# Patient Record
Sex: MALE | Race: BLACK OR AFRICAN AMERICAN | NOT HISPANIC OR LATINO | URBAN - METROPOLITAN AREA
[De-identification: names, ages, dates, MRNs, and addresses within clinical notes are randomized per-mention and may not be internally consistent; named-entity substitution may affect disease eponyms.]

---

## 2024-08-02 ENCOUNTER — OUTPATIENT (OUTPATIENT)
Dept: OUTPATIENT SERVICES | Facility: HOSPITAL | Age: 29
LOS: 1 days | End: 2024-08-02

## 2024-08-02 DIAGNOSIS — Z90.81 ACQUIRED ABSENCE OF SPLEEN: Chronic | ICD-10-CM

## 2024-08-02 DIAGNOSIS — Z98.890 OTHER SPECIFIED POSTPROCEDURAL STATES: Chronic | ICD-10-CM

## 2024-08-09 DIAGNOSIS — D57.42 SICKLE-CELL THALASSEMIA BETA ZERO WITHOUT CRISIS: ICD-10-CM

## 2024-08-09 DIAGNOSIS — G89.4 CHRONIC PAIN SYNDROME: ICD-10-CM

## 2024-08-09 DIAGNOSIS — R79.89 OTHER SPECIFIED ABNORMAL FINDINGS OF BLOOD CHEMISTRY: ICD-10-CM

## 2024-08-09 DIAGNOSIS — E83.19 OTHER DISORDERS OF IRON METABOLISM: ICD-10-CM

## 2024-08-24 ENCOUNTER — INPATIENT (INPATIENT)
Facility: HOSPITAL | Age: 29
LOS: 46 days | Discharge: HOME | End: 2024-10-10
Attending: HOSPITALIST | Admitting: HOSPITALIST
Payer: MEDICAID

## 2024-08-24 VITALS
HEART RATE: 90 BPM | RESPIRATION RATE: 20 BRPM | DIASTOLIC BLOOD PRESSURE: 104 MMHG | SYSTOLIC BLOOD PRESSURE: 162 MMHG | TEMPERATURE: 98 F | OXYGEN SATURATION: 93 %

## 2024-08-24 DIAGNOSIS — Z98.890 OTHER SPECIFIED POSTPROCEDURAL STATES: Chronic | ICD-10-CM

## 2024-08-24 DIAGNOSIS — Z90.81 ACQUIRED ABSENCE OF SPLEEN: Chronic | ICD-10-CM

## 2024-08-24 PROCEDURE — 99285 EMERGENCY DEPT VISIT HI MDM: CPT

## 2024-08-24 NOTE — ED ADULT TRIAGE NOTE - CHIEF COMPLAINT QUOTE
c/o right leg pain for the past week, thinks it is his SCC. c/o chest pain and SOB, feels like when he had acute chest. Denies dizziness, headache. Charge RN notified. c/o right leg pain for the past week, thinks it is his SCC. c/o chest pain and SOB, feels like when he had acute chest. Denies dizziness, headache. Charge RN notified. 4L nasal cannula applied.

## 2024-08-25 DIAGNOSIS — R94.31 ABNORMAL ELECTROCARDIOGRAM [ECG] [EKG]: ICD-10-CM

## 2024-08-25 DIAGNOSIS — D57.00 HB-SS DISEASE WITH CRISIS, UNSPECIFIED: ICD-10-CM

## 2024-08-25 DIAGNOSIS — E87.6 HYPOKALEMIA: ICD-10-CM

## 2024-08-25 DIAGNOSIS — Z29.9 ENCOUNTER FOR PROPHYLACTIC MEASURES, UNSPECIFIED: ICD-10-CM

## 2024-08-25 LAB
ALBUMIN SERPL ELPH-MCNC: 2.9 G/DL — LOW (ref 3.3–5)
ALP SERPL-CCNC: 315 U/L — HIGH (ref 40–120)
ALT FLD-CCNC: 42 U/L — HIGH (ref 4–41)
ANION GAP SERPL CALC-SCNC: 14 MMOL/L — SIGNIFICANT CHANGE UP (ref 7–14)
ANISOCYTOSIS BLD QL: SIGNIFICANT CHANGE UP
AST SERPL-CCNC: 94 U/L — HIGH (ref 4–40)
BASOPHILS # BLD AUTO: 0.3 K/UL — HIGH (ref 0–0.2)
BASOPHILS NFR BLD AUTO: 1.9 % — SIGNIFICANT CHANGE UP (ref 0–2)
BILIRUB DIRECT SERPL-MCNC: 3.4 MG/DL — HIGH (ref 0–0.3)
BILIRUB INDIRECT FLD-MCNC: 2.1 MG/DL — HIGH (ref 0–1)
BILIRUB SERPL-MCNC: 5.5 MG/DL — HIGH (ref 0.2–1.2)
BILIRUB SERPL-MCNC: 5.5 MG/DL — HIGH (ref 0.2–1.2)
BUN SERPL-MCNC: 6 MG/DL — LOW (ref 7–23)
CALCIUM SERPL-MCNC: 8.5 MG/DL — SIGNIFICANT CHANGE UP (ref 8.4–10.5)
CHLORIDE SERPL-SCNC: 94 MMOL/L — LOW (ref 98–107)
CO2 SERPL-SCNC: 27 MMOL/L — SIGNIFICANT CHANGE UP (ref 22–31)
CREAT SERPL-MCNC: 0.36 MG/DL — LOW (ref 0.5–1.3)
DACRYOCYTES BLD QL SMEAR: SLIGHT — SIGNIFICANT CHANGE UP
EGFR: 157 ML/MIN/1.73M2 — SIGNIFICANT CHANGE UP
EOSINOPHIL # BLD AUTO: 0.6 K/UL — HIGH (ref 0–0.5)
EOSINOPHIL NFR BLD AUTO: 3.8 % — SIGNIFICANT CHANGE UP (ref 0–6)
GIANT PLATELETS BLD QL SMEAR: PRESENT — SIGNIFICANT CHANGE UP
GLUCOSE SERPL-MCNC: 83 MG/DL — SIGNIFICANT CHANGE UP (ref 70–99)
HCT VFR BLD CALC: 23.2 % — LOW (ref 39–50)
HGB BLD-MCNC: 7 G/DL — CRITICAL LOW (ref 13–17)
HOWELL-JOLLY BOD BLD QL SMEAR: PRESENT — SIGNIFICANT CHANGE UP
HYPOCHROMIA BLD QL: SIGNIFICANT CHANGE UP
IANC: 10.31 K/UL — HIGH (ref 1.8–7.4)
LDH SERPL L TO P-CCNC: 821 U/L — HIGH (ref 135–225)
LYMPHOCYTES # BLD AUTO: 20.7 % — SIGNIFICANT CHANGE UP (ref 13–44)
LYMPHOCYTES # BLD AUTO: 3.26 K/UL — SIGNIFICANT CHANGE UP (ref 1–3.3)
MACROCYTES BLD QL: SIGNIFICANT CHANGE UP
MANUAL SMEAR VERIFICATION: SIGNIFICANT CHANGE UP
MCHC RBC-ENTMCNC: 20.3 PG — LOW (ref 27–34)
MCHC RBC-ENTMCNC: 30.2 GM/DL — LOW (ref 32–36)
MCV RBC AUTO: 67.2 FL — LOW (ref 80–100)
MICROCYTES BLD QL: SLIGHT — SIGNIFICANT CHANGE UP
MONOCYTES # BLD AUTO: 1.78 K/UL — HIGH (ref 0–0.9)
MONOCYTES NFR BLD AUTO: 11.3 % — SIGNIFICANT CHANGE UP (ref 2–14)
MYELOCYTES NFR BLD: 3.8 % — HIGH (ref 0–0)
NEUTROPHILS # BLD AUTO: 9.23 K/UL — HIGH (ref 1.8–7.4)
NEUTROPHILS NFR BLD AUTO: 58.5 % — SIGNIFICANT CHANGE UP (ref 43–77)
NRBC # BLD: 653 /100 WBCS — HIGH (ref 0–0)
PLAT MORPH BLD: ABNORMAL
PLATELET # BLD AUTO: 337 K/UL — SIGNIFICANT CHANGE UP (ref 150–400)
PLATELET COUNT - ESTIMATE: NORMAL — SIGNIFICANT CHANGE UP
POIKILOCYTOSIS BLD QL AUTO: SLIGHT — SIGNIFICANT CHANGE UP
POLYCHROMASIA BLD QL SMEAR: SIGNIFICANT CHANGE UP
POTASSIUM SERPL-MCNC: 3.1 MMOL/L — LOW (ref 3.5–5.3)
POTASSIUM SERPL-SCNC: 3.1 MMOL/L — LOW (ref 3.5–5.3)
PROT SERPL-MCNC: 8.1 G/DL — SIGNIFICANT CHANGE UP (ref 6–8.3)
RBC # BLD: 3.45 M/UL — LOW (ref 4.2–5.8)
RBC # BLD: 3.45 M/UL — LOW (ref 4.2–5.8)
RBC # FLD: 27.9 % — HIGH (ref 10.3–14.5)
RBC BLD AUTO: ABNORMAL
RETICS #: 600.6 K/UL — HIGH (ref 25–125)
RETICS/RBC NFR: 17.4 % — HIGH (ref 0.5–2.5)
SICKLE CELLS BLD QL SMEAR: SLIGHT — SIGNIFICANT CHANGE UP
SODIUM SERPL-SCNC: 135 MMOL/L — SIGNIFICANT CHANGE UP (ref 135–145)
SPHEROCYTES BLD QL SMEAR: SLIGHT — SIGNIFICANT CHANGE UP
TARGETS BLD QL SMEAR: SIGNIFICANT CHANGE UP
TROPONIN T, HIGH SENSITIVITY RESULT: <6 NG/L — SIGNIFICANT CHANGE UP
TROPONIN T, HIGH SENSITIVITY RESULT: <6 NG/L — SIGNIFICANT CHANGE UP
WBC # BLD: 15.77 K/UL — HIGH (ref 3.8–10.5)
WBC # FLD AUTO: 15.77 K/UL — HIGH (ref 3.8–10.5)

## 2024-08-25 PROCEDURE — 99252 IP/OBS CONSLTJ NEW/EST SF 35: CPT | Mod: GC

## 2024-08-25 PROCEDURE — 99223 1ST HOSP IP/OBS HIGH 75: CPT | Mod: GC

## 2024-08-25 PROCEDURE — 71046 X-RAY EXAM CHEST 2 VIEWS: CPT | Mod: 26

## 2024-08-25 RX ORDER — ENOXAPARIN SODIUM 150 MG/ML
40 INJECTION SUBCUTANEOUS EVERY 24 HOURS
Refills: 0 | Status: DISCONTINUED | OUTPATIENT
Start: 2024-08-25 | End: 2024-08-25

## 2024-08-25 RX ORDER — ONDANSETRON HCL/PF 4 MG/2 ML
4 VIAL (ML) INJECTION EVERY 6 HOURS
Refills: 0 | Status: DISCONTINUED | OUTPATIENT
Start: 2024-08-25 | End: 2024-10-10

## 2024-08-25 RX ORDER — KETOROLAC TROMETHAMINE 10 MG/1
10 TABLET, FILM COATED ORAL ONCE
Refills: 0 | Status: DISCONTINUED | OUTPATIENT
Start: 2024-08-25 | End: 2024-08-25

## 2024-08-25 RX ORDER — ACETAMINOPHEN 325 MG
1000 TABLET ORAL ONCE
Refills: 0 | Status: COMPLETED | OUTPATIENT
Start: 2024-08-25 | End: 2024-08-25

## 2024-08-25 RX ORDER — MORPHINE SULFATE 30 MG/1
4 TABLET, FILM COATED, EXTENDED RELEASE ORAL ONCE
Refills: 0 | Status: DISCONTINUED | OUTPATIENT
Start: 2024-08-25 | End: 2024-08-25

## 2024-08-25 RX ORDER — SENNOSIDES 8.6 MG
2 TABLET ORAL AT BEDTIME
Refills: 0 | Status: DISCONTINUED | OUTPATIENT
Start: 2024-08-25 | End: 2024-09-01

## 2024-08-25 RX ORDER — GABAPENTIN 800 MG/1
1 TABLET, FILM COATED ORAL
Refills: 0 | DISCHARGE

## 2024-08-25 RX ORDER — LIDOCAINE 50 MG/G
1 CREAM TOPICAL ONCE
Refills: 0 | Status: COMPLETED | OUTPATIENT
Start: 2024-08-25 | End: 2024-08-25

## 2024-08-25 RX ORDER — NALOXONE HYDROCHLORIDE 0.4 MG/ML
0.1 INJECTION, SOLUTION INTRAMUSCULAR; INTRAVENOUS; SUBCUTANEOUS
Refills: 0 | Status: DISCONTINUED | OUTPATIENT
Start: 2024-08-25 | End: 2024-10-10

## 2024-08-25 RX ORDER — SODIUM CHLORIDE IRRIG SOLUTION 0.9 %
1000 SOLUTION, IRRIGATION IRRIGATION
Refills: 0 | Status: DISCONTINUED | OUTPATIENT
Start: 2024-08-25 | End: 2024-09-01

## 2024-08-25 RX ORDER — ENOXAPARIN SODIUM 150 MG/ML
30 INJECTION SUBCUTANEOUS EVERY 24 HOURS
Refills: 0 | Status: DISCONTINUED | OUTPATIENT
Start: 2024-08-25 | End: 2024-09-19

## 2024-08-25 RX ORDER — ACETAMINOPHEN 325 MG
2 TABLET ORAL
Refills: 0 | DISCHARGE

## 2024-08-25 RX ORDER — HYDROXYUREA 500 MG/1
1000 CAPSULE ORAL
Refills: 0 | Status: DISCONTINUED | OUTPATIENT
Start: 2024-08-25 | End: 2024-08-26

## 2024-08-25 RX ORDER — MORPHINE SULFATE 30 MG/1
30 TABLET, FILM COATED, EXTENDED RELEASE ORAL
Refills: 0 | Status: DISCONTINUED | OUTPATIENT
Start: 2024-08-25 | End: 2024-08-26

## 2024-08-25 RX ORDER — KETOROLAC TROMETHAMINE 10 MG/1
15 TABLET, FILM COATED ORAL ONCE
Refills: 0 | Status: DISCONTINUED | OUTPATIENT
Start: 2024-08-25 | End: 2024-08-25

## 2024-08-25 RX ORDER — MORPHINE SULFATE 30 MG/1
2 TABLET, FILM COATED, EXTENDED RELEASE ORAL ONCE
Refills: 0 | Status: DISCONTINUED | OUTPATIENT
Start: 2024-08-25 | End: 2024-08-25

## 2024-08-25 RX ORDER — ACETAMINOPHEN 325 MG
650 TABLET ORAL EVERY 6 HOURS
Refills: 0 | Status: DISCONTINUED | OUTPATIENT
Start: 2024-08-25 | End: 2024-08-26

## 2024-08-25 RX ORDER — TYROSINE 500 MG
2 CAPSULE ORAL
Refills: 0 | DISCHARGE

## 2024-08-25 RX ORDER — MORPHINE SULFATE 30 MG/1
15 TABLET, FILM COATED, EXTENDED RELEASE ORAL
Refills: 0 | Status: DISCONTINUED | OUTPATIENT
Start: 2024-08-25 | End: 2024-08-31

## 2024-08-25 RX ORDER — CRIZANLIZUMAB 10 MG/ML
225 INJECTION INTRAVENOUS
Refills: 0 | DISCHARGE

## 2024-08-25 RX ORDER — TYROSINE 500 MG
10 CAPSULE ORAL
Refills: 0 | Status: DISCONTINUED | OUTPATIENT
Start: 2024-08-25 | End: 2024-10-10

## 2024-08-25 RX ORDER — HYDROXYUREA 500 MG/1
750 CAPSULE ORAL
Refills: 0 | Status: DISCONTINUED | OUTPATIENT
Start: 2024-08-25 | End: 2024-08-26

## 2024-08-25 RX ORDER — ACETAMINOPHEN 325 MG
1000 TABLET ORAL ONCE
Refills: 0 | Status: COMPLETED | OUTPATIENT
Start: 2024-08-25 | End: 2024-08-26

## 2024-08-25 RX ORDER — FOLIC ACID 1 MG/1
1 TABLET ORAL DAILY
Refills: 0 | Status: DISCONTINUED | OUTPATIENT
Start: 2024-08-25 | End: 2024-10-10

## 2024-08-25 RX ADMIN — FOLIC ACID 1 MILLIGRAM(S): 1 TABLET ORAL at 13:15

## 2024-08-25 RX ADMIN — Medication 400 MILLIGRAM(S): at 03:34

## 2024-08-25 RX ADMIN — KETOROLAC TROMETHAMINE 15 MILLIGRAM(S): 10 TABLET, FILM COATED ORAL at 03:13

## 2024-08-25 RX ADMIN — Medication 10 GRAM(S): at 18:48

## 2024-08-25 RX ADMIN — KETOROLAC TROMETHAMINE 10 MILLIGRAM(S): 10 TABLET, FILM COATED ORAL at 09:05

## 2024-08-25 RX ADMIN — KETOROLAC TROMETHAMINE 10 MILLIGRAM(S): 10 TABLET, FILM COATED ORAL at 08:52

## 2024-08-25 RX ADMIN — MORPHINE SULFATE 4 MILLIGRAM(S): 30 TABLET, FILM COATED, EXTENDED RELEASE ORAL at 04:46

## 2024-08-25 RX ADMIN — Medication 100 MILLILITER(S): at 10:40

## 2024-08-25 RX ADMIN — MORPHINE SULFATE 4 MILLIGRAM(S): 30 TABLET, FILM COATED, EXTENDED RELEASE ORAL at 05:25

## 2024-08-25 RX ADMIN — Medication 40 MILLIEQUIVALENT(S): at 18:48

## 2024-08-25 RX ADMIN — MORPHINE SULFATE 30 MILLILITER(S): 30 TABLET, FILM COATED, EXTENDED RELEASE ORAL at 10:42

## 2024-08-25 RX ADMIN — LIDOCAINE 1 PATCH: 50 CREAM TOPICAL at 04:46

## 2024-08-25 RX ADMIN — Medication 40 MILLIEQUIVALENT(S): at 13:15

## 2024-08-25 RX ADMIN — LIDOCAINE 1 PATCH: 50 CREAM TOPICAL at 07:19

## 2024-08-25 RX ADMIN — MORPHINE SULFATE 4 MILLIGRAM(S): 30 TABLET, FILM COATED, EXTENDED RELEASE ORAL at 02:11

## 2024-08-25 RX ADMIN — Medication 1000 MILLIGRAM(S): at 13:12

## 2024-08-25 RX ADMIN — MORPHINE SULFATE 4 MILLIGRAM(S): 30 TABLET, FILM COATED, EXTENDED RELEASE ORAL at 01:11

## 2024-08-25 RX ADMIN — MORPHINE SULFATE 2 MILLIGRAM(S): 30 TABLET, FILM COATED, EXTENDED RELEASE ORAL at 07:01

## 2024-08-25 RX ADMIN — ENOXAPARIN SODIUM 30 MILLIGRAM(S): 150 INJECTION SUBCUTANEOUS at 13:15

## 2024-08-25 RX ADMIN — KETOROLAC TROMETHAMINE 15 MILLIGRAM(S): 10 TABLET, FILM COATED ORAL at 02:11

## 2024-08-25 RX ADMIN — MORPHINE SULFATE 15 MILLIGRAM(S): 30 TABLET, FILM COATED, EXTENDED RELEASE ORAL at 18:42

## 2024-08-25 RX ADMIN — MORPHINE SULFATE 30 MILLILITER(S): 30 TABLET, FILM COATED, EXTENDED RELEASE ORAL at 20:50

## 2024-08-25 RX ADMIN — Medication 400 MILLIGRAM(S): at 12:57

## 2024-08-25 RX ADMIN — KETOROLAC TROMETHAMINE 15 MILLIGRAM(S): 10 TABLET, FILM COATED ORAL at 01:10

## 2024-08-25 RX ADMIN — MORPHINE SULFATE 4 MILLIGRAM(S): 30 TABLET, FILM COATED, EXTENDED RELEASE ORAL at 03:13

## 2024-08-25 RX ADMIN — Medication 1000 MILLIGRAM(S): at 04:39

## 2024-08-25 NOTE — H&P ADULT - NSHPSOCIALHISTORY_GEN_ALL_CORE
Lives in apartment with mother and grandmother. 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 Denies alcohol, drug, tobacco use.

## 2024-08-25 NOTE — H&P ADULT - NSHPLABSRESULTS_GEN_ALL_CORE
LABS:                          7.0    15.77 )-----------( 337      ( 25 Aug 2024 01:10 )             23.2     08-25    135  |  94<L>  |  6<L>  ----------------------------<  83  3.1<L>   |  27  |  0.36<L>    Ca    8.5      25 Aug 2024 01:10    TPro  8.1  /  Alb  2.9<L>  /  TBili  5.5<H>  /  DBili  3.4<H>  /  AST  94<H>  /  ALT  42<H>  /  AlkPhos  315<H>  08-25    LIVER FUNCTIONS - ( 25 Aug 2024 01:10 )  Alb: 2.9 g/dL / Pro: 8.1 g/dL / ALK PHOS: 315 U/L / ALT: 42 U/L / AST: 94 U/L / GGT: x                     Urinalysis Basic - ( 25 Aug 2024 01:10 )    Color: x / Appearance: x / SG: x / pH: x  Gluc: 83 mg/dL / Ketone: x  / Bili: x / Urobili: x   Blood: x / Protein: x / Nitrite: x   Leuk Esterase: x / RBC: x / WBC x   Sq Epi: x / Non Sq Epi: x / Bacteria: x      Radiology:  CXR 8/25/24  IMPRESSION:  No focal consolidation.

## 2024-08-25 NOTE — H&P ADULT - ATTENDING SUPERVISION STATEMENT
Discussed condition and exacerbating conditions/situations (e.g., dry/arid environments, overhead fans, air conditioners, side effect of medications). Resident

## 2024-08-25 NOTE — ED PROVIDER NOTE - ATTENDING CONTRIBUTION TO CARE
29-year-old male with history of sickle cell anemia, previous splenectomy presenting with right knee pain x 1 week and lateral torso pain x 2 days.  Patient reports symptoms are typical of his vaso-occlusive crises.  Denies associated fever, chills, shortness of breath, cough or URI symptoms.  He has been taking his home medications without any relief.  No preceding injury or fall.    Thin, lying in stretcher, awake and alert, nontoxic.  AF/VSS.  NCAT (+)scleral icterus.  Lungs cta bl.  Cards nl S1/S2, RRR, no MRG.  Abd soft ntnd.  No pedal edema or calf tenderness.  All extremities intact, no gross deformities, no focal neuro deficits.    Suspect vaso-occlusive crisis, rule out acute chest, underlying infection, worsening anemia, cardiac event.  Plan for EKG, labs, pain control, likely admit.

## 2024-08-25 NOTE — H&P ADULT - HISTORY OF PRESENT ILLNESS
24M with h/o sickle cell disease (h/o ACS, previous splenectomy) here with low back, bilateral knee, and left ankle pain since 9pm last night.  Pt has been taking home PO morphine and gabapentin without any relief.  He reports this pain is typical of his pain crisis.  Denies any fever, chills, cp, sob, abd pain, n/v/c/d.  No recent illnesses.  24M with sickle cell disease (hx acute chest syndrome, previous splenectomy) with L hip osteonecrosis and b/l shoulder osteonecrosis who presented to hospital for R knee pain and chest pain.     Patient states that he began noticing worsening R knee pain with swelling 1 week ago. 3 days ago started having R and L lateral chest pain. Yesterday started noticing b/l feet, hand, and forearm pain.  Home medications of PO morphine and gabapentin did not alleviate the pain. Denies any fever, chills, cp, sob, abd pain, n/v/c/d, no recent illness. His last hospitalization for sickle cell crisis was in 10/2023 and states that his current pain is similar to previous crisis pain. Has been following with his PCP Dr. Montano for management. States that he was getting Adakveo infusions, last received 2 months ago and was suppose to get monthly infusion but cancelled appointment due to being sick at the time.    In ED, vitals significant for /104, sating well on RA. Labs with Hb 7, K 3.1, elevated LFTs total bili 5.5, , AST 94, ALT 42. Troponin neg. CXR with course interstitial marking with no consolidation and avascular necrosis bL on humeral heads. Given toradol 15 MG x2, morphine 4 MG x3, morphine 2 MG x1, and tylenol. Patient seen and examined at bedside and reported 8/10 pain. Given another 10 MG toradol. Heme consulted.  24M with sickle cell disease (hx acute chest syndrome, previous splenectomy) with L hip osteonecrosis and b/l shoulder osteonecrosis who presented to hospital for R knee pain and chest pain.     Patient states that he began noticing worsening R knee pain with swelling 1 week ago. 3 days ago started having R and L lateral chest pain. Yesterday started noticing b/l feet, hand, and forearm pain.  Home medications of PO morphine and gabapentin did not alleviate the pain. Denies any fever, chills, cp, sob, abd pain, n/v/c/d, no recent illness. His last hospitalization for sickle cell crisis was in 10/2023 and states that his current pain is similar to previous crisis pain. Has been following with his PCP Dr. Montano for management. States that he was getting Adakveo infusions, last received 2 months ago and was suppose to get monthly infusion but cancelled appointment due to being sick at the time.    In ED, vitals significant for /104, sating well on RA. Labs with Hb 7, K 3.1, elevated LFTs total bili 5.5, , AST 94, ALT 42. EKG with TWI in V1-V3. Troponin neg. CXR with course interstitial marking with no consolidation and avascular necrosis bL on humeral heads. Given toradol 15 MG x2, morphine 4 MG x3, morphine 2 MG x1, and tylenol. Patient seen and examined at bedside and reported 8/10 pain. Given another 10 MG toradol. Heme consulted.

## 2024-08-25 NOTE — ED PROVIDER NOTE - NSICDXFAMILYHX_GEN_ALL_CORE_FT
FAMILY HISTORY:  Family history of sickle cell trait, mother and father with sickle cell trait, only child  Family history of sickle cell trait in father  Family history of sickle cell trait in mother

## 2024-08-25 NOTE — ED PROVIDER NOTE - NSICDXPASTMEDICALHX_GEN_ALL_CORE_FT
PAST MEDICAL HISTORY:  Acute chest syndrome     Acute chest syndrome     Cholelithiasis     Disease of biliary tract     Lumbago     Sickle Cell Disease     Sickle cell disease, type S beta-zero thalassemia, with splenic sequestration

## 2024-08-25 NOTE — H&P ADULT - NSHPPHYSICALEXAM_GEN_ALL_CORE
VITALS:   T(C): 36.6 (08-25-24 @ 07:21), Max: 36.9 (08-25-24 @ 04:49)  HR: 95 (08-25-24 @ 07:21) (73 - 105)  BP: 135/91 (08-25-24 @ 07:21) (131/93 - 162/104)  RR: 15 (08-25-24 @ 07:21) (12 - 20)  SpO2: 97% (08-25-24 @ 07:21) (93% - 99%)    GENERAL: NAD, very thin  HEAD:  Atraumatic, Normocephalic  EYES: EOMI, PERRLA, conjunctiva and sclera clear  ENT: Moist mucous membranes  NECK: Supple, No JVD  CHEST/LUNG: Clear to auscultation bilaterally; No rales, rhonchi, wheezing, or rubs. Unlabored respirations  HEART: Regular rate and rhythm; No murmurs, rubs, or gallops  ABDOMEN: BSx4; Soft, nontender, nondistended  EXTREMITIES:  R knee swelling, ROM intact. 2+ Peripheral Pulses, brisk capillary refill. No clubbing, cyanosis, or edema  NERVOUS SYSTEM:  A&Ox3, no focal deficits   SKIN: No rashes or lesions  Psych: Normal speech, normal behavior, normal affect

## 2024-08-25 NOTE — PATIENT PROFILE ADULT - FALL HARM RISK - HARM RISK INTERVENTIONS
Assistance with ambulation/Assistance OOB with selected safe patient handling equipment/Communicate Risk of Fall with Harm to all staff/Reinforce activity limits and safety measures with patient and family/Review medications for side effects contributing to fall risk/Sit up slowly, dangle for a short time, stand at bedside before walking/Tailored Fall Risk Interventions/Toileting schedule using arm’s reach rule for commode and bathroom/Visual Cue: Yellow wristband and red socks/Bed in lowest position, wheels locked, appropriate side rails in place/Call bell, personal items and telephone in reach/Instruct patient to call for assistance before getting out of bed or chair/Non-slip footwear when patient is out of bed/[place] to call system/Physically safe environment - no spills, clutter or unnecessary equipment/Purposeful Proactive Rounding/Room/bathroom lighting operational, light cord in reach

## 2024-08-25 NOTE — ED ADULT NURSE NOTE - CHIEF COMPLAINT QUOTE
Reason for Call:  Other order question    Detailed comments: Has questions regarding an order - when draw dates should be? If they can be a day before or after and please fax order to [phone]    Phone Number Patient can be reached at: Other phone number:  [phone]    Best Time: anytime    Can we leave a detailed message on this number? YES    Call taken on 6/15/2022 at 9:15 AM by Jinny Hu    
Shawn from patient facility requesting recheck date for INR be changed to Wednesdays for patient. There are multiple patients needing to be rechecked on Weds. Approval given and order faxed to Shawn at [phone].    Thanks! Amy Santizo RN    
c/o right leg pain for the past week, thinks it is his SCC. c/o chest pain and SOB, feels like when he had acute chest. Denies dizziness, headache. Charge RN notified. 4L nasal cannula applied.

## 2024-08-25 NOTE — CONSULT NOTE ADULT - ASSESSMENT
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 Hematology consulted for sickle cell crisis and to rule out acute chest syndrome.    Pt follows with Dr. Montano. Missed dose of adakveo for last 2 months. Pt with inadeduate pain control at this time , will need palliative care for pain regimen. Chest pain has resolved, no hypoxia, no consolidation in CXR. Acute chest syndrome less likely.     # Sickle cell pain crises  # Chest pain ( resolved)  # R knee pain and swelling  - Obtain palliative care consult for pain management ( PCA pump adjustment)  - resume home hydrea 1500mg daily, endari 10mg PO BID, oxbryta 1500mg daily  - incentive spirometry  - daily CBC with Diff, CMP, LDH, Haptoglobin, retic count, bilirubin fractionation  - obtain X ray knee  - obtain serum uric acid    Pt seen at bedside and discussed with attending Dr. Daniel Cummings.    Timi Green MD  [id number]  Hematology-Oncology Fellow  [facility]

## 2024-08-25 NOTE — H&P ADULT - PROBLEM SELECTOR PLAN 1
- follows with Dr. Montano, missed montly dose of Adakveo for 2 months which may explain SCC  - with R knee pain, CP  - low concern for acute chest given no opacities on CXR, chest pain more consistent with SCC  - hematology consulted  A/P:  - f/u R knee XR  - c/w 1/2 NS  - pain control with home morphine 15 MG ER, pca morphine pump with bowel regimen  - resume home hydrea and endari  - resume home morphine ER 15mg bid  - incentive spirometer  - daily labs, CBC w/ diff, LDH, haptoglobin, CMP, retic count  - will continue to monitor for signs of acute chest syndrome. If fever, culture and repeat CXR stat  - trend CBC, and transfuse for Hb < 7, consent obtained hgb 7

## 2024-08-25 NOTE — H&P ADULT - NSICDXFAMILYHX_GEN_ALL_CORE_FT
FAMILY HISTORY:  Family history of sickle cell trait, mother and father with sickle cell trait, only child  Family history of sickle cell trait in father  Family history of sickle cell trait in mother  FHx: dementia  FHx: hypertension

## 2024-08-25 NOTE — CONSULT NOTE ADULT - SUBJECTIVE AND OBJECTIVE BOX
HPI:  24M with sickle cell disease (hx acute chest syndrome, previous splenectomy) with L hip osteonecrosis and b/l shoulder osteonecrosis who presented to hospital for R knee pain and chest pain.     Patient states that he began noticing worsening R knee pain with swelling 1 week ago. 3 days ago started having R and L lateral chest pain. Yesterday started noticing b/l feet, hand, and forearm pain.  Home medications of PO morphine and gabapentin did not alleviate the pain. Denies any fever, chills, cp, sob, abd pain, n/v/c/d, no recent illness. His last hospitalization for sickle cell crisis was in 10/2023 and states that his current pain is similar to previous crisis pain. Has been following with his PCP Dr. Montano for management. States that he was getting Adakveo infusions, last received 2 months ago and was suppose to get monthly infusion but cancelled appointment due to being sick at the time.    In ED, vitals significant for /104, sating well on RA. Labs with Hb 7, K 3.1, elevated LFTs total bili 5.5, , AST 94, ALT 42. EKG with TWI in V1-V3. Troponin neg. CXR with course interstitial marking with no consolidation and avascular necrosis bL on humeral heads. Given toradol 15 MG x2, morphine 4 MG x3, morphine 2 MG x1, and tylenol. Patient seen and examined at bedside and reported 8/10 pain. Given another 10 MG toradol.     Hematology was consulted to rule out acute chest syndrome.    PAST MEDICAL & SURGICAL HISTORY:  Sickle Cell Disease      Disease of biliary tract      Lumbago      Acute chest syndrome      Sickle cell disease, type S beta-zero thalassemia, with splenic sequestration      Acute chest syndrome      Cholelithiasis      History of Splenectomy  3y/o      S/P splenectomy      History of biliary stent insertion          Allergies    No Known Allergies    Intolerances    Dilaudid (Other)      MEDICATIONS  (STANDING):  enoxaparin Injectable 30 milliGRAM(s) SubCutaneous every 24 hours  folic acid 1 milliGRAM(s) Oral daily  glutamine Powder 10 Gram(s) Oral two times a day  hydroxyurea 1000 milliGRAM(s) Oral <User Schedule>  hydroxyurea 750 milliGRAM(s) Oral <User Schedule>  morphine ER Tablet 15 milliGRAM(s) Oral two times a day  morphine PCA (5 mG/mL) 30 milliLiter(s) PCA Continuous PCA Continuous  polyethylene glycol 3350 17 Gram(s) Oral daily  potassium chloride   Powder 40 milliEquivalent(s) Oral every 4 hours  senna 2 Tablet(s) Oral at bedtime  sodium chloride 0.45%. 1000 milliLiter(s) (100 mL/Hr) IV Continuous <Continuous>    MEDICATIONS  (PRN):  naloxone Injectable 0.1 milliGRAM(s) IV Push every 3 minutes PRN For ANY of the following changes in patient status:  A. RR LESS THAN 10 breaths per minute, B. Oxygen saturation LESS THAN 90%, C. Sedation score of 6  ondansetron Injectable 4 milliGRAM(s) IV Push every 6 hours PRN Nausea      FAMILY HISTORY:  Family history of sickle cell trait  mother and father with sickle cell trait, only child    Family history of sickle cell trait in father    Family history of sickle cell trait in mother    FHx: hypertension    FHx: dementia        SOCIAL HISTORY: No EtOH, no tobacco    REVIEW OF SYSTEMS:    CONSTITUTIONAL: No weakness, fevers or chills  EYES/ENT: No visual changes;  No vertigo or throat pain   NECK: No pain or stiffness  RESPIRATORY: No cough, wheezing, hemoptysis; No shortness of breath  CARDIOVASCULAR: No chest pain or palpitations  GASTROINTESTINAL: No abdominal or epigastric pain. No nausea, vomiting, or hematemesis; No diarrhea or constipation. No melena or hematochezia.  GENITOURINARY: No dysuria, frequency or hematuria  NEUROLOGICAL: No numbness or weakness  SKIN: No itching, burning, rashes, or lesions   All other review of systems is negative unless indicated above.        T(F): 98 (08-25-24 @ 14:53), Max: 98.5 (08-25-24 @ 04:49)  HR: 94 (08-25-24 @ 14:53)  BP: 134/81 (08-25-24 @ 14:53)  RR: 17 (08-25-24 @ 14:53)  SpO2: 97% (08-25-24 @ 14:53)  Wt(kg): --    GENERAL: NAD, well-developed  HEAD:  Atraumatic, Normocephalic  EYES: EOMI, PERRLA, conjunctiva and sclera clear  NECK: Supple, No JVD  CHEST/LUNG: Clear to auscultation bilaterally; No wheeze  HEART: Regular rate and rhythm; No murmurs, rubs, or gallops  ABDOMEN: Soft, Nontender, Nondistended; Bowel sounds present  EXTREMITIES:  R Knee swollen, warm, tender to touch.   NEUROLOGY: non-focal  SKIN: No rashes or lesions                          7.0    15.77 )-----------( 337      ( 25 Aug 2024 01:10 )             23.2       08-25    135  |  94<L>  |  6<L>  ----------------------------<  83  3.1<L>   |  27  |  0.36<L>    Ca    8.5      25 Aug 2024 01:10    TPro  8.1  /  Alb  2.9<L>  /  TBili  5.5<H>  /  DBili  3.4<H>  /  AST  94<H>  /  ALT  42<H>  /  AlkPhos  315<H>  08-25      Lactate Dehydrogenase, Serum: 821 U/L (08-25 @ 01:10)

## 2024-08-25 NOTE — H&P ADULT - ATTENDING COMMENTS
Patient seen and examined, case d/w house staff.    29M with SCD, hx of acute chest, L hip/shoulder osteonecrosis, splenectomy, p/w 1 wk hx of worsening pains in his right knee/foot, chest ribs, progressing to b/l forearm pain, has been taking morphine IR 15 mg 4x/day (usual dose once daily) in addition to long acting Morphine 15mg bid. He reports he got Adakveo(crizanlizumab) infusion in June 20's, missed last infusion. He denies fever/chill/chest pain/sob/productive cough /gi or gu symptoms.     PE: cachectic, frail young man, lungs clear, heart regular, abdomen soft, nt/nd, extrem: R knee ttp, warm, muscle atrophy    Labs: wbc 15.7, hb 7 (hb 7.2 in 2022 admission) plts 337, Tn<6 x2, k 3.1, cr 0.36, tb 5.5, ap 315, ast/alt 94/42, ldh 821, indirect bili 2.1, direct bili 3.4, CXR no focal infiltrate,   Home meds reviewed: folic acid 1mg, hydrea 1g M-F, 750mg Sat-Sun, Endari 10g bid, pepcid 20mg prn, morphine ER 15mg bid, IR 15mg qd (in crisis 3-4x/day)    A/P:  # sickle crisis  - 1/2 NS  - pca morphine pump 1.5mg q6min, 4h lockout 20mg, reports intolerance to dilaudid pca ; bowel regimen  - resume home hydrea and endari  - resume home morphine ER 15mg bid  - incentive spirometer  -daily labs, CBC w/ diff, LDH, haptoglobin, CMP, retic count  - monitor for signs of acute chest syndrome, if fever, culture and repeat CXR stat  - trend CBC, hgb 7, no transfusion for now  - DVT ppx lovenox    # abnormal ECG, TWI V1-V3 noted and reviewed, check TTE, likely demand ischemia in s/o sickle crisis/anemia, Tn negative Patient seen and examined, case d/w house staff.    29M with SCD, hx of acute chest, L hip/shoulder osteonecrosis, splenectomy, p/w 1 wk hx of worsening pains in his right knee/foot, chest ribs, progressing to b/l forearm pain, has been taking morphine IR 15 mg 4x/day (usual dose once daily) in addition to long acting Morphine 15mg bid. He reports he got Adakveo(crizanlizumab) infusion in June 20's, missed last infusion. He denies fever/chill/chest pain/sob/productive cough /gi or gu symptoms.     PE: cachectic, frail young man, lungs clear, heart regular, abdomen soft, nt/nd, extrem: R knee ttp, warm, muscle atrophy    Labs: wbc 15.7, hb 7 (hb 7.2 in 2022 admission) plts 337, Tn<6 x2, k 3.1, cr 0.36, tb 5.5, ap 315, ast/alt 94/42, ldh 821, indirect bili 2.1, direct bili 3.4, CXR no focal infiltrate,   Home meds reviewed: folic acid 1mg, hydrea 1g M-F, 750mg Sat-Sun, Endari 10g bid, pepcid 20mg prn, morphine ER 15mg bid, IR 15mg qd (in crisis 3-4x/day)    A/P:  # sickle crisis, pt missed Adakveo(crizanlizumab) infusion in July  - 1/2 NS  - pca morphine pump 1.5mg q6min, 4h lockout 20mg, reports intolerance to dilaudid pca ; bowel regimen  - resume home hydrea and endari  - resume home morphine ER 15mg bid  - incentive spirometer  -daily labs, CBC w/ diff, LDH, haptoglobin, CMP, retic count  - monitor for signs of acute chest syndrome, if fever, culture and repeat CXR stat  - trend CBC, hgb 7, no transfusion for now  - replete lytes, hypokalemia replete, keep K>4 and Mg>2  - DVT ppx lovenox    # abnormal ECG, TWI V1-V3 noted and reviewed, check TTE, likely demand ischemia in s/o sickle crisis/anemia, Tn negative

## 2024-08-25 NOTE — PATIENT PROFILE ADULT - FUNCTIONAL ASSESSMENT - BASIC MOBILITY 6.
3-calculated by average/Not able to assess (calculate score using [facility] averaging method)

## 2024-08-25 NOTE — H&P ADULT - PROBLEM SELECTOR PLAN 4
- Fluids: 1/2 NS  - Electrolytes: Will replete to maintain K>4, Phos>3, and Mag>2  - Diet: regular  - Activity: as toelrated  - DVT Prophylaxis: lovenox  - Disposition: pending medical optimization

## 2024-08-25 NOTE — ED ADULT NURSE REASSESSMENT NOTE - NS ED NURSE REASSESS COMMENT FT1
Patient still c/o pain; MD Guillen made aware, patient medicated per chart. Patient admitted to tele pending bed assignment. Patient on cardia monitor; NSR. Comfort measures maintained. Bed in lowest position. Safety maintained.

## 2024-08-25 NOTE — ED PROVIDER NOTE - CLINICAL SUMMARY MEDICAL DECISION MAKING FREE TEXT BOX
29-year-old male with PMH sickle cell disease with multiple episodes of acute chest syndrome, left hip osteonecrosis, osteonecrosis of his shoulders, asplenic, on penicillin, morphine 15 mg p.o. at home, albuterol presents for 1 week of right knee pain and 2 days of left-sided lateral chest and right-sided lateral chest pain consistent with prior sickle cell pain crises and acute chest syndrome episodes.  Denies fever, chills, cough, congestion, rhinorrhea, shortness of breath.  Denies nausea, vomiting, abdominal pain. Denies trauma or falls.      Gen: well appearing, NAD  HEENT: +scleral icterus b/l  Cardiac: regular rate rhythm, normal S1S2  Chest: CTA BL, no wheeze or crackles  Abdomen: normal BS, soft, NT  Extremity: no gross deformity, good perfusion, MAEx4, +R knee swelling and warmth but intact passive ROM  Skin: no rash  Neuro: grossly normal    Concern for sickle cell pain crisis vs acute chest. WIll obtain labs, ekg, trop, give analgesia. Dispo pending results and per clinical course.

## 2024-08-25 NOTE — ED PROVIDER NOTE - NSICDXPASTSURGICALHX_GEN_ALL_CORE_FT
PAST SURGICAL HISTORY:  History of biliary stent insertion     History of Splenectomy 5y/o    S/P splenectomy

## 2024-08-25 NOTE — ED ADULT NURSE REASSESSMENT NOTE - NS ED NURSE REASSESS COMMENT FT1
Patient c/o 8/10 right leg pain; MD Guillen made aware, patient to be medicated per chart. VS noted. Respirations even and unlabored, chest rise symmetrical b/l. Denies chest pain, SOB, N/V, fever or chills. Comfort measures maintained. Bed in lowest position. Safety Maintained.

## 2024-08-25 NOTE — PATIENT PROFILE ADULT - HAVE YOU BEEN EATING POORLY BECAUSE OF A DECREASED APPETITE?
You can access the FollowMyHealth Patient Portal offered by [facility] by registering at the following website: [email or web address]. By joining [facility]’s FollowMyHealth portal, you will also be able to view your health information using other applications (apps) compatible with our system.
No (0)

## 2024-08-25 NOTE — ED ADULT NURSE NOTE - OBJECTIVE STATEMENT
BREAK RN: Pt received to room 5 a&ox4, ambulatory, on room air coming to ED c/o sickle cell crisis. Pt history of sickle cell, acute chest syndrome. Pt arrives c/o b/l leg pain starting x1 week ago. Pt takes morphine and gabapentin at home, states he has not been taking gabapentin, morphine not helping pain. Pt states x3 days ago, chest pain started, states it hurts when breathing. Pt arrives to room on 2LNC, pt transferred to room air, o2 96-98%. Pt respirations even and unlabored, chest rise and fall equal b/l. Pt denies HA, dizziness, N/V/D, fever/chills. 20g placed to RAC, labs collected and sent. Pt medicated per EMAR. Pt pending XR. Stretcher in lowest position, call bell within reach, pt safety maintained.

## 2024-08-25 NOTE — H&P ADULT - NSHPREVIEWOFSYSTEMS_GEN_ALL_CORE
REVIEW OF SYSTEMS:  CONSTITUTIONAL: No weakness, fevers, chills, sick contacts, or unintended weight loss  EYES: No visual changes or vertigo  ENT: No throat pain, rhinorrhea, or hearing loss   NECK: No pain or stiffness  RESPIRATORY: No cough, wheezing, hemoptysis; No shortness of breath  CARDIOVASCULAR: No chest pain or palpitations  GASTROINTESTINAL: No abdominal or epigastric pain. No nausea, vomiting, or hematemesis; No diarrhea or constipation. No melena or hematochezia.  GENITOURINARY: No dysuria, frequency or hematuria  NEUROLOGICAL: No numbness or weakness  SKIN: No itching, rashes, or bruises  Psych: Good mood, no substance use REVIEW OF SYSTEMS:  CONSTITUTIONAL: No weakness, fevers, chills, sick contacts, or unintended weight loss  EYES: No visual changes or vertigo  ENT: No throat pain, rhinorrhea, or hearing loss   NECK: No pain or stiffness  RESPIRATORY: No cough, wheezing, hemoptysis; No shortness of breath  CARDIOVASCULAR: No chest pain or palpitations  GASTROINTESTINAL: No abdominal or epigastric pain. No nausea, vomiting, or hematemesis; No diarrhea or constipation. No melena or hematochezia.  GENITOURINARY: No dysuria, frequency or hematuria  NEUROLOGICAL: No numbness or weakness  SKIN: No itching, rashes, or bruises  MSK: per HPI  Psych: Good mood, no substance use

## 2024-08-25 NOTE — H&P ADULT - ASSESSMENT
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 Hematology consulted.

## 2024-08-25 NOTE — ED PROVIDER NOTE - CARE PLAN
Add 52 Modifier (Optional): no Detail Level: Zone Consent: The patient's consent was obtained including but not limited to risks of crusting, scabbing, blistering, scarring, darker or lighter pigmentary change, recurrence, incomplete removal and infection. Medical Necessity Information: It is in your best interest to select a reason for this procedure from the list below. All of these items fulfill various CMS LCD requirements except the new and changing color options. Post-Care Instructions: I reviewed with the patient in detail post-care instructions. Patient is to avoid picking at any of the treated lesions. Pt may apply Vaseline to crusted or scabbing areas. Medical Necessity Clause: This procedure was medically necessary because the lesions that were treated were: Principal Discharge DX:	Sickle cell pain crisis   Render Post-Care Instructions In Note?: yes 1

## 2024-08-25 NOTE — CONSULT NOTE ADULT - ATTENDING COMMENTS
30 y/o man with sickle cell disease admitted with pain crisis, no fever or lung consolidation to suggest ACS, no neurologic compromise, Pain control w PCA pump via primary team or palliative care.  Gopal Cummings MD PhD  Oncology Attending

## 2024-08-26 DIAGNOSIS — M25.561 PAIN IN RIGHT KNEE: ICD-10-CM

## 2024-08-26 LAB
ALBUMIN SERPL ELPH-MCNC: 2.5 G/DL — LOW (ref 3.3–5)
ALP SERPL-CCNC: 277 U/L — HIGH (ref 40–120)
ALT FLD-CCNC: 30 U/L — SIGNIFICANT CHANGE UP (ref 4–41)
ANION GAP SERPL CALC-SCNC: 13 MMOL/L — SIGNIFICANT CHANGE UP (ref 7–14)
APPEARANCE UR: ABNORMAL
AST SERPL-CCNC: 76 U/L — HIGH (ref 4–40)
BACTERIA # UR AUTO: ABNORMAL /HPF
BASOPHILS # BLD AUTO: 0.1 K/UL — SIGNIFICANT CHANGE UP (ref 0–0.2)
BASOPHILS NFR BLD AUTO: 0.7 % — SIGNIFICANT CHANGE UP (ref 0–2)
BILIRUB SERPL-MCNC: 3.9 MG/DL — HIGH (ref 0.2–1.2)
BILIRUB UR-MCNC: ABNORMAL
BLD GP AB SCN SERPL QL: NEGATIVE — SIGNIFICANT CHANGE UP
BUN SERPL-MCNC: 5 MG/DL — LOW (ref 7–23)
CALCIUM SERPL-MCNC: 8.2 MG/DL — LOW (ref 8.4–10.5)
CAST: 1 /LPF — SIGNIFICANT CHANGE UP (ref 0–4)
CHLORIDE SERPL-SCNC: 100 MMOL/L — SIGNIFICANT CHANGE UP (ref 98–107)
CO2 SERPL-SCNC: 22 MMOL/L — SIGNIFICANT CHANGE UP (ref 22–31)
COLOR SPEC: SIGNIFICANT CHANGE UP
COMMENT - URINE: SIGNIFICANT CHANGE UP
CREAT SERPL-MCNC: 0.28 MG/DL — LOW (ref 0.5–1.3)
DIFF PNL FLD: NEGATIVE — SIGNIFICANT CHANGE UP
EGFR: 170 ML/MIN/1.73M2 — SIGNIFICANT CHANGE UP
EOSINOPHIL # BLD AUTO: 0.02 K/UL — SIGNIFICANT CHANGE UP (ref 0–0.5)
EOSINOPHIL NFR BLD AUTO: 0.1 % — SIGNIFICANT CHANGE UP (ref 0–6)
GLUCOSE SERPL-MCNC: 87 MG/DL — SIGNIFICANT CHANGE UP (ref 70–99)
GLUCOSE UR QL: NEGATIVE MG/DL — SIGNIFICANT CHANGE UP
HAPTOGLOB SERPL-MCNC: <20 MG/DL — LOW (ref 34–200)
HCT VFR BLD CALC: 23.1 % — LOW (ref 39–50)
HGB BLD-MCNC: 6.8 G/DL — CRITICAL LOW (ref 13–17)
IANC: 10.06 K/UL — HIGH (ref 1.8–7.4)
IMM GRANULOCYTES NFR BLD AUTO: 2 % — HIGH (ref 0–0.9)
KETONES UR-MCNC: NEGATIVE MG/DL — SIGNIFICANT CHANGE UP
LDH SERPL L TO P-CCNC: 735 U/L — HIGH (ref 135–225)
LEUKOCYTE ESTERASE UR-ACNC: ABNORMAL
LYMPHOCYTES # BLD AUTO: 21.9 % — SIGNIFICANT CHANGE UP (ref 13–44)
LYMPHOCYTES # BLD AUTO: 3.27 K/UL — SIGNIFICANT CHANGE UP (ref 1–3.3)
MAGNESIUM SERPL-MCNC: 1.8 MG/DL — SIGNIFICANT CHANGE UP (ref 1.6–2.6)
MCHC RBC-ENTMCNC: 19.9 PG — LOW (ref 27–34)
MCHC RBC-ENTMCNC: 29.4 GM/DL — LOW (ref 32–36)
MCV RBC AUTO: 67.7 FL — LOW (ref 80–100)
MONOCYTES # BLD AUTO: 1.21 K/UL — HIGH (ref 0–0.9)
MONOCYTES NFR BLD AUTO: 8.1 % — SIGNIFICANT CHANGE UP (ref 2–14)
NEUTROPHILS # BLD AUTO: 10.06 K/UL — HIGH (ref 1.8–7.4)
NEUTROPHILS NFR BLD AUTO: 67.2 % — SIGNIFICANT CHANGE UP (ref 43–77)
NITRITE UR-MCNC: NEGATIVE — SIGNIFICANT CHANGE UP
NRBC # BLD: 379 /100 WBCS — HIGH (ref 0–0)
NRBC # FLD: 56.69 K/UL — HIGH (ref 0–0)
PH UR: 6.5 — SIGNIFICANT CHANGE UP (ref 5–8)
PHOSPHATE SERPL-MCNC: 2.5 MG/DL — SIGNIFICANT CHANGE UP (ref 2.5–4.5)
PLATELET # BLD AUTO: 349 K/UL — SIGNIFICANT CHANGE UP (ref 150–400)
POTASSIUM SERPL-MCNC: 3.8 MMOL/L — SIGNIFICANT CHANGE UP (ref 3.5–5.3)
POTASSIUM SERPL-SCNC: 3.8 MMOL/L — SIGNIFICANT CHANGE UP (ref 3.5–5.3)
PROT SERPL-MCNC: 7.3 G/DL — SIGNIFICANT CHANGE UP (ref 6–8.3)
PROT UR-MCNC: SIGNIFICANT CHANGE UP MG/DL
RBC # BLD: 3.41 M/UL — LOW (ref 4.2–5.8)
RBC # BLD: 3.41 M/UL — LOW (ref 4.2–5.8)
RBC # FLD: 27.9 % — HIGH (ref 10.3–14.5)
RBC CASTS # UR COMP ASSIST: < 5 /HPF — SIGNIFICANT CHANGE UP (ref 0–4)
RETICS #: 623.4 K/UL — HIGH (ref 25–125)
RETICS/RBC NFR: 18.4 % — HIGH (ref 0.5–2.5)
REVIEW: SIGNIFICANT CHANGE UP
RH IG SCN BLD-IMP: POSITIVE — SIGNIFICANT CHANGE UP
SODIUM SERPL-SCNC: 135 MMOL/L — SIGNIFICANT CHANGE UP (ref 135–145)
SP GR SPEC: 1.02 — SIGNIFICANT CHANGE UP (ref 1–1.03)
SQUAMOUS # UR AUTO: 0 /HPF — SIGNIFICANT CHANGE UP (ref 0–5)
URATE SERPL-MCNC: 5.7 MG/DL — SIGNIFICANT CHANGE UP (ref 3.4–8.8)
UROBILINOGEN FLD QL: 2 MG/DL (ref 0.2–1)
WBC # BLD: 14.96 K/UL — HIGH (ref 3.8–10.5)
WBC # FLD AUTO: 14.96 K/UL — HIGH (ref 3.8–10.5)
WBC UR QL: 0 /HPF — SIGNIFICANT CHANGE UP (ref 0–5)

## 2024-08-26 PROCEDURE — 73560 X-RAY EXAM OF KNEE 1 OR 2: CPT | Mod: 26,RT

## 2024-08-26 PROCEDURE — 99233 SBSQ HOSP IP/OBS HIGH 50: CPT | Mod: GC

## 2024-08-26 PROCEDURE — 93306 TTE W/DOPPLER COMPLETE: CPT | Mod: 26

## 2024-08-26 RX ORDER — ACETAMINOPHEN 325 MG
650 TABLET ORAL EVERY 6 HOURS
Refills: 0 | Status: DISCONTINUED | OUTPATIENT
Start: 2024-08-26 | End: 2024-08-27

## 2024-08-26 RX ORDER — HYDROXYUREA 500 MG/1
1500 CAPSULE ORAL DAILY
Refills: 0 | Status: DISCONTINUED | OUTPATIENT
Start: 2024-08-26 | End: 2024-09-19

## 2024-08-26 RX ORDER — KETOROLAC TROMETHAMINE 10 MG/1
15 TABLET, FILM COATED ORAL EVERY 6 HOURS
Refills: 0 | Status: DISCONTINUED | OUTPATIENT
Start: 2024-08-26 | End: 2024-08-31

## 2024-08-26 RX ORDER — KETOROLAC TROMETHAMINE 10 MG/1
15 TABLET, FILM COATED ORAL EVERY 6 HOURS
Refills: 0 | Status: DISCONTINUED | OUTPATIENT
Start: 2024-08-26 | End: 2024-08-26

## 2024-08-26 RX ORDER — VOXELOTOR 300 MG/1
1500 TABLET, FOR SUSPENSION ORAL DAILY
Refills: 0 | Status: DISCONTINUED | OUTPATIENT
Start: 2024-08-26 | End: 2024-09-26

## 2024-08-26 RX ORDER — MORPHINE SULFATE 30 MG/1
30 TABLET, FILM COATED, EXTENDED RELEASE ORAL
Refills: 0 | Status: DISCONTINUED | OUTPATIENT
Start: 2024-08-26 | End: 2024-08-28

## 2024-08-26 RX ADMIN — MORPHINE SULFATE 30 MILLILITER(S): 30 TABLET, FILM COATED, EXTENDED RELEASE ORAL at 20:33

## 2024-08-26 RX ADMIN — MORPHINE SULFATE 15 MILLIGRAM(S): 30 TABLET, FILM COATED, EXTENDED RELEASE ORAL at 18:45

## 2024-08-26 RX ADMIN — FOLIC ACID 1 MILLIGRAM(S): 1 TABLET ORAL at 11:45

## 2024-08-26 RX ADMIN — ENOXAPARIN SODIUM 30 MILLIGRAM(S): 150 INJECTION SUBCUTANEOUS at 11:45

## 2024-08-26 RX ADMIN — Medication 10 GRAM(S): at 23:59

## 2024-08-26 RX ADMIN — Medication 400 MILLIGRAM(S): at 00:13

## 2024-08-26 RX ADMIN — Medication 100 MILLILITER(S): at 16:20

## 2024-08-26 RX ADMIN — MORPHINE SULFATE 15 MILLIGRAM(S): 30 TABLET, FILM COATED, EXTENDED RELEASE ORAL at 07:08

## 2024-08-26 RX ADMIN — Medication 10 GRAM(S): at 07:08

## 2024-08-26 RX ADMIN — KETOROLAC TROMETHAMINE 15 MILLIGRAM(S): 10 TABLET, FILM COATED ORAL at 18:24

## 2024-08-26 RX ADMIN — Medication 100 MILLILITER(S): at 07:17

## 2024-08-26 RX ADMIN — MORPHINE SULFATE 30 MILLILITER(S): 30 TABLET, FILM COATED, EXTENDED RELEASE ORAL at 08:56

## 2024-08-26 RX ADMIN — MORPHINE SULFATE 15 MILLIGRAM(S): 30 TABLET, FILM COATED, EXTENDED RELEASE ORAL at 18:25

## 2024-08-26 RX ADMIN — MORPHINE SULFATE 30 MILLILITER(S): 30 TABLET, FILM COATED, EXTENDED RELEASE ORAL at 16:20

## 2024-08-26 RX ADMIN — HYDROXYUREA 1000 MILLIGRAM(S): 500 CAPSULE ORAL at 07:08

## 2024-08-26 RX ADMIN — KETOROLAC TROMETHAMINE 15 MILLIGRAM(S): 10 TABLET, FILM COATED ORAL at 23:57

## 2024-08-26 RX ADMIN — Medication 650 MILLIGRAM(S): at 11:45

## 2024-08-26 RX ADMIN — KETOROLAC TROMETHAMINE 15 MILLIGRAM(S): 10 TABLET, FILM COATED ORAL at 18:45

## 2024-08-26 NOTE — PROGRESS NOTE ADULT - PROBLEM SELECTOR PLAN 2
- EKG with TWI V1-V3 (anterior leads)  - troponin neg  - maybe 2/2 demand iso SCC  Plan   - TTE - concern for possible septic arthritis given severe tenderness, swelling, warmth. Other items on differential include bone infarction, osteonecrosis  - afebrile   Plan  - expedite R knee XR

## 2024-08-26 NOTE — PROGRESS NOTE ADULT - PROBLEM SELECTOR PLAN 4
- Fluids: 1/2 NS  - Electrolytes: Will replete to maintain K>4, Phos>3, and Mag>2  - Diet: regular  - Activity: as tolerated  - DVT Prophylaxis: lovenox  - Disposition: pending medical optimization - K 3.1, repleated  Plan  - CTM and replete as needed

## 2024-08-26 NOTE — PROGRESS NOTE ADULT - PROBLEM SELECTOR PLAN 3
- K 3.1, repleated  Plan  - CTM and replete as needed - EKG with TWI V1-V3 (anterior leads)  - troponin neg  - maybe 2/2 demand iso SCC  Plan   - TTE

## 2024-08-26 NOTE — PROGRESS NOTE ADULT - PROBLEM SELECTOR PLAN 5
- Fluids: 1/2 NS  - Electrolytes: Will replete to maintain K>4, Phos>3, and Mag>2  - Diet: regular  - Activity: as tolerated  - DVT Prophylaxis: lovenox  - Disposition: pending medical optimization

## 2024-08-26 NOTE — PROGRESS NOTE ADULT - ATTENDING COMMENTS
Patient seen and examined at bedside. In brief, 29M with SCD, hx of acute chest, L hip/shoulder osteonecrosis, splenectomy, p/w 1 wk hx of worsening pains in his right knee/foot, chest ribs, progressing to b/l forearm pain, has been taking morphine IR 15 mg 4x/day (usual dose once daily) in addition to long acting Morphine 15mg bid. He reports he got Adakveo(crizanlizumab) infusion in June 20's, missed last infusion. He denies fever/chill/chest pain/sob/productive cough /gi or gu symptoms.         # sickle crisis, pt missed Adakveo(crizanlizumab) infusion in July  - 1/2 NS  - pca morphine pump 1.5mg q6min, 4h lockout 20mg, patient reports dose does not relieve pain, will increase demand dose to 2mg reports intolerance to dilaudid pca ; uptitrate bowel regimen: miralax BID and vinay   - resume home hydrea and endari  - resume home morphine ER 15mg bid  - incentive spirometer  -daily labs, CBC w/ diff, LDH, haptoglobin, CMP, retic count  - monitor for signs of acute chest syndrome, if fever, culture and repeat CXR stat  - trend CBC, hgb 7, no transfusion for now  - replete lytes, hypokalemia replete, keep K>4 and Mg>2  - DVT ppx lovenox    # abnormal ECG, TWI V1-V3 noted and reviewed, TTE,:  Left ventricular systolic function is normal with an ejection fraction visually estimated at 50 to 55 %. no reported wall motion abnormality likely demand ischemia in s/o sickle crisis/anemia, Tn negative.

## 2024-08-26 NOTE — PROGRESS NOTE ADULT - SUBJECTIVE AND OBJECTIVE BOX
PROGRESS NOTE:     Patient is a 29y old  Male who presents with a chief complaint of sickle cell crisis (25 Aug 2024 15:57)      INTERVAL HISTORY: Ua performed for dark urine, specimen from urinal. Given IV tylenol. NAEO. VSS. ROS negative.    MEDICATIONS  (STANDING):  enoxaparin Injectable 30 milliGRAM(s) SubCutaneous every 24 hours  folic acid 1 milliGRAM(s) Oral daily  glutamine Powder 10 Gram(s) Oral two times a day  hydroxyurea 750 milliGRAM(s) Oral <User Schedule>  hydroxyurea 1000 milliGRAM(s) Oral <User Schedule>  morphine ER Tablet 15 milliGRAM(s) Oral two times a day  morphine PCA (5 mG/mL) 30 milliLiter(s) PCA Continuous PCA Continuous  polyethylene glycol 3350 17 Gram(s) Oral daily  senna 2 Tablet(s) Oral at bedtime  sodium chloride 0.45%. 1000 milliLiter(s) (100 mL/Hr) IV Continuous <Continuous>    MEDICATIONS  (PRN):  acetaminophen     Tablet .. 650 milliGRAM(s) Oral every 6 hours PRN Moderate Pain (4 - 6), Severe Pain (7 - 10)  naloxone Injectable 0.1 milliGRAM(s) IV Push every 3 minutes PRN For ANY of the following changes in patient status:  A. RR LESS THAN 10 breaths per minute, B. Oxygen saturation LESS THAN 90%, C. Sedation score of 6  ondansetron Injectable 4 milliGRAM(s) IV Push every 6 hours PRN Nausea      CAPILLARY BLOOD GLUCOSE        I&O's Summary    25 Aug 2024 07:01  -  26 Aug 2024 07:00  --------------------------------------------------------  IN: 0 mL / OUT: 600 mL / NET: -600 mL        PHYSICAL EXAM:  Vital Signs Last 24 Hrs  T(C): 36.8 (26 Aug 2024 07:00), Max: 36.9 (25 Aug 2024 17:19)  T(F): 98.3 (26 Aug 2024 07:00), Max: 98.4 (25 Aug 2024 17:19)  HR: 85 (26 Aug 2024 07:00) (85 - 105)  BP: 131/80 (26 Aug 2024 07:00) (125/79 - 143/95)  BP(mean): --  RR: 19 (26 Aug 2024 07:00) (17 - 19)  SpO2: 99% (26 Aug 2024 07:00) (96% - 99%)    Parameters below as of 26 Aug 2024 07:00  Patient On (Oxygen Delivery Method): room air        CONSTITUTIONAL: NAD, well-developed  HEENT:   RESPIRATORY: Normal respiratory effort; lungs are clear to auscultation bilaterally  CARDIOVASCULAR: Regular rate and rhythm, normal S1 and S2, no murmur/rub/gallop; No lower extremity edema; Peripheral pulses are 2+ bilaterally  ABDOMEN: Nontender to palpation, normoactive bowel sounds, no rebound/guarding; No hepatosplenomegaly  MUSCULOSKELETAL: no clubbing or cyanosis of digits; no joint swelling or tenderness to palpation  NEURO: No focal deficits noted  PSYCH: A+Ox to person, place, and time; affect appropriate    LABS:                        6.8    14.96 )-----------( 349      ( 26 Aug 2024 06:46 )             23.1     08-26    135  |  100  |  5<L>  ----------------------------<  87  3.8   |  22  |  0.28<L>    Ca    8.2<L>      26 Aug 2024 06:46  Phos  2.5     08-26  Mg     1.80     08-26    TPro  7.3  /  Alb  2.5<L>  /  TBili  3.9<H>  /  DBili  x   /  AST  76<H>  /  ALT  30  /  AlkPhos  277<H>  08-26          Urinalysis Basic - ( 26 Aug 2024 06:46 )    Color: x / Appearance: x / SG: x / pH: x  Gluc: 87 mg/dL / Ketone: x  / Bili: x / Urobili: x   Blood: x / Protein: x / Nitrite: x   Leuk Esterase: x / RBC: x / WBC x   Sq Epi: x / Non Sq Epi: x / Bacteria: x        Urinalysis with Rflx Culture (collected 26 Aug 2024 00:13)        RADIOLOGY:        ******************************  Authored By: Mariluz Salmon MD PGY1  Internal Medicine  MS Teams  ******************************   PROGRESS NOTE:     Patient is a 29y old  Male who presents with a chief complaint of sickle cell crisis (25 Aug 2024 15:57)      INTERVAL HISTORY: UA performed for dark urine, specimen from urinal. Given IV tylenol ON for pain. Patient seen and examined at bedside. States that pain is currently 7/10, worst in R knee. Reports that his mother is planning to bring in HU and Oxbryta later in the day. Denies fever, chills, new CP, n/v.     MEDICATIONS  (STANDING):  enoxaparin Injectable 30 milliGRAM(s) SubCutaneous every 24 hours  folic acid 1 milliGRAM(s) Oral daily  glutamine Powder 10 Gram(s) Oral two times a day  hydroxyurea 750 milliGRAM(s) Oral <User Schedule>  hydroxyurea 1000 milliGRAM(s) Oral <User Schedule>  morphine ER Tablet 15 milliGRAM(s) Oral two times a day  morphine PCA (5 mG/mL) 30 milliLiter(s) PCA Continuous PCA Continuous  polyethylene glycol 3350 17 Gram(s) Oral daily  senna 2 Tablet(s) Oral at bedtime  sodium chloride 0.45%. 1000 milliLiter(s) (100 mL/Hr) IV Continuous <Continuous>    MEDICATIONS  (PRN):  acetaminophen     Tablet .. 650 milliGRAM(s) Oral every 6 hours PRN Moderate Pain (4 - 6), Severe Pain (7 - 10)  naloxone Injectable 0.1 milliGRAM(s) IV Push every 3 minutes PRN For ANY of the following changes in patient status:  A. RR LESS THAN 10 breaths per minute, B. Oxygen saturation LESS THAN 90%, C. Sedation score of 6  ondansetron Injectable 4 milliGRAM(s) IV Push every 6 hours PRN Nausea      CAPILLARY BLOOD GLUCOSE        I&O's Summary    25 Aug 2024 07:01  -  26 Aug 2024 07:00  --------------------------------------------------------  IN: 0 mL / OUT: 600 mL / NET: -600 mL        PHYSICAL EXAM:  Vital Signs Last 24 Hrs  T(C): 36.8 (26 Aug 2024 07:00), Max: 36.9 (25 Aug 2024 17:19)  T(F): 98.3 (26 Aug 2024 07:00), Max: 98.4 (25 Aug 2024 17:19)  HR: 85 (26 Aug 2024 07:00) (85 - 105)  BP: 131/80 (26 Aug 2024 07:00) (125/79 - 143/95)  BP(mean): --  RR: 19 (26 Aug 2024 07:00) (17 - 19)  SpO2: 99% (26 Aug 2024 07:00) (96% - 99%)    Parameters below as of 26 Aug 2024 07:00  Patient On (Oxygen Delivery Method): room air        CONSTITUTIONAL: NAD, thin  HEENT: scleral icterus  RESPIRATORY: Normal respiratory effort; lungs are clear to auscultation bilaterally  CARDIOVASCULAR: Regular rate and rhythm, normal S1 and S2, no murmur/rub/gallop; No lower extremity edema  ABDOMEN: Nontender to palpation, normoactive bowel sounds, no rebound/guarding; No hepatosplenomegaly  MUSCULOSKELETAL: R knee swelling, warm, tender  NEURO: No focal deficits noted  PSYCH: A+Ox3 to person, place, and time; affect appropriate    LABS:                        6.8    14.96 )-----------( 349      ( 26 Aug 2024 06:46 )             23.1     08-26    135  |  100  |  5<L>  ----------------------------<  87  3.8   |  22  |  0.28<L>    Ca    8.2<L>      26 Aug 2024 06:46  Phos  2.5     08-26  Mg     1.80     08-26    TPro  7.3  /  Alb  2.5<L>  /  TBili  3.9<H>  /  DBili  x   /  AST  76<H>  /  ALT  30  /  AlkPhos  277<H>  08-26          Urinalysis Basic - ( 26 Aug 2024 06:46 )    Color: x / Appearance: x / SG: x / pH: x  Gluc: 87 mg/dL / Ketone: x  / Bili: x / Urobili: x   Blood: x / Protein: x / Nitrite: x   Leuk Esterase: x / RBC: x / WBC x   Sq Epi: x / Non Sq Epi: x / Bacteria: x        Urinalysis with Rflx Culture (collected 26 Aug 2024 00:13)        RADIOLOGY:        ******************************  Authored By: Mariluz Salmon MD PGY1  Internal Medicine  MS Teams  ******************************

## 2024-08-26 NOTE — PROGRESS NOTE ADULT - PROBLEM SELECTOR PLAN 1
#Transmitinitis  - follows with Dr. Montano, missed monthly dose of Adakveo for 2 months which may explain SCC  - with R knee pain, CP  - low concern for acute chest given no opacities on CXR, chest pain more consistent with SCC  - hematology consulted  A/P:  - f/u R knee XR  - c/w 1/2 NS  - pain control with home morphine 15 MG ER, pca morphine pump with bowel regimen  - resume home hydrea and endari  - resume home morphine ER 15mg bid  - incentive spirometer  - daily labs, CBC w/ diff, LDH, haptoglobin, CMP, retic count  - will continue to monitor for signs of acute chest syndrome. If fever, culture and repeat CXR stat  - trend CBC, and transfuse for Hb < 7, consent obtained hgb 7 #Transaminitis  - follows with Dr. Montano, missed monthly dose of Adakveo for 2 months which may explain SCC  - with R knee pain, CP  - low concern for acute chest given no opacities on CXR, chest pain more consistent with SCC  - hematology consulted  - palliative deferred pain management to primary team   A/P:  - c/w 1/2 NS  - pain control with home morphine 15 MG ER, pca morphine pump (will adjust), tylenol q6h PRN with bowel regimen  - resume home hydroxyurea (need to take home med), oxbryta (need to take home med) and endari  - incentive spirometer  - daily labs, CBC w/ diff, LDH, haptoglobin, CMP, retic count  - will continue to monitor for signs of acute chest syndrome. If fever, culture and repeat CXR stat  - trend CBC, and transfuse for Hb < 7, consent obtained hgb 7 #Transaminitis  - follows with Dr. Montano, missed monthly dose of Adakveo for 2 months which may explain SCC  - with R knee pain, CP  - low concern for acute chest given no opacities on CXR, chest pain more consistent with SCC  - Hb 6.8 but no report of increased fatigue  - hematology consulted  - palliative deferred pain management to primary team   A/P:  - c/w 1/2 NS  - pain control with home morphine 15 MG ER, pca morphine pump (will adjust), tylenol q6h PRN with bowel regimen  - resume home hydroxyurea (need to take home med), oxbryta (need to take home med) and endari  - incentive spirometer  - daily labs, CBC w/ diff, LDH, haptoglobin, CMP, retic count  - will continue to monitor for signs of acute chest syndrome. If fever, culture and repeat CXR stat  - trend CBC, and transfuse for symptomatic anemia, will hold on transfusion for Hb 6.8 #Transaminitis  - follows with Dr. Montano, missed monthly dose of Adakveo for 2 months which may explain SCC  - with R knee pain, CP  - low concern for acute chest given no opacities on CXR, chest pain more consistent with SCC  - Hb 6.8 but no report of increased fatigue  - transaminitis likely 2/2 to hepatopathy iso vasoocclusive crisus, no abdominal pain and LFTs improving, could otherwise consider RUQ US  - hematology consulted  - palliative deferred pain management to primary team   A/P:  - c/w 1/2 NS  - pain control with home morphine 15 MG ER, pca morphine pump (will adjust), tylenol q6h PRN with bowel regimen  - resume home hydroxyurea (need to take home med), oxbryta (need to take home med) and endari  - incentive spirometer  - daily labs, CBC w/ diff, LDH, haptoglobin, CMP, retic count  - will continue to monitor for signs of acute chest syndrome. If fever, culture and repeat CXR stat  - trend CBC, and transfuse for symptomatic anemia, will hold on transfusion for Hb 6.8

## 2024-08-27 LAB
ALBUMIN SERPL ELPH-MCNC: 2.5 G/DL — LOW (ref 3.3–5)
ALP SERPL-CCNC: 250 U/L — HIGH (ref 40–120)
ALT FLD-CCNC: 24 U/L — SIGNIFICANT CHANGE UP (ref 4–41)
ANION GAP SERPL CALC-SCNC: 12 MMOL/L — SIGNIFICANT CHANGE UP (ref 7–14)
AST SERPL-CCNC: 57 U/L — HIGH (ref 4–40)
BASOPHILS # BLD AUTO: 0.04 K/UL — SIGNIFICANT CHANGE UP (ref 0–0.2)
BASOPHILS NFR BLD AUTO: 0.3 % — SIGNIFICANT CHANGE UP (ref 0–2)
BILIRUB SERPL-MCNC: 2.9 MG/DL — HIGH (ref 0.2–1.2)
BUN SERPL-MCNC: 5 MG/DL — LOW (ref 7–23)
CALCIUM SERPL-MCNC: 7.8 MG/DL — LOW (ref 8.4–10.5)
CHLORIDE SERPL-SCNC: 100 MMOL/L — SIGNIFICANT CHANGE UP (ref 98–107)
CO2 SERPL-SCNC: 23 MMOL/L — SIGNIFICANT CHANGE UP (ref 22–31)
CREAT SERPL-MCNC: 0.29 MG/DL — LOW (ref 0.5–1.3)
EGFR: 168 ML/MIN/1.73M2 — SIGNIFICANT CHANGE UP
EOSINOPHIL # BLD AUTO: 0.07 K/UL — SIGNIFICANT CHANGE UP (ref 0–0.5)
EOSINOPHIL NFR BLD AUTO: 0.6 % — SIGNIFICANT CHANGE UP (ref 0–6)
GLUCOSE SERPL-MCNC: 71 MG/DL — SIGNIFICANT CHANGE UP (ref 70–99)
HAPTOGLOB SERPL-MCNC: <20 MG/DL — LOW (ref 34–200)
HCT VFR BLD CALC: 21.7 % — LOW (ref 39–50)
HGB BLD-MCNC: 6.7 G/DL — CRITICAL LOW (ref 13–17)
IANC: 7.78 K/UL — HIGH (ref 1.8–7.4)
IMM GRANULOCYTES NFR BLD AUTO: 1.2 % — HIGH (ref 0–0.9)
LDH SERPL L TO P-CCNC: 600 U/L — HIGH (ref 135–225)
LYMPHOCYTES # BLD AUTO: 27.8 % — SIGNIFICANT CHANGE UP (ref 13–44)
LYMPHOCYTES # BLD AUTO: 3.41 K/UL — HIGH (ref 1–3.3)
MAGNESIUM SERPL-MCNC: 1.9 MG/DL — SIGNIFICANT CHANGE UP (ref 1.6–2.6)
MCHC RBC-ENTMCNC: 20.4 PG — LOW (ref 27–34)
MCHC RBC-ENTMCNC: 30.9 GM/DL — LOW (ref 32–36)
MCV RBC AUTO: 66 FL — LOW (ref 80–100)
MONOCYTES # BLD AUTO: 0.83 K/UL — SIGNIFICANT CHANGE UP (ref 0–0.9)
MONOCYTES NFR BLD AUTO: 6.8 % — SIGNIFICANT CHANGE UP (ref 2–14)
NEUTROPHILS # BLD AUTO: 7.78 K/UL — HIGH (ref 1.8–7.4)
NEUTROPHILS NFR BLD AUTO: 63.3 % — SIGNIFICANT CHANGE UP (ref 43–77)
NRBC # BLD: 297 /100 WBCS — HIGH (ref 0–0)
NRBC # FLD: 36.5 K/UL — HIGH (ref 0–0)
PHOSPHATE SERPL-MCNC: 2.4 MG/DL — LOW (ref 2.5–4.5)
PLATELET # BLD AUTO: 365 K/UL — SIGNIFICANT CHANGE UP (ref 150–400)
POTASSIUM SERPL-MCNC: 3.4 MMOL/L — LOW (ref 3.5–5.3)
POTASSIUM SERPL-SCNC: 3.4 MMOL/L — LOW (ref 3.5–5.3)
PROT SERPL-MCNC: 7 G/DL — SIGNIFICANT CHANGE UP (ref 6–8.3)
RBC # BLD: 3.29 M/UL — LOW (ref 4.2–5.8)
RBC # BLD: 3.29 M/UL — LOW (ref 4.2–5.8)
RBC # FLD: 26 % — HIGH (ref 10.3–14.5)
RETICS #: 514.9 K/UL — HIGH (ref 25–125)
RETICS/RBC NFR: 15.4 % — HIGH (ref 0.5–2.5)
SODIUM SERPL-SCNC: 135 MMOL/L — SIGNIFICANT CHANGE UP (ref 135–145)
WBC # BLD: 12.28 K/UL — HIGH (ref 3.8–10.5)
WBC # FLD AUTO: 12.28 K/UL — HIGH (ref 3.8–10.5)

## 2024-08-27 PROCEDURE — 99233 SBSQ HOSP IP/OBS HIGH 50: CPT | Mod: GC

## 2024-08-27 PROCEDURE — 76881 US COMPL JOINT R-T W/IMG: CPT | Mod: 26,RT

## 2024-08-27 RX ORDER — ACETAMINOPHEN 325 MG
650 TABLET ORAL EVERY 6 HOURS
Refills: 0 | Status: DISCONTINUED | OUTPATIENT
Start: 2024-08-27 | End: 2024-09-07

## 2024-08-27 RX ORDER — CHLORHEXIDINE GLUCONATE ORAL RINSE 1.2 MG/ML
1 SOLUTION DENTAL DAILY
Refills: 0 | Status: DISCONTINUED | OUTPATIENT
Start: 2024-08-27 | End: 2024-10-10

## 2024-08-27 RX ORDER — ACETAMINOPHEN 325 MG
600 TABLET ORAL ONCE
Refills: 0 | Status: DISCONTINUED | OUTPATIENT
Start: 2024-08-27 | End: 2024-08-27

## 2024-08-27 RX ADMIN — Medication 2 TABLET(S): at 21:59

## 2024-08-27 RX ADMIN — MORPHINE SULFATE 30 MILLILITER(S): 30 TABLET, FILM COATED, EXTENDED RELEASE ORAL at 20:32

## 2024-08-27 RX ADMIN — FOLIC ACID 1 MILLIGRAM(S): 1 TABLET ORAL at 12:16

## 2024-08-27 RX ADMIN — VOXELOTOR 1500 MILLIGRAM(S): 300 TABLET, FOR SUSPENSION ORAL at 00:00

## 2024-08-27 RX ADMIN — KETOROLAC TROMETHAMINE 15 MILLIGRAM(S): 10 TABLET, FILM COATED ORAL at 20:05

## 2024-08-27 RX ADMIN — MORPHINE SULFATE 15 MILLIGRAM(S): 30 TABLET, FILM COATED, EXTENDED RELEASE ORAL at 20:05

## 2024-08-27 RX ADMIN — MORPHINE SULFATE 15 MILLIGRAM(S): 30 TABLET, FILM COATED, EXTENDED RELEASE ORAL at 06:59

## 2024-08-27 RX ADMIN — Medication 40 MILLIEQUIVALENT(S): at 12:15

## 2024-08-27 RX ADMIN — KETOROLAC TROMETHAMINE 15 MILLIGRAM(S): 10 TABLET, FILM COATED ORAL at 06:44

## 2024-08-27 RX ADMIN — Medication 650 MILLIGRAM(S): at 20:05

## 2024-08-27 RX ADMIN — Medication 650 MILLIGRAM(S): at 21:00

## 2024-08-27 RX ADMIN — VOXELOTOR 1500 MILLIGRAM(S): 300 TABLET, FOR SUSPENSION ORAL at 12:15

## 2024-08-27 RX ADMIN — Medication 10 GRAM(S): at 06:46

## 2024-08-27 RX ADMIN — KETOROLAC TROMETHAMINE 15 MILLIGRAM(S): 10 TABLET, FILM COATED ORAL at 00:12

## 2024-08-27 RX ADMIN — KETOROLAC TROMETHAMINE 15 MILLIGRAM(S): 10 TABLET, FILM COATED ORAL at 21:00

## 2024-08-27 RX ADMIN — KETOROLAC TROMETHAMINE 15 MILLIGRAM(S): 10 TABLET, FILM COATED ORAL at 12:16

## 2024-08-27 RX ADMIN — Medication 17 GRAM(S): at 06:44

## 2024-08-27 RX ADMIN — MORPHINE SULFATE 30 MILLILITER(S): 30 TABLET, FILM COATED, EXTENDED RELEASE ORAL at 08:09

## 2024-08-27 RX ADMIN — HYDROXYUREA 1500 MILLIGRAM(S): 500 CAPSULE ORAL at 12:16

## 2024-08-27 RX ADMIN — ENOXAPARIN SODIUM 30 MILLIGRAM(S): 150 INJECTION SUBCUTANEOUS at 02:42

## 2024-08-27 RX ADMIN — MORPHINE SULFATE 15 MILLIGRAM(S): 30 TABLET, FILM COATED, EXTENDED RELEASE ORAL at 06:44

## 2024-08-27 RX ADMIN — KETOROLAC TROMETHAMINE 15 MILLIGRAM(S): 10 TABLET, FILM COATED ORAL at 06:59

## 2024-08-27 RX ADMIN — MORPHINE SULFATE 15 MILLIGRAM(S): 30 TABLET, FILM COATED, EXTENDED RELEASE ORAL at 21:00

## 2024-08-27 RX ADMIN — Medication 100 MILLILITER(S): at 17:42

## 2024-08-27 NOTE — PROGRESS NOTE ADULT - PROBLEM SELECTOR PLAN 3
- EKG with TWI V1-V3 (anterior leads)  - troponin neg  - maybe 2/2 demand iso SCC  Plan   - TTE - EKG with TWI V1-V3 (anterior leads)  - troponin neg  - maybe 2/2 demand iso SCC  - TTE normal  Plan  - CTM CP

## 2024-08-27 NOTE — PROVIDER CONTACT NOTE (MEDICATION) - SITUATION
Pt recently received Morphine & Toradol for breakthrough pain. Pt is requesting that STAT Tylenol be delayed until later as pain is subsiding.

## 2024-08-27 NOTE — PROGRESS NOTE ADULT - PROBLEM SELECTOR PLAN 4
- Fluids: 1/2 NS  - Electrolytes: Will replete to maintain K>4, Phos>3, and Mag>2  - Diet: regular  - Activity: as tolerated  - DVT Prophylaxis: lovenox  - Disposition: pending medical optimization - Fluids: 1/2 NS  - Electrolytes: Will replete to maintain K>4, Phos>3, and Mag>2  - Diet: regular  - Activity: as tolerated  - DVT Prophylaxis: lovenox  - PT: consulted  - Disposition: pending medical optimization

## 2024-08-27 NOTE — PROGRESS NOTE ADULT - SUBJECTIVE AND OBJECTIVE BOX
PROGRESS NOTE:     Patient is a 29y old  Male who presents with a chief complaint of sickle cell crisis (26 Aug 2024 07:42)      INTERVAL HISTORY: NAEO. VSS. ROS negative.    MEDICATIONS  (STANDING):  enoxaparin Injectable 30 milliGRAM(s) SubCutaneous every 24 hours  folic acid 1 milliGRAM(s) Oral daily  glutamine Powder 10 Gram(s) Oral two times a day  hydroxyurea 1500 milliGRAM(s) Oral daily  ketorolac   Injectable 15 milliGRAM(s) IV Push every 6 hours  morphine ER Tablet 15 milliGRAM(s) Oral two times a day  morphine PCA (5 mG/mL) 30 milliLiter(s) PCA Continuous PCA Continuous  polyethylene glycol 3350 17 Gram(s) Oral two times a day  senna 2 Tablet(s) Oral at bedtime  sodium chloride 0.45%. 1000 milliLiter(s) (100 mL/Hr) IV Continuous <Continuous>  voxelotor 1500 milliGRAM(s) Oral daily    MEDICATIONS  (PRN):  acetaminophen     Tablet .. 650 milliGRAM(s) Oral every 6 hours PRN Mild Pain (1 - 3), Moderate Pain (4 - 6), Severe Pain (7 - 10)  naloxone Injectable 0.1 milliGRAM(s) IV Push every 3 minutes PRN For ANY of the following changes in patient status:  A. RR LESS THAN 10 breaths per minute, B. Oxygen saturation LESS THAN 90%, C. Sedation score of 6  ondansetron Injectable 4 milliGRAM(s) IV Push every 6 hours PRN Nausea      CAPILLARY BLOOD GLUCOSE        I&O's Summary      PHYSICAL EXAM:  Vital Signs Last 24 Hrs  T(C): 36.9 (27 Aug 2024 04:29), Max: 36.9 (26 Aug 2024 11:24)  T(F): 98.4 (27 Aug 2024 04:29), Max: 98.4 (26 Aug 2024 11:24)  HR: 89 (27 Aug 2024 04:29) (82 - 100)  BP: 133/88 (27 Aug 2024 04:29) (104/66 - 133/88)  BP(mean): --  RR: 17 (27 Aug 2024 04:29) (17 - 20)  SpO2: 100% (27 Aug 2024 04:29) (97% - 100%)    Parameters below as of 27 Aug 2024 04:29  Patient On (Oxygen Delivery Method): room air        CONSTITUTIONAL: NAD, well-developed  HEENT:   RESPIRATORY: Normal respiratory effort; lungs are clear to auscultation bilaterally  CARDIOVASCULAR: Regular rate and rhythm, normal S1 and S2, no murmur/rub/gallop; No lower extremity edema; Peripheral pulses are 2+ bilaterally  ABDOMEN: Nontender to palpation, normoactive bowel sounds, no rebound/guarding; No hepatosplenomegaly  MUSCULOSKELETAL: no clubbing or cyanosis of digits; no joint swelling or tenderness to palpation  NEURO: No focal deficits noted  PSYCH: A+Ox to person, place, and time; affect appropriate    LABS:                        6.8    14.96 )-----------( 349      ( 26 Aug 2024 06:46 )             23.1     08-26    135  |  100  |  5<L>  ----------------------------<  87  3.8   |  22  |  0.28<L>    Ca    8.2<L>      26 Aug 2024 06:46  Phos  2.5     08-26  Mg     1.80     08-26    TPro  7.3  /  Alb  2.5<L>  /  TBili  3.9<H>  /  DBili  x   /  AST  76<H>  /  ALT  30  /  AlkPhos  277<H>  08-26          Urinalysis Basic - ( 26 Aug 2024 06:46 )    Color: x / Appearance: x / SG: x / pH: x  Gluc: 87 mg/dL / Ketone: x  / Bili: x / Urobili: x   Blood: x / Protein: x / Nitrite: x   Leuk Esterase: x / RBC: x / WBC x   Sq Epi: x / Non Sq Epi: x / Bacteria: x        Urinalysis with Rflx Culture (collected 26 Aug 2024 00:13)        RADIOLOGY:  XR R Knee (8/26/24)  FINDINGS/  IMPRESSION:  Heterogenous appearance of the intramedullary canal of the visualized   bones.. No radiographic findings to suggest osteonecrosis. No acute   fracture. No dislocation. Joint spaces are preserved.      ******************************  Authored By: Mariluz Salmon MD PGY1  Internal Medicine  MS Teams  ******************************   PROGRESS NOTE:     Patient is a 29y old  Male who presents with a chief complaint of sickle cell crisis (26 Aug 2024 07:42)      INTERVAL HISTORY: NAEO. VSS. Patient seen and examined at bedtime. States pain is unchanged, 7/10, worse on R. knee and b/l forearms. Appeared fatigued but denied feeling worsening fatigue. Last BM prior to admission. ROS otherwise negative.    MEDICATIONS  (STANDING):  enoxaparin Injectable 30 milliGRAM(s) SubCutaneous every 24 hours  folic acid 1 milliGRAM(s) Oral daily  glutamine Powder 10 Gram(s) Oral two times a day  hydroxyurea 1500 milliGRAM(s) Oral daily  ketorolac   Injectable 15 milliGRAM(s) IV Push every 6 hours  morphine ER Tablet 15 milliGRAM(s) Oral two times a day  morphine PCA (5 mG/mL) 30 milliLiter(s) PCA Continuous PCA Continuous  polyethylene glycol 3350 17 Gram(s) Oral two times a day  senna 2 Tablet(s) Oral at bedtime  sodium chloride 0.45%. 1000 milliLiter(s) (100 mL/Hr) IV Continuous <Continuous>  voxelotor 1500 milliGRAM(s) Oral daily    MEDICATIONS  (PRN):  acetaminophen     Tablet .. 650 milliGRAM(s) Oral every 6 hours PRN Mild Pain (1 - 3), Moderate Pain (4 - 6), Severe Pain (7 - 10)  naloxone Injectable 0.1 milliGRAM(s) IV Push every 3 minutes PRN For ANY of the following changes in patient status:  A. RR LESS THAN 10 breaths per minute, B. Oxygen saturation LESS THAN 90%, C. Sedation score of 6  ondansetron Injectable 4 milliGRAM(s) IV Push every 6 hours PRN Nausea      CAPILLARY BLOOD GLUCOSE        I&O's Summary      PHYSICAL EXAM:  Vital Signs Last 24 Hrs  T(C): 36.9 (27 Aug 2024 04:29), Max: 36.9 (26 Aug 2024 11:24)  T(F): 98.4 (27 Aug 2024 04:29), Max: 98.4 (26 Aug 2024 11:24)  HR: 89 (27 Aug 2024 04:29) (82 - 100)  BP: 133/88 (27 Aug 2024 04:29) (104/66 - 133/88)  BP(mean): --  RR: 17 (27 Aug 2024 04:29) (17 - 20)  SpO2: 100% (27 Aug 2024 04:29) (97% - 100%)    Parameters below as of 27 Aug 2024 04:29  Patient On (Oxygen Delivery Method): room air        CONSTITUTIONAL: NAD, thin  HEENT: scleral icterus  RESPIRATORY: Normal respiratory effort; lungs are clear to auscultation bilaterally  CARDIOVASCULAR: Regular rate and rhythm, normal S1 and S2, no murmur/rub/gallop; No lower extremity edema  ABDOMEN: Nontender to palpation, normoactive bowel sounds, no rebound/guarding; No hepatosplenomegaly  MUSCULOSKELETAL: R>L knee, warm, tender  NEURO: No focal deficits noted  PSYCH: A+Ox3 to person, place, and time; affect appropriate    LABS:                        6.8    14.96 )-----------( 349      ( 26 Aug 2024 06:46 )             23.1     08-26    135  |  100  |  5<L>  ----------------------------<  87  3.8   |  22  |  0.28<L>    Ca    8.2<L>      26 Aug 2024 06:46  Phos  2.5     08-26  Mg     1.80     08-26    TPro  7.3  /  Alb  2.5<L>  /  TBili  3.9<H>  /  DBili  x   /  AST  76<H>  /  ALT  30  /  AlkPhos  277<H>  08-26          Urinalysis Basic - ( 26 Aug 2024 06:46 )    Color: x / Appearance: x / SG: x / pH: x  Gluc: 87 mg/dL / Ketone: x  / Bili: x / Urobili: x   Blood: x / Protein: x / Nitrite: x   Leuk Esterase: x / RBC: x / WBC x   Sq Epi: x / Non Sq Epi: x / Bacteria: x        Urinalysis with Rflx Culture (collected 26 Aug 2024 00:13)        RADIOLOGY:  XR R Knee (8/26/24)  FINDINGS/  IMPRESSION:  Heterogenous appearance of the intramedullary canal of the visualized   bones.. No radiographic findings to suggest osteonecrosis. No acute   fracture. No dislocation. Joint spaces are preserved.      ******************************  Authored By: Mariluz Salmon MD PGY1  Internal Medicine  MS Teams  ******************************

## 2024-08-27 NOTE — PROGRESS NOTE ADULT - PROBLEM SELECTOR PLAN 1
#Transaminitis  - follows with Dr. Montano, missed monthly dose of Adakveo for 2 months which may explain SCC  - with R knee pain, CP  - low concern for acute chest given no opacities on CXR, chest pain more consistent with SCC  - Hb 6.8 but no report of increased fatigue  - transaminitis likely 2/2 to hepatopathy iso vasoocclusive crisus, no abdominal pain and LFTs improving, could otherwise consider RUQ US  - hematology consulted  - palliative deferred pain management to primary team   A/P:  - c/w 1/2 NS  - pain control with home morphine 15 MG ER, pca morphine pump (will adjust), tylenol q6h PRN with bowel regimen  - resume home hydroxyurea (need to take home med), oxbryta (need to take home med) and endari  - incentive spirometer  - daily labs, CBC w/ diff, LDH, haptoglobin, CMP, retic count  - will continue to monitor for signs of acute chest syndrome. If fever, culture and repeat CXR stat  - trend CBC, and transfuse for symptomatic anemia, will hold on transfusion for Hb 6.8 #Transaminitis  - follows with Dr. Montano, missed monthly dose of Adakveo for 2 months which may explain SCC  - with R knee pain, CP  - low concern for acute chest given no opacities on CXR, chest pain more consistent with SCC  - Hb 6.8 --> 6.7, but no report of increased fatigue  - transaminitis likely 2/2 to hepatopathy iso vasoocclusive crisus, no abdominal pain and LFTs improving, could otherwise consider RUQ US  - hematology consulted  - palliative deferred pain management to primary team   A/P:  - c/w 1/2 NS  - pain control with home morphine 15 MG ER, pca morphine pump (will adjust), toradol q6; tylenol made standing - bowel regimen: miralax BID, senna 2 MG; hold off on starting miralax  - resume home hydroxyurea (need to take home med), oxbryta (need to take home med) and endari  - incentive spirometer  - daily labs, CBC w/ diff, LDH, haptoglobin, CMP, retic count  - will continue to monitor for signs of acute chest syndrome. If fever, culture and repeat CXR stat  - trend CBC, and transfuse for symptomatic anemia, will hold on transfusion for Hb 6.7

## 2024-08-27 NOTE — PROGRESS NOTE ADULT - ATTENDING COMMENTS
Patient seen and examined at bedside. In brief, 29M with SCD, hx of acute chest, L hip/shoulder osteonecrosis, splenectomy, p/w 1 wk hx of worsening pains in his right knee/foot, chest ribs, progressing to b/l forearm pain, has been taking morphine IR 15 mg 4x/day (usual dose once daily) in addition to long acting Morphine 15mg bid. He reports he got Adakveo(crizanlizumab) infusion in June 20's, missed last infusion. He denies fever/chill/chest pain/sob/productive cough /gi or gu symptoms.     Patient now on up-titrated to 2mg Demand dose, locked out of pump. Patient also on standing toradol.     # sickle crisis, pt missed Adakveo(crizanlizumab) infusion in July  - 1/2 NS  - pca morphine pump 1.5mg q6min, 4h lockout 20mg, patient reports dose does not relieve pain, increased demand dose to 2mg (on 8/26) reports intolerance to dilaudid pca ; uptitrate bowel regimen: miralax BID and senna   - On standing toradol   - resume home hydrea and endari  - resume home morphine ER 15mg bid  - incentive spirometer  -daily labs, CBC w/ diff, LDH, haptoglobin, CMP, retic count  - monitor for signs of acute chest syndrome, if fever, culture and repeat CXR stat  - trend CBC,  hgb < 7, no transfusion for now  - replete lytes, hypokalemia replete, keep K>4 and Mg>2  - DVT ppx lovenox    # abnormal ECG, TWI V1-V3 noted and reviewed, TTE,:  Left ventricular systolic function is normal with an ejection fraction visually estimated at 50 to 55 %. no reported wall motion abnormality likely demand ischemia in s/o sickle crisis/anemia, Tn negative.      Time-based billing (NON-critical care).     50 minutes spent on total encounter. The necessity of the time spent during the encounter on this date of service was due to:     Review of laboratory data, radiology results, consultants' recommendations, documentation in [place], discussion with patient/house staff/ACP and interdisciplinary staff (such as , social workers, etc). Interventions were performed as documented above.

## 2024-08-27 NOTE — PROGRESS NOTE ADULT - ASSESSMENT
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

## 2024-08-27 NOTE — PROGRESS NOTE ADULT - PROBLEM SELECTOR PLAN 2
- concern for possible septic arthritis given severe tenderness, swelling, warmth. Other items on differential include bone infarction, osteonecrosis  - afebrile   - R knee XR with no effusion, osteonecrosis - concern for possible septic arthritis given severe tenderness, swelling, warmth. Other items on differential include bone infarction, osteonecrosis  - afebrile   - R knee XR with no effusion, osteonecrosis  Plan  - US knee to r/o effusion

## 2024-08-28 LAB
ALBUMIN SERPL ELPH-MCNC: 2.4 G/DL — LOW (ref 3.3–5)
ALP SERPL-CCNC: 250 U/L — HIGH (ref 40–120)
ALT FLD-CCNC: 23 U/L — SIGNIFICANT CHANGE UP (ref 4–41)
ANION GAP SERPL CALC-SCNC: 12 MMOL/L — SIGNIFICANT CHANGE UP (ref 7–14)
AST SERPL-CCNC: 55 U/L — HIGH (ref 4–40)
BASOPHILS # BLD AUTO: 0.03 K/UL — SIGNIFICANT CHANGE UP (ref 0–0.2)
BASOPHILS NFR BLD AUTO: 0.3 % — SIGNIFICANT CHANGE UP (ref 0–2)
BILIRUB SERPL-MCNC: 2.5 MG/DL — HIGH (ref 0.2–1.2)
BUN SERPL-MCNC: 4 MG/DL — LOW (ref 7–23)
CALCIUM SERPL-MCNC: 7.9 MG/DL — LOW (ref 8.4–10.5)
CHLORIDE SERPL-SCNC: 104 MMOL/L — SIGNIFICANT CHANGE UP (ref 98–107)
CO2 SERPL-SCNC: 20 MMOL/L — LOW (ref 22–31)
CREAT SERPL-MCNC: 0.31 MG/DL — LOW (ref 0.5–1.3)
CRP SERPL-MCNC: 58.2 MG/L — HIGH
EGFR: 165 ML/MIN/1.73M2 — SIGNIFICANT CHANGE UP
EOSINOPHIL # BLD AUTO: 0.09 K/UL — SIGNIFICANT CHANGE UP (ref 0–0.5)
EOSINOPHIL NFR BLD AUTO: 0.9 % — SIGNIFICANT CHANGE UP (ref 0–6)
GLUCOSE SERPL-MCNC: 66 MG/DL — LOW (ref 70–99)
HAPTOGLOB SERPL-MCNC: <20 MG/DL — LOW (ref 34–200)
HCT VFR BLD CALC: 23.5 % — LOW (ref 39–50)
HGB BLD-MCNC: 7.1 G/DL — LOW (ref 13–17)
IANC: 4.05 K/UL — SIGNIFICANT CHANGE UP (ref 1.8–7.4)
IMM GRANULOCYTES NFR BLD AUTO: 0.8 % — SIGNIFICANT CHANGE UP (ref 0–0.9)
LDH SERPL L TO P-CCNC: 530 U/L — HIGH (ref 135–225)
LYMPHOCYTES # BLD AUTO: 5.81 K/UL — HIGH (ref 1–3.3)
LYMPHOCYTES # BLD AUTO: 54.9 % — HIGH (ref 13–44)
MAGNESIUM SERPL-MCNC: 1.8 MG/DL — SIGNIFICANT CHANGE UP (ref 1.6–2.6)
MCHC RBC-ENTMCNC: 20.1 PG — LOW (ref 27–34)
MCHC RBC-ENTMCNC: 30.2 GM/DL — LOW (ref 32–36)
MCV RBC AUTO: 66.4 FL — LOW (ref 80–100)
MONOCYTES # BLD AUTO: 0.52 K/UL — SIGNIFICANT CHANGE UP (ref 0–0.9)
MONOCYTES NFR BLD AUTO: 4.9 % — SIGNIFICANT CHANGE UP (ref 2–14)
MRSA PCR RESULT.: DETECTED
NEUTROPHILS # BLD AUTO: 4.05 K/UL — SIGNIFICANT CHANGE UP (ref 1.8–7.4)
NEUTROPHILS NFR BLD AUTO: 38.2 % — LOW (ref 43–77)
NRBC # BLD: 10 /100 WBCS — HIGH (ref 0–0)
NRBC # FLD: 1.06 K/UL — HIGH (ref 0–0)
PHOSPHATE SERPL-MCNC: 2.8 MG/DL — SIGNIFICANT CHANGE UP (ref 2.5–4.5)
PLATELET # BLD AUTO: 374 K/UL — SIGNIFICANT CHANGE UP (ref 150–400)
POTASSIUM SERPL-MCNC: 3.5 MMOL/L — SIGNIFICANT CHANGE UP (ref 3.5–5.3)
POTASSIUM SERPL-SCNC: 3.5 MMOL/L — SIGNIFICANT CHANGE UP (ref 3.5–5.3)
PROT SERPL-MCNC: 6.8 G/DL — SIGNIFICANT CHANGE UP (ref 6–8.3)
RBC # BLD: 3.54 M/UL — LOW (ref 4.2–5.8)
RBC # FLD: 25 % — HIGH (ref 10.3–14.5)
S AUREUS DNA NOSE QL NAA+PROBE: DETECTED
SODIUM SERPL-SCNC: 136 MMOL/L — SIGNIFICANT CHANGE UP (ref 135–145)
WBC # BLD: 10.58 K/UL — HIGH (ref 3.8–10.5)
WBC # FLD AUTO: 10.58 K/UL — HIGH (ref 3.8–10.5)

## 2024-08-28 PROCEDURE — 99233 SBSQ HOSP IP/OBS HIGH 50: CPT | Mod: GC

## 2024-08-28 RX ORDER — MUPIROCIN 20 MG/G
1 OINTMENT TOPICAL
Refills: 0 | Status: DISCONTINUED | OUTPATIENT
Start: 2024-08-28 | End: 2024-08-28

## 2024-08-28 RX ORDER — MORPHINE SULFATE 30 MG/1
30 TABLET, FILM COATED, EXTENDED RELEASE ORAL
Refills: 0 | Status: DISCONTINUED | OUTPATIENT
Start: 2024-08-28 | End: 2024-08-30

## 2024-08-28 RX ORDER — MUPIROCIN 20 MG/G
1 OINTMENT TOPICAL
Refills: 0 | Status: COMPLETED | OUTPATIENT
Start: 2024-08-28 | End: 2024-09-02

## 2024-08-28 RX ADMIN — Medication 650 MILLIGRAM(S): at 01:15

## 2024-08-28 RX ADMIN — ENOXAPARIN SODIUM 30 MILLIGRAM(S): 150 INJECTION SUBCUTANEOUS at 13:50

## 2024-08-28 RX ADMIN — MORPHINE SULFATE 30 MILLILITER(S): 30 TABLET, FILM COATED, EXTENDED RELEASE ORAL at 20:25

## 2024-08-28 RX ADMIN — CHLORHEXIDINE GLUCONATE ORAL RINSE 1 APPLICATION(S): 1.2 SOLUTION DENTAL at 11:50

## 2024-08-28 RX ADMIN — HYDROXYUREA 1500 MILLIGRAM(S): 500 CAPSULE ORAL at 11:51

## 2024-08-28 RX ADMIN — MORPHINE SULFATE 15 MILLIGRAM(S): 30 TABLET, FILM COATED, EXTENDED RELEASE ORAL at 18:44

## 2024-08-28 RX ADMIN — MORPHINE SULFATE 30 MILLILITER(S): 30 TABLET, FILM COATED, EXTENDED RELEASE ORAL at 12:15

## 2024-08-28 RX ADMIN — Medication 650 MILLIGRAM(S): at 06:13

## 2024-08-28 RX ADMIN — MORPHINE SULFATE 15 MILLIGRAM(S): 30 TABLET, FILM COATED, EXTENDED RELEASE ORAL at 06:25

## 2024-08-28 RX ADMIN — Medication 100 MILLILITER(S): at 02:52

## 2024-08-28 RX ADMIN — Medication 650 MILLIGRAM(S): at 12:50

## 2024-08-28 RX ADMIN — Medication 650 MILLIGRAM(S): at 17:44

## 2024-08-28 RX ADMIN — VOXELOTOR 1500 MILLIGRAM(S): 300 TABLET, FOR SUSPENSION ORAL at 11:52

## 2024-08-28 RX ADMIN — Medication 650 MILLIGRAM(S): at 11:50

## 2024-08-28 RX ADMIN — MUPIROCIN 1 APPLICATION(S): 20 OINTMENT TOPICAL at 17:49

## 2024-08-28 RX ADMIN — MORPHINE SULFATE 15 MILLIGRAM(S): 30 TABLET, FILM COATED, EXTENDED RELEASE ORAL at 07:10

## 2024-08-28 RX ADMIN — Medication 10 GRAM(S): at 07:10

## 2024-08-28 RX ADMIN — Medication 650 MILLIGRAM(S): at 00:16

## 2024-08-28 RX ADMIN — FOLIC ACID 1 MILLIGRAM(S): 1 TABLET ORAL at 11:52

## 2024-08-28 RX ADMIN — Medication 100 MILLILITER(S): at 12:23

## 2024-08-28 RX ADMIN — Medication 650 MILLIGRAM(S): at 18:44

## 2024-08-28 RX ADMIN — KETOROLAC TROMETHAMINE 15 MILLIGRAM(S): 10 TABLET, FILM COATED ORAL at 06:14

## 2024-08-28 RX ADMIN — Medication 650 MILLIGRAM(S): at 07:10

## 2024-08-28 RX ADMIN — KETOROLAC TROMETHAMINE 15 MILLIGRAM(S): 10 TABLET, FILM COATED ORAL at 11:51

## 2024-08-28 RX ADMIN — MORPHINE SULFATE 15 MILLIGRAM(S): 30 TABLET, FILM COATED, EXTENDED RELEASE ORAL at 17:44

## 2024-08-28 RX ADMIN — Medication 100 MILLILITER(S): at 21:39

## 2024-08-28 RX ADMIN — MORPHINE SULFATE 30 MILLILITER(S): 30 TABLET, FILM COATED, EXTENDED RELEASE ORAL at 06:41

## 2024-08-28 RX ADMIN — KETOROLAC TROMETHAMINE 15 MILLIGRAM(S): 10 TABLET, FILM COATED ORAL at 18:44

## 2024-08-28 RX ADMIN — MORPHINE SULFATE 30 MILLILITER(S): 30 TABLET, FILM COATED, EXTENDED RELEASE ORAL at 08:28

## 2024-08-28 RX ADMIN — KETOROLAC TROMETHAMINE 15 MILLIGRAM(S): 10 TABLET, FILM COATED ORAL at 01:15

## 2024-08-28 RX ADMIN — KETOROLAC TROMETHAMINE 15 MILLIGRAM(S): 10 TABLET, FILM COATED ORAL at 12:51

## 2024-08-28 RX ADMIN — KETOROLAC TROMETHAMINE 15 MILLIGRAM(S): 10 TABLET, FILM COATED ORAL at 00:15

## 2024-08-28 RX ADMIN — KETOROLAC TROMETHAMINE 15 MILLIGRAM(S): 10 TABLET, FILM COATED ORAL at 07:10

## 2024-08-28 RX ADMIN — KETOROLAC TROMETHAMINE 15 MILLIGRAM(S): 10 TABLET, FILM COATED ORAL at 17:44

## 2024-08-28 NOTE — PROGRESS NOTE ADULT - PROBLEM SELECTOR PLAN 1
#Transaminitis  - follows with Dr. Montano, missed monthly dose of Adakveo for 2 months which may explain SCC  - with R knee pain, CP  - low concern for acute chest given no opacities on CXR, chest pain more consistent with SCC  - Hb 6.8 --> 6.7, but no report of increased fatigue  - transaminitis likely 2/2 to hepatopathy iso vasoocclusive crisus, no abdominal pain and LFTs improving, could otherwise consider RUQ US  - hematology consulted  - palliative deferred pain management to primary team   A/P:  - c/w 1/2 NS  - pain control with home morphine 15 MG ER, pca morphine pump (will adjust), toradol q6; tylenol made standing - bowel regimen: miralax BID, senna 2 MG; hold off on starting miralax  - resume home hydroxyurea (need to take home med), oxbryta (need to take home med) and endari  - incentive spirometer  - daily labs, CBC w/ diff, LDH, haptoglobin, CMP, retic count  - will continue to monitor for signs of acute chest syndrome. If fever, culture and repeat CXR stat  - trend CBC, and transfuse for symptomatic anemia, will hold on transfusion for Hb 6.7 #Transaminitis  - follows with Dr. Montano, missed monthly dose of Adakveo for 2 months which may explain SCC  - with R knee pain, CP  - low concern for acute chest given no opacities on CXR, chest pain more consistent with SCC  - Hb 6.8 --> 6.7, but no report of increased fatigue  - transaminitis likely 2/2 to hepatopathy iso vasoocclusive crisus, no abdominal pain and LFTs improving, could otherwise consider RUQ US  - hematology consulted  - palliative deferred pain management to primary team   A/P:  - c/w 1/2 NS  - pain control with home morphine 15 MG ER, pca morphine pump (will adjust), toradol q6; tylenol  - bowel regimen: miralax BID, senna 2 MG; hold off on starting miralax  - home hydroxyurea (need to take home med), oxbryta (need to take home med) and endari  - incentive spirometer  - daily labs, CBC w/ diff, LDH, haptoglobin, CMP, retic count  - will continue to monitor for signs of acute chest syndrome. If fever, culture and repeat CXR stat  - trend CBC, and transfuse for symptomatic anemia, will hold on transfusion for Hb 6.7

## 2024-08-28 NOTE — PHYSICAL THERAPY INITIAL EVALUATION ADULT - PERTINENT HX OF CURRENT PROBLEM, REHAB EVAL
Pt is a 29 year old male with SCD (hx acute chest syndrome and splenectomy), osteonecrosis L hip and b/l shoulder admitted for sickle cell crisis.

## 2024-08-28 NOTE — DIETITIAN INITIAL EVALUATION ADULT - PERTINENT LABORATORY DATA
(8/28) Na 136, BUN 4<L>, Cr 0.31<L>, BG 66<L>, K+ 3.5, Phos 2.8, Mg 1.80, Alk Phos 250<H>, ALT/SGPT 23, AST/SGOT 55<H>

## 2024-08-28 NOTE — PROGRESS NOTE ADULT - PROBLEM SELECTOR PLAN 2
- concern for possible septic arthritis given severe tenderness, swelling, warmth. Other items on differential include bone infarction, osteonecrosis  - afebrile   - R knee XR with no effusion, osteonecrosis  - US knee with small effusion   Plan  - no plan for arthrocentesis - concern for possible septic arthritis given severe tenderness, swelling, warmth. Other items on differential include bone infarction, osteonecrosis  - afebrile   - R knee XR with no effusion, osteonecrosis  - US knee with small effusion   Plan  - will follow-up with heme regarding further plan   - f/u CRP

## 2024-08-28 NOTE — DIETITIAN INITIAL EVALUATION ADULT - PERTINENT MEDS FT
folic acid   glutamine Powder  hydroxyurea  morphine ER Tablet   morphine PCA Continuous   polyethylene glycol   senna   sodium chloride 0.45% IV Continuous  ondansetron IV PRN

## 2024-08-28 NOTE — DIETITIAN INITIAL EVALUATION ADULT - OTHER INFO
Per chart, pt is 29 year old male PMH SCD (hx acute chest syndrome and splenectomy), osteonecrosis L hip and bilateral shoulder admitted for sickle cell crisis.    Pt confirms NKFA, denies difficulties chewing/swallowing. Pt lives at home with family, denies issues with access to/preparation of food PTA. Pt reports decreased PO intake x1 week PTA secondary to pain. Pt consumes a regular diet at baseline.    Pt reports improving appetite/PO intake since admission. Breakfast tray visibly untouched during time of visit. Flowsheets indicate poor PO intake in house thus far. Pt amenable to provision of nutrition shake, no other food preferences vocalized at this time. Pt is able to feed self independently. Last BM PTA, pt denies current GI distress. Ordered for senna 2 tablets qHS and Miralax 17 gm BID. Labs notable for hypokalemia, hypophosphatemia.

## 2024-08-28 NOTE — DIETITIAN NUTRITION RISK NOTIFICATION - ADDITIONAL COMMENTS/DIETITIAN RECOMMENDATIONS
Please see Dietitian Initial Assessment for complete recommendations.  Nhung Razo, ESAU, CDN #[id number]  Also available on [employer] Teams

## 2024-08-28 NOTE — PROGRESS NOTE ADULT - PROBLEM SELECTOR PLAN 4
- Fluids: 1/2 NS  - Electrolytes: Will replete to maintain K>4, Phos>3, and Mag>2  - Diet: regular  - Activity: as tolerated  - DVT Prophylaxis: lovenox  - PT: consulted  - Disposition: pending medical optimization - Fluids: 1/2 NS  - Electrolytes: Will replete to maintain K>4, Phos>3, and Mag>2  - Diet: regular  - Activity: as tolerated  - DVT Prophylaxis: lovenox  - PT: consulted   - Disposition: pending medical optimization

## 2024-08-28 NOTE — DIETITIAN INITIAL EVALUATION ADULT - ADD RECOMMEND
1) Recommend continue regular diet.  2) Recommend addition of Ensure Plus High Protein 1 PO 2x daily (provides 350 kcal, 20 gm protein per 8 oz serving).  3) Monitor electrolytes (K+, Mg, P) and replete to within desired limits as clinically indicated.   4) Obtain weekly weights.

## 2024-08-28 NOTE — PROGRESS NOTE ADULT - ATTENDING COMMENTS
Patient seen and examined at bedside. In brief, 29M with SCD, hx of acute chest, L hip/shoulder osteonecrosis, splenectomy, p/w 1 wk hx of worsening pains in his right knee/foot, chest ribs, progressing to b/l forearm pain, has been taking morphine IR 15 mg 4x/day (usual dose once daily) in addition to long acting Morphine 15mg bid. He reports he got Adakveo(crizanlizumab) infusion in June 20's, missed last infusion. He denies fever/chill/chest pain/sob/productive cough /gi or gu symptoms.     Patient now on up-titrated to 2mg Demand dose, locked out of pump. Patient also on standing toradol. Will increase patient's 4 hour limit, as patient still getting locked out but reports demand dose relieves pain     # sickle crisis, pt missed Adakveo(crizanlizumab) infusion in July  - 1/2 NS  - pca morphine pump 2mg q6min, 4h lockout 24mg, patient reports dose does not relieve pain, increased demand dose to 2mg (on 8/26) reports intolerance to dilaudid pca, will up-titrate 4 hr lock out to 24mg on 8/28  ; uptitrate bowel regimen: miralax BID and senna   - On standing toradol   - resume home hydrea and endari  - resume home morphine ER 15mg bid  - incentive spirometer  -daily labs, CBC w/ diff, LDH, haptoglobin, CMP, retic count  - monitor for signs of acute chest syndrome, if fever, culture and repeat CXR stat  - trend CBC,  hgb < 7, no transfusion for now  - replete lytes, hypokalemia replete, keep K>4 and Mg>2  - DVT ppx lovenox    # abnormal ECG, TWI V1-V3 noted and reviewed, TTE,:  Left ventricular systolic function is normal with an ejection fraction visually estimated at 50 to 55 %. no reported wall motion abnormality likely demand ischemia in s/o sickle crisis/anemia, Tn negative.      Time-based billing (NON-critical care).     50 minutes spent on total encounter. The necessity of the time spent during the encounter on this date of service was due to:     Review of laboratory data, radiology results, consultants' recommendations, documentation in [place], discussion with patient/house staff/ACP and interdisciplinary staff (such as , social workers, etc). Interventions were performed as documented above.

## 2024-08-28 NOTE — PROGRESS NOTE ADULT - SUBJECTIVE AND OBJECTIVE BOX
PROGRESS NOTE:     Patient is a 29y old  Male who presents with a chief complaint of sickle cell crisis (27 Aug 2024 07:49)      INTERVAL HISTORY: NAEO. VSS. ROS negative.    MEDICATIONS  (STANDING):  acetaminophen     Tablet .. 650 milliGRAM(s) Oral every 6 hours  chlorhexidine 2% Cloths 1 Application(s) Topical daily  enoxaparin Injectable 30 milliGRAM(s) SubCutaneous every 24 hours  folic acid 1 milliGRAM(s) Oral daily  glutamine Powder 10 Gram(s) Oral two times a day  hydroxyurea 1500 milliGRAM(s) Oral daily  ketorolac   Injectable 15 milliGRAM(s) IV Push every 6 hours  morphine ER Tablet 15 milliGRAM(s) Oral two times a day  morphine PCA (5 mG/mL) 30 milliLiter(s) PCA Continuous PCA Continuous  polyethylene glycol 3350 17 Gram(s) Oral two times a day  senna 2 Tablet(s) Oral at bedtime  sodium chloride 0.45%. 1000 milliLiter(s) (100 mL/Hr) IV Continuous <Continuous>  voxelotor 1500 milliGRAM(s) Oral daily    MEDICATIONS  (PRN):  naloxone Injectable 0.1 milliGRAM(s) IV Push every 3 minutes PRN For ANY of the following changes in patient status:  A. RR LESS THAN 10 breaths per minute, B. Oxygen saturation LESS THAN 90%, C. Sedation score of 6  ondansetron Injectable 4 milliGRAM(s) IV Push every 6 hours PRN Nausea      CAPILLARY BLOOD GLUCOSE        I&O's Summary    27 Aug 2024 07:01  -  28 Aug 2024 07:00  --------------------------------------------------------  IN: 2200 mL / OUT: 800 mL / NET: 1400 mL        PHYSICAL EXAM:  Vital Signs Last 24 Hrs  T(C): 36.6 (28 Aug 2024 06:00), Max: 36.8 (28 Aug 2024 02:00)  T(F): 97.9 (28 Aug 2024 06:00), Max: 98.2 (28 Aug 2024 02:00)  HR: 74 (28 Aug 2024 06:00) (70 - 95)  BP: 116/77 (28 Aug 2024 06:00) (102/64 - 127/70)  BP(mean): --  RR: 17 (28 Aug 2024 06:00) (17 - 19)  SpO2: 100% (28 Aug 2024 06:00) (96% - 100%)    Parameters below as of 28 Aug 2024 06:00  Patient On (Oxygen Delivery Method): room air    CONSTITUTIONAL: NAD, thin  HEENT: scleral icterus  RESPIRATORY: Normal respiratory effort; lungs are clear to auscultation bilaterally  CARDIOVASCULAR: Regular rate and rhythm, normal S1 and S2, no murmur/rub/gallop; No lower extremity edema  ABDOMEN: Nontender to palpation, normoactive bowel sounds, no rebound/guarding; No hepatosplenomegaly  MUSCULOSKELETAL: R knee swelling, warm, tender  NEURO: No focal deficits noted  PSYCH: A+Ox3 to person, place, and time; affect appropriate    LABS:                        6.7    12.28 )-----------( 365      ( 27 Aug 2024 06:28 )             21.7     08-27    135  |  100  |  5<L>  ----------------------------<  71  3.4<L>   |  23  |  0.29<L>    Ca    7.8<L>      27 Aug 2024 06:28  Phos  2.4     08-27  Mg     1.90     08-27    TPro  7.0  /  Alb  2.5<L>  /  TBili  2.9<H>  /  DBili  x   /  AST  57<H>  /  ALT  24  /  AlkPhos  250<H>  08-27          Urinalysis Basic - ( 27 Aug 2024 06:28 )    Color: x / Appearance: x / SG: x / pH: x  Gluc: 71 mg/dL / Ketone: x  / Bili: x / Urobili: x   Blood: x / Protein: x / Nitrite: x   Leuk Esterase: x / RBC: x / WBC x   Sq Epi: x / Non Sq Epi: x / Bacteria: x        Urinalysis with Rflx Culture (collected 26 Aug 2024 00:13)        RADIOLOGY:        ******************************  Authored By: Mariluz Salmon MD PGY1  Internal Medicine  [employer] Teams  ******************************   PROGRESS NOTE:     Patient is a 29y old  Male who presents with a chief complaint of sickle cell crisis (27 Aug 2024 07:49)      INTERVAL HISTORY: NAEO. VSS. Patient states pain is 6/10, imporved from yesterday, larglely located in R. knee in b/l arms. Denies fever, chills, abd pain. No BM but states that he has had no appetite in past days.    MEDICATIONS  (STANDING):  acetaminophen     Tablet .. 650 milliGRAM(s) Oral every 6 hours  chlorhexidine 2% Cloths 1 Application(s) Topical daily  enoxaparin Injectable 30 milliGRAM(s) SubCutaneous every 24 hours  folic acid 1 milliGRAM(s) Oral daily  glutamine Powder 10 Gram(s) Oral two times a day  hydroxyurea 1500 milliGRAM(s) Oral daily  ketorolac   Injectable 15 milliGRAM(s) IV Push every 6 hours  morphine ER Tablet 15 milliGRAM(s) Oral two times a day  morphine PCA (5 mG/mL) 30 milliLiter(s) PCA Continuous PCA Continuous  polyethylene glycol 3350 17 Gram(s) Oral two times a day  senna 2 Tablet(s) Oral at bedtime  sodium chloride 0.45%. 1000 milliLiter(s) (100 mL/Hr) IV Continuous <Continuous>  voxelotor 1500 milliGRAM(s) Oral daily    MEDICATIONS  (PRN):  naloxone Injectable 0.1 milliGRAM(s) IV Push every 3 minutes PRN For ANY of the following changes in patient status:  A. RR LESS THAN 10 breaths per minute, B. Oxygen saturation LESS THAN 90%, C. Sedation score of 6  ondansetron Injectable 4 milliGRAM(s) IV Push every 6 hours PRN Nausea      CAPILLARY BLOOD GLUCOSE        I&O's Summary    27 Aug 2024 07:01  -  28 Aug 2024 07:00  --------------------------------------------------------  IN: 2200 mL / OUT: 800 mL / NET: 1400 mL        PHYSICAL EXAM:  Vital Signs Last 24 Hrs  T(C): 36.6 (28 Aug 2024 06:00), Max: 36.8 (28 Aug 2024 02:00)  T(F): 97.9 (28 Aug 2024 06:00), Max: 98.2 (28 Aug 2024 02:00)  HR: 74 (28 Aug 2024 06:00) (70 - 95)  BP: 116/77 (28 Aug 2024 06:00) (102/64 - 127/70)  BP(mean): --  RR: 17 (28 Aug 2024 06:00) (17 - 19)  SpO2: 100% (28 Aug 2024 06:00) (96% - 100%)    Parameters below as of 28 Aug 2024 06:00  Patient On (Oxygen Delivery Method): room air    CONSTITUTIONAL: NAD, thin  HEENT: scleral icterus improving  RESPIRATORY: Normal respiratory effort; lungs are clear to auscultation bilaterally  CARDIOVASCULAR: Regular rate and rhythm, normal S1 and S2, no murmur/rub/gallop; No lower extremity edema  ABDOMEN: Nontender to palpation, normoactive bowel sounds, no rebound/guarding; No hepatosplenomegaly  MUSCULOSKELETAL: R knee swelling, warm, tender  NEURO: No focal deficits noted  PSYCH: A+Ox3 to person, place, and time; affect appropriate    LABS:                        6.7    12.28 )-----------( 365      ( 27 Aug 2024 06:28 )             21.7     08-27    135  |  100  |  5<L>  ----------------------------<  71  3.4<L>   |  23  |  0.29<L>    Ca    7.8<L>      27 Aug 2024 06:28  Phos  2.4     08-27  Mg     1.90     08-27    TPro  7.0  /  Alb  2.5<L>  /  TBili  2.9<H>  /  DBili  x   /  AST  57<H>  /  ALT  24  /  AlkPhos  250<H>  08-27          Urinalysis Basic - ( 27 Aug 2024 06:28 )    Color: x / Appearance: x / SG: x / pH: x  Gluc: 71 mg/dL / Ketone: x  / Bili: x / Urobili: x   Blood: x / Protein: x / Nitrite: x   Leuk Esterase: x / RBC: x / WBC x   Sq Epi: x / Non Sq Epi: x / Bacteria: x        Urinalysis with Rflx Culture (collected 26 Aug 2024 00:13)          ******************************  Authored By: Mariluz Salmon MD PGY1  Internal Medicine  MS Teams  ******************************

## 2024-08-28 NOTE — PROGRESS NOTE ADULT - PROBLEM SELECTOR PLAN 3
- EKG with TWI V1-V3 (anterior leads)  - troponin neg  - maybe 2/2 demand iso SCC  - TTE normal  Plan  - CTM CP

## 2024-08-28 NOTE — PHYSICAL THERAPY INITIAL EVALUATION ADULT - ADDITIONAL COMMENTS
Pt lives with his mom and grandmother in an apartment with ~8-10 steps to enter. Pt sometimes uses crutches and was independent with ADLs prior. . Pt reports 1 fall in the past 6 months.   Pt left sitting at edge of the bed in NAD, all lines intact, call bell in reach, bed alarm on and EDGAR Kaur made aware.

## 2024-08-28 NOTE — PHYSICAL THERAPY INITIAL EVALUATION ADULT - GENERAL OBSERVATIONS, REHAB EVAL
Chart reviewed and cleared for PT by EDGAR Kaur. Pt received semi supine in bed in NAD, all lines intact + IV, PCA pump, HR 80s.

## 2024-08-28 NOTE — DIETITIAN INITIAL EVALUATION ADULT - OTHER CALCULATIONS
Pt reports usual body weight 100 lbs, endorses recent unintentional weight loss.  Dosing weight (8/27) 90 lbs / 40.8 kg.  Apparent 10 lb weight loss (10%) x<1 month, clinically significant.   Ideal Body Weight: 136 lbs / 61.8 kg +/-10%

## 2024-08-28 NOTE — PHYSICAL THERAPY INITIAL EVALUATION ADULT - PHYSICAL ASSIST/NONPHYSICAL ASSIST: GAIT, REHAB EVAL
18w2d; see note below.     Spoke with patient who reports migraine x 5 days, very intense past couple of days. Patient is tearful. Patient states she \"cannot function, never felt like this before\". Rates pain at worst 10/10, vomiting due to pain. Tried medication cocktail yesterday and decreased pain to 6/10, still dull and throbbing. Did not take last night,   Stopped last night, took again this am ~0920. Denies history of migraines in past. No photosensitivity. Denies any swelling. Reports her chest and rib cage hurt occas -from vomiting? Intermeittent blurry vision L>R. Recommend patient be seen, appointment scheduled, patient will have her dad drive her to appointment. No additional questions or concerns.    verbal cues/1 person assist

## 2024-08-29 LAB
ADD ON TEST-SPECIMEN IN LAB: SIGNIFICANT CHANGE UP
ALBUMIN SERPL ELPH-MCNC: 2.3 G/DL — LOW (ref 3.3–5)
ALP SERPL-CCNC: 275 U/L — HIGH (ref 40–120)
ALT FLD-CCNC: 19 U/L — SIGNIFICANT CHANGE UP (ref 4–41)
ANION GAP SERPL CALC-SCNC: 12 MMOL/L — SIGNIFICANT CHANGE UP (ref 7–14)
AST SERPL-CCNC: 52 U/L — HIGH (ref 4–40)
BASOPHILS # BLD AUTO: 0.05 K/UL — SIGNIFICANT CHANGE UP (ref 0–0.2)
BASOPHILS NFR BLD AUTO: 0.5 % — SIGNIFICANT CHANGE UP (ref 0–2)
BILIRUB SERPL-MCNC: 2 MG/DL — HIGH (ref 0.2–1.2)
BUN SERPL-MCNC: 6 MG/DL — LOW (ref 7–23)
CALCIUM SERPL-MCNC: 8.1 MG/DL — LOW (ref 8.4–10.5)
CHLORIDE SERPL-SCNC: 106 MMOL/L — SIGNIFICANT CHANGE UP (ref 98–107)
CO2 SERPL-SCNC: 19 MMOL/L — LOW (ref 22–31)
CREAT SERPL-MCNC: 0.36 MG/DL — LOW (ref 0.5–1.3)
EGFR: 157 ML/MIN/1.73M2 — SIGNIFICANT CHANGE UP
EOSINOPHIL # BLD AUTO: 0.09 K/UL — SIGNIFICANT CHANGE UP (ref 0–0.5)
EOSINOPHIL NFR BLD AUTO: 0.9 % — SIGNIFICANT CHANGE UP (ref 0–6)
GLUCOSE SERPL-MCNC: 70 MG/DL — SIGNIFICANT CHANGE UP (ref 70–99)
HAPTOGLOB SERPL-MCNC: <20 MG/DL — LOW (ref 34–200)
HCT VFR BLD CALC: 21.8 % — LOW (ref 39–50)
HGB BLD-MCNC: 6.9 G/DL — CRITICAL LOW (ref 13–17)
IANC: 5.04 K/UL — SIGNIFICANT CHANGE UP (ref 1.8–7.4)
IMM GRANULOCYTES NFR BLD AUTO: 1.4 % — HIGH (ref 0–0.9)
LDH SERPL L TO P-CCNC: 461 U/L — HIGH (ref 135–225)
LYMPHOCYTES # BLD AUTO: 4.48 K/UL — HIGH (ref 1–3.3)
LYMPHOCYTES # BLD AUTO: 43.5 % — SIGNIFICANT CHANGE UP (ref 13–44)
MAGNESIUM SERPL-MCNC: 1.9 MG/DL — SIGNIFICANT CHANGE UP (ref 1.6–2.6)
MCHC RBC-ENTMCNC: 20.5 PG — LOW (ref 27–34)
MCHC RBC-ENTMCNC: 31.7 GM/DL — LOW (ref 32–36)
MCV RBC AUTO: 64.9 FL — LOW (ref 80–100)
MONOCYTES # BLD AUTO: 0.5 K/UL — SIGNIFICANT CHANGE UP (ref 0–0.9)
MONOCYTES NFR BLD AUTO: 4.9 % — SIGNIFICANT CHANGE UP (ref 2–14)
NEUTROPHILS # BLD AUTO: 5.04 K/UL — SIGNIFICANT CHANGE UP (ref 1.8–7.4)
NEUTROPHILS NFR BLD AUTO: 48.8 % — SIGNIFICANT CHANGE UP (ref 43–77)
NRBC # BLD: 9 /100 WBCS — HIGH (ref 0–0)
NRBC # FLD: 0.97 K/UL — HIGH (ref 0–0)
PHOSPHATE SERPL-MCNC: 2.9 MG/DL — SIGNIFICANT CHANGE UP (ref 2.5–4.5)
PLATELET # BLD AUTO: 411 K/UL — HIGH (ref 150–400)
POTASSIUM SERPL-MCNC: 3.6 MMOL/L — SIGNIFICANT CHANGE UP (ref 3.5–5.3)
POTASSIUM SERPL-SCNC: 3.6 MMOL/L — SIGNIFICANT CHANGE UP (ref 3.5–5.3)
PROT SERPL-MCNC: 7.1 G/DL — SIGNIFICANT CHANGE UP (ref 6–8.3)
RBC # BLD: 3.36 M/UL — LOW (ref 4.2–5.8)
RBC # FLD: 24.1 % — HIGH (ref 10.3–14.5)
RETICS #: 394 K/UL — HIGH (ref 25–125)
RETICS/RBC NFR: 11.8 % — HIGH (ref 0.5–2.5)
SODIUM SERPL-SCNC: 137 MMOL/L — SIGNIFICANT CHANGE UP (ref 135–145)
WBC # BLD: 10.3 K/UL — SIGNIFICANT CHANGE UP (ref 3.8–10.5)
WBC # FLD AUTO: 10.3 K/UL — SIGNIFICANT CHANGE UP (ref 3.8–10.5)

## 2024-08-29 PROCEDURE — 99233 SBSQ HOSP IP/OBS HIGH 50: CPT | Mod: GC

## 2024-08-29 RX ADMIN — MORPHINE SULFATE 15 MILLIGRAM(S): 30 TABLET, FILM COATED, EXTENDED RELEASE ORAL at 07:00

## 2024-08-29 RX ADMIN — KETOROLAC TROMETHAMINE 15 MILLIGRAM(S): 10 TABLET, FILM COATED ORAL at 23:15

## 2024-08-29 RX ADMIN — Medication 650 MILLIGRAM(S): at 12:02

## 2024-08-29 RX ADMIN — Medication 10 GRAM(S): at 06:09

## 2024-08-29 RX ADMIN — KETOROLAC TROMETHAMINE 15 MILLIGRAM(S): 10 TABLET, FILM COATED ORAL at 07:00

## 2024-08-29 RX ADMIN — Medication 650 MILLIGRAM(S): at 00:16

## 2024-08-29 RX ADMIN — HYDROXYUREA 1500 MILLIGRAM(S): 500 CAPSULE ORAL at 12:03

## 2024-08-29 RX ADMIN — Medication 650 MILLIGRAM(S): at 13:02

## 2024-08-29 RX ADMIN — CHLORHEXIDINE GLUCONATE ORAL RINSE 1 APPLICATION(S): 1.2 SOLUTION DENTAL at 12:04

## 2024-08-29 RX ADMIN — Medication 650 MILLIGRAM(S): at 06:08

## 2024-08-29 RX ADMIN — MORPHINE SULFATE 30 MILLILITER(S): 30 TABLET, FILM COATED, EXTENDED RELEASE ORAL at 08:24

## 2024-08-29 RX ADMIN — MORPHINE SULFATE 15 MILLIGRAM(S): 30 TABLET, FILM COATED, EXTENDED RELEASE ORAL at 17:54

## 2024-08-29 RX ADMIN — KETOROLAC TROMETHAMINE 15 MILLIGRAM(S): 10 TABLET, FILM COATED ORAL at 00:16

## 2024-08-29 RX ADMIN — FOLIC ACID 1 MILLIGRAM(S): 1 TABLET ORAL at 12:03

## 2024-08-29 RX ADMIN — MORPHINE SULFATE 30 MILLILITER(S): 30 TABLET, FILM COATED, EXTENDED RELEASE ORAL at 20:17

## 2024-08-29 RX ADMIN — VOXELOTOR 1500 MILLIGRAM(S): 300 TABLET, FOR SUSPENSION ORAL at 12:03

## 2024-08-29 RX ADMIN — KETOROLAC TROMETHAMINE 15 MILLIGRAM(S): 10 TABLET, FILM COATED ORAL at 12:02

## 2024-08-29 RX ADMIN — KETOROLAC TROMETHAMINE 15 MILLIGRAM(S): 10 TABLET, FILM COATED ORAL at 17:57

## 2024-08-29 RX ADMIN — MORPHINE SULFATE 15 MILLIGRAM(S): 30 TABLET, FILM COATED, EXTENDED RELEASE ORAL at 06:09

## 2024-08-29 RX ADMIN — Medication 650 MILLIGRAM(S): at 01:15

## 2024-08-29 RX ADMIN — Medication 10 GRAM(S): at 17:57

## 2024-08-29 RX ADMIN — KETOROLAC TROMETHAMINE 15 MILLIGRAM(S): 10 TABLET, FILM COATED ORAL at 13:02

## 2024-08-29 RX ADMIN — MUPIROCIN 1 APPLICATION(S): 20 OINTMENT TOPICAL at 06:13

## 2024-08-29 RX ADMIN — MUPIROCIN 1 APPLICATION(S): 20 OINTMENT TOPICAL at 17:56

## 2024-08-29 RX ADMIN — Medication 650 MILLIGRAM(S): at 06:26

## 2024-08-29 RX ADMIN — KETOROLAC TROMETHAMINE 15 MILLIGRAM(S): 10 TABLET, FILM COATED ORAL at 06:08

## 2024-08-29 RX ADMIN — Medication 650 MILLIGRAM(S): at 23:15

## 2024-08-29 RX ADMIN — MORPHINE SULFATE 30 MILLILITER(S): 30 TABLET, FILM COATED, EXTENDED RELEASE ORAL at 14:20

## 2024-08-29 RX ADMIN — KETOROLAC TROMETHAMINE 15 MILLIGRAM(S): 10 TABLET, FILM COATED ORAL at 01:15

## 2024-08-29 RX ADMIN — ENOXAPARIN SODIUM 30 MILLIGRAM(S): 150 INJECTION SUBCUTANEOUS at 12:05

## 2024-08-29 RX ADMIN — Medication 650 MILLIGRAM(S): at 17:56

## 2024-08-29 NOTE — PROGRESS NOTE ADULT - ATTENDING COMMENTS
Patient seen and examined at bedside. In brief, 29M with SCD, hx of acute chest, L hip/shoulder osteonecrosis, splenectomy, p/w 1 wk hx of worsening pains in his right knee/foot, chest ribs, progressing to b/l forearm pain, has been taking morphine IR 15 mg 4x/day (usual dose once daily) in addition to long acting Morphine 15mg bid. He reports he got Adakveo(crizanlizumab) infusion in June 20's, missed last infusion. He denies fever/chill/chest pain/sob/productive cough /gi or gu symptoms.     Patient reports relief with increase in four hour limit. Still getting locked out. however, patient is not getting locked out for max dosing. Locked out from 6 hour limit. Pt reports relief with current dose. will keep for now. Will uptitrate demand dose, if pt still with pain     # sickle crisis, pt missed Adakveo(crizanlizumab) infusion in July  - 1/2 NS  - pca morphine pump 2mg q6min, 4h lockout 24mg, patient reports dose does not relieve pain, increased demand dose to 2mg (on 8/26) reports intolerance to dilaudid pca, will up-titrate 4 hr lock out to 24mg on 8/28  ; uptitrate bowel regimen: miralax BID and senna   - On standing toradol   - resume home hydrea and endari  - resume home morphine ER 15mg bid  - incentive spirometer  -daily labs, CBC w/ diff, LDH, haptoglobin, CMP, retic count  - monitor for signs of acute chest syndrome, if fever, culture and repeat CXR stat  - trend CBC,  hgb < 7, no transfusion for now  - replete lytes, hypokalemia replete, keep K>4 and Mg>2  - DVT ppx lovenox    # abnormal ECG, TWI V1-V3 noted and reviewed, TTE,:  Left ventricular systolic function is normal with an ejection fraction visually estimated at 50 to 55 %. no reported wall motion abnormality likely demand ischemia in s/o sickle crisis/anemia, Tn negative.      Time-based billing (NON-critical care).     50 minutes spent on total encounter. The necessity of the time spent during the encounter on this date of service was due to:     Review of laboratory data, radiology results, consultants' recommendations, documentation in [place], discussion with patient/house staff/ACP and interdisciplinary staff (such as , social workers, etc). Interventions were performed as documented above.

## 2024-08-29 NOTE — PROGRESS NOTE ADULT - SUBJECTIVE AND OBJECTIVE BOX
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 VSS. ROS negative.    MEDICATIONS  (STANDING):  acetaminophen     Tablet .. 650 milliGRAM(s) Oral every 6 hours  chlorhexidine 2% Cloths 1 Application(s) Topical daily  enoxaparin Injectable 30 milliGRAM(s) SubCutaneous every 24 hours  folic acid 1 milliGRAM(s) Oral daily  glutamine Powder 10 Gram(s) Oral two times a day  hydroxyurea 1500 milliGRAM(s) Oral daily  ketorolac   Injectable 15 milliGRAM(s) IV Push every 6 hours  morphine ER Tablet 15 milliGRAM(s) Oral two times a day  morphine PCA (5 mG/mL) 30 milliLiter(s) PCA Continuous PCA Continuous  mupirocin 2% Ointment 1 Application(s) Both Nostrils two times a day  polyethylene glycol 3350 17 Gram(s) Oral two times a day  senna 2 Tablet(s) Oral at bedtime  sodium chloride 0.45%. 1000 milliLiter(s) (100 mL/Hr) IV Continuous <Continuous>  voxelotor 1500 milliGRAM(s) Oral daily    MEDICATIONS  (PRN):  naloxone Injectable 0.1 milliGRAM(s) IV Push every 3 minutes PRN For ANY of the following changes in patient status:  A. RR LESS THAN 10 breaths per minute, B. Oxygen saturation LESS THAN 90%, C. Sedation score of 6  ondansetron Injectable 4 milliGRAM(s) IV Push every 6 hours PRN Nausea      CAPILLARY BLOOD GLUCOSE        I&O's Summary    28 Aug 2024 07:01  -  29 Aug 2024 07:00  --------------------------------------------------------  IN: 1960 mL / OUT: 1300 mL / NET: 660 mL        PHYSICAL EXAM:  Vital Signs Last 24 Hrs  T(C): 36.6 (29 Aug 2024 05:37), Max: 36.8 (28 Aug 2024 14:00)  T(F): 97.9 (29 Aug 2024 05:37), Max: 98.3 (28 Aug 2024 14:00)  HR: 73 (29 Aug 2024 05:37) (64 - 88)  BP: 97/63 (29 Aug 2024 05:37) (97/63 - 128/85)  BP(mean): --  RR: 18 (29 Aug 2024 05:37) (17 - 18)  SpO2: 100% (29 Aug 2024 05:37) (98% - 100%)    Parameters below as of 29 Aug 2024 05:37  Patient On (Oxygen Delivery Method): room air    CONSTITUTIONAL: NAD, thin  HEENT: scleral icterus improving  RESPIRATORY: Normal respiratory effort; lungs are clear to auscultation bilaterally  CARDIOVASCULAR: Regular rate and rhythm, normal S1 and S2, no murmur/rub/gallop; No lower extremity edema  ABDOMEN: Nontender to palpation, normoactive bowel sounds, no rebound/guarding; No hepatosplenomegaly  MUSCULOSKELETAL: R knee swelling, warm, tender  NEURO: No focal deficits noted  PSYCH: A+Ox3 to person, place, and time; affect appropriate    LABS:                        7.1    10.58 )-----------( 374      ( 28 Aug 2024 06:26 )             23.5     08-28    136  |  104  |  4<L>  ----------------------------<  66<L>  3.5   |  20<L>  |  0.31<L>    Ca    7.9<L>      28 Aug 2024 06:26  Phos  2.8     08-28  Mg     1.80     08-28    TPro  6.8  /  Alb  2.4<L>  /  TBili  2.5<H>  /  DBili  x   /  AST  55<H>  /  ALT  23  /  AlkPhos  250<H>  08-28          Urinalysis Basic - ( 28 Aug 2024 06:26 )    Color: x / Appearance: x / SG: x / pH: x  Gluc: 66 mg/dL / Ketone: x  / Bili: x / Urobili: x   Blood: x / Protein: x / Nitrite: x   Leuk Esterase: x / RBC: x / WBC x   Sq Epi: x / Non Sq Epi: x / Bacteria: x          RADIOLOGY:        ******************************  Authored By: Mariluz Salmon MD PGY1  Internal Medicine  MS Teams  ******************************

## 2024-08-29 NOTE — PROGRESS NOTE ADULT - PROBLEM SELECTOR PLAN 4
- Fluids: 1/2 NS  - Electrolytes: Will replete to maintain K>4, Phos>3, and Mag>2  - Diet: regular  - Activity: as tolerated  - DVT Prophylaxis: lovenox  - PT: outpatient PT  - Disposition: pending medical optimization

## 2024-08-29 NOTE — PROGRESS NOTE ADULT - PROBLEM SELECTOR PLAN 2
- concern for possible septic arthritis given severe tenderness, swelling, warmth. Other items on differential include bone infarction, osteonecrosis  - afebrile   - R knee XR with no effusion, osteonecrosis  - US knee with small effusion   Plan  - will follow-up with heme regarding further plan   - f/u CRP

## 2024-08-29 NOTE — PROGRESS NOTE ADULT - PROBLEM SELECTOR PLAN 1
#Transaminitis  - follows with Dr. Montano, missed monthly dose of Adakveo for 2 months which may explain SCC  - with R knee pain, CP  - low concern for acute chest given no opacities on CXR, chest pain more consistent with SCC  - Hb 6.8 --> 6.7, but no report of increased fatigue  - transaminitis likely 2/2 to hepatopathy iso vasoocclusive crisus, no abdominal pain and LFTs improving, could otherwise consider RUQ US  - hematology consulted  - palliative deferred pain management to primary team   A/P:  - c/w 1/2 NS  - pain control with home morphine 15 MG ER, pca morphine pump (will adjust), toradol q6; tylenol  - bowel regimen: miralax BID, senna 2 MG; hold off on starting miralax  - home hydroxyurea (need to take home med), oxbryta (need to take home med) and endari  - incentive spirometer  - daily labs, CBC w/ diff, LDH, haptoglobin, CMP, retic count  - will continue to monitor for signs of acute chest syndrome. If fever, culture and repeat CXR stat  - trend CBC, and transfuse for symptomatic anemia, will hold on transfusion for Hb 6.7

## 2024-08-30 LAB
ALBUMIN SERPL ELPH-MCNC: 2.4 G/DL — LOW (ref 3.3–5)
ALP SERPL-CCNC: 276 U/L — HIGH (ref 40–120)
ALT FLD-CCNC: 23 U/L — SIGNIFICANT CHANGE UP (ref 4–41)
ANION GAP SERPL CALC-SCNC: 9 MMOL/L — SIGNIFICANT CHANGE UP (ref 7–14)
AST SERPL-CCNC: 55 U/L — HIGH (ref 4–40)
BASOPHILS # BLD AUTO: 0.05 K/UL — SIGNIFICANT CHANGE UP (ref 0–0.2)
BASOPHILS NFR BLD AUTO: 0.4 % — SIGNIFICANT CHANGE UP (ref 0–2)
BILIRUB SERPL-MCNC: 1.8 MG/DL — HIGH (ref 0.2–1.2)
BUN SERPL-MCNC: 6 MG/DL — LOW (ref 7–23)
CALCIUM SERPL-MCNC: 8.1 MG/DL — LOW (ref 8.4–10.5)
CHLORIDE SERPL-SCNC: 108 MMOL/L — HIGH (ref 98–107)
CO2 SERPL-SCNC: 20 MMOL/L — LOW (ref 22–31)
CREAT SERPL-MCNC: 0.31 MG/DL — LOW (ref 0.5–1.3)
EGFR: 165 ML/MIN/1.73M2 — SIGNIFICANT CHANGE UP
EOSINOPHIL # BLD AUTO: 0.18 K/UL — SIGNIFICANT CHANGE UP (ref 0–0.5)
EOSINOPHIL NFR BLD AUTO: 1.4 % — SIGNIFICANT CHANGE UP (ref 0–6)
GLUCOSE SERPL-MCNC: 96 MG/DL — SIGNIFICANT CHANGE UP (ref 70–99)
HAPTOGLOB SERPL-MCNC: <20 MG/DL — LOW (ref 34–200)
HCT VFR BLD CALC: 22.3 % — LOW (ref 39–50)
HGB BLD-MCNC: 7 G/DL — CRITICAL LOW (ref 13–17)
IANC: 7.19 K/UL — SIGNIFICANT CHANGE UP (ref 1.8–7.4)
IMM GRANULOCYTES NFR BLD AUTO: 1.3 % — HIGH (ref 0–0.9)
LDH SERPL L TO P-CCNC: 443 U/L — HIGH (ref 135–225)
LYMPHOCYTES # BLD AUTO: 35.7 % — SIGNIFICANT CHANGE UP (ref 13–44)
LYMPHOCYTES # BLD AUTO: 4.48 K/UL — HIGH (ref 1–3.3)
MAGNESIUM SERPL-MCNC: 1.8 MG/DL — SIGNIFICANT CHANGE UP (ref 1.6–2.6)
MCHC RBC-ENTMCNC: 20.3 PG — LOW (ref 27–34)
MCHC RBC-ENTMCNC: 31.4 GM/DL — LOW (ref 32–36)
MCV RBC AUTO: 64.6 FL — LOW (ref 80–100)
MONOCYTES # BLD AUTO: 0.49 K/UL — SIGNIFICANT CHANGE UP (ref 0–0.9)
MONOCYTES NFR BLD AUTO: 3.9 % — SIGNIFICANT CHANGE UP (ref 2–14)
NEUTROPHILS # BLD AUTO: 7.19 K/UL — SIGNIFICANT CHANGE UP (ref 1.8–7.4)
NEUTROPHILS NFR BLD AUTO: 57.3 % — SIGNIFICANT CHANGE UP (ref 43–77)
NRBC # BLD: 9 /100 WBCS — HIGH (ref 0–0)
NRBC # FLD: 1.09 K/UL — HIGH (ref 0–0)
PHOSPHATE SERPL-MCNC: 2.8 MG/DL — SIGNIFICANT CHANGE UP (ref 2.5–4.5)
PLATELET # BLD AUTO: 492 K/UL — HIGH (ref 150–400)
POTASSIUM SERPL-MCNC: 3.5 MMOL/L — SIGNIFICANT CHANGE UP (ref 3.5–5.3)
POTASSIUM SERPL-SCNC: 3.5 MMOL/L — SIGNIFICANT CHANGE UP (ref 3.5–5.3)
PROT SERPL-MCNC: 6.9 G/DL — SIGNIFICANT CHANGE UP (ref 6–8.3)
RBC # BLD: 3.45 M/UL — LOW (ref 4.2–5.8)
RBC # BLD: 3.45 M/UL — LOW (ref 4.2–5.8)
RBC # FLD: 24 % — HIGH (ref 10.3–14.5)
RETICS #: 374.8 K/UL — HIGH (ref 25–125)
RETICS/RBC NFR: 11.1 % — HIGH (ref 0.5–2.5)
SODIUM SERPL-SCNC: 137 MMOL/L — SIGNIFICANT CHANGE UP (ref 135–145)
WBC # BLD: 12.55 K/UL — HIGH (ref 3.8–10.5)
WBC # FLD AUTO: 12.55 K/UL — HIGH (ref 3.8–10.5)

## 2024-08-30 PROCEDURE — 99233 SBSQ HOSP IP/OBS HIGH 50: CPT | Mod: GC

## 2024-08-30 RX ORDER — MORPHINE SULFATE 30 MG/1
30 TABLET, FILM COATED, EXTENDED RELEASE ORAL
Refills: 0 | Status: DISCONTINUED | OUTPATIENT
Start: 2024-08-30 | End: 2024-08-30

## 2024-08-30 RX ORDER — MORPHINE SULFATE 30 MG/1
30 TABLET, FILM COATED, EXTENDED RELEASE ORAL
Refills: 0 | Status: DISCONTINUED | OUTPATIENT
Start: 2024-08-30 | End: 2024-09-01

## 2024-08-30 RX ADMIN — KETOROLAC TROMETHAMINE 15 MILLIGRAM(S): 10 TABLET, FILM COATED ORAL at 12:51

## 2024-08-30 RX ADMIN — MORPHINE SULFATE 30 MILLILITER(S): 30 TABLET, FILM COATED, EXTENDED RELEASE ORAL at 13:39

## 2024-08-30 RX ADMIN — Medication 650 MILLIGRAM(S): at 05:54

## 2024-08-30 RX ADMIN — MORPHINE SULFATE 15 MILLIGRAM(S): 30 TABLET, FILM COATED, EXTENDED RELEASE ORAL at 05:53

## 2024-08-30 RX ADMIN — Medication 650 MILLIGRAM(S): at 17:52

## 2024-08-30 RX ADMIN — MUPIROCIN 1 APPLICATION(S): 20 OINTMENT TOPICAL at 17:52

## 2024-08-30 RX ADMIN — Medication 2 TABLET(S): at 21:49

## 2024-08-30 RX ADMIN — KETOROLAC TROMETHAMINE 15 MILLIGRAM(S): 10 TABLET, FILM COATED ORAL at 23:30

## 2024-08-30 RX ADMIN — KETOROLAC TROMETHAMINE 15 MILLIGRAM(S): 10 TABLET, FILM COATED ORAL at 05:54

## 2024-08-30 RX ADMIN — MORPHINE SULFATE 15 MILLIGRAM(S): 30 TABLET, FILM COATED, EXTENDED RELEASE ORAL at 17:52

## 2024-08-30 RX ADMIN — Medication 650 MILLIGRAM(S): at 11:51

## 2024-08-30 RX ADMIN — KETOROLAC TROMETHAMINE 15 MILLIGRAM(S): 10 TABLET, FILM COATED ORAL at 11:51

## 2024-08-30 RX ADMIN — Medication 100 MILLILITER(S): at 20:50

## 2024-08-30 RX ADMIN — KETOROLAC TROMETHAMINE 15 MILLIGRAM(S): 10 TABLET, FILM COATED ORAL at 17:52

## 2024-08-30 RX ADMIN — Medication 10 GRAM(S): at 17:53

## 2024-08-30 RX ADMIN — FOLIC ACID 1 MILLIGRAM(S): 1 TABLET ORAL at 11:52

## 2024-08-30 RX ADMIN — Medication 650 MILLIGRAM(S): at 12:51

## 2024-08-30 RX ADMIN — Medication 10 GRAM(S): at 05:54

## 2024-08-30 RX ADMIN — MORPHINE SULFATE 15 MILLIGRAM(S): 30 TABLET, FILM COATED, EXTENDED RELEASE ORAL at 18:52

## 2024-08-30 RX ADMIN — MUPIROCIN 1 APPLICATION(S): 20 OINTMENT TOPICAL at 05:54

## 2024-08-30 RX ADMIN — KETOROLAC TROMETHAMINE 15 MILLIGRAM(S): 10 TABLET, FILM COATED ORAL at 18:52

## 2024-08-30 RX ADMIN — Medication 650 MILLIGRAM(S): at 18:52

## 2024-08-30 RX ADMIN — MORPHINE SULFATE 30 MILLILITER(S): 30 TABLET, FILM COATED, EXTENDED RELEASE ORAL at 20:10

## 2024-08-30 RX ADMIN — VOXELOTOR 1500 MILLIGRAM(S): 300 TABLET, FOR SUSPENSION ORAL at 11:52

## 2024-08-30 RX ADMIN — MORPHINE SULFATE 30 MILLILITER(S): 30 TABLET, FILM COATED, EXTENDED RELEASE ORAL at 08:31

## 2024-08-30 RX ADMIN — HYDROXYUREA 1500 MILLIGRAM(S): 500 CAPSULE ORAL at 11:52

## 2024-08-30 RX ADMIN — Medication 650 MILLIGRAM(S): at 23:30

## 2024-08-30 RX ADMIN — ENOXAPARIN SODIUM 30 MILLIGRAM(S): 150 INJECTION SUBCUTANEOUS at 11:53

## 2024-08-30 RX ADMIN — CHLORHEXIDINE GLUCONATE ORAL RINSE 1 APPLICATION(S): 1.2 SOLUTION DENTAL at 11:51

## 2024-08-30 NOTE — CHART NOTE - NSCHARTNOTEFT_GEN_A_CORE
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 Hematology  was consulted for sickle cell crisis and to rule out acute chest syndrome. No concern for acute chest syndrome as no consolidation on CXR, no hypoxia. Pt likely was in sickle cell pain crisis. Primary team managing his pain regimen.     # Sickle cell pain crisis (improving)  - c/w pain control  - obtain Serum electrophoresis (ordered)  - pt to follow up with Dr. Jesica Montano after discharge  - c/w home medication for sickle cell (hydrea, endari, oxbryta)    Hematology will sign off.     Timi Green MD  PGY4  Hematology-Oncology Fellow  [facility]

## 2024-08-30 NOTE — PROGRESS NOTE ADULT - SUBJECTIVE AND OBJECTIVE BOX
PROGRESS NOTE:     Patient is a 29y old  Male who presents with a chief complaint of sickle cell crisis (29 Aug 2024 07:25)      INTERVAL HISTORY: NAEO. VSS. ROS negative.    MEDICATIONS  (STANDING):  acetaminophen     Tablet .. 650 milliGRAM(s) Oral every 6 hours  chlorhexidine 2% Cloths 1 Application(s) Topical daily  enoxaparin Injectable 30 milliGRAM(s) SubCutaneous every 24 hours  folic acid 1 milliGRAM(s) Oral daily  glutamine Powder 10 Gram(s) Oral two times a day  hydroxyurea 1500 milliGRAM(s) Oral daily  ketorolac   Injectable 15 milliGRAM(s) IV Push every 6 hours  morphine ER Tablet 15 milliGRAM(s) Oral two times a day  morphine PCA (5 mG/mL) 30 milliLiter(s) PCA Continuous PCA Continuous  mupirocin 2% Ointment 1 Application(s) Both Nostrils two times a day  polyethylene glycol 3350 17 Gram(s) Oral two times a day  senna 2 Tablet(s) Oral at bedtime  sodium chloride 0.45%. 1000 milliLiter(s) (100 mL/Hr) IV Continuous <Continuous>  voxelotor 1500 milliGRAM(s) Oral daily    MEDICATIONS  (PRN):  naloxone Injectable 0.1 milliGRAM(s) IV Push every 3 minutes PRN For ANY of the following changes in patient status:  A. RR LESS THAN 10 breaths per minute, B. Oxygen saturation LESS THAN 90%, C. Sedation score of 6  ondansetron Injectable 4 milliGRAM(s) IV Push every 6 hours PRN Nausea      CAPILLARY BLOOD GLUCOSE        I&O's Summary    29 Aug 2024 07:01  -  30 Aug 2024 07:00  --------------------------------------------------------  IN: 0 mL / OUT: 1100 mL / NET: -1100 mL        PHYSICAL EXAM:  Vital Signs Last 24 Hrs  T(C): 36.6 (30 Aug 2024 05:20), Max: 37.2 (30 Aug 2024 00:30)  T(F): 97.8 (30 Aug 2024 05:20), Max: 98.9 (30 Aug 2024 00:30)  HR: 76 (30 Aug 2024 05:20) (63 - 88)  BP: 105/66 (30 Aug 2024 05:20) (97/62 - 106/75)  BP(mean): --  RR: 17 (30 Aug 2024 05:20) (15 - 17)  SpO2: 95% (30 Aug 2024 05:20) (95% - 100%)    Parameters below as of 30 Aug 2024 05:20  Patient On (Oxygen Delivery Method): room air        CONSTITUTIONAL: NAD, thin  HEENT: scleral icterus improving  RESPIRATORY: Normal respiratory effort; lungs are clear to auscultation bilaterally  CARDIOVASCULAR: Regular rate and rhythm, normal S1 and S2, no murmur/rub/gallop; No lower extremity edema  ABDOMEN: Nontender to palpation, normoactive bowel sounds, no rebound/guarding; No hepatosplenomegaly  MUSCULOSKELETAL: R knee swelling, warm, tender  NEURO: No focal deficits noted  PSYCH: A+Ox3 to person, place, and time; affect appropriate    LABS:                        6.9    10.30 )-----------( 411      ( 29 Aug 2024 05:51 )             21.8     08-29    137  |  106  |  6<L>  ----------------------------<  70  3.6   |  19<L>  |  0.36<L>    Ca    8.1<L>      29 Aug 2024 05:51  Phos  2.9     08-29  Mg     1.90     08-29    TPro  7.1  /  Alb  2.3<L>  /  TBili  2.0<H>  /  DBili  x   /  AST  52<H>  /  ALT  19  /  AlkPhos  275<H>  08-29          Urinalysis Basic - ( 29 Aug 2024 05:51 )    Color: x / Appearance: x / SG: x / pH: x  Gluc: 70 mg/dL / Ketone: x  / Bili: x / Urobili: x   Blood: x / Protein: x / Nitrite: x   Leuk Esterase: x / RBC: x / WBC x   Sq Epi: x / Non Sq Epi: x / Bacteria: x          RADIOLOGY:        ******************************  Authored By: Mariluz Salmon MD PGY1  Internal Medicine  MS Teams  ******************************   PROGRESS NOTE:     Patient is a 29y old  Male who presents with a chief complaint of sickle cell crisis (29 Aug 2024 07:25)    INTERVAL HISTORY: NAEO. VSS. States that overall pain a bit improved, R knee 5/10, b/l upper arm unchanged at 7/10. Had period this morning when he was locked out of the pump, but was given toradol/morphine and pain was alleviated. ROS otherwise negative.    MEDICATIONS  (STANDING):  acetaminophen     Tablet .. 650 milliGRAM(s) Oral every 6 hours  chlorhexidine 2% Cloths 1 Application(s) Topical daily  enoxaparin Injectable 30 milliGRAM(s) SubCutaneous every 24 hours  folic acid 1 milliGRAM(s) Oral daily  glutamine Powder 10 Gram(s) Oral two times a day  hydroxyurea 1500 milliGRAM(s) Oral daily  ketorolac   Injectable 15 milliGRAM(s) IV Push every 6 hours  morphine ER Tablet 15 milliGRAM(s) Oral two times a day  morphine PCA (5 mG/mL) 30 milliLiter(s) PCA Continuous PCA Continuous  mupirocin 2% Ointment 1 Application(s) Both Nostrils two times a day  polyethylene glycol 3350 17 Gram(s) Oral two times a day  senna 2 Tablet(s) Oral at bedtime  sodium chloride 0.45%. 1000 milliLiter(s) (100 mL/Hr) IV Continuous <Continuous>  voxelotor 1500 milliGRAM(s) Oral daily    MEDICATIONS  (PRN):  naloxone Injectable 0.1 milliGRAM(s) IV Push every 3 minutes PRN For ANY of the following changes in patient status:  A. RR LESS THAN 10 breaths per minute, B. Oxygen saturation LESS THAN 90%, C. Sedation score of 6  ondansetron Injectable 4 milliGRAM(s) IV Push every 6 hours PRN Nausea      CAPILLARY BLOOD GLUCOSE        I&O's Summary    29 Aug 2024 07:01  -  30 Aug 2024 07:00  --------------------------------------------------------  IN: 0 mL / OUT: 1100 mL / NET: -1100 mL        PHYSICAL EXAM:  Vital Signs Last 24 Hrs  T(C): 36.6 (30 Aug 2024 05:20), Max: 37.2 (30 Aug 2024 00:30)  T(F): 97.8 (30 Aug 2024 05:20), Max: 98.9 (30 Aug 2024 00:30)  HR: 76 (30 Aug 2024 05:20) (63 - 88)  BP: 105/66 (30 Aug 2024 05:20) (97/62 - 106/75)  BP(mean): --  RR: 17 (30 Aug 2024 05:20) (15 - 17)  SpO2: 95% (30 Aug 2024 05:20) (95% - 100%)    Parameters below as of 30 Aug 2024 05:20  Patient On (Oxygen Delivery Method): room air        CONSTITUTIONAL: NAD, thin  HEENT: scleral icterus improving  RESPIRATORY: Normal respiratory effort; lungs are clear to auscultation bilaterally  CARDIOVASCULAR: Regular rate and rhythm, normal S1 and S2, no murmur/rub/gallop; No lower extremity edema  ABDOMEN: Nontender to palpation, normoactive bowel sounds, no rebound/guarding; No hepatosplenomegaly  MUSCULOSKELETAL: R knee swelling, warm, tenderness markedly improved  NEURO: No focal deficits noted  PSYCH: A+Ox3 to person, place, and time; affect appropriate    LABS:                        6.9    10.30 )-----------( 411      ( 29 Aug 2024 05:51 )             21.8     08-29    137  |  106  |  6<L>  ----------------------------<  70  3.6   |  19<L>  |  0.36<L>    Ca    8.1<L>      29 Aug 2024 05:51  Phos  2.9     08-29  Mg     1.90     08-29    TPro  7.1  /  Alb  2.3<L>  /  TBili  2.0<H>  /  DBili  x   /  AST  52<H>  /  ALT  19  /  AlkPhos  275<H>  08-29          Urinalysis Basic - ( 29 Aug 2024 05:51 )    Color: x / Appearance: x / SG: x / pH: x  Gluc: 70 mg/dL / Ketone: x  / Bili: x / Urobili: x   Blood: x / Protein: x / Nitrite: x   Leuk Esterase: x / RBC: x / WBC x   Sq Epi: x / Non Sq Epi: x / Bacteria: x              ******************************  Authored By: Mariluz Salmon MD PGY1  Internal Medicine  MS Teams  ******************************

## 2024-08-30 NOTE — PROGRESS NOTE ADULT - PROBLEM SELECTOR PLAN 3
- EKG with TWI V1-V3 (anterior leads)  - troponin neg  - maybe 2/2 demand iso SCC  - TTE normal  Plan  - CTM CP - EKG with TWI V1-V3 (anterior leads)  - troponin neg  - maybe 2/2 demand iso SCC  - TTE normal  Plan  - CTM

## 2024-08-30 NOTE — PROGRESS NOTE ADULT - ATTENDING COMMENTS
Patient seen and examined at bedside. In brief, 29M with SCD, hx of acute chest, L hip/shoulder osteonecrosis, splenectomy, p/w 1 wk hx of worsening pains in his right knee/foot, chest ribs, progressing to b/l forearm pain, has been taking morphine IR 15 mg 4x/day (usual dose once daily) in addition to long acting Morphine 15mg bid. He reports he got Adakveo(crizanlizumab) infusion in June 20's, missed last infusion. He denies fever/chill/chest pain/sob/productive cough /gi or gu symptoms.     Patient locked out of PCA pump - attempted about 20-30 times. Will increase demand dose.   # sickle crisis, pt missed Adakveo(crizanlizumab) infusion in July  - 1/2 NS  - pca morphine pump 2mg q6min, 4h lockout 24mg, patient reports dose does not relieve pain, increased demand dose to 2.5mg (on 8/30) reports intolerance to dilaudid pca, will up-titrate 4 hr lock out to 33mg on 8/30  ; uptitrate bowel regimen: miralax BID and senna   - On standing toradol   - resume home hydrea and endari  - resume home morphine ER 15mg bid  - incentive spirometer  -daily labs, CBC w/ diff, LDH, haptoglobin, CMP, retic count  - monitor for signs of acute chest syndrome, if fever, culture and repeat CXR stat  - trend CBC,  hgb < 7, no transfusion for now  - replete lytes, hypokalemia replete, keep K>4 and Mg>2  - DVT ppx lovenox    # abnormal ECG, TWI V1-V3 noted and reviewed, TTE,:  Left ventricular systolic function is normal with an ejection fraction visually estimated at 50 to 55 %. no reported wall motion abnormality likely demand ischemia in s/o sickle crisis/anemia, Tn negative.      Time-based billing (NON-critical care).     50 minutes spent on total encounter. The necessity of the time spent during the encounter on this date of service was due to:     Review of laboratory data, radiology results, consultants' recommendations, documentation in [place], discussion with patient/house staff/ACP and interdisciplinary staff (such as , social workers, etc). Interventions were performed as documented above. Patient seen and examined at bedside. In brief, 29M with SCD, hx of acute chest, L hip/shoulder osteonecrosis, splenectomy, p/w 1 wk hx of worsening pains in his right knee/foot, chest ribs, progressing to b/l forearm pain, has been taking morphine IR 15 mg 4x/day (usual dose once daily) in addition to long acting Morphine 15mg bid. He reports he got Adakveo(crizanlizumab) infusion in June 20's, missed last infusion. He denies fever/chill/chest pain/sob/productive cough /gi or gu symptoms.     Patient locked out of PCA pump - attempted about 20-30 times. Initial plan to increase demand dose to 2.5 but patient concerned 2.5 too high of a dose. will increase 4 hr limit .   # sickle crisis, pt missed Adakveo(crizanlizumab) infusion in July  - 1/2 NS  - pca morphine pump 2mg q6min, 4h lockout 30mg, patient reports dose does not relieve pain, increased demand dose to 2.0mg (on 8/26) reports intolerance to dilaudid pca, will up-titrate 4 hr lock out to 30mg on 8/30  ; uptitrate bowel regimen: miralax BID and senna   - On standing toradol   - resume home hydrea and endari  - resume home morphine ER 15mg bid  - incentive spirometer  -daily labs, CBC w/ diff, LDH, haptoglobin, CMP, retic count  - monitor for signs of acute chest syndrome, if fever, culture and repeat CXR stat  - trend CBC,  hgb < 7, no transfusion for now  - replete lytes, hypokalemia replete, keep K>4 and Mg>2  - DVT ppx lovenox    # abnormal ECG, TWI V1-V3 noted and reviewed, TTE,:  Left ventricular systolic function is normal with an ejection fraction visually estimated at 50 to 55 %. no reported wall motion abnormality likely demand ischemia in s/o sickle crisis/anemia, Tn negative.      Time-based billing (NON-critical care).     50 minutes spent on total encounter. The necessity of the time spent during the encounter on this date of service was due to:     Review of laboratory data, radiology results, consultants' recommendations, documentation in [place], discussion with patient/house staff/ACP and interdisciplinary staff (such as , social workers, etc). Interventions were performed as documented above.

## 2024-08-30 NOTE — PROGRESS NOTE ADULT - PROBLEM SELECTOR PLAN 2
- concern for possible septic arthritis given severe tenderness, swelling, warmth. Other items on differential include bone infarction, osteonecrosis  - afebrile   - R knee XR with no effusion, osteonecrosis  - US knee with small effusion   Plan  - will follow-up with heme regarding further plan   - f/u CRP - low concern for septic arthritis given improving tenderness, improved WBC, afebrile  - R knee XR with no effusion, osteonecrosis  - US knee with small effusion   Plan  - will defer abx given overall improving course, CTM

## 2024-08-31 LAB
ALBUMIN SERPL ELPH-MCNC: 2.4 G/DL — LOW (ref 3.3–5)
ALP SERPL-CCNC: 284 U/L — HIGH (ref 40–120)
ALT FLD-CCNC: 25 U/L — SIGNIFICANT CHANGE UP (ref 4–41)
ANION GAP SERPL CALC-SCNC: 10 MMOL/L — SIGNIFICANT CHANGE UP (ref 7–14)
ANISOCYTOSIS BLD QL: SIGNIFICANT CHANGE UP
AST SERPL-CCNC: 62 U/L — HIGH (ref 4–40)
BASOPHILS # BLD AUTO: 0 K/UL — SIGNIFICANT CHANGE UP (ref 0–0.2)
BASOPHILS NFR BLD AUTO: 0 % — SIGNIFICANT CHANGE UP (ref 0–2)
BILIRUB SERPL-MCNC: 1.7 MG/DL — HIGH (ref 0.2–1.2)
BUN SERPL-MCNC: 3 MG/DL — LOW (ref 7–23)
CALCIUM SERPL-MCNC: 8 MG/DL — LOW (ref 8.4–10.5)
CHLORIDE SERPL-SCNC: 108 MMOL/L — HIGH (ref 98–107)
CO2 SERPL-SCNC: 20 MMOL/L — LOW (ref 22–31)
CREAT SERPL-MCNC: 0.3 MG/DL — LOW (ref 0.5–1.3)
EGFR: 166 ML/MIN/1.73M2 — SIGNIFICANT CHANGE UP
ELLIPTOCYTES BLD QL SMEAR: SLIGHT — SIGNIFICANT CHANGE UP
EOSINOPHIL # BLD AUTO: 0 K/UL — SIGNIFICANT CHANGE UP (ref 0–0.5)
EOSINOPHIL NFR BLD AUTO: 0 % — SIGNIFICANT CHANGE UP (ref 0–6)
GIANT PLATELETS BLD QL SMEAR: PRESENT — SIGNIFICANT CHANGE UP
GLUCOSE SERPL-MCNC: 74 MG/DL — SIGNIFICANT CHANGE UP (ref 70–99)
HAPTOGLOB SERPL-MCNC: <20 MG/DL — LOW (ref 34–200)
HCT VFR BLD CALC: 24.2 % — LOW (ref 39–50)
HGB BLD-MCNC: 7.7 G/DL — LOW (ref 13–17)
HYPOCHROMIA BLD QL: SLIGHT — SIGNIFICANT CHANGE UP
IANC: 6.3 K/UL — SIGNIFICANT CHANGE UP (ref 1.8–7.4)
LDH SERPL L TO P-CCNC: 474 U/L — HIGH (ref 135–225)
LYMPHOCYTES # BLD AUTO: 2.68 K/UL — SIGNIFICANT CHANGE UP (ref 1–3.3)
LYMPHOCYTES # BLD AUTO: 27.1 % — SIGNIFICANT CHANGE UP (ref 13–44)
MAGNESIUM SERPL-MCNC: 1.9 MG/DL — SIGNIFICANT CHANGE UP (ref 1.6–2.6)
MANUAL SMEAR VERIFICATION: SIGNIFICANT CHANGE UP
MCHC RBC-ENTMCNC: 20.7 PG — LOW (ref 27–34)
MCHC RBC-ENTMCNC: 31.8 GM/DL — LOW (ref 32–36)
MCV RBC AUTO: 65.1 FL — LOW (ref 80–100)
MICROCYTES BLD QL: SIGNIFICANT CHANGE UP
MONOCYTES # BLD AUTO: 0.51 K/UL — SIGNIFICANT CHANGE UP (ref 0–0.9)
MONOCYTES NFR BLD AUTO: 5.2 % — SIGNIFICANT CHANGE UP (ref 2–14)
NEUTROPHILS # BLD AUTO: 6.27 K/UL — SIGNIFICANT CHANGE UP (ref 1.8–7.4)
NEUTROPHILS NFR BLD AUTO: 63.5 % — SIGNIFICANT CHANGE UP (ref 43–77)
NRBC # BLD: 195 /100 WBCS — HIGH (ref 0–0)
OVALOCYTES BLD QL SMEAR: SLIGHT — SIGNIFICANT CHANGE UP
PHOSPHATE SERPL-MCNC: 3 MG/DL — SIGNIFICANT CHANGE UP (ref 2.5–4.5)
PLAT MORPH BLD: NORMAL — SIGNIFICANT CHANGE UP
PLATELET # BLD AUTO: 546 K/UL — HIGH (ref 150–400)
PLATELET COUNT - ESTIMATE: ABNORMAL
POIKILOCYTOSIS BLD QL AUTO: SIGNIFICANT CHANGE UP
POLYCHROMASIA BLD QL SMEAR: SLIGHT — SIGNIFICANT CHANGE UP
POTASSIUM SERPL-MCNC: 3.8 MMOL/L — SIGNIFICANT CHANGE UP (ref 3.5–5.3)
POTASSIUM SERPL-SCNC: 3.8 MMOL/L — SIGNIFICANT CHANGE UP (ref 3.5–5.3)
PROT SERPL-MCNC: 7.3 G/DL — SIGNIFICANT CHANGE UP (ref 6–8.3)
RBC # BLD: 3.72 M/UL — LOW (ref 4.2–5.8)
RBC # BLD: 3.72 M/UL — LOW (ref 4.2–5.8)
RBC # FLD: 23.9 % — HIGH (ref 10.3–14.5)
RBC BLD AUTO: ABNORMAL
RETICS #: 383.4 K/UL — HIGH (ref 25–125)
RETICS/RBC NFR: 10.3 % — HIGH (ref 0.5–2.5)
SCHISTOCYTES BLD QL AUTO: SLIGHT — SIGNIFICANT CHANGE UP
SICKLE CELLS BLD QL SMEAR: SLIGHT — SIGNIFICANT CHANGE UP
SMUDGE CELLS # BLD: PRESENT — SIGNIFICANT CHANGE UP
SODIUM SERPL-SCNC: 138 MMOL/L — SIGNIFICANT CHANGE UP (ref 135–145)
TARGETS BLD QL SMEAR: SIGNIFICANT CHANGE UP
VARIANT LYMPHS # BLD: 4.2 % — SIGNIFICANT CHANGE UP (ref 0–6)
WBC # BLD: 9.88 K/UL — SIGNIFICANT CHANGE UP (ref 3.8–10.5)
WBC # FLD AUTO: 9.88 K/UL — SIGNIFICANT CHANGE UP (ref 3.8–10.5)

## 2024-08-31 PROCEDURE — 99232 SBSQ HOSP IP/OBS MODERATE 35: CPT | Mod: GC

## 2024-08-31 RX ORDER — MORPHINE SULFATE 30 MG/1
15 TABLET, FILM COATED, EXTENDED RELEASE ORAL
Refills: 0 | Status: DISCONTINUED | OUTPATIENT
Start: 2024-08-31 | End: 2024-09-06

## 2024-08-31 RX ADMIN — Medication 650 MILLIGRAM(S): at 07:34

## 2024-08-31 RX ADMIN — Medication 650 MILLIGRAM(S): at 06:34

## 2024-08-31 RX ADMIN — MORPHINE SULFATE 30 MILLILITER(S): 30 TABLET, FILM COATED, EXTENDED RELEASE ORAL at 20:19

## 2024-08-31 RX ADMIN — ENOXAPARIN SODIUM 30 MILLIGRAM(S): 150 INJECTION SUBCUTANEOUS at 13:59

## 2024-08-31 RX ADMIN — Medication 650 MILLIGRAM(S): at 19:24

## 2024-08-31 RX ADMIN — HYDROXYUREA 1500 MILLIGRAM(S): 500 CAPSULE ORAL at 13:59

## 2024-08-31 RX ADMIN — Medication 650 MILLIGRAM(S): at 00:30

## 2024-08-31 RX ADMIN — Medication 10 GRAM(S): at 05:26

## 2024-08-31 RX ADMIN — FOLIC ACID 1 MILLIGRAM(S): 1 TABLET ORAL at 14:07

## 2024-08-31 RX ADMIN — MUPIROCIN 1 APPLICATION(S): 20 OINTMENT TOPICAL at 05:36

## 2024-08-31 RX ADMIN — Medication 100 MILLILITER(S): at 14:00

## 2024-08-31 RX ADMIN — KETOROLAC TROMETHAMINE 15 MILLIGRAM(S): 10 TABLET, FILM COATED ORAL at 00:30

## 2024-08-31 RX ADMIN — CHLORHEXIDINE GLUCONATE ORAL RINSE 1 APPLICATION(S): 1.2 SOLUTION DENTAL at 14:08

## 2024-08-31 RX ADMIN — KETOROLAC TROMETHAMINE 15 MILLIGRAM(S): 10 TABLET, FILM COATED ORAL at 19:25

## 2024-08-31 RX ADMIN — MORPHINE SULFATE 15 MILLIGRAM(S): 30 TABLET, FILM COATED, EXTENDED RELEASE ORAL at 19:25

## 2024-08-31 RX ADMIN — MORPHINE SULFATE 30 MILLILITER(S): 30 TABLET, FILM COATED, EXTENDED RELEASE ORAL at 00:07

## 2024-08-31 RX ADMIN — MORPHINE SULFATE 15 MILLIGRAM(S): 30 TABLET, FILM COATED, EXTENDED RELEASE ORAL at 07:24

## 2024-08-31 RX ADMIN — KETOROLAC TROMETHAMINE 15 MILLIGRAM(S): 10 TABLET, FILM COATED ORAL at 06:34

## 2024-08-31 RX ADMIN — Medication 650 MILLIGRAM(S): at 14:58

## 2024-08-31 RX ADMIN — VOXELOTOR 1500 MILLIGRAM(S): 300 TABLET, FOR SUSPENSION ORAL at 14:01

## 2024-08-31 RX ADMIN — Medication 17 GRAM(S): at 06:36

## 2024-08-31 RX ADMIN — KETOROLAC TROMETHAMINE 15 MILLIGRAM(S): 10 TABLET, FILM COATED ORAL at 18:25

## 2024-08-31 RX ADMIN — Medication 650 MILLIGRAM(S): at 18:24

## 2024-08-31 RX ADMIN — Medication 10 GRAM(S): at 18:26

## 2024-08-31 RX ADMIN — MORPHINE SULFATE 30 MILLILITER(S): 30 TABLET, FILM COATED, EXTENDED RELEASE ORAL at 08:26

## 2024-08-31 RX ADMIN — KETOROLAC TROMETHAMINE 15 MILLIGRAM(S): 10 TABLET, FILM COATED ORAL at 07:34

## 2024-08-31 RX ADMIN — KETOROLAC TROMETHAMINE 15 MILLIGRAM(S): 10 TABLET, FILM COATED ORAL at 13:58

## 2024-08-31 RX ADMIN — Medication 650 MILLIGRAM(S): at 13:58

## 2024-08-31 RX ADMIN — KETOROLAC TROMETHAMINE 15 MILLIGRAM(S): 10 TABLET, FILM COATED ORAL at 14:58

## 2024-08-31 RX ADMIN — MUPIROCIN 1 APPLICATION(S): 20 OINTMENT TOPICAL at 18:27

## 2024-08-31 RX ADMIN — MORPHINE SULFATE 15 MILLIGRAM(S): 30 TABLET, FILM COATED, EXTENDED RELEASE ORAL at 18:25

## 2024-08-31 RX ADMIN — Medication 100 MILLILITER(S): at 05:33

## 2024-08-31 RX ADMIN — MORPHINE SULFATE 15 MILLIGRAM(S): 30 TABLET, FILM COATED, EXTENDED RELEASE ORAL at 06:24

## 2024-08-31 NOTE — PROGRESS NOTE ADULT - SUBJECTIVE AND OBJECTIVE BOX
****[facility]io [facility] PGY-2*****    CHIEF COMPLAINT:    Overnight Events:  Interval Events:    OBJECTIVE:  ICU Vital Signs Last 24 Hrs  T(C): 36.7 (31 Aug 2024 04:30), Max: 37.1 (30 Aug 2024 12:30)  T(F): 98 (31 Aug 2024 04:30), Max: 98.7 (30 Aug 2024 12:30)  HR: 72 (31 Aug 2024 04:30) (72 - 85)  BP: 105/64 (31 Aug 2024 04:30) (101/68 - 111/75)  BP(mean): --  ABP: --  ABP(mean): --  RR: 18 (31 Aug 2024 04:30) (16 - 18)  SpO2: 100% (31 Aug 2024 04:30) (99% - 100%)    O2 Parameters below as of 31 Aug 2024 04:30  Patient On (Oxygen Delivery Method): room air              08-29 @ 07:01  -  08-30 @ 07:00  --------------------------------------------------------  IN: 0 mL / OUT: 1100 mL / NET: -1100 mL      CAPILLARY BLOOD GLUCOSE          PHYSICAL EXAM  General:   Head:    Eyes:   Neck:   Cardiac:   Lungs:   Abdomen:   Extremities:   Neuro  Psych:   Skin:  Etc:      HOSPITAL MEDICATIONS:  MEDICATIONS  (STANDING):  acetaminophen     Tablet .. 650 milliGRAM(s) Oral every 6 hours  chlorhexidine 2% Cloths 1 Application(s) Topical daily  enoxaparin Injectable 30 milliGRAM(s) SubCutaneous every 24 hours  folic acid 1 milliGRAM(s) Oral daily  glutamine Powder 10 Gram(s) Oral two times a day  hydroxyurea 1500 milliGRAM(s) Oral daily  ketorolac   Injectable 15 milliGRAM(s) IV Push every 6 hours  morphine ER Tablet 15 milliGRAM(s) Oral two times a day  morphine PCA (5 mG/mL) 30 milliLiter(s) PCA Continuous PCA Continuous  mupirocin 2% Ointment 1 Application(s) Both Nostrils two times a day  polyethylene glycol 3350 17 Gram(s) Oral two times a day  senna 2 Tablet(s) Oral at bedtime  sodium chloride 0.45%. 1000 milliLiter(s) (100 mL/Hr) IV Continuous <Continuous>  voxelotor 1500 milliGRAM(s) Oral daily    MEDICATIONS  (PRN):  naloxone Injectable 0.1 milliGRAM(s) IV Push every 3 minutes PRN For ANY of the following changes in patient status:  A. RR LESS THAN 10 breaths per minute, B. Oxygen saturation LESS THAN 90%, C. Sedation score of 6  ondansetron Injectable 4 milliGRAM(s) IV Push every 6 hours PRN Nausea      LABS:                        7.0    12.55 )-----------( 492      ( 30 Aug 2024 07:46 )             22.3     Hgb Trend: 7.0<--, 6.9<--, 7.1<--, 6.7<--, 6.8<--  08-30    137  |  108<H>  |  6<L>  ----------------------------<  96  3.5   |  20<L>  |  0.31<L>    Ca    8.1<L>      30 Aug 2024 07:46  Phos  2.8     08-30  Mg     1.80     08-30    TPro  6.9  /  Alb  2.4<L>  /  TBili  1.8<H>  /  DBili  x   /  AST  55<H>  /  ALT  23  /  AlkPhos  276<H>  08-30    Creatinine Trend: 0.31<--, 0.36<--, 0.31<--, 0.29<--, 0.28<--, 0.36<--    Urinalysis Basic - ( 30 Aug 2024 07:46 )    Color: x / Appearance: x / SG: x / pH: x  Gluc: 96 mg/dL / Ketone: x  / Bili: x / Urobili: x   Blood: x / Protein: x / Nitrite: x   Leuk Esterase: x / RBC: x / WBC x   Sq Epi: x / Non Sq Epi: x / Bacteria: x            MICROBIOLOGY:       RADIOLOGY:  [ ] Reviewed by me   ****[facility]io [facility] PGY-2*****    CHIEF COMPLAINT: Sickle cell pain crisis    Overnight Events: NA  Interval Events: Patient reported doing much better with his pca pump settings changed. He stated that he feels that his right knee pain has mostly resolved. He asked some questions about getting surgery done in the future for his osteonecrosis.     They denied fevers/chills, shortness of breath/chest pain, abdomin pain, constipation/diarrehea, any issues with urination.    OBJECTIVE:  ICU Vital Signs Last 24 Hrs  T(C): 36.7 (31 Aug 2024 04:30), Max: 37.1 (30 Aug 2024 12:30)  T(F): 98 (31 Aug 2024 04:30), Max: 98.7 (30 Aug 2024 12:30)  HR: 72 (31 Aug 2024 04:30) (72 - 85)  BP: 105/64 (31 Aug 2024 04:30) (101/68 - 111/75)  BP(mean): --  ABP: --  ABP(mean): --  RR: 18 (31 Aug 2024 04:30) (16 - 18)  SpO2: 100% (31 Aug 2024 04:30) (99% - 100%)    O2 Parameters below as of 31 Aug 2024 04:30  Patient On (Oxygen Delivery Method): room air    08-29 @ 07:01  -  08-30 @ 07:00  --------------------------------------------------------  IN: 0 mL / OUT: 1100 mL / NET: -1100 mL      CAPILLARY BLOOD GLUCOSE          PHYSICAL EXAM  General: Well appearing  Head: NA  Eyes: Clear sclera (presented with scleral icterus)  Neck: NA  Cardiac: Normal R/R, No pathological heart sounds  Lungs: Clear lung fields, moving air well  Abdomen: +bs, non tender non distened  Extremities: Right knee with reduced swelling, no other signs of edema/etc  Neuro: Ao3  Psych: Positive and hopeful   Skin: NA  Etc: NA    HOSPITAL MEDICATIONS:  MEDICATIONS  (STANDING):  acetaminophen     Tablet .. 650 milliGRAM(s) Oral every 6 hours  chlorhexidine 2% Cloths 1 Application(s) Topical daily  enoxaparin Injectable 30 milliGRAM(s) SubCutaneous every 24 hours  folic acid 1 milliGRAM(s) Oral daily  glutamine Powder 10 Gram(s) Oral two times a day  hydroxyurea 1500 milliGRAM(s) Oral daily  ketorolac   Injectable 15 milliGRAM(s) IV Push every 6 hours  morphine ER Tablet 15 milliGRAM(s) Oral two times a day  morphine PCA (5 mG/mL) 30 milliLiter(s) PCA Continuous PCA Continuous  mupirocin 2% Ointment 1 Application(s) Both Nostrils two times a day  polyethylene glycol 3350 17 Gram(s) Oral two times a day  senna 2 Tablet(s) Oral at bedtime  sodium chloride 0.45%. 1000 milliLiter(s) (100 mL/Hr) IV Continuous <Continuous>  voxelotor 1500 milliGRAM(s) Oral daily    MEDICATIONS  (PRN):  naloxone Injectable 0.1 milliGRAM(s) IV Push every 3 minutes PRN For ANY of the following changes in patient status:  A. RR LESS THAN 10 breaths per minute, B. Oxygen saturation LESS THAN 90%, C. Sedation score of 6  ondansetron Injectable 4 milliGRAM(s) IV Push every 6 hours PRN Nausea      LABS:                        7.0    12.55 )-----------( 492      ( 30 Aug 2024 07:46 )             22.3     Hgb Trend: 7.0<--, 6.9<--, 7.1<--, 6.7<--, 6.8<--  08-30    137  |  108<H>  |  6<L>  ----------------------------<  96  3.5   |  20<L>  |  0.31<L>    Ca    8.1<L>      30 Aug 2024 07:46  Phos  2.8     08-30  Mg     1.80     08-30    TPro  6.9  /  Alb  2.4<L>  /  TBili  1.8<H>  /  DBili  x   /  AST  55<H>  /  ALT  23  /  AlkPhos  276<H>  08-30    Creatinine Trend: 0.31<--, 0.36<--, 0.31<--, 0.29<--, 0.28<--, 0.36<--    Urinalysis Basic - ( 30 Aug 2024 07:46 )    Color: x / Appearance: x / SG: x / pH: x  Gluc: 96 mg/dL / Ketone: x  / Bili: x / Urobili: x   Blood: x / Protein: x / Nitrite: x   Leuk Esterase: x / RBC: x / WBC x   Sq Epi: x / Non Sq Epi: x / Bacteria: x            MICROBIOLOGY:       RADIOLOGY:  [ ] Reviewed by me

## 2024-08-31 NOTE — PROGRESS NOTE ADULT - PROBLEM SELECTOR PLAN 1
#Transaminitis  - follows with Dr. Montano, missed monthly dose of Adakveo for 2 months which may explain SCC  - with R knee pain, CP  - low concern for acute chest given no opacities on CXR, chest pain more consistent with SCC  - Hb 6.8 --> 6.7, but no report of increased fatigue  - transaminitis likely 2/2 to hepatopathy iso vasoocclusive crisus, no abdominal pain and LFTs improving, could otherwise consider RUQ US  - hematology consulted  - palliative deferred pain management to primary team   A/P:  - c/w 1/2 NS  - pain control with home morphine 15 MG ER, pca morphine pump (will adjust), toradol q6; tylenol  - bowel regimen: miralax BID, senna 2 MG; hold off on starting miralax  - home hydroxyurea (need to take home med), oxbryta (need to take home med) and endari  - incentive spirometer  - daily labs, CBC w/ diff, LDH, haptoglobin, CMP, retic count  - will continue to monitor for signs of acute chest syndrome. If fever, culture and repeat CXR stat  - trend CBC, and transfuse for symptomatic anemia, will hold on transfusion for Hb 6.7 #Transaminitis  - follows with Dr. Montano, missed monthly dose of Adakveo for 2 months which may explain SCC  - with R knee pain, CP  - low concern for acute chest given no opacities on CXR, chest pain more consistent with SCC  - Hb 6.8 --> 6.7, but no report of increased fatigue  - transaminitis likely 2/2 to hepatopathy iso vasoocclusive crisus, no abdominal pain and LFTs improving, could otherwise consider RUQ US  - hematology consulted  - palliative deferred pain management to primary team   A/P:  - c/w 1/2 NS  - pain control with home morphine 15 MG ERBID , pca morphine pump (will adjust), toradol q6; tylenol  - bowel regimen: miralax BID, senna 2 MG; hold off on starting miralax  - home hydroxyurea (need to take home med), oxbryta (need to take home med) and endari  - incentive spirometer  - daily labs, CBC w/ diff, LDH, haptoglobin, CMP, retic count  - will continue to monitor for signs of acute chest syndrome. If fever, culture and repeat CXR stat  - trend CBC, and transfuse for symptomatic anemia, will hold on transfusion for Hb 6.7

## 2024-08-31 NOTE — PROGRESS NOTE ADULT - PROBLEM SELECTOR PLAN 3
- EKG with TWI V1-V3 (anterior leads)  - troponin neg  - maybe 2/2 demand iso SCC  - TTE normal  Plan  - CTM

## 2024-08-31 NOTE — PROGRESS NOTE ADULT - ATTENDING COMMENTS
Seen and examined by me this afternoon. Resting comfortably in bed, states that the pain is better. Tolerating PO, +BM.  C/w current Morphine PCA dose for sickle cell crisis treatment, 2mg q6min, 4h lockout 30mg and c/w MS contin  c/w bowel regimen  C/w hydroxyurea/oxbryta and IVF's  Rest as above

## 2024-08-31 NOTE — PROGRESS NOTE ADULT - PROBLEM SELECTOR PLAN 2
- low concern for septic arthritis given improving tenderness, improved WBC, afebrile  - R knee XR with no effusion, osteonecrosis  - US knee with small effusion   Plan  - will defer abx given overall improving course, CTM - low concern for septic arthritis given improving tenderness, improved WBC, afebrile  - R knee XR with no effusion, osteonecrosis  - US knee with small effusion   - mostly resolved 8/31  Plan  - will defer abx given overall improving course, CTM

## 2024-08-31 NOTE — PROGRESS NOTE ADULT - PROBLEM SELECTOR PLAN 4
- Fluids: 1/2 NS  - Electrolytes: Will replete to maintain K>4, Phos>3, and Mag>2  - Diet: regular  - Activity: as tolerated  - DVT Prophylaxis: lovenox  - PT: outpatient PT  - Disposition: pending medical optimization - Fluids: 1/2 NS  - Electrolytes: Will replete to maintain K>4, Phos>3, and Mag>2  - Diet: regular  - Activity: as tolerated  - DVT Prophylaxis: lovenox  - PT: outpatient PT  - Disposition: Home, nearing end of clinical course

## 2024-09-01 LAB
ALBUMIN SERPL ELPH-MCNC: 2.8 G/DL — LOW (ref 3.3–5)
ALP SERPL-CCNC: 290 U/L — HIGH (ref 40–120)
ALT FLD-CCNC: 28 U/L — SIGNIFICANT CHANGE UP (ref 4–41)
ANION GAP SERPL CALC-SCNC: 17 MMOL/L — HIGH (ref 7–14)
AST SERPL-CCNC: 75 U/L — HIGH (ref 4–40)
BASOPHILS # BLD AUTO: 0.07 K/UL — SIGNIFICANT CHANGE UP (ref 0–0.2)
BASOPHILS NFR BLD AUTO: 0.6 % — SIGNIFICANT CHANGE UP (ref 0–2)
BILIRUB SERPL-MCNC: 1.8 MG/DL — HIGH (ref 0.2–1.2)
BUN SERPL-MCNC: 5 MG/DL — LOW (ref 7–23)
CALCIUM SERPL-MCNC: 8.4 MG/DL — SIGNIFICANT CHANGE UP (ref 8.4–10.5)
CHLORIDE SERPL-SCNC: 104 MMOL/L — SIGNIFICANT CHANGE UP (ref 98–107)
CO2 SERPL-SCNC: 20 MMOL/L — LOW (ref 22–31)
CREAT SERPL-MCNC: 0.34 MG/DL — LOW (ref 0.5–1.3)
EGFR: 160 ML/MIN/1.73M2 — SIGNIFICANT CHANGE UP
EOSINOPHIL # BLD AUTO: 0.11 K/UL — SIGNIFICANT CHANGE UP (ref 0–0.5)
EOSINOPHIL NFR BLD AUTO: 0.9 % — SIGNIFICANT CHANGE UP (ref 0–6)
FERRITIN SERPL-MCNC: 2397 NG/ML — HIGH (ref 30–400)
GLUCOSE SERPL-MCNC: 86 MG/DL — SIGNIFICANT CHANGE UP (ref 70–99)
HAPTOGLOB SERPL-MCNC: <20 MG/DL — LOW (ref 34–200)
HCT VFR BLD CALC: 25.2 % — LOW (ref 39–50)
HGB BLD-MCNC: 8 G/DL — LOW (ref 13–17)
IANC: 5.43 K/UL — SIGNIFICANT CHANGE UP (ref 1.8–7.4)
IMM GRANULOCYTES NFR BLD AUTO: 0.5 % — SIGNIFICANT CHANGE UP (ref 0–0.9)
LDH SERPL L TO P-CCNC: 505 U/L — HIGH (ref 135–225)
LYMPHOCYTES # BLD AUTO: 48.8 % — HIGH (ref 13–44)
LYMPHOCYTES # BLD AUTO: 5.71 K/UL — HIGH (ref 1–3.3)
MAGNESIUM SERPL-MCNC: 2 MG/DL — SIGNIFICANT CHANGE UP (ref 1.6–2.6)
MCHC RBC-ENTMCNC: 20.8 PG — LOW (ref 27–34)
MCHC RBC-ENTMCNC: 31.7 GM/DL — LOW (ref 32–36)
MCV RBC AUTO: 65.6 FL — LOW (ref 80–100)
MONOCYTES # BLD AUTO: 0.31 K/UL — SIGNIFICANT CHANGE UP (ref 0–0.9)
MONOCYTES NFR BLD AUTO: 2.7 % — SIGNIFICANT CHANGE UP (ref 2–14)
NEUTROPHILS # BLD AUTO: 5.43 K/UL — SIGNIFICANT CHANGE UP (ref 1.8–7.4)
NEUTROPHILS NFR BLD AUTO: 46.5 % — SIGNIFICANT CHANGE UP (ref 43–77)
NRBC # BLD: 5 /100 WBCS — HIGH (ref 0–0)
NRBC # FLD: 0.63 K/UL — HIGH (ref 0–0)
PHOSPHATE SERPL-MCNC: 3.1 MG/DL — SIGNIFICANT CHANGE UP (ref 2.5–4.5)
PLATELET # BLD AUTO: 688 K/UL — HIGH (ref 150–400)
POTASSIUM SERPL-MCNC: 3.6 MMOL/L — SIGNIFICANT CHANGE UP (ref 3.5–5.3)
POTASSIUM SERPL-SCNC: 3.6 MMOL/L — SIGNIFICANT CHANGE UP (ref 3.5–5.3)
PROT SERPL-MCNC: 8.3 G/DL — SIGNIFICANT CHANGE UP (ref 6–8.3)
RBC # BLD: 3.84 M/UL — LOW (ref 4.2–5.8)
RBC # BLD: 3.84 M/UL — LOW (ref 4.2–5.8)
RBC # FLD: 24.6 % — HIGH (ref 10.3–14.5)
RETICS #: 370.1 K/UL — HIGH (ref 25–125)
RETICS/RBC NFR: 9.5 % — HIGH (ref 0.5–2.5)
SODIUM SERPL-SCNC: 141 MMOL/L — SIGNIFICANT CHANGE UP (ref 135–145)
WBC # BLD: 11.69 K/UL — HIGH (ref 3.8–10.5)
WBC # FLD AUTO: 11.69 K/UL — HIGH (ref 3.8–10.5)

## 2024-09-01 PROCEDURE — 99232 SBSQ HOSP IP/OBS MODERATE 35: CPT | Mod: GC

## 2024-09-01 RX ORDER — MORPHINE SULFATE 30 MG/1
30 TABLET, FILM COATED, EXTENDED RELEASE ORAL
Refills: 0 | Status: DISCONTINUED | OUTPATIENT
Start: 2024-09-01 | End: 2024-09-03

## 2024-09-01 RX ORDER — SODIUM CHLORIDE IRRIG SOLUTION 0.9 %
1000 SOLUTION, IRRIGATION IRRIGATION
Refills: 0 | Status: DISCONTINUED | OUTPATIENT
Start: 2024-09-01 | End: 2024-10-10

## 2024-09-01 RX ORDER — SENNOSIDES 8.6 MG
2 TABLET ORAL AT BEDTIME
Refills: 0 | Status: DISCONTINUED | OUTPATIENT
Start: 2024-09-01 | End: 2024-10-10

## 2024-09-01 RX ADMIN — Medication 100 MILLILITER(S): at 13:14

## 2024-09-01 RX ADMIN — MUPIROCIN 1 APPLICATION(S): 20 OINTMENT TOPICAL at 18:11

## 2024-09-01 RX ADMIN — MORPHINE SULFATE 30 MILLILITER(S): 30 TABLET, FILM COATED, EXTENDED RELEASE ORAL at 20:21

## 2024-09-01 RX ADMIN — Medication 650 MILLIGRAM(S): at 06:13

## 2024-09-01 RX ADMIN — MORPHINE SULFATE 15 MILLIGRAM(S): 30 TABLET, FILM COATED, EXTENDED RELEASE ORAL at 06:13

## 2024-09-01 RX ADMIN — Medication 650 MILLIGRAM(S): at 18:10

## 2024-09-01 RX ADMIN — MORPHINE SULFATE 15 MILLIGRAM(S): 30 TABLET, FILM COATED, EXTENDED RELEASE ORAL at 05:13

## 2024-09-01 RX ADMIN — Medication 650 MILLIGRAM(S): at 01:52

## 2024-09-01 RX ADMIN — VOXELOTOR 1500 MILLIGRAM(S): 300 TABLET, FOR SUSPENSION ORAL at 12:18

## 2024-09-01 RX ADMIN — Medication 100 MILLILITER(S): at 02:55

## 2024-09-01 RX ADMIN — Medication 650 MILLIGRAM(S): at 13:18

## 2024-09-01 RX ADMIN — FOLIC ACID 1 MILLIGRAM(S): 1 TABLET ORAL at 12:18

## 2024-09-01 RX ADMIN — Medication 650 MILLIGRAM(S): at 12:18

## 2024-09-01 RX ADMIN — Medication 10 GRAM(S): at 05:13

## 2024-09-01 RX ADMIN — MORPHINE SULFATE 15 MILLIGRAM(S): 30 TABLET, FILM COATED, EXTENDED RELEASE ORAL at 18:10

## 2024-09-01 RX ADMIN — Medication 650 MILLIGRAM(S): at 05:13

## 2024-09-01 RX ADMIN — Medication 75 MILLILITER(S): at 14:50

## 2024-09-01 RX ADMIN — Medication 650 MILLIGRAM(S): at 23:14

## 2024-09-01 RX ADMIN — CHLORHEXIDINE GLUCONATE ORAL RINSE 1 APPLICATION(S): 1.2 SOLUTION DENTAL at 12:16

## 2024-09-01 RX ADMIN — Medication 650 MILLIGRAM(S): at 19:10

## 2024-09-01 RX ADMIN — MORPHINE SULFATE 15 MILLIGRAM(S): 30 TABLET, FILM COATED, EXTENDED RELEASE ORAL at 19:10

## 2024-09-01 RX ADMIN — HYDROXYUREA 1500 MILLIGRAM(S): 500 CAPSULE ORAL at 12:16

## 2024-09-01 RX ADMIN — MORPHINE SULFATE 30 MILLILITER(S): 30 TABLET, FILM COATED, EXTENDED RELEASE ORAL at 08:09

## 2024-09-01 RX ADMIN — MORPHINE SULFATE 30 MILLILITER(S): 30 TABLET, FILM COATED, EXTENDED RELEASE ORAL at 14:08

## 2024-09-01 RX ADMIN — Medication 10 GRAM(S): at 18:10

## 2024-09-01 RX ADMIN — MORPHINE SULFATE 30 MILLILITER(S): 30 TABLET, FILM COATED, EXTENDED RELEASE ORAL at 04:07

## 2024-09-01 RX ADMIN — Medication 650 MILLIGRAM(S): at 00:06

## 2024-09-01 RX ADMIN — ENOXAPARIN SODIUM 30 MILLIGRAM(S): 150 INJECTION SUBCUTANEOUS at 12:19

## 2024-09-01 RX ADMIN — MUPIROCIN 1 APPLICATION(S): 20 OINTMENT TOPICAL at 05:13

## 2024-09-01 NOTE — DISCHARGE NOTE PROVIDER - CARE PROVIDER_API CALL
Jesica Montano  Internal Medicine  [id number] [place] [id number]  Phone: [phone]  Fax: [phone]  Established Patient  Follow Up Time: 2 weeks

## 2024-09-01 NOTE — DISCHARGE NOTE PROVIDER - DETAILS OF MALNUTRITION DIAGNOSIS/DIAGNOSES
This patient has been assessed with a concern for Malnutrition and was treated during this hospitalization for the following Nutrition diagnosis/diagnoses:     -  08/28/2024: Severe protein-calorie malnutrition   -  08/28/2024: Underweight (BMI < 19)

## 2024-09-01 NOTE — DISCHARGE NOTE PROVIDER - NSDCMRMEDTOKEN_GEN_ALL_CORE_FT
acetaminophen 500 mg oral tablet: 2 tab(s) orally every 8 hours as needed for pain  crizanlizumab-tmca 10 mg/mL intravenous solution: 225 milligram(s) intravenously once a month for 6 doses  Endari 5 g oral powder for reconstitution: 2 each orally every 12 hours  famotidine 20 mg oral tablet: 1 tab(s) orally 2 times a day  folic acid 1 mg oral tablet: 1 tab(s) orally once a day  gabapentin 300 mg oral tablet: 1 tab(s) orally every 12 hours  hydroxyurea 500 mg oral capsule: 3 cap(s) orally once a day  ibuprofen 600 mg oral tablet: 1 tab(s) orally every 8 hours, As needed, Moderate Pain (4 - 6), Severe Pain (7 - 10)  morphine 15 mg oral tablet: 1 tab(s) orally every 8 hours  as needed for SEVERE PAIN MDD:3 tab  morphine 15 mg/8 to 12 hr oral tablet, extended release: 1 tab(s) orally every 8 hours, hold for oversedation MDD:3 tab  Oxbryta 500 mg oral tablet: 3 tab(s) orally once a day   acetaminophen 500 mg oral tablet: 2 tab(s) orally every 8 hours as needed for pain  crizanlizumab-tmca 10 mg/mL intravenous solution: 225 milligram(s) intravenously once a month for 6 doses  Endari 5 g oral powder for reconstitution: 2 each orally every 12 hours  famotidine 20 mg oral tablet: 1 tab(s) orally 2 times a day  folic acid 1 mg oral tablet: 1 tab(s) orally once a day  gabapentin 300 mg oral tablet: 1 tab(s) orally every 12 hours  hydroxyurea 500 mg oral capsule: 3 cap(s) orally once a day  ibuprofen 600 mg oral tablet: 1 tab(s) orally every 8 hours, As needed, Moderate Pain (4 - 6), Severe Pain (7 - 10)  Oxbryta 500 mg oral tablet: 3 tab(s) orally once a day   acetaminophen 500 mg oral tablet: 2 tab(s) orally every 8 hours as needed for pain  crizanlizumab-tmca 10 mg/mL intravenous solution: 225 milligram(s) intravenously once a month for 6 doses  Endari 5 g oral powder for reconstitution: 2 each orally every 12 hours  famotidine 20 mg oral tablet: 1 tab(s) orally 2 times a day  folic acid 1 mg oral tablet: 1 tab(s) orally once a day  gabapentin 300 mg oral tablet: 1 tab(s) orally every 12 hours  hydroxyurea 500 mg oral capsule: 3 cap(s) orally once a day  ibuprofen 600 mg oral tablet: 1 tab(s) orally every 8 hours, As needed, Moderate Pain (4 - 6), Severe Pain (7 - 10)   acetaminophen 500 mg oral tablet: 2 tab(s) orally every 8 hours as needed for pain  crizanlizumab-tmca 10 mg/mL intravenous solution: 225 milligram(s) intravenously once a month for 6 doses  Endari 5 g oral powder for reconstitution: 2 each orally every 12 hours  famotidine 20 mg oral tablet: 1 tab(s) orally 2 times a day  folic acid 1 mg oral tablet: 1 tab(s) orally once a day  gabapentin 300 mg oral tablet: 1 tab(s) orally every 12 hours  hydroxyurea 500 mg oral capsule: 3 cap(s) orally once a day  ibuprofen 600 mg oral tablet: 1 tab(s) orally every 8 hours, As needed, Moderate Pain (4 - 6), Severe Pain (7 - 10)  morphine 15 mg oral tablet: 1 tab(s) orally every 8 hours MDD: 3 tabs  MS Contin 30 mg oral tablet, extended release: 1 tab(s) orally 2 times a day MDD: 2 tabs  naloxone 4 mg/0.1 mL nasal spray: 1 spray(s) nasal once   acetaminophen 500 mg oral tablet: 2 tab(s) orally every 8 hours as needed for pain  crizanlizumab-tmca 10 mg/mL intravenous solution: 225 milligram(s) intravenously once a month for 6 doses  Endari 5 g oral powder for reconstitution: 2 each orally every 12 hours  famotidine 20 mg oral tablet: 1 tab(s) orally 2 times a day  folic acid 1 mg oral tablet: 1 tab(s) orally once a day  gabapentin 300 mg oral tablet: 1 tab(s) orally every 12 hours  hydroxyurea 500 mg oral capsule: 3 cap(s) orally once a day 1000mg (2 tabs) during the week (Mon-Fri), 1500mg (3 tabs) on the weekend (Sat-Sun)  ibuprofen 600 mg oral tablet: 1 tab(s) orally every 8 hours, As needed, Moderate Pain (4 - 6), Severe Pain (7 - 10)  morphine 15 mg oral tablet: 1 tab(s) orally every 8 hours MDD: 3 tabs  MS Contin 30 mg oral tablet, extended release: 1 tab(s) orally 2 times a day MDD: 2 tabs  naloxone 4 mg/0.1 mL nasal spray: 1 spray(s) nasal once

## 2024-09-01 NOTE — PROGRESS NOTE ADULT - ATTENDING COMMENTS
Seen and examined by me this afternoon. Increased pain on hands, arms and RLE, tolerating PO, +BM  Will increase Morphine PCA for for sickle cell crisis treatment, 2.5mg q6min, 4h lockout 30mg and c/w MS contin  c/w bowel regimen  C/w hydroxyurea/oxbryta and IVF's (decreasing rate to 75 cc/hr)  Rest as above

## 2024-09-01 NOTE — PROGRESS NOTE ADULT - PROBLEM SELECTOR PLAN 1
#Transaminitis  - follows with Dr. Montano, missed monthly dose of Adakveo for 2 months which may explain SCC  - with R knee pain, CP  - low concern for acute chest given no opacities on CXR, chest pain more consistent with SCC  - Hb 6.8 --> 6.7, but no report of increased fatigue  - transaminitis likely 2/2 to hepatopathy iso vasoocclusive crisus, no abdominal pain and LFTs improving, could otherwise consider RUQ US  - hematology consulted  - palliative deferred pain management to primary team   A/P:  - c/w 1/2 NS  - pain control with home morphine 15 MG ERBID , pca morphine pump (will adjust), toradol q6; tylenol  - bowel regimen: miralax BID, senna 2 MG; hold off on starting miralax  - home hydroxyurea (need to take home med), oxbryta (need to take home med) and endari  - incentive spirometer  - daily labs, CBC w/ diff, LDH, haptoglobin, CMP, retic count  - will continue to monitor for signs of acute chest syndrome. If fever, culture and repeat CXR stat  - trend CBC, and transfuse for symptomatic anemia, will hold on transfusion for Hb 6.7 #Transaminitis  - follows with Dr. Montano, missed monthly dose of Adakveo for 2 months which may explain SCC  - with R knee pain, CP  - low concern for acute chest given no opacities on CXR, chest pain more consistent with SCC  - Hb 6.8 --> 6.7, but no report of increased fatigue  - transaminitis likely 2/2 to hepatopathy iso vasoocclusive crisus, no abdominal pain and LFTs improving, could otherwise consider RUQ US  - hematology consulted  - palliative deferred pain management to primary team   A/P:  - c/w 1/2 NS  - pain control with home morphine 15 MG ERBID , pca morphine pump (will adjust), toradol q6; tylenol  - bowel regimen: miralax BID, senna 2 MG PRN  - home hydroxyurea (need to take home med), oxbryta (need to take home med) and endari  - incentive spirometer  - daily labs, CBC w/ diff, LDH, haptoglobin, CMP, retic count  - will continue to monitor for signs of acute chest syndrome. If fever, culture and repeat CXR stat  - trend CBC, and transfuse for symptomatic anemia, will hold on transfusion for Hb 6.7

## 2024-09-01 NOTE — DISCHARGE NOTE PROVIDER - NSDCFUSCHEDAPPT_GEN_ALL_CORE_FT
Mavis Walsh  [facility]  OPHTHALM [address]  Scheduled Appointment: 09/06/2024    Jesica Montano  [facility]  INTMED OP [id number] [facility]  Scheduled Appointment: 10/18/2024     [facility]  INTMED OP [id number] [facility]  Scheduled Appointment: 10/18/2024

## 2024-09-01 NOTE — PROGRESS NOTE ADULT - SUBJECTIVE AND OBJECTIVE BOX
PROGRESS NOTE:     Patient is a 29y old  Male who presents with a chief complaint of sickle cell crisis (31 Aug 2024 05:25)      INTERVAL HISTORY: NAEO. VSS. ROS negative.    MEDICATIONS  (STANDING):  acetaminophen     Tablet .. 650 milliGRAM(s) Oral every 6 hours  chlorhexidine 2% Cloths 1 Application(s) Topical daily  enoxaparin Injectable 30 milliGRAM(s) SubCutaneous every 24 hours  folic acid 1 milliGRAM(s) Oral daily  glutamine Powder 10 Gram(s) Oral two times a day  hydroxyurea 1500 milliGRAM(s) Oral daily  morphine ER Tablet 15 milliGRAM(s) Oral two times a day  morphine PCA (5 mG/mL) 30 milliLiter(s) PCA Continuous PCA Continuous  mupirocin 2% Ointment 1 Application(s) Both Nostrils two times a day  polyethylene glycol 3350 17 Gram(s) Oral two times a day  senna 2 Tablet(s) Oral at bedtime  sodium chloride 0.45%. 1000 milliLiter(s) (100 mL/Hr) IV Continuous <Continuous>  voxelotor 1500 milliGRAM(s) Oral daily    MEDICATIONS  (PRN):  naloxone Injectable 0.1 milliGRAM(s) IV Push every 3 minutes PRN For ANY of the following changes in patient status:  A. RR LESS THAN 10 breaths per minute, B. Oxygen saturation LESS THAN 90%, C. Sedation score of 6  ondansetron Injectable 4 milliGRAM(s) IV Push every 6 hours PRN Nausea      CAPILLARY BLOOD GLUCOSE        I&O's Summary    31 Aug 2024 07:01  -  01 Sep 2024 07:00  --------------------------------------------------------  IN: 0 mL / OUT: 1600 mL / NET: -1600 mL        PHYSICAL EXAM:  Vital Signs Last 24 Hrs  T(C): 36.8 (01 Sep 2024 04:00), Max: 37.1 (31 Aug 2024 12:27)  T(F): 98.2 (01 Sep 2024 04:00), Max: 98.7 (31 Aug 2024 12:27)  HR: 87 (01 Sep 2024 04:00) (66 - 87)  BP: 108/68 (01 Sep 2024 04:00) (103/69 - 109/76)  BP(mean): --  RR: 19 (01 Sep 2024 04:00) (17 - 19)  SpO2: 98% (01 Sep 2024 04:00) (97% - 99%)    Parameters below as of 01 Sep 2024 04:00  Patient On (Oxygen Delivery Method): room air      CONSTITUTIONAL: NAD, thin  HEENT: scleral icterus improving  RESPIRATORY: Normal respiratory effort; lungs are clear to auscultation bilaterally  CARDIOVASCULAR: Regular rate and rhythm, normal S1 and S2, no murmur/rub/gallop; No lower extremity edema  ABDOMEN: Nontender to palpation, normoactive bowel sounds, no rebound/guarding; No hepatosplenomegaly  MUSCULOSKELETAL: R knee swelling, warm, tenderness markedly improved  NEURO: No focal deficits noted  PSYCH: A+Ox3 to person, place, and time; affect appropriate      LABS:                        7.7    9.88  )-----------( 546      ( 31 Aug 2024 06:46 )             24.2     08-31    138  |  108<H>  |  3<L>  ----------------------------<  74  3.8   |  20<L>  |  0.30<L>    Ca    8.0<L>      31 Aug 2024 06:46  Phos  3.0     08-31  Mg     1.90     08-31    TPro  7.3  /  Alb  2.4<L>  /  TBili  1.7<H>  /  DBili  x   /  AST  62<H>  /  ALT  25  /  AlkPhos  284<H>  08-31          Urinalysis Basic - ( 31 Aug 2024 06:46 )    Color: x / Appearance: x / SG: x / pH: x  Gluc: 74 mg/dL / Ketone: x  / Bili: x / Urobili: x   Blood: x / Protein: x / Nitrite: x   Leuk Esterase: x / RBC: x / WBC x   Sq Epi: x / Non Sq Epi: x / Bacteria: x          RADIOLOGY:        ******************************  Authored By: Mariluz Salmon MD PGY1  Internal Medicine  MS Teams  ******************************   PROGRESS NOTE:     Patient is a 29y old  Male who presents with a chief complaint of sickle cell crisis (31 Aug 2024 05:25)      INTERVAL HISTORY: NAEO. VSS. Patient states that pain is a bit worse than day prior but may be 2/2 him having been standing for shower. States b/l handand R foot as worst pian but resolving R knee pain. Had BM yesterday. Asked about surgery for b/l shoulder and R hip given osteonecrosis and told patient that this should be discussed with Dr. Montano OP.  Denies fever, chills, abd pain.  ROS negative.    MEDICATIONS  (STANDING):  acetaminophen     Tablet .. 650 milliGRAM(s) Oral every 6 hours  chlorhexidine 2% Cloths 1 Application(s) Topical daily  enoxaparin Injectable 30 milliGRAM(s) SubCutaneous every 24 hours  folic acid 1 milliGRAM(s) Oral daily  glutamine Powder 10 Gram(s) Oral two times a day  hydroxyurea 1500 milliGRAM(s) Oral daily  morphine ER Tablet 15 milliGRAM(s) Oral two times a day  morphine PCA (5 mG/mL) 30 milliLiter(s) PCA Continuous PCA Continuous  mupirocin 2% Ointment 1 Application(s) Both Nostrils two times a day  polyethylene glycol 3350 17 Gram(s) Oral two times a day  senna 2 Tablet(s) Oral at bedtime  sodium chloride 0.45%. 1000 milliLiter(s) (100 mL/Hr) IV Continuous <Continuous>  voxelotor 1500 milliGRAM(s) Oral daily    MEDICATIONS  (PRN):  naloxone Injectable 0.1 milliGRAM(s) IV Push every 3 minutes PRN For ANY of the following changes in patient status:  A. RR LESS THAN 10 breaths per minute, B. Oxygen saturation LESS THAN 90%, C. Sedation score of 6  ondansetron Injectable 4 milliGRAM(s) IV Push every 6 hours PRN Nausea      CAPILLARY BLOOD GLUCOSE        I&O's Summary    31 Aug 2024 07:01  -  01 Sep 2024 07:00  --------------------------------------------------------  IN: 0 mL / OUT: 1600 mL / NET: -1600 mL        PHYSICAL EXAM:  Vital Signs Last 24 Hrs  T(C): 36.8 (01 Sep 2024 04:00), Max: 37.1 (31 Aug 2024 12:27)  T(F): 98.2 (01 Sep 2024 04:00), Max: 98.7 (31 Aug 2024 12:27)  HR: 87 (01 Sep 2024 04:00) (66 - 87)  BP: 108/68 (01 Sep 2024 04:00) (103/69 - 109/76)  BP(mean): --  RR: 19 (01 Sep 2024 04:00) (17 - 19)  SpO2: 98% (01 Sep 2024 04:00) (97% - 99%)    Parameters below as of 01 Sep 2024 04:00  Patient On (Oxygen Delivery Method): room air      CONSTITUTIONAL: NAD, thin  HEENT: scleral icterus improving  RESPIRATORY: Normal respiratory effort; lungs are clear to auscultation bilaterally  CARDIOVASCULAR: Regular rate and rhythm, normal S1 and S2, no murmur/rub/gallop; No lower extremity edema  ABDOMEN: Nontender to palpation, normoactive bowel sounds, no rebound/guarding; No hepatosplenomegaly  MUSCULOSKELETAL: R knee swelling, warm, tenderness markedly improved. R foot with edema  NEURO: No focal deficits noted  PSYCH: A+Ox3 to person, place, and time; affect appropriate      LABS:                        7.7    9.88  )-----------( 546      ( 31 Aug 2024 06:46 )             24.2     08-31    138  |  108<H>  |  3<L>  ----------------------------<  74  3.8   |  20<L>  |  0.30<L>    Ca    8.0<L>      31 Aug 2024 06:46  Phos  3.0     08-31  Mg     1.90     08-31    TPro  7.3  /  Alb  2.4<L>  /  TBili  1.7<H>  /  DBili  x   /  AST  62<H>  /  ALT  25  /  AlkPhos  284<H>  08-31          Urinalysis Basic - ( 31 Aug 2024 06:46 )    Color: x / Appearance: x / SG: x / pH: x  Gluc: 74 mg/dL / Ketone: x  / Bili: x / Urobili: x   Blood: x / Protein: x / Nitrite: x   Leuk Esterase: x / RBC: x / WBC x   Sq Epi: x / Non Sq Epi: x / Bacteria: x          ******************************  Authored By: Mariluz Salmon MD PGY1  Internal Medicine  MS Teams  ******************************

## 2024-09-01 NOTE — DISCHARGE NOTE PROVIDER - NSRESEARCHGRANT_PROPHYLAXISRECOMFT_GEN_A_CORE
-- DO NOT REPLY / DO NOT REPLY ALL --  -- Message is from the [facility]--    COVID-19 Universal Screening: Negative    Patient is requesting a medication refill - medication is on active medication list    Patient is currently OUT of the requested medication.    Was Medication Pended?  Yes.    Rx Name and Dose:  QUEtiapine (SEROQUEL) 50 MG tablet    traMADol (ULTRAM) 50 MG tablet    Duration: 30 days    Pharmacy  [id number] - [facility] [address] [facility] [address]    Patient confirmed the above pharmacy as correct?  Yes    Caller Information       Type Contact Phone    04/10/2020 07:03 AM Phone (Incoming) Philipp Metzger (Self) [phone] (M)          Alternative phone number: n/a    Turnaround time given to caller:   \"This message will be sent to [state Provider's name]. The clinical team will fulfill your request as soon as they review your message.\"   IMPROVE-DD Application Not Available

## 2024-09-01 NOTE — PROGRESS NOTE ADULT - PROBLEM SELECTOR PLAN 4
- Fluids: 1/2 NS  - Electrolytes: Will replete to maintain K>4, Phos>3, and Mag>2  - Diet: regular  - Activity: as tolerated  - DVT Prophylaxis: lovenox  - PT: outpatient PT  - Disposition: Home, nearing end of clinical course

## 2024-09-01 NOTE — PROGRESS NOTE ADULT - PROBLEM SELECTOR PLAN 2
- low concern for septic arthritis given improving tenderness, improved WBC, afebrile  - R knee XR with no effusion, osteonecrosis  - US knee with small effusion   - mostly resolved 8/31  Plan  - will defer abx given overall improving course, CTM

## 2024-09-01 NOTE — DISCHARGE NOTE PROVIDER - NSDCCPCAREPLAN_GEN_ALL_CORE_FT
PRINCIPAL DISCHARGE DIAGNOSIS  Diagnosis: Sickle cell pain crisis  Assessment and Plan of Treatment: You presented to the hospital after experience severe pain in your chest, right knee, arms, and feet. Imaging of your chest showed that your did not have acute chest syndrome. EKG of your heart showed some abnoramlities but an untrasound (echocardiogram) of your heart showed no abnormalities. You were given fluids, started on pain medications including the PCA pump, and had the hematology team following you. There was concern for infection in your right knee, but Xray imaging showed no osteonecrosis and ultrasound showed a small amount of fluid. Your knee pain improved over time which made the suspicion of infection even lower. You pain medication were adjusted and over time you were able to come off the PCA pump and have you pain managed sufficently with oral medication. Be sure to follow up closely with Dr. Montano for further mangment, including resuming your Adacveo infusions and discussing management of your osteonecrosis including surgery.   If you begin experiencing severe pain similar or espeically worse than your prior sickle cell crisis episodes, have shortness of breath, severe joint swelling and pain, fevers, please seek urgent medical attention.     PRINCIPAL DISCHARGE DIAGNOSIS  Diagnosis: Sickle cell pain crisis  Assessment and Plan of Treatment: You presented to the hospital after experience severe pain in your chest, right knee, arms, and feet. Imaging of your chest showed that your did not have acute chest syndrome. EKG of your heart showed some abnoramlities but an untrasound (echocardiogram) of your heart showed no abnormalities. You were given fluids, started on pain medications including the PCA pump, and had the hematology team following you. There was concern for infection in your right knee, but Xray imaging showed no osteonecrosis and ultrasound showed a small amount of fluid. Your knee pain improved over time which made the suspicion of infection even lower. You pain medication were adjusted and over time you were able to come off the PCA pump and have you pain managed sufficently with oral medication. Be sure to follow up closely with Dr. Montano for further mangment, including resuming your Adacveo infusions and discussing management of your osteonecrosis including surgery.   If you begin experiencing severe pain similar or espeically worse than your prior sickle cell crisis episodes, have shortness of breath, severe joint swelling and pain, fevers, please seek urgent medical attention.  Continue taking all medications you were previously taking prior to admission as previsouly presecribed.  Medication Change: Morphine 15 ER by mouth twice daily -> Morphine 30 ER by mouth twice daily     PRINCIPAL DISCHARGE DIAGNOSIS  Diagnosis: Sickle cell pain crisis  Assessment and Plan of Treatment: You presented to the hospital after experiencing severe pain in your chest, right knee, arms, and feet. Imaging of your chest showed that your did not have acute chest syndrome. EKG of your heart showed some abnoramlities but an untrasound (echocardiogram) of your heart showed no abnormalities. You were given fluids, started on pain medications including the PCA pump, and had the hematology team following you. There was concern for infection in your right knee, but Xray imaging showed no osteonecrosis and ultrasound showed a small amount of fluid. Your knee pain improved over time which made the suspicion of infection even lower. You were given red blood cell transfusions to help with the pain. Your pain medication was adjusted and over time you were able to come off the PCA pump with your pain managed sufficently with oral medication. Be sure to follow up closely with Dr. Montano for further mangment, including resuming your Adacveo infusions, discontinuing your oxbryta, and discussing management of your osteonecrosis including surgery.   If you begin experiencing severe pain similar or espeically worse than your prior sickle cell crisis episodes, have shortness of breath, severe joint swelling and pain, fevers, please seek urgent medical attention.  Continue taking all medications you were previously taking prior to admission as previsouly presecribed.  Medication Change: Morphine 15 ER by mouth twice daily -> Morphine 30 ER by mouth twice daily     PRINCIPAL DISCHARGE DIAGNOSIS  Diagnosis: Sickle cell pain crisis  Assessment and Plan of Treatment: You presented to the hospital after experiencing severe pain in your chest, right knee, arms, and feet. Imaging of your chest showed that your did not have acute chest syndrome. EKG of your heart showed some abnoramlities but an untrasound (echocardiogram) of your heart showed no abnormalities. You were given fluids, started on pain medications including the PCA pump, and had the hematology team following you. There was concern for infection in your right knee, but Xray imaging showed no osteonecrosis and ultrasound showed a small amount of fluid. Your knee pain improved over time which made the suspicion of infection even lower. You were given red blood cell transfusions to help with the pain. Your pain medication was adjusted and over time you were able to come off the PCA pump with your pain managed sufficently with oral medication. Be sure to follow up closely with Dr. Montano for further mangment, including resuming your Adacveo infusions, discontinuing your oxbryta, and discussing management of your osteonecrosis including surgery.   If you begin experiencing severe pain similar or espeically worse than your prior sickle cell crisis episodes, have shortness of breath, severe joint swelling and pain, fevers, please seek urgent medical attention.  Continue taking all medications you were previously taking prior to admission as previsouly presecribed.  Medication Change:   - Morphine 15 ER by mouth twice daily -> Morphine 30 ER by mouth twice daily  - continue morphine sulfate 15mg IR every 8 hours  - STOP oxbryta  - continue hydroxyurea 1000mg daily Mon-Fri, 1500mg daily Sat and Sun

## 2024-09-01 NOTE — DISCHARGE NOTE PROVIDER - HOSPITAL COURSE
HPI:  24M with sickle cell disease (hx acute chest syndrome, previous splenectomy) with L hip osteonecrosis and b/l shoulder osteonecrosis who presented to hospital for R knee pain and chest pain.     Patient states that he began noticing worsening R knee pain with swelling 1 week ago. 3 days ago started having R and L lateral chest pain. Yesterday started noticing b/l feet, hand, and forearm pain.  Home medications of PO morphine and gabapentin did not alleviate the pain. Denies any fever, chills, cp, sob, abd pain, n/v/c/d, no recent illness. His last hospitalization for sickle cell crisis was in 10/2023 and states that his current pain is similar to previous crisis pain. Has been following with his PCP Dr. Montano for management. States that he was getting Adakveo infusions, last received 2 months ago and was suppose to get monthly infusion but cancelled appointment due to being sick at the time.    In ED, vitals significant for /104, sating well on RA. Labs with Hb 7, K 3.1, elevated LFTs total bili 5.5, , AST 94, ALT 42. EKG with TWI in V1-V3. Troponin neg. CXR with course interstitial marking with no consolidation and avascular necrosis bL on humeral heads. Given toradol 15 MG x2, morphine 4 MG x3, morphine 2 MG x1, and tylenol. Patient seen and examined at bedside and reported 8/10 pain. Given another 10 MG toradol. Heme consulted.  (25 Aug 2024 07:40)    Hospital course:  Patient was started on fluids and started on PCA pump and home morphine for pain control. TTE obtained for abnormal EKG and found to be normal. Heamtology contoled and stated no concern for acute chest syndrome given no finding on imaging. Concern for septic arthritis  8/25: admitted for sickle cell crises, on PCA pump, hematology with no concern for ACS, palliative consulted  8/26: R knee XR normal, PCA pump adjusted and standing toradol   8/27: no BM since admission but wantin to hold off lactulose, pain uncahnged, tylenol made standing, moved to 9S, R knee US with small effusion  8/28: pain improving, heme asking about possible abx for knee  9/1: R knee improved, still with pain, PCA pump increased    The patient is afebrile, hemodynamically stable and medically optimized for discharge to --- with follow up with _________________. On day of discharge, patient is clinically stable with no new exam findings or acute symptoms compared to prior. The patient was seen by the attending physician on the date of discharge and deemed stable and acceptable for discharge. The patient's chronic medical conditions were treated accordingly per the patient's home medication regimen. The patient's medication reconciliation (with changes made to chronic medications), follow up appointments, discharge orders, instructions, and significant lab and diagnostic studies are as noted.    Important Medication Changes and Reason:      Active or Pending Issues Requiring Follow-up:      Advanced Directives:   [ ] Full code  [ ] DNR  [ ] Hospice    Discharge Diagnoses:        HPI:  24M with sickle cell disease (hx acute chest syndrome, previous splenectomy) with L hip osteonecrosis and b/l shoulder osteonecrosis who presented to hospital for R knee pain and chest pain.     Patient states that he began noticing worsening R knee pain with swelling 1 week ago. 3 days ago started having R and L lateral chest pain. Yesterday started noticing b/l feet, hand, and forearm pain.  Home medications of PO morphine and gabapentin did not alleviate the pain. Denies any fever, chills, cp, sob, abd pain, n/v/c/d, no recent illness. His last hospitalization for sickle cell crisis was in 10/2023 and states that his current pain is similar to previous crisis pain. Has been following with his PCP Dr. Montano for management. States that he was getting Adakveo infusions, last received 2 months ago and was suppose to get monthly infusion but cancelled appointment due to being sick at the time.    In ED, vitals significant for /104, sating well on RA. Labs with Hb 7, K 3.1, elevated LFTs total bili 5.5, , AST 94, ALT 42. EKG with TWI in V1-V3. Troponin neg. CXR with course interstitial marking with no consolidation and avascular necrosis bL on humeral heads. Given toradol 15 MG x2, morphine 4 MG x3, morphine 2 MG x1, and tylenol. Patient seen and examined at bedside and reported 8/10 pain. Given another 10 MG toradol. Heme consulted.  (25 Aug 2024 07:40)    Hospital course:  Patient was started on fluids and started on PCA pump and home morphine for pain control. TTE obtained for abnormal EKG and found to be normal. Hematology consulted and stated no concern for acute chest syndrome given no finding on imaging. Concern for septic arthritis given right knee swelling and warmth. R knee xray on 8/26 with no osteonecrosis and ultrasound on 8/27 with small effusion. Labs showed minor and resolving leukcytosis and patient remained afebril, so suspicion for infection low and antibiotics held off. Patient able to come down on PCA pump on __ and was off pump on ___. Pain adequately controlled with oral medication.     The patient is afebrile, hemodynamically stable and medically optimized for discharge to home with follow up with Dr. Montano. On day of discharge, patient is clinically stable with no new exam findings or acute symptoms compared to prior. The patient was seen by the attending physician on the date of discharge and deemed stable and acceptable for discharge. The patient's chronic medical conditions were treated accordingly per the patient's home medication regimen. The patient's medication reconciliation (with changes made to chronic medications), follow up appointments, discharge orders, instructions, and significant lab and diagnostic studies are as noted.    Important Medication Changes and Reason:      Active or Pending Issues Requiring Follow-up:  - sickle cell disease management    Advanced Directives:   [X] Full code  [ ] DNR  [ ] Hospice    Discharge Diagnoses:   sickle cell pain crisis     HPI:  24M with sickle cell disease (hx acute chest syndrome, previous splenectomy) with L hip osteonecrosis and b/l shoulder osteonecrosis who presented to hospital for R knee pain and chest pain.     Patient states that he began noticing worsening R knee pain with swelling 1 week ago. 3 days ago started having R and L lateral chest pain. Yesterday started noticing b/l feet, hand, and forearm pain.  Home medications of PO morphine and gabapentin did not alleviate the pain. Denies any fever, chills, cp, sob, abd pain, n/v/c/d, no recent illness. His last hospitalization for sickle cell crisis was in 10/2023 and states that his current pain is similar to previous crisis pain. Has been following with his PCP Dr. Montano for management. States that he was getting Adakveo infusions, last received 2 months ago and was suppose to get monthly infusion but cancelled appointment due to being sick at the time.    In ED, vitals significant for /104, sating well on RA. Labs with Hb 7, K 3.1, elevated LFTs total bili 5.5, , AST 94, ALT 42. EKG with TWI in V1-V3. Troponin neg. CXR with course interstitial marking with no consolidation and avascular necrosis bL on humeral heads. Given toradol 15 MG x2, morphine 4 MG x3, morphine 2 MG x1, and tylenol. Patient seen and examined at bedside and reported 8/10 pain. Given another 10 MG toradol. Heme consulted.  (25 Aug 2024 07:40)    Hospital course:  Patient was started on fluids and started on PCA pump and home morphine for pain control. TTE obtained for abnormal EKG and found to be normal. Hematology consulted and stated no concern for acute chest syndrome given no finding on imaging. Concern for septic arthritis given right knee swelling and warmth. R knee xray on 8/26 with no osteonecrosis and ultrasound on 8/27 with small effusion. Labs showed minor and resolving leukcytosis and patient remained afebrile, so suspicion for infection low and antibiotics held off. Patient able to come down on PCA pump on 8/30 and was off pump on ___. Pain adequately controlled with oral medication.     The patient is afebrile, hemodynamically stable and medically optimized for discharge to home with follow up with Dr. Montano. On day of discharge, patient is clinically stable with no new exam findings or acute symptoms compared to prior. The patient was seen by the attending physician on the date of discharge and deemed stable and acceptable for discharge. The patient's chronic medical conditions were treated accordingly per the patient's home medication regimen. The patient's medication reconciliation (with changes made to chronic medications), follow up appointments, discharge orders, instructions, and significant lab and diagnostic studies are as noted.    Important Medication Changes and Reason:  STARTED: --  CHANGED: --   STOPPED: --     Active or Pending Issues Requiring Follow-up:  - sickle cell disease management    Advanced Directives:   [X] Full code  [ ] DNR  [ ] Hospice    Discharge Diagnoses:   sickle cell pain crisis     HPI:  24M with sickle cell disease (hx acute chest syndrome, previous splenectomy) with L hip osteonecrosis and b/l shoulder osteonecrosis who presented to hospital for R knee pain and chest pain.     Patient states that he began noticing worsening R knee pain with swelling 1 week ago. 3 days ago started having R and L lateral chest pain. Yesterday started noticing b/l feet, hand, and forearm pain.  Home medications of PO morphine and gabapentin did not alleviate the pain. Denies any fever, chills, cp, sob, abd pain, n/v/c/d, no recent illness. His last hospitalization for sickle cell crisis was in 10/2023 and states that his current pain is similar to previous crisis pain. Has been following with his PCP Dr. Montano for management. States that he was getting Adakveo infusions, last received 2 months ago and was suppose to get monthly infusion but cancelled appointment due to being sick at the time.    In ED, vitals significant for /104, sating well on RA. Labs with Hb 7, K 3.1, elevated LFTs total bili 5.5, , AST 94, ALT 42. EKG with TWI in V1-V3. Troponin neg. CXR with course interstitial marking with no consolidation and avascular necrosis bL on humeral heads. Given toradol 15 MG x2, morphine 4 MG x3, morphine 2 MG x1, and tylenol. Patient seen and examined at bedside and reported 8/10 pain. Given another 10 MG toradol. Heme consulted.  (25 Aug 2024 07:40)    Hospital course:  Patient was started on fluids and started on PCA pump and home morphine for pain control. TTE obtained for abnormal EKG and found to be normal. Hematology consulted and stated no concern for acute chest syndrome given no finding on imaging. Concern for septic arthritis given right knee swelling and warmth. R knee xray on 8/26 with no osteonecrosis and ultrasound on 8/27 with small effusion. Labs showed minor and resolving leukcytosis and patient remained afebrile, so suspicion for infection low and antibiotics held off. Patient able to come down on PCA pump on 8/30 and was off pump on ___. Pain adequately controlled with oral medication. Patient home Morphine 15 ER BID -> 30 mg ER BID (9/15).    The patient is afebrile, hemodynamically stable and medically optimized for discharge to home with follow up with Dr. Montano. On day of discharge, patient is clinically stable with no new exam findings or acute symptoms compared to prior. The patient was seen by the attending physician on the date of discharge and deemed stable and acceptable for discharge. The patient's chronic medical conditions were treated accordingly per the patient's home medication regimen. The patient's medication reconciliation (with changes made to chronic medications), follow up appointments, discharge orders, instructions, and significant lab and diagnostic studies are as noted.    Important Medication Changes and Reason:  STARTED: --  CHANGED: Morphine 15 ER PO BID -> Morphine 30 ER PO BID  STOPPED: --     Active or Pending Issues Requiring Follow-up:  - sickle cell disease management    Advanced Directives:   [X] Full code  [ ] DNR  [ ] Hospice    Discharge Diagnoses:   sickle cell pain crisis     HPI:  24M with sickle cell disease (hx acute chest syndrome, previous splenectomy) with L hip osteonecrosis and b/l shoulder osteonecrosis who presented to hospital for R knee pain and chest pain.     Patient states that he began noticing worsening R knee pain with swelling 1 week ago. 3 days ago started having R and L lateral chest pain. Yesterday started noticing b/l feet, hand, and forearm pain.  Home medications of PO morphine and gabapentin did not alleviate the pain. Denies any fever, chills, cp, sob, abd pain, n/v/c/d, no recent illness. His last hospitalization for sickle cell crisis was in 10/2023 and states that his current pain is similar to previous crisis pain. Has been following with his PCP Dr. Montano for management. States that he was getting Adakveo infusions, last received 2 months ago and was suppose to get monthly infusion but cancelled appointment due to being sick at the time.    In ED, vitals significant for /104, sating well on RA. Labs with Hb 7, K 3.1, elevated LFTs total bili 5.5, , AST 94, ALT 42. EKG with TWI in V1-V3. Troponin neg. CXR with course interstitial marking with no consolidation and avascular necrosis bL on humeral heads. Given toradol 15 MG x2, morphine 4 MG x3, morphine 2 MG x1, and tylenol. Patient seen and examined at bedside and reported 8/10 pain. Given another 10 MG toradol. Heme consulted.  (25 Aug 2024 07:40)    Hospital course:  Patient was started on fluids and started on PCA pump and home morphine for pain control. TTE obtained for abnormal EKG and found to be normal. Hematology consulted and stated no concern for acute chest syndrome given no finding on imaging. Concern for septic arthritis given right knee swelling and warmth. R knee xray on 8/26 with no osteonecrosis and ultrasound on 8/27 with small effusion. Labs showed minor and resolving leukcytosis and patient remained afebrile, so suspicion for infection low and antibiotics held off. Patient able to come down on PCA pump on 8/30 and was off pump on ___. Pain adequately controlled with oral medication. Patient home Morphine 15 ER BID -> 30 mg ER BID (9/15).    The patient is afebrile, hemodynamically stable and medically optimized for discharge to home with follow up with Dr. Montano.      Active or Pending Issues Requiring Follow-up:  - sickle cell disease management    Advanced Directives:   [X] Full code  [ ] DNR  [ ] Hospice    Discharge Diagnoses:   sickle cell pain crisis     HPI:  24M with sickle cell disease (hx acute chest syndrome, previous splenectomy) with L hip osteonecrosis and b/l shoulder osteonecrosis who presented to hospital for R knee pain and chest pain.     Patient states that he began noticing worsening R knee pain with swelling 1 week ago. 3 days ago started having R and L lateral chest pain. Yesterday started noticing b/l feet, hand, and forearm pain.  Home medications of PO morphine and gabapentin did not alleviate the pain. Denies any fever, chills, cp, sob, abd pain, n/v/c/d, no recent illness. His last hospitalization for sickle cell crisis was in 10/2023 and states that his current pain is similar to previous crisis pain. Has been following with his PCP Dr. Montano for management. States that he was getting Adakveo infusions, last received 2 months ago and was suppose to get monthly infusion but cancelled appointment due to being sick at the time.    In ED, vitals significant for /104, sating well on RA. Labs with Hb 7, K 3.1, elevated LFTs total bili 5.5, , AST 94, ALT 42. EKG with TWI in V1-V3. Troponin neg. CXR with course interstitial marking with no consolidation and avascular necrosis bL on humeral heads. Given toradol 15 MG x2, morphine 4 MG x3, morphine 2 MG x1, and tylenol. Patient seen and examined at bedside and reported 8/10 pain. Given another 10 MG toradol. Heme consulted.  (25 Aug 2024 07:40)    Hospital course:  Patient was started on fluids and started on PCA pump and home morphine for pain control. TTE obtained for abnormal EKG and found to be normal. Hematology consulted and stated no concern for acute chest syndrome given no finding on imaging. Concern for septic arthritis given right knee swelling and warmth. R knee xray on 8/26 with no osteonecrosis and ultrasound on 8/27 with small effusion. Labs showed minor and resolving leukocytosis and patient remained afebrile, so suspicion for infection low and antibiotics held off. Patient showing minimal pain improvement, received 1 unit of PRBC on 9/19, 9/22, 9/26. During hospital stay, oxbryta was recalled by [employer], medication held until outpatient follow up. Patient able to come off PCA pump on __. Pain adequately controlled with oral medication. Patient home Morphine 15 ER BID -> 30 mg ER BID (9/15).    The patient is afebrile, hemodynamically stable and medically optimized for discharge to home with follow up with Dr. Montano.      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     Patient states that he began noticing worsening R knee pain with swelling 1 week ago. 3 days ago started having R and L lateral chest pain. Yesterday started noticing b/l feet, hand, and forearm pain.  Home medications of PO morphine and gabapentin did not alleviate the pain. Denies any fever, chills, cp, sob, abd pain, n/v/c/d, no recent illness. His last hospitalization for sickle cell crisis was in 10/2023 and states that his current pain is similar to previous crisis pain. Has been following with his PCP Dr. Montano for management. States that he was getting Adakveo infusions, last received 2 months ago and was suppose to get monthly infusion but cancelled appointment due to being sick at the time.    In ED, vitals significant for /104, sating well on RA. Labs with Hb 7, K 3.1, elevated LFTs total bili 5.5, , AST 94, ALT 42. EKG with TWI in V1-V3. Troponin neg. CXR with course interstitial marking with no consolidation and avascular necrosis bL on humeral heads. Given toradol 15 MG x2, morphine 4 MG x3, morphine 2 MG x1, and tylenol. Patient seen and examined at bedside and reported 8/10 pain. Given another 10 MG toradol. Heme consulted.  (25 Aug 2024 07:40)    Hospital course:  Patient was started on fluids and started on PCA pump and home morphine for pain control. TTE obtained for abnormal EKG and found to be normal. Hematology consulted and stated no concern for acute chest syndrome given no finding on imaging. Concern for septic arthritis given right knee swelling and warmth. R knee xray on 8/26 with no osteonecrosis and ultrasound on 8/27 with small effusion. Labs showed minor and resolving leukocytosis and patient remained afebrile, so suspicion for infection low and antibiotics held off. Patient showing minimal pain improvement, received 1 unit of PRBC on 9/19, 9/22, 9/26. During hospital stay, oxbryta was recalled by [employer], medication held until outpatient follow up. Patient able to come off PCA pump on __. Pain adequately controlled with oral medication. Patient home Morphine 15 ER BID -> 30 mg ER BID (9/15).    The patient is afebrile, hemodynamically stable and medically optimized for discharge to home with follow up with Dr. Montano.      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     Patient states that he began noticing worsening R knee pain with swelling 1 week ago. 3 days ago started having R and L lateral chest pain. Yesterday started noticing b/l feet, hand, and forearm pain.  Home medications of PO morphine and gabapentin did not alleviate the pain. Denies any fever, chills, cp, sob, abd pain, n/v/c/d, no recent illness. His last hospitalization for sickle cell crisis was in 10/2023 and states that his current pain is similar to previous crisis pain. Has been following with his PCP Dr. Montano for management. States that he was getting Adakveo infusions, last received 2 months ago and was suppose to get monthly infusion but cancelled appointment due to being sick at the time.    In ED, vitals significant for /104, sating well on RA. Labs with Hb 7, K 3.1, elevated LFTs total bili 5.5, , AST 94, ALT 42. EKG with TWI in V1-V3. Troponin neg. CXR with course interstitial marking with no consolidation and avascular necrosis bL on humeral heads. Given toradol 15 MG x2, morphine 4 MG x3, morphine 2 MG x1, and tylenol. Patient seen and examined at bedside and reported 8/10 pain. Given another 10 MG toradol. Heme consulted.  (25 Aug 2024 07:40)    Hospital course:  Patient was started on fluids and started on PCA pump and home morphine for pain control. TTE obtained for abnormal EKG and found to be normal. Hematology consulted and stated no concern for acute chest syndrome given no finding on imaging. Concern for septic arthritis given right knee swelling and warmth. R knee xray on 8/26 with no osteonecrosis and ultrasound on 8/27 with small effusion. Labs showed minor and resolving leukocytosis and patient remained afebrile, so suspicion for infection low and antibiotics held off. Patient showing minimal pain improvement, received 1 unit of PRBC on 9/19, 9/22, 9/26. During hospital stay, oxbryta was recalled by [employer], medication held until outpatient follow up. Patient home Morphine 15 ER BID -> 30 mg ER BID (9/15). Patient's course was complicated by persistent crisis pain Patient able to come off PCA pump on 10/10. Pain adequately controlled with oral medication.     The patient is afebrile, hemodynamically stable and medically optimized for discharge to home with follow up with Dr. Montano.      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     Patient states that he began noticing worsening R knee pain with swelling 1 week ago. 3 days ago started having R and L lateral chest pain. Yesterday started noticing b/l feet, hand, and forearm pain.  Home medications of PO morphine and gabapentin did not alleviate the pain. Denies any fever, chills, cp, sob, abd pain, n/v/c/d, no recent illness. His last hospitalization for sickle cell crisis was in 10/2023 and states that his current pain is similar to previous crisis pain. Has been following with his PCP Dr. Montano for management. States that he was getting Adakveo infusions, last received 2 months ago and was suppose to get monthly infusion but cancelled appointment due to being sick at the time.    In ED, vitals significant for /104, sating well on RA. Labs with Hb 7, K 3.1, elevated LFTs total bili 5.5, , AST 94, ALT 42. EKG with TWI in V1-V3. Troponin neg. CXR with course interstitial marking with no consolidation and avascular necrosis bL on humeral heads. Given toradol 15 MG x2, morphine 4 MG x3, morphine 2 MG x1, and tylenol. Patient seen and examined at bedside and reported 8/10 pain. Given another 10 MG toradol. Heme consulted.  (25 Aug 2024 07:40)    Hospital course:  Patient was started on fluids and started on PCA pump and home morphine for pain control. TTE obtained for abnormal EKG and found to be normal. Hematology consulted and stated no concern for acute chest syndrome given no finding on imaging. Concern for septic arthritis given right knee swelling and warmth. R knee xray on 8/26 with no osteonecrosis and ultrasound on 8/27 with small effusion. Labs showed minor and resolving leukocytosis and patient remained afebrile, so suspicion for infection low and antibiotics held off. Patient showing minimal pain improvement, received 1 unit of PRBC on 9/19, 9/22, 9/26. During hospital stay, oxbryta was recalled by [employer], medication held until outpatient follow up. Patient home Morphine 15 ER BID -> 30 mg ER BID (9/15). Hydrea held iso thrombocytopenia, restarted at home dose once resolved. Patient's course was complicated by persistent crisis pain. Repeat XRs obtained which were negative for avascular necrosis. Continued with aggressive IVFs, PCA, and multimodal pain mgmt. Hgb S% continued to decrease during his course. Patient slowly weaned and able to come off PCA pump on 10/10. Pain adequately controlled with oral medication.     The patient is afebrile, hemodynamically stable and medically optimized for discharge to home with follow up with Dr. Montano.      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     Patient states that he began noticing worsening R knee pain with swelling 1 week ago. 3 days ago started having R and L lateral chest pain. Yesterday started noticing b/l feet, hand, and forearm pain.  Home medications of PO morphine and gabapentin did not alleviate the pain. Denies any fever, chills, cp, sob, abd pain, n/v/c/d, no recent illness. His last hospitalization for sickle cell crisis was in 10/2023 and states that his current pain is similar to previous crisis pain. Has been following with his PCP Dr. Montano for management. States that he was getting Adakveo infusions, last received 2 months ago and was suppose to get monthly infusion but cancelled appointment due to being sick at the time.    In ED, vitals significant for /104, sating well on RA. Labs with Hb 7, K 3.1, elevated LFTs total bili 5.5, , AST 94, ALT 42. EKG with TWI in V1-V3. Troponin neg. CXR with course interstitial marking with no consolidation and avascular necrosis bL on humeral heads. Given toradol 15 MG x2, morphine 4 MG x3, morphine 2 MG x1, and tylenol. Patient seen and examined at bedside and reported 8/10 pain. Given another 10 MG toradol. Heme consulted.  (25 Aug 2024 07:40)    Hospital course:  Patient was started on fluids and started on PCA pump and home morphine for pain control. TTE obtained for abnormal EKG and found to be normal. Hematology consulted and stated no concern for acute chest syndrome given no finding on imaging. Concern for septic arthritis given right knee swelling and warmth. R knee xray on 8/26 with no osteonecrosis and ultrasound on 8/27 with small effusion. Labs showed minor and resolving leukocytosis and patient remained afebrile, so suspicion for infection low and antibiotics held off. Patient showing minimal pain improvement, received 1 unit of PRBC on 9/19, 9/22, 9/26. During hospital stay, oxbryta was recalled by [employer], medication held until outpatient follow up. Patient home Morphine 15 ER BID -> 30 mg ER BID (9/15). Hydrea held iso thrombocytopenia, restarted at home dose once resolved. Patient's course was complicated by persistent crisis pain. Repeat XRs obtained which were negative for avascular necrosis. Continued with aggressive IVFs, PCA, and multimodal pain mgmt. Hgb S% continued to decrease during his course. Patient slowly weaned and able to come off PCA pump on 10/10. Pain adequately controlled with oral medication. He will be discharged on morphine 30mg ER BID and moprhine 15mg IR q8h x 7 days until follow up with PCP Dr. Montano.     On day of discharge, patient is clinically stable with no new exam findings or acute symptoms compared to prior. The patient was seen by the attending physician on the date of discharge and deemed stable and acceptable for discharge. The patient's chronic medical conditions were treated accordingly per the patient's home medication regimen. 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     Patient states that he began noticing worsening R knee pain with swelling 1 week ago. 3 days ago started having R and L lateral chest pain. Yesterday started noticing b/l feet, hand, and forearm pain.  Home medications of PO morphine and gabapentin did not alleviate the pain. Denies any fever, chills, cp, sob, abd pain, n/v/c/d, no recent illness. His last hospitalization for sickle cell crisis was in 10/2023 and states that his current pain is similar to previous crisis pain. Has been following with his PCP Dr. Montano for management. States that he was getting Adakveo infusions, last received 2 months ago and was suppose to get monthly infusion but cancelled appointment due to being sick at the time.    In ED, vitals significant for /104, sating well on RA. Labs with Hb 7, K 3.1, elevated LFTs total bili 5.5, , AST 94, ALT 42. EKG with TWI in V1-V3. Troponin neg. CXR with course interstitial marking with no consolidation and avascular necrosis bL on humeral heads. Given toradol 15 MG x2, morphine 4 MG x3, morphine 2 MG x1, and tylenol. Patient seen and examined at bedside and reported 8/10 pain. Given another 10 MG toradol. Heme consulted.  (25 Aug 2024 07:40)    Hospital course:  Patient was started on fluids and started on PCA pump and home morphine for pain control. TTE obtained for abnormal EKG and found to be normal. Hematology consulted and stated no concern for acute chest syndrome given no finding on imaging. Concern for septic arthritis given right knee swelling and warmth. R knee xray on 8/26 with no osteonecrosis and ultrasound on 8/27 with small effusion. Labs showed minor and resolving leukocytosis and patient remained afebrile, so suspicion for infection low and antibiotics held off. Patient showing minimal pain improvement, received 1 unit of PRBC on 9/19, 9/22, 9/26. During hospital stay, oxbryta was recalled by [employer], medication held until outpatient follow up. Patient home Morphine 15 ER BID -> 30 mg ER BID (9/15). Hydrea held iso thrombocytopenia, restarted at home dose once resolved. Patient's course was complicated by persistent crisis pain. Repeat XRs obtained which were negative for avascular necrosis. Continued with aggressive IVFs, PCA, and multimodal pain mgmt. Hgb S% continued to decrease during his course. Patient slowly weaned and able to come off PCA pump on 10/10. Pain adequately controlled with oral medication. He will be discharged on morphine 30mg ER BID and moprhine 15mg IR q8h x 7 days until follow up with PCP Dr. Montano.     On day of discharge, patient is clinically stable with no new exam findings or acute symptoms compared to prior. The patient was seen by the attending physician on the date of discharge and deemed stable and acceptable for discharge. The patient's chronic medical conditions were treated accordingly per the patient's home medication regimen. The patient's medication reconciliation (with changes made to chronic medications), follow up appointments, discharge orders, instructions, and significant lab and diagnostic studies are as noted.     Active or Pending Issues Requiring Follow-up:  - f/u with PCP Dr. Montano     Medication changes:   - INCREASE morphine sulfate ER from 15mg BID to 30mg BID   - continue morphine sulfate IR 15mg q8h   - STOP oxbryta     Advanced Directives:   [X] Full code  [ ] DNR  [ ] Hospice    Discharge Diagnoses:   sickle cell pain crisis     HPI:  29M with sickle cell disease (history of acute chest syndrome, previous splenectomy) with L hip osteonecrosis and b/l shoulder osteonecrosis who presented to hospital for R knee pain and chest pain.     Patient states that he began noticing worsening R knee pain with swelling 1 week ago. 3 days ago started having R and L lateral chest pain. Yesterday started noticing b/l feet, hand, and forearm pain.  Home medications of PO morphine and gabapentin did not alleviate the pain. Denies any fever, chills, cp, sob, abd pain, n/v/c/d, no recent illness. His last hospitalization for sickle cell crisis was in 10/2023 and states that his current pain is similar to previous crisis pain. Has been following with his PCP Dr. Montano for management. States that he was getting Adakveo infusions, last received 2 months ago and was suppose to get monthly infusion but cancelled appointment due to being sick at the time.    In ED, vitals significant for /104, sating well on RA. Labs with Hb 7, K 3.1, elevated LFTs total bili 5.5, , AST 94, ALT 42. EKG with TWI in V1-V3. Troponin neg. CXR with course interstitial marking with no consolidation and avascular necrosis bL on humeral heads. Given toradol 15 MG x2, morphine 4 MG x3, morphine 2 MG x1, and tylenol. Patient seen and examined at bedside and reported 8/10 pain. Given another 10 MG toradol. Heme consulted.  (25 Aug 2024 07:40)    Hospital course:  Patient was started on fluids and started on PCA pump and home morphine for pain control. TTE obtained for abnormal EKG and found to be normal. Hematology consulted and stated no concern for acute chest syndrome given no finding on imaging. Concern for septic arthritis given right knee swelling and warmth. R knee xray on 8/26 with no osteonecrosis and ultrasound on 8/27 with small effusion. Labs showed minor and resolving leukocytosis and patient remained afebrile, so suspicion for infection low and antibiotics held off. Patient showing minimal pain improvement, received 1 unit of PRBC on 9/19, 9/22, 9/26. During hospital stay, oxbryta was recalled by [employer], medication held until outpatient follow up. Patient home Morphine 15 ER BID -> 30 mg ER BID (9/15). Hydrea held iso thrombocytopenia, restarted at home dose once resolved. Patient's course was complicated by persistent crisis pain. Repeat XRs obtained which were negative for avascular necrosis. Continued with aggressive IVFs, PCA, and multimodal pain mgmt. Hgb S% continued to decrease during his course. Patient slowly weaned and able to come off PCA pump on 10/10. Pain adequately controlled with oral medication. He will be discharged on morphine 30mg ER BID and moprhine 15mg IR q8h x 7 days until follow up with PCP Dr. Montano.     On day of discharge, patient is clinically stable with no new exam findings or acute symptoms compared to prior. The patient was seen by the attending physician on the date of discharge and deemed stable and acceptable for discharge. The patient's chronic medical conditions were treated accordingly per the patient's home medication regimen. The patient's medication reconciliation (with changes made to chronic medications), follow up appointments, discharge orders, instructions, and significant lab and diagnostic studies are as noted.     Active or Pending Issues Requiring Follow-up:  - f/u with PCP Dr. Montano     Medication changes:   - INCREASE morphine sulfate ER from 15mg BID to 30mg BID   - continue morphine sulfate IR 15mg q8h   - STOP oxbryta     Advanced Directives:   [X] Full code  [ ] DNR  [ ] Hospice    Discharge Diagnoses:   sickle cell pain crisis

## 2024-09-01 NOTE — DISCHARGE NOTE PROVIDER - NSDCCPTREATMENT_GEN_ALL_CORE_FT
PRINCIPAL PROCEDURE  Procedure: Complete ultrasound of joint of extremity  Findings and Treatment: Right knee ultrasound performed 8/27/24  FINDINGS/  IMPRESSION:  Survey imaging of the right knee demonstrates a small knee effusion.   There appears to be prepatellar soft tissue swelling present.. No   discrete hyperemia about the knee. No evidence of advanced productive   changes.      SECONDARY PROCEDURE  Procedure: XR knee right, 2 views  Findings and Treatment: Performed 8/26/24  FINDINGS/  IMPRESSION:  Heterogenous appearance of the intramedullary canal of the visualized   bones.. No radiographic findings to suggest osteonecrosis. No acute   fracture. No dislocation. Joint spaces are preserved.    Procedure: XR chest, 2 views  Findings and Treatment: Performed 8/25/24  FINDINGS:  The heart is normal in size.  Coarse interstitial markings bilaterally without focal consolidation.  There is no pneumothorax or pleural effusion.  No acute bony abnormality.Osseous sequela of sickle cell disease.  IMPRESSION:  No focal consolidation.    Procedure: TTE (transthoracic echocardiography)  Findings and Treatment: Performed 8/26/24  CONCLUSIONS:      1. Left ventricular systolic function is normal with an ejection fraction visually estimated at 50 to 55 %.   2. Normal left ventricular diastolic function.   3. Left atrium is mildly dilated.   4. The right atrium is normal in size.   5. Normal right ventricular cavity size, with normal wall thickness, and normal right ventricular systolic function.   6. No significant valvular disease.   7. Trileaflet aortic valve with normal systolic excursion.   8. No pericardial effusion seen.

## 2024-09-01 NOTE — DISCHARGE NOTE PROVIDER - NSDCFUADDAPPT_GEN_ALL_CORE_FT
APPTS READY TO BE MADE [X]    Best Family or Patient Contact (if needed):    Additional Information about above appointments (if needed):    1: PCP. Call [phone] to see if your next appointment can be moved up  2:   3:     Other comments or requests:

## 2024-09-02 LAB
ALBUMIN SERPL ELPH-MCNC: 2.7 G/DL — LOW (ref 3.3–5)
ALP SERPL-CCNC: 281 U/L — HIGH (ref 40–120)
ALT FLD-CCNC: 26 U/L — SIGNIFICANT CHANGE UP (ref 4–41)
ANION GAP SERPL CALC-SCNC: 12 MMOL/L — SIGNIFICANT CHANGE UP (ref 7–14)
AST SERPL-CCNC: 64 U/L — HIGH (ref 4–40)
BASOPHILS # BLD AUTO: 0.05 K/UL — SIGNIFICANT CHANGE UP (ref 0–0.2)
BASOPHILS NFR BLD AUTO: 0.4 % — SIGNIFICANT CHANGE UP (ref 0–2)
BILIRUB SERPL-MCNC: 1.7 MG/DL — HIGH (ref 0.2–1.2)
BUN SERPL-MCNC: 4 MG/DL — LOW (ref 7–23)
CALCIUM SERPL-MCNC: 8.2 MG/DL — LOW (ref 8.4–10.5)
CHLORIDE SERPL-SCNC: 104 MMOL/L — SIGNIFICANT CHANGE UP (ref 98–107)
CO2 SERPL-SCNC: 22 MMOL/L — SIGNIFICANT CHANGE UP (ref 22–31)
CREAT SERPL-MCNC: 0.32 MG/DL — LOW (ref 0.5–1.3)
EGFR: 163 ML/MIN/1.73M2 — SIGNIFICANT CHANGE UP
EOSINOPHIL # BLD AUTO: 0.12 K/UL — SIGNIFICANT CHANGE UP (ref 0–0.5)
EOSINOPHIL NFR BLD AUTO: 1 % — SIGNIFICANT CHANGE UP (ref 0–6)
GLUCOSE SERPL-MCNC: 85 MG/DL — SIGNIFICANT CHANGE UP (ref 70–99)
HAPTOGLOB SERPL-MCNC: <20 MG/DL — LOW (ref 34–200)
HCT VFR BLD CALC: 23.8 % — LOW (ref 39–50)
HGB BLD-MCNC: 7.6 G/DL — LOW (ref 13–17)
IANC: 7.09 K/UL — SIGNIFICANT CHANGE UP (ref 1.8–7.4)
IMM GRANULOCYTES NFR BLD AUTO: 0.7 % — SIGNIFICANT CHANGE UP (ref 0–0.9)
LDH SERPL L TO P-CCNC: 446 U/L — HIGH (ref 135–225)
LYMPHOCYTES # BLD AUTO: 34.1 % — SIGNIFICANT CHANGE UP (ref 13–44)
LYMPHOCYTES # BLD AUTO: 4.05 K/UL — HIGH (ref 1–3.3)
MAGNESIUM SERPL-MCNC: 1.8 MG/DL — SIGNIFICANT CHANGE UP (ref 1.6–2.6)
MCHC RBC-ENTMCNC: 20.8 PG — LOW (ref 27–34)
MCHC RBC-ENTMCNC: 31.9 GM/DL — LOW (ref 32–36)
MCV RBC AUTO: 65 FL — LOW (ref 80–100)
MONOCYTES # BLD AUTO: 0.48 K/UL — SIGNIFICANT CHANGE UP (ref 0–0.9)
MONOCYTES NFR BLD AUTO: 4 % — SIGNIFICANT CHANGE UP (ref 2–14)
NEUTROPHILS # BLD AUTO: 7.09 K/UL — SIGNIFICANT CHANGE UP (ref 1.8–7.4)
NEUTROPHILS NFR BLD AUTO: 59.8 % — SIGNIFICANT CHANGE UP (ref 43–77)
NRBC # BLD: 70 /100 WBCS — HIGH (ref 0–0)
NRBC # FLD: 8.35 K/UL — HIGH (ref 0–0)
PHOSPHATE SERPL-MCNC: 3.2 MG/DL — SIGNIFICANT CHANGE UP (ref 2.5–4.5)
PLATELET # BLD AUTO: 659 K/UL — HIGH (ref 150–400)
POTASSIUM SERPL-MCNC: 3.5 MMOL/L — SIGNIFICANT CHANGE UP (ref 3.5–5.3)
POTASSIUM SERPL-SCNC: 3.5 MMOL/L — SIGNIFICANT CHANGE UP (ref 3.5–5.3)
PROT SERPL-MCNC: 7.7 G/DL — SIGNIFICANT CHANGE UP (ref 6–8.3)
RBC # BLD: 3.66 M/UL — LOW (ref 4.2–5.8)
RBC # BLD: 3.66 M/UL — LOW (ref 4.2–5.8)
RBC # FLD: 24.5 % — HIGH (ref 10.3–14.5)
RETICS #: 329.6 K/UL — HIGH (ref 25–125)
RETICS/RBC NFR: 9.1 % — HIGH (ref 0.5–2.5)
SODIUM SERPL-SCNC: 138 MMOL/L — SIGNIFICANT CHANGE UP (ref 135–145)
WBC # BLD: 11.87 K/UL — HIGH (ref 3.8–10.5)
WBC # FLD AUTO: 11.87 K/UL — HIGH (ref 3.8–10.5)

## 2024-09-02 PROCEDURE — 99232 SBSQ HOSP IP/OBS MODERATE 35: CPT | Mod: GC

## 2024-09-02 RX ADMIN — Medication 650 MILLIGRAM(S): at 07:05

## 2024-09-02 RX ADMIN — Medication 650 MILLIGRAM(S): at 00:14

## 2024-09-02 RX ADMIN — Medication 75 MILLILITER(S): at 06:04

## 2024-09-02 RX ADMIN — Medication 75 MILLILITER(S): at 13:30

## 2024-09-02 RX ADMIN — Medication 10 GRAM(S): at 06:02

## 2024-09-02 RX ADMIN — MORPHINE SULFATE 30 MILLILITER(S): 30 TABLET, FILM COATED, EXTENDED RELEASE ORAL at 20:04

## 2024-09-02 RX ADMIN — Medication 650 MILLIGRAM(S): at 06:05

## 2024-09-02 RX ADMIN — Medication 10 GRAM(S): at 17:28

## 2024-09-02 RX ADMIN — MORPHINE SULFATE 30 MILLILITER(S): 30 TABLET, FILM COATED, EXTENDED RELEASE ORAL at 08:05

## 2024-09-02 RX ADMIN — Medication 650 MILLIGRAM(S): at 17:27

## 2024-09-02 RX ADMIN — ENOXAPARIN SODIUM 30 MILLIGRAM(S): 150 INJECTION SUBCUTANEOUS at 13:58

## 2024-09-02 RX ADMIN — MORPHINE SULFATE 15 MILLIGRAM(S): 30 TABLET, FILM COATED, EXTENDED RELEASE ORAL at 06:05

## 2024-09-02 RX ADMIN — CHLORHEXIDINE GLUCONATE ORAL RINSE 1 APPLICATION(S): 1.2 SOLUTION DENTAL at 11:20

## 2024-09-02 RX ADMIN — MORPHINE SULFATE 15 MILLIGRAM(S): 30 TABLET, FILM COATED, EXTENDED RELEASE ORAL at 17:27

## 2024-09-02 RX ADMIN — FOLIC ACID 1 MILLIGRAM(S): 1 TABLET ORAL at 11:18

## 2024-09-02 RX ADMIN — MORPHINE SULFATE 15 MILLIGRAM(S): 30 TABLET, FILM COATED, EXTENDED RELEASE ORAL at 07:35

## 2024-09-02 RX ADMIN — MUPIROCIN 1 APPLICATION(S): 20 OINTMENT TOPICAL at 06:06

## 2024-09-02 RX ADMIN — HYDROXYUREA 1500 MILLIGRAM(S): 500 CAPSULE ORAL at 11:19

## 2024-09-02 RX ADMIN — VOXELOTOR 1500 MILLIGRAM(S): 300 TABLET, FOR SUSPENSION ORAL at 11:19

## 2024-09-02 RX ADMIN — Medication 650 MILLIGRAM(S): at 11:18

## 2024-09-02 NOTE — PROGRESS NOTE ADULT - ATTENDING COMMENTS
Seen and examined by me this afternoon. Pain on hands/UE's and RLE has improved since increasing PCA yesterday, tolerating PO, +BM.  C/w current dose of Morphine PCA for for sickle cell crisis treatment, 2.5mg q6min, 4h lockout 30mg and c/w MS contin  c/w bowel regimen  C/w hydroxyurea/oxbryta and IVF's  Rest as above

## 2024-09-02 NOTE — PROGRESS NOTE ADULT - SUBJECTIVE AND OBJECTIVE BOX
Taurus Mayo M.D.   PGY-1 Internal Medicine  ** Note not finalized until signed by attending **   --------------------------  Taurus Mayo MD  Internal Medicine   PGY-1  --------------------------    SUBJECTIVE / OVERNIGHT EVENTS:    Pt seen in AM at bedside, resting comfortably in bed, Reports mild pain in R foot, b/l arms. Reports swelling of the R knee and R foot, present since admission. When asked, pt denies any recent or active fever, chills, nausea, vomiting, headache, acute sob, chest pain, abdominal pain, genitourinary sx.    MEDICATIONS  (STANDING):  acetaminophen     Tablet .. 650 milliGRAM(s) Oral every 6 hours  chlorhexidine 2% Cloths 1 Application(s) Topical daily  enoxaparin Injectable 30 milliGRAM(s) SubCutaneous every 24 hours  folic acid 1 milliGRAM(s) Oral daily  glutamine Powder 10 Gram(s) Oral two times a day  hydroxyurea 1500 milliGRAM(s) Oral daily  morphine ER Tablet 15 milliGRAM(s) Oral two times a day  morphine PCA (5 mG/mL) 30 milliLiter(s) PCA Continuous PCA Continuous  sodium chloride 0.45%. 1000 milliLiter(s) (75 mL/Hr) IV Continuous <Continuous>  voxelotor 1500 milliGRAM(s) Oral daily    MEDICATIONS  (PRN):  naloxone Injectable 0.1 milliGRAM(s) IV Push every 3 minutes PRN For ANY of the following changes in patient status:  A. RR LESS THAN 10 breaths per minute, B. Oxygen saturation LESS THAN 90%, C. Sedation score of 6  ondansetron Injectable 4 milliGRAM(s) IV Push every 6 hours PRN Nausea  polyethylene glycol 3350 17 Gram(s) Oral two times a day PRN Constipation  senna 2 Tablet(s) Oral at bedtime PRN Constipation    CAPILLARY BLOOD GLUCOSE    I&O's Summary    01 Sep 2024 07:01  -  02 Sep 2024 07:00  --------------------------------------------------------  IN: 0 mL / OUT: 2400 mL / NET: -2400 mL    PHYSICAL EXAM:  Vital Signs Last 24 Hrs  T(C): 36.8 (02 Sep 2024 12:00), Max: 36.8 (01 Sep 2024 20:00)  T(F): 98.2 (02 Sep 2024 12:00), Max: 98.3 (01 Sep 2024 20:00)  HR: 77 (02 Sep 2024 12:00) (75 - 90)  BP: 105/63 (02 Sep 2024 12:00) (102/60 - 107/72)  RR: 18 (02 Sep 2024 12:00) (17 - 18)  SpO2: 100% (02 Sep 2024 12:00) (97% - 100%)    Parameters below as of 02 Sep 2024 12:00  Patient On (Oxygen Delivery Method): room air    CONSTITUTIONAL: NAD; well-developed  HEENT: PERRL, clear conjunctiva  RESPIRATORY: Normal respiratory effort; lungs are clear to auscultation bilaterally; No Crackles/Rhonchi/Wheezing  CARDIOVASCULAR: Regular rate and rhythm, normal S1 and S2, no murmur/rub/gallop; No lower extremity edema; Peripheral pulses are 2+ bilaterally  ABDOMEN: Nontender to palpation, normoactive bowel sounds, no rebound/guarding; No hepatosplenomegaly  MUSCULOSKELETAL: +swelling R knee; R foot. Per previous note, improved since admission. no clubbing or cyanosis of digits; no tenderness to palpation  EXTREMITY: Lower extremities Non-tender to palpation; non-erythematous B/L  NEURO: A&Ox3; no focal deficits   PSYCH: normal mood; Affect appropirate    LABS:                        7.6    11.87 )-----------( 659      ( 02 Sep 2024 07:15 )             23.8     09-02    138  |  104  |  4<L>  ----------------------------<  85  3.5   |  22  |  0.32<L>    Ca    8.2<L>      02 Sep 2024 07:15  Phos  3.2     09-02  Mg     1.80     09-02    TPro  7.7  /  Alb  2.7<L>  /  TBili  1.7<H>  /  DBili  x   /  AST  64<H>  /  ALT  26  /  AlkPhos  281<H>  09-02    Urinalysis Basic - ( 02 Sep 2024 07:15 )    Color: x / Appearance: x / SG: x / pH: x  Gluc: 85 mg/dL / Ketone: x  / Bili: x / Urobili: x   Blood: x / Protein: x / Nitrite: x   Leuk Esterase: x / RBC: x / WBC x   Sq Epi: x / Non Sq Epi: x / Bacteria: x    RADIOLOGY & ADDITIONAL TESTS:  Results Reviewed: X  Imaging Personally Reviewed: X  Electrocardiogram Personally Reviewed: X

## 2024-09-02 NOTE — PROGRESS NOTE ADULT - PROBLEM SELECTOR PLAN 1
#Transaminitis  - follows with Dr. Montano, missed monthly dose of Adakveo for 2 months which may explain SCC  - with R knee pain, CP  - low concern for acute chest given no opacities on CXR, chest pain more consistent with SCC  - transaminitis likely 2/2 to hepatopathy iso vasoocclusive crisus, no abdominal pain and LFTs improving, could otherwise consider RUQ US  - hematology consulted  - palliative deferred pain management to primary team     A/P:  - c/w 1/2 NS  - pain control with home morphine 15 MG ERBID , pca morphine pump (will adjust), toradol q6; tylenol  - bowel regimen: miralax BID, senna 2 MG PRN  - home hydroxyurea (need to take home med), oxbryta (need to take home med) and endari  - incentive spirometer  - daily labs, CBC w/ diff, LDH, haptoglobin, CMP, retic count  - will continue to monitor for signs of acute chest syndrome. If fever, culture and repeat CXR stat  - trend CBC, and transfuse for symptomatic anemia,

## 2024-09-03 LAB
ALBUMIN SERPL ELPH-MCNC: 2.8 G/DL — LOW (ref 3.3–5)
ALP SERPL-CCNC: 314 U/L — HIGH (ref 40–120)
ALT FLD-CCNC: 26 U/L — SIGNIFICANT CHANGE UP (ref 4–41)
ANION GAP SERPL CALC-SCNC: 14 MMOL/L — SIGNIFICANT CHANGE UP (ref 7–14)
AST SERPL-CCNC: 57 U/L — HIGH (ref 4–40)
BASOPHILS # BLD AUTO: 0.05 K/UL — SIGNIFICANT CHANGE UP (ref 0–0.2)
BASOPHILS NFR BLD AUTO: 0.5 % — SIGNIFICANT CHANGE UP (ref 0–2)
BILIRUB SERPL-MCNC: 1.6 MG/DL — HIGH (ref 0.2–1.2)
BUN SERPL-MCNC: 4 MG/DL — LOW (ref 7–23)
CALCIUM SERPL-MCNC: 7.8 MG/DL — LOW (ref 8.4–10.5)
CHLORIDE SERPL-SCNC: 104 MMOL/L — SIGNIFICANT CHANGE UP (ref 98–107)
CO2 SERPL-SCNC: 22 MMOL/L — SIGNIFICANT CHANGE UP (ref 22–31)
CREAT SERPL-MCNC: 0.37 MG/DL — LOW (ref 0.5–1.3)
EGFR: 156 ML/MIN/1.73M2 — SIGNIFICANT CHANGE UP
EOSINOPHIL # BLD AUTO: 0.07 K/UL — SIGNIFICANT CHANGE UP (ref 0–0.5)
EOSINOPHIL NFR BLD AUTO: 0.6 % — SIGNIFICANT CHANGE UP (ref 0–6)
GLUCOSE SERPL-MCNC: 97 MG/DL — SIGNIFICANT CHANGE UP (ref 70–99)
HAPTOGLOB SERPL-MCNC: <20 MG/DL — LOW (ref 34–200)
HCT VFR BLD CALC: 24.8 % — LOW (ref 39–50)
HEMOGLOBIN INTERPRETATION: SIGNIFICANT CHANGE UP
HGB A MFR BLD: 0 % — LOW (ref 95–97.6)
HGB A2 MFR BLD: 4.1 % — HIGH (ref 2.4–3.5)
HGB BLD-MCNC: 8.1 G/DL — LOW (ref 13–17)
HGB F MFR BLD: 4.3 % — HIGH (ref 0–1.5)
HGB OTHER MFR BLD ELPH: 19.2 % — HIGH
HGB S MFR BLD: 72.4 % — HIGH
IANC: 7.03 K/UL — SIGNIFICANT CHANGE UP (ref 1.8–7.4)
IMM GRANULOCYTES NFR BLD AUTO: 0.7 % — SIGNIFICANT CHANGE UP (ref 0–0.9)
LDH SERPL L TO P-CCNC: 454 U/L — HIGH (ref 135–225)
LYMPHOCYTES # BLD AUTO: 3.37 K/UL — HIGH (ref 1–3.3)
LYMPHOCYTES # BLD AUTO: 30.8 % — SIGNIFICANT CHANGE UP (ref 13–44)
MAGNESIUM SERPL-MCNC: 1.9 MG/DL — SIGNIFICANT CHANGE UP (ref 1.6–2.6)
MCHC RBC-ENTMCNC: 21.4 PG — LOW (ref 27–34)
MCHC RBC-ENTMCNC: 32.7 GM/DL — SIGNIFICANT CHANGE UP (ref 32–36)
MCV RBC AUTO: 65.6 FL — LOW (ref 80–100)
MONOCYTES # BLD AUTO: 0.35 K/UL — SIGNIFICANT CHANGE UP (ref 0–0.9)
MONOCYTES NFR BLD AUTO: 3.2 % — SIGNIFICANT CHANGE UP (ref 2–14)
NEUTROPHILS # BLD AUTO: 7.03 K/UL — SIGNIFICANT CHANGE UP (ref 1.8–7.4)
NEUTROPHILS NFR BLD AUTO: 64.2 % — SIGNIFICANT CHANGE UP (ref 43–77)
NRBC # BLD: 77 /100 WBCS — HIGH (ref 0–0)
NRBC # FLD: 8.47 K/UL — HIGH (ref 0–0)
PHOSPHATE SERPL-MCNC: 3.5 MG/DL — SIGNIFICANT CHANGE UP (ref 2.5–4.5)
PLATELET # BLD AUTO: 616 K/UL — HIGH (ref 150–400)
POTASSIUM SERPL-MCNC: 3.7 MMOL/L — SIGNIFICANT CHANGE UP (ref 3.5–5.3)
POTASSIUM SERPL-SCNC: 3.7 MMOL/L — SIGNIFICANT CHANGE UP (ref 3.5–5.3)
PROT SERPL-MCNC: 7.3 G/DL — SIGNIFICANT CHANGE UP (ref 6–8.3)
RBC # BLD: 3.78 M/UL — LOW (ref 4.2–5.8)
RBC # BLD: 3.78 M/UL — LOW (ref 4.2–5.8)
RBC # FLD: 24.3 % — HIGH (ref 10.3–14.5)
RETICS #: 340.5 K/UL — HIGH (ref 25–125)
RETICS/RBC NFR: 9 % — HIGH (ref 0.5–2.5)
SODIUM SERPL-SCNC: 140 MMOL/L — SIGNIFICANT CHANGE UP (ref 135–145)
WBC # BLD: 10.95 K/UL — HIGH (ref 3.8–10.5)
WBC # FLD AUTO: 10.95 K/UL — HIGH (ref 3.8–10.5)

## 2024-09-03 PROCEDURE — 99232 SBSQ HOSP IP/OBS MODERATE 35: CPT | Mod: GC

## 2024-09-03 RX ORDER — ACETAMINOPHEN 325 MG
600 TABLET ORAL ONCE
Refills: 0 | Status: COMPLETED | OUTPATIENT
Start: 2024-09-03 | End: 2024-09-03

## 2024-09-03 RX ORDER — MORPHINE SULFATE 30 MG/1
30 TABLET, FILM COATED, EXTENDED RELEASE ORAL
Refills: 0 | Status: DISCONTINUED | OUTPATIENT
Start: 2024-09-03 | End: 2024-09-04

## 2024-09-03 RX ADMIN — Medication 650 MILLIGRAM(S): at 05:11

## 2024-09-03 RX ADMIN — Medication 10 GRAM(S): at 05:11

## 2024-09-03 RX ADMIN — MORPHINE SULFATE 30 MILLILITER(S): 30 TABLET, FILM COATED, EXTENDED RELEASE ORAL at 10:39

## 2024-09-03 RX ADMIN — Medication 650 MILLIGRAM(S): at 06:00

## 2024-09-03 RX ADMIN — Medication 650 MILLIGRAM(S): at 12:58

## 2024-09-03 RX ADMIN — ENOXAPARIN SODIUM 30 MILLIGRAM(S): 150 INJECTION SUBCUTANEOUS at 11:58

## 2024-09-03 RX ADMIN — VOXELOTOR 1500 MILLIGRAM(S): 300 TABLET, FOR SUSPENSION ORAL at 11:58

## 2024-09-03 RX ADMIN — MORPHINE SULFATE 15 MILLIGRAM(S): 30 TABLET, FILM COATED, EXTENDED RELEASE ORAL at 06:00

## 2024-09-03 RX ADMIN — Medication 650 MILLIGRAM(S): at 18:13

## 2024-09-03 RX ADMIN — Medication 10 GRAM(S): at 17:13

## 2024-09-03 RX ADMIN — HYDROXYUREA 1500 MILLIGRAM(S): 500 CAPSULE ORAL at 11:58

## 2024-09-03 RX ADMIN — MORPHINE SULFATE 30 MILLILITER(S): 30 TABLET, FILM COATED, EXTENDED RELEASE ORAL at 20:23

## 2024-09-03 RX ADMIN — Medication 650 MILLIGRAM(S): at 11:58

## 2024-09-03 RX ADMIN — MORPHINE SULFATE 15 MILLIGRAM(S): 30 TABLET, FILM COATED, EXTENDED RELEASE ORAL at 17:13

## 2024-09-03 RX ADMIN — CHLORHEXIDINE GLUCONATE ORAL RINSE 1 APPLICATION(S): 1.2 SOLUTION DENTAL at 11:58

## 2024-09-03 RX ADMIN — MORPHINE SULFATE 15 MILLIGRAM(S): 30 TABLET, FILM COATED, EXTENDED RELEASE ORAL at 18:13

## 2024-09-03 RX ADMIN — MORPHINE SULFATE 15 MILLIGRAM(S): 30 TABLET, FILM COATED, EXTENDED RELEASE ORAL at 05:11

## 2024-09-03 RX ADMIN — MORPHINE SULFATE 30 MILLILITER(S): 30 TABLET, FILM COATED, EXTENDED RELEASE ORAL at 08:37

## 2024-09-03 RX ADMIN — MORPHINE SULFATE 30 MILLILITER(S): 30 TABLET, FILM COATED, EXTENDED RELEASE ORAL at 13:27

## 2024-09-03 RX ADMIN — Medication 650 MILLIGRAM(S): at 17:13

## 2024-09-03 RX ADMIN — Medication 600 MILLIGRAM(S): at 01:20

## 2024-09-03 RX ADMIN — Medication 240 MILLIGRAM(S): at 00:45

## 2024-09-03 RX ADMIN — FOLIC ACID 1 MILLIGRAM(S): 1 TABLET ORAL at 11:58

## 2024-09-03 NOTE — CHART NOTE - NSCHARTNOTEFT_GEN_A_CORE
NUTRITION FOLLOW UP NOTE    Pt seen for malnutrition follow up.     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    Diet Prescription:   - Regular  - Ensure Plus High Protein 1 PO 2x daily    Nutrition Course:  - Pt reports improving appetite/PO intake, tolerating diet. Pt is consuming Ensure shakes, amenable to continued provision. Preference vocalized for vanilla ice cream and turkey sandwiches. Pt is able to feed self independently. No noted GI distress, last BM ?8/24 per flowsheets; ordered for senna 2 tablets qHS PRN and Miralax 17 gm BID PRN. No new weights available.     Pertinent Medications:   - folic acid, glutamine Powder, hydroxyurea, morphine ER Tablet, morphine PCA Continuous, sodium chloride 0.45% IV Continuous, ondansetron IV PRN, polyethylene glycol PRN, senna PRN    Pertinent Labs:   - (9/3) Na 140 mmol/L Glu 97 mg/dL K+ 3.7 mmol/L Cr 0.37 mg/dL<L> BUN 4 mg/dL<L> Phos 3.5 mg/dL Alb 2.8 g/dL<L>    Weight: (8/27) 89.9 lbs / 40.8 kg   Height: 65 in / 165.1 cm  IBW: 136 lbs / 61.8 kg +/-10%  BMI: 15.0 kg/m^2    Physical Assessment, per flowsheets:  Edema/Pressure Injury: none noted    Estimated Needs:   [X] No change since previous assessment, based on dosing weight 89.9 lbs / 40.8 kg   0683-1881 kcal daily @30-35 kcal/kg, 48.96-61.2 gm protein daily @1.2-1.5 gm/kg     Previous Nutrition Diagnosis: [X] Severe malnutrition in the context of acute illness or injury, ongoing   New Nutrition Diagnosis: [X] not applicable     Education:  [X] not applicable     Interventions:   1) Recommend continue regular diet + Ensure Plus High Protein 1 PO 2x daily (provides 350 kcal, 20 gm protein per 8 oz serving).  2) Obtained food preferences, will honor as able.   3) Obtain weekly weights.     Monitor & Evaluate:  PO intake, tolerance to diet/supplement, nutrition related lab values, weight trends, BMs/GI distress, hydration status, skin integrity.    Nhung Razo RDN, CDN #[id number]  Also available on [employer] Teams NUTRITION FOLLOW UP NOTE     Pt seen for malnutrition follow up.     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    Diet Prescription:   - Regular  - Ensure Plus High Protein 1 PO 2x daily    Nutrition Course:  - Pt reports improving appetite/PO intake, tolerating diet. Pt is consuming Ensure shakes, amenable to continued provision. Preference vocalized for vanilla ice cream and turkey sandwiches. Pt is able to feed self independently. No noted GI distress, last BM ?8/24 per flowsheets; ordered for senna 2 tablets qHS PRN and Miralax 17 gm BID PRN. No new weights available.     Pertinent Medications:   - folic acid, glutamine Powder, hydroxyurea, morphine ER Tablet, morphine PCA Continuous, sodium chloride 0.45% IV Continuous, ondansetron IV PRN, polyethylene glycol PRN, senna PRN    Pertinent Labs:   - (9/3) Na 140 mmol/L Glu 97 mg/dL K+ 3.7 mmol/L Cr 0.37 mg/dL<L> BUN 4 mg/dL<L> Phos 3.5 mg/dL Alb 2.8 g/dL<L>    Weight: (8/27) 89.9 lbs / 40.8 kg   Height: 65 in / 165.1 cm  IBW: 136 lbs / 61.8 kg +/-10%  BMI: 15.0 kg/m^2    Physical Assessment, per flowsheets:  Edema/Pressure Injury: none noted    Estimated Needs:   [X] No change since previous assessment, based on dosing weight 89.9 lbs / 40.8 kg   8819-8968 kcal daily @30-35 kcal/kg, 48.96-61.2 gm protein daily @1.2-1.5 gm/kg     Previous Nutrition Diagnosis: [X] Severe malnutrition in the context of acute illness or injury, ongoing   New Nutrition Diagnosis: [X] not applicable     Education:  [X] not applicable     Interventions:   1) Recommend continue regular diet + Ensure Plus High Protein 1 PO 2x daily (provides 350 kcal, 20 gm protein per 8 oz serving).  2) Obtained food preferences, will honor as able.   3) Obtain weekly weights.     Monitor & Evaluate:  PO intake, tolerance to diet/supplement, nutrition related lab values, weight trends, BMs/GI distress, hydration status, skin integrity.    Nhung Razo RDN, CDN #[id number]  Also available on [employer] Teams

## 2024-09-03 NOTE — PROGRESS NOTE ADULT - ATTENDING COMMENTS
Seen and examined by me this afternoon. Pain on hands/UE's and RLE is still present, getting locked out of PCA pump, in agreement to increase 4 hour limit, tolerating PO, +BM.  Increasing Morphine PCA 4h limit to 34mg, c/w 2.5mg q6min, and MS contin for for sickle cell crisis treatment  c/w bowel regimen while on opiods  C/w hydroxyurea/oxbryta and IVF's  Rest as above

## 2024-09-03 NOTE — PROGRESS NOTE ADULT - SUBJECTIVE AND OBJECTIVE BOX
Taurus Mayo M.D.   PGY-1 Internal Medicine  ** Note not finalized until signed by attending **   --------------------------  Taurus Mayo MD  Internal Medicine   PGY-1  --------------------------    SUBJECTIVE / OVERNIGHT EVENTS:    Pt seen in AM at bedside, resting comfortably in bed, Reports mild pain in R foot, b/l arms. Reports swelling of the R knee and R foot, present since admission. When asked, pt denies any recent or active fever, chills, nausea, vomiting, headache, acute sob, chest pain, abdominal pain, genitourinary sx.    MEDICATIONS  (STANDING):  acetaminophen     Tablet .. 650 milliGRAM(s) Oral every 6 hours  chlorhexidine 2% Cloths 1 Application(s) Topical daily  enoxaparin Injectable 30 milliGRAM(s) SubCutaneous every 24 hours  folic acid 1 milliGRAM(s) Oral daily  glutamine Powder 10 Gram(s) Oral two times a day  hydroxyurea 1500 milliGRAM(s) Oral daily  morphine ER Tablet 15 milliGRAM(s) Oral two times a day  morphine PCA (5 mG/mL) 30 milliLiter(s) PCA Continuous PCA Continuous  sodium chloride 0.45%. 1000 milliLiter(s) (75 mL/Hr) IV Continuous <Continuous>  voxelotor 1500 milliGRAM(s) Oral daily    MEDICATIONS  (PRN):  naloxone Injectable 0.1 milliGRAM(s) IV Push every 3 minutes PRN For ANY of the following changes in patient status:  A. RR LESS THAN 10 breaths per minute, B. Oxygen saturation LESS THAN 90%, C. Sedation score of 6  ondansetron Injectable 4 milliGRAM(s) IV Push every 6 hours PRN Nausea  polyethylene glycol 3350 17 Gram(s) Oral two times a day PRN Constipation  senna 2 Tablet(s) Oral at bedtime PRN Constipation    CAPILLARY BLOOD GLUCOSE    I&O's Summary    02 Sep 2024 07:01  -  03 Sep 2024 07:00  --------------------------------------------------------  IN: 0 mL / OUT: 800 mL / NET: -800 mL    PHYSICAL EXAM:  Vital Signs Last 24 Hrs  T(C): 36.7 (03 Sep 2024 12:00), Max: 36.8 (03 Sep 2024 00:00)  T(F): 98.1 (03 Sep 2024 12:00), Max: 98.3 (03 Sep 2024 04:00)  HR: 79 (03 Sep 2024 12:00) (60 - 85)  BP: 109/70 (03 Sep 2024 12:00) (104/64 - 136/87)  RR: 17 (03 Sep 2024 12:00) (17 - 18)  SpO2: 99% (03 Sep 2024 12:00) (99% - 100%)    Parameters below as of 03 Sep 2024 12:00  Patient On (Oxygen Delivery Method): room air    CONSTITUTIONAL: NAD; well-developed  HEENT: PERRL, clear conjunctiva  RESPIRATORY: Normal respiratory effort; lungs are clear to auscultation bilaterally; No Crackles/Rhonchi/Wheezing  CARDIOVASCULAR: Regular rate and rhythm, normal S1 and S2, no murmur/rub/gallop; No lower extremity edema; Peripheral pulses are 2+ bilaterally  ABDOMEN: Nontender to palpation, normoactive bowel sounds, no rebound/guarding; No hepatosplenomegaly  MUSCULOSKELETAL: +swelling R knee; R foot. Per previous note, improved since admission. no clubbing or cyanosis of digits; no tenderness to palpation  EXTREMITY: Lower extremities Non-tender to palpation; non-erythematous B/L  NEURO: A&Ox3; no focal deficits   PSYCH: normal mood; Affect appropirate    LABS:                        8.1    10.95 )-----------( 616      ( 03 Sep 2024 06:19 )             24.8     09-03    140  |  104  |  4<L>  ----------------------------<  97  3.7   |  22  |  0.37<L>    Ca    7.8<L>      03 Sep 2024 06:19  Phos  3.5     09-03  Mg     1.90     09-03    TPro  7.3  /  Alb  2.8<L>  /  TBili  1.6<H>  /  DBili  x   /  AST  57<H>  /  ALT  26  /  AlkPhos  314<H>  09-03    Urinalysis Basic - ( 03 Sep 2024 06:19 )  Color: x / Appearance: x / SG: x / pH: x  Gluc: 97 mg/dL / Ketone: x  / Bili: x / Urobili: x   Blood: x / Protein: x / Nitrite: x   Leuk Esterase: x / RBC: x / WBC x   Sq Epi: x / Non Sq Epi: x / Bacteria: x    RADIOLOGY & ADDITIONAL TESTS:  Results Reviewed: X   Imaging Personally Reviewed: X  Electrocardiogram Personally Reviewed: X

## 2024-09-04 LAB
ALBUMIN SERPL ELPH-MCNC: 2.7 G/DL — LOW (ref 3.3–5)
ALP SERPL-CCNC: 294 U/L — HIGH (ref 40–120)
ALT FLD-CCNC: 28 U/L — SIGNIFICANT CHANGE UP (ref 4–41)
ANION GAP SERPL CALC-SCNC: 12 MMOL/L — SIGNIFICANT CHANGE UP (ref 7–14)
ANISOCYTOSIS BLD QL: SIGNIFICANT CHANGE UP
AST SERPL-CCNC: 64 U/L — HIGH (ref 4–40)
BASOPHILS # BLD AUTO: 0.12 K/UL — SIGNIFICANT CHANGE UP (ref 0–0.2)
BASOPHILS NFR BLD AUTO: 1.2 % — SIGNIFICANT CHANGE UP (ref 0–2)
BILIRUB SERPL-MCNC: 1.7 MG/DL — HIGH (ref 0.2–1.2)
BUN SERPL-MCNC: 4 MG/DL — LOW (ref 7–23)
CALCIUM SERPL-MCNC: 8.7 MG/DL — SIGNIFICANT CHANGE UP (ref 8.4–10.5)
CHLORIDE SERPL-SCNC: 103 MMOL/L — SIGNIFICANT CHANGE UP (ref 98–107)
CO2 SERPL-SCNC: 22 MMOL/L — SIGNIFICANT CHANGE UP (ref 22–31)
CREAT SERPL-MCNC: 0.35 MG/DL — LOW (ref 0.5–1.3)
EGFR: 159 ML/MIN/1.73M2 — SIGNIFICANT CHANGE UP
EOSINOPHIL # BLD AUTO: 0 K/UL — SIGNIFICANT CHANGE UP (ref 0–0.5)
EOSINOPHIL NFR BLD AUTO: 0 % — SIGNIFICANT CHANGE UP (ref 0–6)
GIANT PLATELETS BLD QL SMEAR: PRESENT — SIGNIFICANT CHANGE UP
GLUCOSE SERPL-MCNC: 66 MG/DL — LOW (ref 70–99)
HAPTOGLOB SERPL-MCNC: <20 MG/DL — LOW (ref 34–200)
HCT VFR BLD CALC: 23.7 % — LOW (ref 39–50)
HGB BLD-MCNC: 7.6 G/DL — LOW (ref 13–17)
HOWELL-JOLLY BOD BLD QL SMEAR: PRESENT — SIGNIFICANT CHANGE UP
IANC: 4.92 K/UL — SIGNIFICANT CHANGE UP (ref 1.8–7.4)
LDH SERPL L TO P-CCNC: 434 U/L — HIGH (ref 135–225)
LYMPHOCYTES # BLD AUTO: 4.53 K/UL — HIGH (ref 1–3.3)
LYMPHOCYTES # BLD AUTO: 45.2 % — HIGH (ref 13–44)
MACROCYTES BLD QL: SIGNIFICANT CHANGE UP
MAGNESIUM SERPL-MCNC: 1.9 MG/DL — SIGNIFICANT CHANGE UP (ref 1.6–2.6)
MANUAL SMEAR VERIFICATION: SIGNIFICANT CHANGE UP
MCHC RBC-ENTMCNC: 20.9 PG — LOW (ref 27–34)
MCHC RBC-ENTMCNC: 32.1 GM/DL — SIGNIFICANT CHANGE UP (ref 32–36)
MCV RBC AUTO: 65.1 FL — LOW (ref 80–100)
MICROCYTES BLD QL: SIGNIFICANT CHANGE UP
MONOCYTES # BLD AUTO: 0.12 K/UL — SIGNIFICANT CHANGE UP (ref 0–0.9)
MONOCYTES NFR BLD AUTO: 1.2 % — LOW (ref 2–14)
MYELOCYTES NFR BLD: 1.2 % — HIGH (ref 0–0)
NEUTROPHILS # BLD AUTO: 4.42 K/UL — SIGNIFICANT CHANGE UP (ref 1.8–7.4)
NEUTROPHILS NFR BLD AUTO: 44.1 % — SIGNIFICANT CHANGE UP (ref 43–77)
NRBC # BLD: 223 /100 WBCS — HIGH (ref 0–0)
PHOSPHATE SERPL-MCNC: 3.6 MG/DL — SIGNIFICANT CHANGE UP (ref 2.5–4.5)
PLAT MORPH BLD: ABNORMAL
PLATELET # BLD AUTO: 586 K/UL — HIGH (ref 150–400)
PLATELET COUNT - ESTIMATE: ABNORMAL
POIKILOCYTOSIS BLD QL AUTO: SLIGHT — SIGNIFICANT CHANGE UP
POLYCHROMASIA BLD QL SMEAR: SIGNIFICANT CHANGE UP
POTASSIUM SERPL-MCNC: 3.8 MMOL/L — SIGNIFICANT CHANGE UP (ref 3.5–5.3)
POTASSIUM SERPL-SCNC: 3.8 MMOL/L — SIGNIFICANT CHANGE UP (ref 3.5–5.3)
PROT SERPL-MCNC: 7.4 G/DL — SIGNIFICANT CHANGE UP (ref 6–8.3)
RBC # BLD: 3.64 M/UL — LOW (ref 4.2–5.8)
RBC # BLD: 3.64 M/UL — LOW (ref 4.2–5.8)
RBC # FLD: 24.1 % — HIGH (ref 10.3–14.5)
RBC BLD AUTO: SIGNIFICANT CHANGE UP
RETICS #: 316.6 K/UL — HIGH (ref 25–125)
RETICS/RBC NFR: 8.8 % — HIGH (ref 0.5–2.5)
SICKLE CELLS BLD QL SMEAR: SLIGHT — SIGNIFICANT CHANGE UP
SMUDGE CELLS # BLD: PRESENT — SIGNIFICANT CHANGE UP
SODIUM SERPL-SCNC: 137 MMOL/L — SIGNIFICANT CHANGE UP (ref 135–145)
TARGETS BLD QL SMEAR: SIGNIFICANT CHANGE UP
VARIANT LYMPHS # BLD: 7.1 % — HIGH (ref 0–6)
WBC # BLD: 10.02 K/UL — SIGNIFICANT CHANGE UP (ref 3.8–10.5)
WBC # FLD AUTO: 10.02 K/UL — SIGNIFICANT CHANGE UP (ref 3.8–10.5)

## 2024-09-04 PROCEDURE — 99232 SBSQ HOSP IP/OBS MODERATE 35: CPT | Mod: GC

## 2024-09-04 RX ORDER — MORPHINE SULFATE 30 MG/1
30 TABLET, FILM COATED, EXTENDED RELEASE ORAL
Refills: 0 | Status: DISCONTINUED | OUTPATIENT
Start: 2024-09-04 | End: 2024-09-06

## 2024-09-04 RX ADMIN — HYDROXYUREA 1500 MILLIGRAM(S): 500 CAPSULE ORAL at 12:03

## 2024-09-04 RX ADMIN — MORPHINE SULFATE 30 MILLILITER(S): 30 TABLET, FILM COATED, EXTENDED RELEASE ORAL at 16:38

## 2024-09-04 RX ADMIN — MORPHINE SULFATE 15 MILLIGRAM(S): 30 TABLET, FILM COATED, EXTENDED RELEASE ORAL at 18:37

## 2024-09-04 RX ADMIN — Medication 650 MILLIGRAM(S): at 00:28

## 2024-09-04 RX ADMIN — Medication 650 MILLIGRAM(S): at 01:25

## 2024-09-04 RX ADMIN — Medication 650 MILLIGRAM(S): at 12:03

## 2024-09-04 RX ADMIN — Medication 650 MILLIGRAM(S): at 06:15

## 2024-09-04 RX ADMIN — MORPHINE SULFATE 30 MILLILITER(S): 30 TABLET, FILM COATED, EXTENDED RELEASE ORAL at 08:21

## 2024-09-04 RX ADMIN — Medication 650 MILLIGRAM(S): at 05:20

## 2024-09-04 RX ADMIN — MORPHINE SULFATE 15 MILLIGRAM(S): 30 TABLET, FILM COATED, EXTENDED RELEASE ORAL at 05:20

## 2024-09-04 RX ADMIN — MORPHINE SULFATE 30 MILLILITER(S): 30 TABLET, FILM COATED, EXTENDED RELEASE ORAL at 20:23

## 2024-09-04 RX ADMIN — MORPHINE SULFATE 30 MILLILITER(S): 30 TABLET, FILM COATED, EXTENDED RELEASE ORAL at 10:23

## 2024-09-04 RX ADMIN — Medication 10 GRAM(S): at 17:37

## 2024-09-04 RX ADMIN — Medication 650 MILLIGRAM(S): at 18:37

## 2024-09-04 RX ADMIN — Medication 650 MILLIGRAM(S): at 17:37

## 2024-09-04 RX ADMIN — Medication 10 GRAM(S): at 05:19

## 2024-09-04 RX ADMIN — MORPHINE SULFATE 15 MILLIGRAM(S): 30 TABLET, FILM COATED, EXTENDED RELEASE ORAL at 06:15

## 2024-09-04 RX ADMIN — ENOXAPARIN SODIUM 30 MILLIGRAM(S): 150 INJECTION SUBCUTANEOUS at 13:20

## 2024-09-04 RX ADMIN — VOXELOTOR 1500 MILLIGRAM(S): 300 TABLET, FOR SUSPENSION ORAL at 13:21

## 2024-09-04 RX ADMIN — MORPHINE SULFATE 15 MILLIGRAM(S): 30 TABLET, FILM COATED, EXTENDED RELEASE ORAL at 17:37

## 2024-09-04 RX ADMIN — Medication 650 MILLIGRAM(S): at 13:03

## 2024-09-04 RX ADMIN — FOLIC ACID 1 MILLIGRAM(S): 1 TABLET ORAL at 12:03

## 2024-09-04 RX ADMIN — CHLORHEXIDINE GLUCONATE ORAL RINSE 1 APPLICATION(S): 1.2 SOLUTION DENTAL at 12:01

## 2024-09-04 NOTE — PROGRESS NOTE ADULT - SUBJECTIVE AND OBJECTIVE BOX
Taurus Mayo M.D.   PGY-1 Internal Medicine  ** Note not finalized until signed by attending **   --------------------------  Taurus Mayo MD  Internal Medicine   PGY-1  --------------------------    SUBJECTIVE / OVERNIGHT EVENTS:    Patient in less pain than yesterday. b/l arm pain and foot pain. R knee and R foot swelling decreased. Does not appear to be in any acute distress. Endorses that the morphine 2.5 may be too much for him -> wants to go 2.0. When asked, pt denies any recent or active fever, chills, nausea, vomiting, headache, acute sob, chest pain, abdominal pain, genitourinary sx, extremity pain or swelling.    MEDICATIONS  (STANDING):  acetaminophen     Tablet .. 650 milliGRAM(s) Oral every 6 hours  chlorhexidine 2% Cloths 1 Application(s) Topical daily  enoxaparin Injectable 30 milliGRAM(s) SubCutaneous every 24 hours  folic acid 1 milliGRAM(s) Oral daily  glutamine Powder 10 Gram(s) Oral two times a day  hydroxyurea 1500 milliGRAM(s) Oral daily  morphine ER Tablet 15 milliGRAM(s) Oral two times a day  morphine PCA (5 mG/mL) 30 milliLiter(s) PCA Continuous PCA Continuous  sodium chloride 0.45%. 1000 milliLiter(s) (75 mL/Hr) IV Continuous <Continuous>  voxelotor 1500 milliGRAM(s) Oral daily    MEDICATIONS  (PRN):  naloxone Injectable 0.1 milliGRAM(s) IV Push every 3 minutes PRN For ANY of the following changes in patient status:  A. RR LESS THAN 10 breaths per minute, B. Oxygen saturation LESS THAN 90%, C. Sedation score of 6  ondansetron Injectable 4 milliGRAM(s) IV Push every 6 hours PRN Nausea  polyethylene glycol 3350 17 Gram(s) Oral two times a day PRN Constipation  senna 2 Tablet(s) Oral at bedtime PRN Constipation    CAPILLARY BLOOD GLUCOSE    I&O's Summary    PHYSICAL EXAM:  Vital Signs Last 24 Hrs  T(C): 36.7 (04 Sep 2024 08:00), Max: 36.9 (03 Sep 2024 20:00)  T(F): 98.1 (04 Sep 2024 08:00), Max: 98.4 (03 Sep 2024 20:00)  HR: 68 (04 Sep 2024 08:00) (63 - 81)  BP: 107/63 (04 Sep 2024 08:00) (97/69 - 116/67)  RR: 18 (04 Sep 2024 08:00) (16 - 18)  SpO2: 99% (04 Sep 2024 08:00) (98% - 100%)    Parameters below as of 04 Sep 2024 08:00  Patient On (Oxygen Delivery Method): room air    CONSTITUTIONAL: NAD; thin  HEENT: PERRL, clear conjunctiva  RESPIRATORY: Normal respiratory effort; lungs are clear to auscultation bilaterally; No Crackles/Rhonchi/Wheezing  CARDIOVASCULAR: Regular rate and rhythm, normal S1 and S2, no murmur/rub/gallop; No lower extremity edema; Peripheral pulses are 2+ bilaterally  ABDOMEN: Nontender to palpation, normoactive bowel sounds, no rebound/guarding; No hepatosplenomegaly  MUSCULOSKELETAL: +swelling R knee; R foot. Per previous note, improved since admission. no clubbing or cyanosis of digits; no tenderness to palpation  EXTREMITY: Lower extremities Non-tender to palpation; non-erythematous B/L  NEURO: A&Ox3; no focal deficits   PSYCH: normal mood; Affect appropirate    LABS:                        7.6    10.02 )-----------( 586      ( 04 Sep 2024 05:15 )             23.7     09-04    137  |  103  |  4<L>  ----------------------------<  66<L>  3.8   |  22  |  0.35<L>    Ca    8.7      04 Sep 2024 05:15  Phos  3.6     09-04  Mg     1.90     09-04    TPro  7.4  /  Alb  2.7<L>  /  TBili  1.7<H>  /  DBili  x   /  AST  64<H>  /  ALT  28  /  AlkPhos  294<H>  09-04    Urinalysis Basic - ( 04 Sep 2024 05:15 )  Color: x / Appearance: x / SG: x / pH: x  Gluc: 66 mg/dL / Ketone: x  / Bili: x / Urobili: x   Blood: x / Protein: x / Nitrite: x   Leuk Esterase: x / RBC: x / WBC x   Sq Epi: x / Non Sq Epi: x / Bacteria: x    RADIOLOGY & ADDITIONAL TESTS:  Results Reviewed: X  Imaging Personally Reviewed: X  Electrocardiogram Personally Reviewed: X

## 2024-09-04 NOTE — PROGRESS NOTE ADULT - ATTENDING COMMENTS
Seen and examined by me this morning during rounds. Pain on hands/UE's and RLE is improving 6/10 today, wants to go down on the morphine PCA pump to 2, feels that 2.5 is a high dose  Decreasing Morphine PCA to 2.0mg q6min, 4h limit 34mg, c/w MS contin for for sickle cell crisis treatment  c/w bowel regimen while on opioids  C/w hydroxyurea/oxbryta and IVF's  Rest as above

## 2024-09-04 NOTE — PATIENT PROFILE ADULT - [id number]
PRE-SEDATION ASSESSMENT    CONSENT  Consent for procedure and sedation obtained: Yes    MEDICAL HISTORY  Significant medical/surgical history: Yes  Past Complications with Sedation/Anesthesia: No  Significant Family History: No  Smoking History: No  Alcohol/Drug abuse: No  Possible Pregnancy (LMP): No  Cardiac History: Yes  Respiratory History: Yes    PHYSICAL EXAM  History and Physical Reviewed: Patient has valid H&P within 30 days. I have reviewed and there are no changes.  Airway Anatomy : Class II  Heart : Normal  Lungs : Normal  LOC/Mental Status : Normal    OTHER FINDINGS       SEDATION RISK ASSESSMENT    American Society of Anesthesiologists (ASA) A Physical Status Classifications  (For emergency operations, add the letter E after the classification)        ASA 1 A normal healthy patient, (Healthy, non smoker, no or minimal alcohol use).   ASA 2 A patient with a mild systemic disease, (Mild diseases, no substantive functional limitations. E.g. obesity, smoker, occasional drinker, well controlled DM/HTN).   ASA 3 A patient with severe systemic disease (Substantive functional Limitations. One or more moderate to severe diseases. E.g. COPD, uncontrolled DM/HTN, morbid obesity, pacemaker, CAD, CVA).   ASA 4 A patient with severe systemic disease that is a constant threat to life  (Recent MI, CVA, or TIA (<3 months), ESRD, ongoing cardiac ischemia, DIC, ARDS, low ejection fraction).    ASA 5 A moribund patient who is not expected to survive without the operation (Ruptured aorta, massive trauma, multiple organ/systems pathology).   ASA 6 A brain dead patient for organ harvesting.      Risk Status ASA: Class II - Normal patient with mild systemic disease  Plan for Sedation: Moderate Sedation  EKG Monitoring: Yes    NARRATIVE FINDINGS  Risk Status ASA: Class II   Narrative Findings: Mallampati Class II     
hematologic

## 2024-09-05 LAB
ALBUMIN SERPL ELPH-MCNC: 2.9 G/DL — LOW (ref 3.3–5)
ALP SERPL-CCNC: 257 U/L — HIGH (ref 40–120)
ALT FLD-CCNC: 28 U/L — SIGNIFICANT CHANGE UP (ref 4–41)
ANION GAP SERPL CALC-SCNC: 14 MMOL/L — SIGNIFICANT CHANGE UP (ref 7–14)
AST SERPL-CCNC: 79 U/L — HIGH (ref 4–40)
BASOPHILS # BLD AUTO: 0.07 K/UL — SIGNIFICANT CHANGE UP (ref 0–0.2)
BASOPHILS NFR BLD AUTO: 0.7 % — SIGNIFICANT CHANGE UP (ref 0–2)
BILIRUB SERPL-MCNC: 1.6 MG/DL — HIGH (ref 0.2–1.2)
BUN SERPL-MCNC: 5 MG/DL — LOW (ref 7–23)
CALCIUM SERPL-MCNC: 8.3 MG/DL — LOW (ref 8.4–10.5)
CHLORIDE SERPL-SCNC: 100 MMOL/L — SIGNIFICANT CHANGE UP (ref 98–107)
CO2 SERPL-SCNC: 22 MMOL/L — SIGNIFICANT CHANGE UP (ref 22–31)
CREAT SERPL-MCNC: 0.35 MG/DL — LOW (ref 0.5–1.3)
EGFR: 159 ML/MIN/1.73M2 — SIGNIFICANT CHANGE UP
EOSINOPHIL # BLD AUTO: 0.07 K/UL — SIGNIFICANT CHANGE UP (ref 0–0.5)
EOSINOPHIL NFR BLD AUTO: 0.7 % — SIGNIFICANT CHANGE UP (ref 0–6)
GLUCOSE SERPL-MCNC: 73 MG/DL — SIGNIFICANT CHANGE UP (ref 70–99)
HAPTOGLOB SERPL-MCNC: <20 MG/DL — LOW (ref 34–200)
HCT VFR BLD CALC: 23.5 % — LOW (ref 39–50)
HGB BLD-MCNC: 7.7 G/DL — LOW (ref 13–17)
IANC: 4.42 K/UL — SIGNIFICANT CHANGE UP (ref 1.8–7.4)
IMM GRANULOCYTES NFR BLD AUTO: 0.8 % — SIGNIFICANT CHANGE UP (ref 0–0.9)
LDH SERPL L TO P-CCNC: 516 U/L — HIGH (ref 135–225)
LYMPHOCYTES # BLD AUTO: 5.09 K/UL — HIGH (ref 1–3.3)
LYMPHOCYTES # BLD AUTO: 50.6 % — HIGH (ref 13–44)
MAGNESIUM SERPL-MCNC: 1.9 MG/DL — SIGNIFICANT CHANGE UP (ref 1.6–2.6)
MCHC RBC-ENTMCNC: 21.5 PG — LOW (ref 27–34)
MCHC RBC-ENTMCNC: 32.8 GM/DL — SIGNIFICANT CHANGE UP (ref 32–36)
MCV RBC AUTO: 65.6 FL — LOW (ref 80–100)
MONOCYTES # BLD AUTO: 0.33 K/UL — SIGNIFICANT CHANGE UP (ref 0–0.9)
MONOCYTES NFR BLD AUTO: 3.3 % — SIGNIFICANT CHANGE UP (ref 2–14)
NEUTROPHILS # BLD AUTO: 4.42 K/UL — SIGNIFICANT CHANGE UP (ref 1.8–7.4)
NEUTROPHILS NFR BLD AUTO: 43.9 % — SIGNIFICANT CHANGE UP (ref 43–77)
NRBC # BLD: 7 /100 WBCS — HIGH (ref 0–0)
NRBC # FLD: 0.69 K/UL — HIGH (ref 0–0)
PHOSPHATE SERPL-MCNC: 3.6 MG/DL — SIGNIFICANT CHANGE UP (ref 2.5–4.5)
PLATELET # BLD AUTO: 525 K/UL — HIGH (ref 150–400)
POTASSIUM SERPL-MCNC: 3.8 MMOL/L — SIGNIFICANT CHANGE UP (ref 3.5–5.3)
POTASSIUM SERPL-SCNC: 3.8 MMOL/L — SIGNIFICANT CHANGE UP (ref 3.5–5.3)
PROT SERPL-MCNC: 7 G/DL — SIGNIFICANT CHANGE UP (ref 6–8.3)
RBC # BLD: 3.58 M/UL — LOW (ref 4.2–5.8)
RBC # BLD: 3.58 M/UL — LOW (ref 4.2–5.8)
RBC # FLD: 24.4 % — HIGH (ref 10.3–14.5)
RETICS #: 348.3 K/UL — HIGH (ref 25–125)
RETICS/RBC NFR: 9.8 % — HIGH (ref 0.5–2.5)
SODIUM SERPL-SCNC: 136 MMOL/L — SIGNIFICANT CHANGE UP (ref 135–145)
WBC # BLD: 10.06 K/UL — SIGNIFICANT CHANGE UP (ref 3.8–10.5)
WBC # FLD AUTO: 10.06 K/UL — SIGNIFICANT CHANGE UP (ref 3.8–10.5)

## 2024-09-05 PROCEDURE — 99232 SBSQ HOSP IP/OBS MODERATE 35: CPT | Mod: GC

## 2024-09-05 RX ADMIN — MORPHINE SULFATE 30 MILLILITER(S): 30 TABLET, FILM COATED, EXTENDED RELEASE ORAL at 20:08

## 2024-09-05 RX ADMIN — Medication 650 MILLIGRAM(S): at 07:17

## 2024-09-05 RX ADMIN — MORPHINE SULFATE 15 MILLIGRAM(S): 30 TABLET, FILM COATED, EXTENDED RELEASE ORAL at 18:30

## 2024-09-05 RX ADMIN — MORPHINE SULFATE 30 MILLILITER(S): 30 TABLET, FILM COATED, EXTENDED RELEASE ORAL at 08:24

## 2024-09-05 RX ADMIN — Medication 10 GRAM(S): at 17:45

## 2024-09-05 RX ADMIN — Medication 650 MILLIGRAM(S): at 13:08

## 2024-09-05 RX ADMIN — MORPHINE SULFATE 30 MILLILITER(S): 30 TABLET, FILM COATED, EXTENDED RELEASE ORAL at 22:41

## 2024-09-05 RX ADMIN — MORPHINE SULFATE 15 MILLIGRAM(S): 30 TABLET, FILM COATED, EXTENDED RELEASE ORAL at 17:44

## 2024-09-05 RX ADMIN — ENOXAPARIN SODIUM 30 MILLIGRAM(S): 150 INJECTION SUBCUTANEOUS at 15:14

## 2024-09-05 RX ADMIN — Medication 10 GRAM(S): at 06:09

## 2024-09-05 RX ADMIN — MORPHINE SULFATE 15 MILLIGRAM(S): 30 TABLET, FILM COATED, EXTENDED RELEASE ORAL at 07:16

## 2024-09-05 RX ADMIN — Medication 75 MILLILITER(S): at 06:15

## 2024-09-05 RX ADMIN — Medication 650 MILLIGRAM(S): at 17:45

## 2024-09-05 RX ADMIN — Medication 650 MILLIGRAM(S): at 19:09

## 2024-09-05 RX ADMIN — VOXELOTOR 1500 MILLIGRAM(S): 300 TABLET, FOR SUSPENSION ORAL at 15:13

## 2024-09-05 RX ADMIN — Medication 650 MILLIGRAM(S): at 06:17

## 2024-09-05 RX ADMIN — Medication 75 MILLILITER(S): at 12:10

## 2024-09-05 RX ADMIN — MORPHINE SULFATE 15 MILLIGRAM(S): 30 TABLET, FILM COATED, EXTENDED RELEASE ORAL at 06:16

## 2024-09-05 NOTE — PROGRESS NOTE ADULT - SUBJECTIVE AND OBJECTIVE BOX
--------------------------  Taurus Mayo MD  Internal Medicine   PGY-1  --------------------------    SUBJECTIVE / OVERNIGHT EVENTS:    Pt seen in AM at bedside, resting comfortably in bed, endorsing continued b/l arm, R Foot, and new L hip pain. When asked, pt denies any recent or active fever, chills, nausea, vomiting, headache, acute sob, chest pain, abdominal pain, genitourinary sx, or swelling.    MEDICATIONS  (STANDING):  acetaminophen     Tablet .. 650 milliGRAM(s) Oral every 6 hours  chlorhexidine 2% Cloths 1 Application(s) Topical daily  enoxaparin Injectable 30 milliGRAM(s) SubCutaneous every 24 hours  folic acid 1 milliGRAM(s) Oral daily  glutamine Powder 10 Gram(s) Oral two times a day  hydroxyurea 1500 milliGRAM(s) Oral daily  morphine ER Tablet 15 milliGRAM(s) Oral two times a day  morphine PCA (5 mG/mL) 30 milliLiter(s) PCA Continuous PCA Continuous  sodium chloride 0.45%. 1000 milliLiter(s) (75 mL/Hr) IV Continuous <Continuous>  voxelotor 1500 milliGRAM(s) Oral daily    MEDICATIONS  (PRN):  naloxone Injectable 0.1 milliGRAM(s) IV Push every 3 minutes PRN For ANY of the following changes in patient status:  A. RR LESS THAN 10 breaths per minute, B. Oxygen saturation LESS THAN 90%, C. Sedation score of 6  ondansetron Injectable 4 milliGRAM(s) IV Push every 6 hours PRN Nausea  polyethylene glycol 3350 17 Gram(s) Oral two times a day PRN Constipation  senna 2 Tablet(s) Oral at bedtime PRN Constipation    CAPILLARY BLOOD GLUCOSE  I&O's Summary    04 Sep 2024 07:01  -  05 Sep 2024 07:00  --------------------------------------------------------  IN: 0 mL / OUT: 500 mL / NET: -500 mL    PHYSICAL EXAM:  Vital Signs Last 24 Hrs  T(C): 36.7 (05 Sep 2024 08:00), Max: 36.8 (04 Sep 2024 12:00)  T(F): 98.1 (05 Sep 2024 08:00), Max: 98.3 (04 Sep 2024 12:00)  HR: 74 (05 Sep 2024 08:00) (71 - 81)  BP: 107/71 (05 Sep 2024 08:00) (102/61 - 117/72)  RR: 17 (05 Sep 2024 08:00) (16 - 18)  SpO2: 100% (05 Sep 2024 08:00) (99% - 100%)    Parameters below as of 05 Sep 2024 08:00  Patient On (Oxygen Delivery Method): room air    CONSTITUTIONAL: NAD; thin  HEENT: PERRL, clear conjunctiva  RESPIRATORY: Normal respiratory effort; lungs are clear to auscultation bilaterally; No Crackles/Rhonchi/Wheezing  CARDIOVASCULAR: Regular rate and rhythm, normal S1 and S2, no murmur/rub/gallop; No lower extremity edema; Peripheral pulses are 2+ bilaterally  ABDOMEN: Nontender to palpation, normoactive bowel sounds, no rebound/guarding; No hepatosplenomegaly  MUSCULOSKELETAL: +minimal swelling R knee; +minimal swelling R foot. No clubbing or cyanosis of digits; no tenderness to palpation  EXTREMITY: Lower extremities Non-tender to palpation; non-erythematous B/L  NEURO: A&Ox3; no focal deficits   PSYCH: normal mood; Affect appropirate    LABS:                        7.7    10.06 )-----------( 525      ( 05 Sep 2024 05:30 )             23.5     09-05    136  |  100  |  5<L>  ----------------------------<  73  3.8   |  22  |  0.35<L>    Ca    8.3<L>      05 Sep 2024 05:30  Phos  3.6     09-05  Mg     1.90     09-05    TPro  7.0  /  Alb  2.9<L>  /  TBili  1.6<H>  /  DBili  x   /  AST  79<H>  /  ALT  28  /  AlkPhos  257<H>  09-05    Urinalysis Basic - ( 05 Sep 2024 05:30 )    Color: x / Appearance: x / SG: x / pH: x  Gluc: 73 mg/dL / Ketone: x  / Bili: x / Urobili: x   Blood: x / Protein: x / Nitrite: x   Leuk Esterase: x / RBC: x / WBC x   Sq Epi: x / Non Sq Epi: x / Bacteria: x    RADIOLOGY & ADDITIONAL TESTS:  Results Reviewed: X  Imaging Personally Reviewed: X  Electrocardiogram Personally Reviewed: X

## 2024-09-05 NOTE — PROGRESS NOTE ADULT - PROBLEM SELECTOR PLAN 2
- low concern for septic arthritis given improving tenderness, improved WBC, afebrile  - R knee XR with no effusion, osteonecrosis  - US knee with small effusion     Plan  - CTM

## 2024-09-05 NOTE — PROGRESS NOTE ADULT - PROBLEM SELECTOR PLAN 1
#Transaminitis  - follows with Dr. Montano, missed monthly dose of Adakveo for 2 months which may explain SCC  - with R knee pain, b/l arm pain, L hip pain today  - low concern for acute chest given no opacities on CXR, chest pain more consistent with SCC  - transaminitis likely 2/2 to hepatopathy iso vasoocclusive crisus, no abdominal pain and LFTs improving, could otherwise consider RUQ US  - hematology consulted  - palliative deferred pain management to primary team     A/P:  - c/w 1/2 NS  - pain control with home morphine 15 MG ERBID , pca morphine pump (will adjust), toradol q6; tylenol  - bowel regimen: miralax BID, senna 2 MG PRN  - home hydroxyurea (need to take home med), oxbryta (need to take home med) and endari  - incentive spirometer  - daily labs, CBC w/ diff, LDH, haptoglobin, CMP, retic count  - will continue to monitor for signs of acute chest syndrome. If fever, culture and repeat CXR stat  - trend CBC, and transfuse for symptomatic anemia,

## 2024-09-05 NOTE — PROGRESS NOTE ADULT - ATTENDING COMMENTS
Seen and examined by me this morning during rounds. Pain on hands/UE's and RLE continues to improve, but has mild pain on L Hip, pain is 7/10 and he had a walk right before we went to see him. Tolerating current PCA dose very well, +BM  C/w Morphine PCA 2.0mg q6min, 4h limit 34mg, c/w MS contin for sickle cell crisis treatment  c/w bowel regimen while on opioids  C/w hydroxyurea/oxbryta and IVF's  Rest as above

## 2024-09-06 LAB
ALBUMIN SERPL ELPH-MCNC: 3.1 G/DL — LOW (ref 3.3–5)
ALP SERPL-CCNC: 298 U/L — HIGH (ref 40–120)
ALT FLD-CCNC: 34 U/L — SIGNIFICANT CHANGE UP (ref 4–41)
ANION GAP SERPL CALC-SCNC: 14 MMOL/L — SIGNIFICANT CHANGE UP (ref 7–14)
AST SERPL-CCNC: 85 U/L — HIGH (ref 4–40)
BASOPHILS # BLD AUTO: 0.07 K/UL — SIGNIFICANT CHANGE UP (ref 0–0.2)
BASOPHILS NFR BLD AUTO: 0.8 % — SIGNIFICANT CHANGE UP (ref 0–2)
BILIRUB SERPL-MCNC: 1.6 MG/DL — HIGH (ref 0.2–1.2)
BUN SERPL-MCNC: 6 MG/DL — LOW (ref 7–23)
CALCIUM SERPL-MCNC: 8.7 MG/DL — SIGNIFICANT CHANGE UP (ref 8.4–10.5)
CHLORIDE SERPL-SCNC: 104 MMOL/L — SIGNIFICANT CHANGE UP (ref 98–107)
CO2 SERPL-SCNC: 20 MMOL/L — LOW (ref 22–31)
CREAT SERPL-MCNC: 0.39 MG/DL — LOW (ref 0.5–1.3)
EGFR: 154 ML/MIN/1.73M2 — SIGNIFICANT CHANGE UP
EOSINOPHIL # BLD AUTO: 0.06 K/UL — SIGNIFICANT CHANGE UP (ref 0–0.5)
EOSINOPHIL NFR BLD AUTO: 0.7 % — SIGNIFICANT CHANGE UP (ref 0–6)
GLUCOSE SERPL-MCNC: 80 MG/DL — SIGNIFICANT CHANGE UP (ref 70–99)
HAPTOGLOB SERPL-MCNC: <20 MG/DL — LOW (ref 34–200)
HCT VFR BLD CALC: 25.1 % — LOW (ref 39–50)
HGB BLD-MCNC: 8.2 G/DL — LOW (ref 13–17)
IANC: 3.62 K/UL — SIGNIFICANT CHANGE UP (ref 1.8–7.4)
IMM GRANULOCYTES NFR BLD AUTO: 0.5 % — SIGNIFICANT CHANGE UP (ref 0–0.9)
LDH SERPL L TO P-CCNC: 564 U/L — HIGH (ref 135–225)
LYMPHOCYTES # BLD AUTO: 4.59 K/UL — HIGH (ref 1–3.3)
LYMPHOCYTES # BLD AUTO: 52.6 % — HIGH (ref 13–44)
MAGNESIUM SERPL-MCNC: 2.2 MG/DL — SIGNIFICANT CHANGE UP (ref 1.6–2.6)
MCHC RBC-ENTMCNC: 21.8 PG — LOW (ref 27–34)
MCHC RBC-ENTMCNC: 32.7 GM/DL — SIGNIFICANT CHANGE UP (ref 32–36)
MCV RBC AUTO: 66.8 FL — LOW (ref 80–100)
MONOCYTES # BLD AUTO: 0.35 K/UL — SIGNIFICANT CHANGE UP (ref 0–0.9)
MONOCYTES NFR BLD AUTO: 4 % — SIGNIFICANT CHANGE UP (ref 2–14)
NEUTROPHILS # BLD AUTO: 3.62 K/UL — SIGNIFICANT CHANGE UP (ref 1.8–7.4)
NEUTROPHILS NFR BLD AUTO: 41.4 % — LOW (ref 43–77)
NRBC # BLD: 6 /100 WBCS — HIGH (ref 0–0)
NRBC # FLD: 0.51 K/UL — HIGH (ref 0–0)
PHOSPHATE SERPL-MCNC: 3.7 MG/DL — SIGNIFICANT CHANGE UP (ref 2.5–4.5)
PLATELET # BLD AUTO: 505 K/UL — HIGH (ref 150–400)
POTASSIUM SERPL-MCNC: 4.1 MMOL/L — SIGNIFICANT CHANGE UP (ref 3.5–5.3)
POTASSIUM SERPL-SCNC: 4.1 MMOL/L — SIGNIFICANT CHANGE UP (ref 3.5–5.3)
PROT SERPL-MCNC: 7.8 G/DL — SIGNIFICANT CHANGE UP (ref 6–8.3)
RBC # BLD: 3.76 M/UL — LOW (ref 4.2–5.8)
RBC # BLD: 3.76 M/UL — LOW (ref 4.2–5.8)
RBC # FLD: 25.3 % — HIGH (ref 10.3–14.5)
RETICS #: 379.4 K/UL — HIGH (ref 25–125)
RETICS/RBC NFR: 10 % — HIGH (ref 0.5–2.5)
SODIUM SERPL-SCNC: 138 MMOL/L — SIGNIFICANT CHANGE UP (ref 135–145)
WBC # BLD: 8.73 K/UL — SIGNIFICANT CHANGE UP (ref 3.8–10.5)
WBC # FLD AUTO: 8.73 K/UL — SIGNIFICANT CHANGE UP (ref 3.8–10.5)

## 2024-09-06 PROCEDURE — 99232 SBSQ HOSP IP/OBS MODERATE 35: CPT | Mod: GC

## 2024-09-06 RX ORDER — MORPHINE SULFATE 30 MG/1
30 TABLET, FILM COATED, EXTENDED RELEASE ORAL
Refills: 0 | Status: DISCONTINUED | OUTPATIENT
Start: 2024-09-06 | End: 2024-09-06

## 2024-09-06 RX ORDER — MORPHINE SULFATE 30 MG/1
15 TABLET, FILM COATED, EXTENDED RELEASE ORAL
Refills: 0 | Status: DISCONTINUED | OUTPATIENT
Start: 2024-09-06 | End: 2024-09-12

## 2024-09-06 RX ORDER — ACETAMINOPHEN 325 MG
625 TABLET ORAL ONCE
Refills: 0 | Status: DISCONTINUED | OUTPATIENT
Start: 2024-09-06 | End: 2024-09-06

## 2024-09-06 RX ORDER — MORPHINE SULFATE 30 MG/1
30 TABLET, FILM COATED, EXTENDED RELEASE ORAL
Refills: 0 | Status: DISCONTINUED | OUTPATIENT
Start: 2024-09-06 | End: 2024-09-09

## 2024-09-06 RX ORDER — ACETAMINOPHEN 325 MG
625 TABLET ORAL ONCE
Refills: 0 | Status: COMPLETED | OUTPATIENT
Start: 2024-09-06 | End: 2024-09-06

## 2024-09-06 RX ADMIN — CHLORHEXIDINE GLUCONATE ORAL RINSE 1 APPLICATION(S): 1.2 SOLUTION DENTAL at 11:28

## 2024-09-06 RX ADMIN — MORPHINE SULFATE 15 MILLIGRAM(S): 30 TABLET, FILM COATED, EXTENDED RELEASE ORAL at 06:07

## 2024-09-06 RX ADMIN — HYDROXYUREA 1500 MILLIGRAM(S): 500 CAPSULE ORAL at 11:27

## 2024-09-06 RX ADMIN — VOXELOTOR 1500 MILLIGRAM(S): 300 TABLET, FOR SUSPENSION ORAL at 12:02

## 2024-09-06 RX ADMIN — Medication 75 MILLILITER(S): at 19:09

## 2024-09-06 RX ADMIN — Medication 10 GRAM(S): at 19:09

## 2024-09-06 RX ADMIN — MORPHINE SULFATE 30 MILLILITER(S): 30 TABLET, FILM COATED, EXTENDED RELEASE ORAL at 20:12

## 2024-09-06 RX ADMIN — Medication 250 MILLIGRAM(S): at 10:06

## 2024-09-06 RX ADMIN — MORPHINE SULFATE 15 MILLIGRAM(S): 30 TABLET, FILM COATED, EXTENDED RELEASE ORAL at 20:09

## 2024-09-06 RX ADMIN — Medication 650 MILLIGRAM(S): at 19:09

## 2024-09-06 RX ADMIN — MORPHINE SULFATE 30 MILLILITER(S): 30 TABLET, FILM COATED, EXTENDED RELEASE ORAL at 11:59

## 2024-09-06 RX ADMIN — MORPHINE SULFATE 30 MILLILITER(S): 30 TABLET, FILM COATED, EXTENDED RELEASE ORAL at 08:15

## 2024-09-06 RX ADMIN — Medication 625 MILLIGRAM(S): at 10:30

## 2024-09-06 RX ADMIN — Medication 650 MILLIGRAM(S): at 06:06

## 2024-09-06 RX ADMIN — FOLIC ACID 1 MILLIGRAM(S): 1 TABLET ORAL at 11:28

## 2024-09-06 RX ADMIN — MORPHINE SULFATE 15 MILLIGRAM(S): 30 TABLET, FILM COATED, EXTENDED RELEASE ORAL at 07:07

## 2024-09-06 RX ADMIN — MORPHINE SULFATE 15 MILLIGRAM(S): 30 TABLET, FILM COATED, EXTENDED RELEASE ORAL at 19:09

## 2024-09-06 RX ADMIN — ENOXAPARIN SODIUM 30 MILLIGRAM(S): 150 INJECTION SUBCUTANEOUS at 13:03

## 2024-09-06 RX ADMIN — Medication 10 GRAM(S): at 06:03

## 2024-09-06 RX ADMIN — Medication 650 MILLIGRAM(S): at 07:06

## 2024-09-06 RX ADMIN — Medication 75 MILLILITER(S): at 06:05

## 2024-09-06 NOTE — PROGRESS NOTE ADULT - ATTENDING COMMENTS
Seen and examined by me this morning. About to have breakfast. Pain on hands/UE's and RLE is better 6-7/10 but pain on L hip is about 8/10. Tolerating PO. +BM. Wants to go down on the PCA a bit as he feels that sometimes he is too sleepy and that at times he is getting more pain medication of what he needs. +Edema of R foot is minimal, +Edema of R knee (stable)  Will decrease morphine PCA to 1.75mg q6min, 4H limit being decreased to 30mg and to c/w MS contin for sickle cell crisis treatment  C/w hydroxyurea/oxbryta and IVF's  c/w bowel regimen while on opioids  Rest as above Seen and examined by me this morning. About to have breakfast. Pain on hands/UE's and RLE is better 6-7/10 but pain on L hip is about 8/10. Tolerating PO. +BM. Wants to go down on the PCA a bit as he feels that sometimes he is too sleepy and that at times he is getting more pain medication of what he needs. +Edema of R foot is minimal, +Edema of R knee (stable)  Will decrease morphine PCA to 1.75mg q6min, 4H limit being decreased to 30mg and c/w MS contin for sickle cell crisis treatment  C/w hydroxyurea/oxbryta and IVF's  c/w bowel regimen while on opioids  Rest as above

## 2024-09-06 NOTE — PROGRESS NOTE ADULT - SUBJECTIVE AND OBJECTIVE BOX
Taurus Mayo M.D.   PGY-1 Internal Medicine  ** Note not finalized until signed by attending **   --------------------------  Taurus Mayo MD  Internal Medicine   PGY-1  --------------------------    SUBJECTIVE / OVERNIGHT EVENTS:    Patient endorsing worsened pain compared to yesterday in b/l arms and R foot. Improved L hip pain. Patient reporting he is getting locked out of PCA when in pain; would like to go up on lockout mg 4 hour max or down on demand dose.  When asked, pt denies any recent or active fever, chills, nausea, vomiting, headache, acute sob, chest pain, abdominal pain, genitourinary sx, extremity pain or swelling.    MEDICATIONS  (STANDING):  acetaminophen     Tablet .. 650 milliGRAM(s) Oral every 6 hours  acetaminophen   IVPB .. 625 milliGRAM(s) IV Intermittent once  chlorhexidine 2% Cloths 1 Application(s) Topical daily  enoxaparin Injectable 30 milliGRAM(s) SubCutaneous every 24 hours  folic acid 1 milliGRAM(s) Oral daily  glutamine Powder 10 Gram(s) Oral two times a day  hydroxyurea 1500 milliGRAM(s) Oral daily  morphine ER Tablet 15 milliGRAM(s) Oral two times a day  morphine PCA (5 mG/mL) 30 milliLiter(s) PCA Continuous PCA Continuous  sodium chloride 0.45%. 1000 milliLiter(s) (75 mL/Hr) IV Continuous <Continuous>  voxelotor 1500 milliGRAM(s) Oral daily    MEDICATIONS  (PRN):  naloxone Injectable 0.1 milliGRAM(s) IV Push every 3 minutes PRN For ANY of the following changes in patient status:  A. RR LESS THAN 10 breaths per minute, B. Oxygen saturation LESS THAN 90%, C. Sedation score of 6  ondansetron Injectable 4 milliGRAM(s) IV Push every 6 hours PRN Nausea  polyethylene glycol 3350 17 Gram(s) Oral two times a day PRN Constipation  senna 2 Tablet(s) Oral at bedtime PRN Constipation      CAPILLARY BLOOD GLUCOSE        I&O's Summary    05 Sep 2024 07:01  -  06 Sep 2024 07:00  --------------------------------------------------------  IN: 1850 mL / OUT: 1600 mL / NET: 250 mL        PHYSICAL EXAM:  Vital Signs Last 24 Hrs  T(C): 36.7 (06 Sep 2024 08:00), Max: 36.9 (06 Sep 2024 00:00)  T(F): 98.1 (06 Sep 2024 08:00), Max: 98.4 (06 Sep 2024 00:00)  HR: 76 (06 Sep 2024 08:00) (72 - 79)  BP: 104/67 (06 Sep 2024 08:00) (102/60 - 107/62)  BP(mean): --  RR: 17 (06 Sep 2024 08:00) (17 - 18)  SpO2: 100% (06 Sep 2024 08:00) (100% - 100%)    Parameters below as of 06 Sep 2024 08:00  Patient On (Oxygen Delivery Method): room air        CONSTITUTIONAL: NAD; well-developed  HEENT: PERRL, clear conjunctiva  RESPIRATORY: Normal respiratory effort; lungs are clear to auscultation bilaterally; No Crackles/Rhonchi/Wheezing  CARDIOVASCULAR: Regular rate and rhythm, normal S1 and S2, no murmur/rub/gallop; No lower extremity edema; Peripheral pulses are 2+ bilaterally  ABDOMEN: Nontender to palpation, normoactive bowel sounds, no rebound/guarding; No hepatosplenomegaly  MUSCULOSKELETAL: no clubbing or cyanosis of digits; no joint swelling or tenderness to palpation  EXTREMITY: Lower extremities Non-tender to palpation; non-erythematous B/L  NEURO: A&Ox3; no focal deficits   PSYCH: normal mood; Affect appropirate    LABS:                        8.2    8.73  )-----------( 505      ( 06 Sep 2024 05:53 )             25.1     09-06    138  |  104  |  6<L>  ----------------------------<  80  4.1   |  20<L>  |  0.39<L>    Ca    8.7      06 Sep 2024 05:53  Phos  3.7     09-06  Mg     2.20     09-06    TPro  7.8  /  Alb  3.1<L>  /  TBili  1.6<H>  /  DBili  x   /  AST  85<H>  /  ALT  34  /  AlkPhos  298<H>  09-06          Urinalysis Basic - ( 06 Sep 2024 05:53 )    Color: x / Appearance: x / SG: x / pH: x  Gluc: 80 mg/dL / Ketone: x  / Bili: x / Urobili: x   Blood: x / Protein: x / Nitrite: x   Leuk Esterase: x / RBC: x / WBC x   Sq Epi: x / Non Sq Epi: x / Bacteria: x          RADIOLOGY & ADDITIONAL TESTS:  Results Reviewed:   Imaging Personally Reviewed:  Electrocardiogram Personally Reviewed: --------------------------  Taurus Mayo MD  Internal Medicine   PGY-1  --------------------------    SUBJECTIVE / OVERNIGHT EVENTS:    Patient endorsing worsened pain compared to yesterday in b/l arms and R foot. Improved L hip pain. Patient reporting he is getting locked out of PCA when in pain; would like to go up on lockout mg 4 hour max or down on demand dose.  When asked, pt denies any recent or active fever, chills, nausea, vomiting, headache, acute sob, chest pain, abdominal pain, genitourinary sx, extremity pain or swelling.    MEDICATIONS  (STANDING):  acetaminophen     Tablet .. 650 milliGRAM(s) Oral every 6 hours  acetaminophen   IVPB .. 625 milliGRAM(s) IV Intermittent once  chlorhexidine 2% Cloths 1 Application(s) Topical daily  enoxaparin Injectable 30 milliGRAM(s) SubCutaneous every 24 hours  folic acid 1 milliGRAM(s) Oral daily  glutamine Powder 10 Gram(s) Oral two times a day  hydroxyurea 1500 milliGRAM(s) Oral daily  morphine ER Tablet 15 milliGRAM(s) Oral two times a day  morphine PCA (5 mG/mL) 30 milliLiter(s) PCA Continuous PCA Continuous  sodium chloride 0.45%. 1000 milliLiter(s) (75 mL/Hr) IV Continuous <Continuous>  voxelotor 1500 milliGRAM(s) Oral daily    MEDICATIONS  (PRN):  naloxone Injectable 0.1 milliGRAM(s) IV Push every 3 minutes PRN For ANY of the following changes in patient status:  A. RR LESS THAN 10 breaths per minute, B. Oxygen saturation LESS THAN 90%, C. Sedation score of 6  ondansetron Injectable 4 milliGRAM(s) IV Push every 6 hours PRN Nausea  polyethylene glycol 3350 17 Gram(s) Oral two times a day PRN Constipation  senna 2 Tablet(s) Oral at bedtime PRN Constipation    CAPILLARY BLOOD GLUCOSE    I&O's Summary    05 Sep 2024 07:01  -  06 Sep 2024 07:00  --------------------------------------------------------  IN: 1850 mL / OUT: 1600 mL / NET: 250 mL    PHYSICAL EXAM:  Vital Signs Last 24 Hrs  T(C): 36.7 (06 Sep 2024 08:00), Max: 36.9 (06 Sep 2024 00:00)  T(F): 98.1 (06 Sep 2024 08:00), Max: 98.4 (06 Sep 2024 00:00)  HR: 76 (06 Sep 2024 08:00) (72 - 79)  BP: 104/67 (06 Sep 2024 08:00) (102/60 - 107/62)  BP(mean): --  RR: 17 (06 Sep 2024 08:00) (17 - 18)  SpO2: 100% (06 Sep 2024 08:00) (100% - 100%)    Parameters below as of 06 Sep 2024 08:00  Patient On (Oxygen Delivery Method): room air    CONSTITUTIONAL: NAD; well-developed  HEENT: PERRL, clear conjunctiva  RESPIRATORY: Normal respiratory effort; lungs are clear to auscultation bilaterally; No Crackles/Rhonchi/Wheezing  CARDIOVASCULAR: Regular rate and rhythm, normal S1 and S2, no murmur/rub/gallop; No lower extremity edema; Peripheral pulses are 2+ bilaterally  ABDOMEN: Nontender to palpation, normoactive bowel sounds, no rebound/guarding; No hepatosplenomegaly  MUSCULOSKELETAL: no clubbing or cyanosis of digits; no joint swelling or tenderness to palpation  EXTREMITY: Lower extremities Non-tender to palpation; non-erythematous B/L  NEURO: A&Ox3; no focal deficits   PSYCH: normal mood; Affect appropirate    LABS:                        8.2    8.73  )-----------( 505      ( 06 Sep 2024 05:53 )             25.1     09-06    138  |  104  |  6<L>  ----------------------------<  80  4.1   |  20<L>  |  0.39<L>    Ca    8.7      06 Sep 2024 05:53  Phos  3.7     09-06  Mg     2.20     09-06    TPro  7.8  /  Alb  3.1<L>  /  TBili  1.6<H>  /  DBili  x   /  AST  85<H>  /  ALT  34  /  AlkPhos  298<H>  09-06    Urinalysis Basic - ( 06 Sep 2024 05:53 )  Color: x / Appearance: x / SG: x / pH: x  Gluc: 80 mg/dL / Ketone: x  / Bili: x / Urobili: x   Blood: x / Protein: x / Nitrite: x   Leuk Esterase: x / RBC: x / WBC x   Sq Epi: x / Non Sq Epi: x / Bacteria: x    RADIOLOGY & ADDITIONAL TESTS:  Results Reviewed: X   Imaging Personally Reviewed: X  Electrocardiogram Personally Reviewed: X

## 2024-09-07 LAB
ALBUMIN SERPL ELPH-MCNC: 3.1 G/DL — LOW (ref 3.3–5)
ALP SERPL-CCNC: 302 U/L — HIGH (ref 40–120)
ALT FLD-CCNC: 35 U/L — SIGNIFICANT CHANGE UP (ref 4–41)
ANION GAP SERPL CALC-SCNC: 13 MMOL/L — SIGNIFICANT CHANGE UP (ref 7–14)
AST SERPL-CCNC: 93 U/L — HIGH (ref 4–40)
BASOPHILS # BLD AUTO: 0.06 K/UL — SIGNIFICANT CHANGE UP (ref 0–0.2)
BASOPHILS NFR BLD AUTO: 0.7 % — SIGNIFICANT CHANGE UP (ref 0–2)
BILIRUB SERPL-MCNC: 1.5 MG/DL — HIGH (ref 0.2–1.2)
BUN SERPL-MCNC: 7 MG/DL — SIGNIFICANT CHANGE UP (ref 7–23)
CALCIUM SERPL-MCNC: 8.4 MG/DL — SIGNIFICANT CHANGE UP (ref 8.4–10.5)
CHLORIDE SERPL-SCNC: 102 MMOL/L — SIGNIFICANT CHANGE UP (ref 98–107)
CO2 SERPL-SCNC: 24 MMOL/L — SIGNIFICANT CHANGE UP (ref 22–31)
CREAT SERPL-MCNC: 0.34 MG/DL — LOW (ref 0.5–1.3)
EGFR: 160 ML/MIN/1.73M2 — SIGNIFICANT CHANGE UP
EOSINOPHIL # BLD AUTO: 0.06 K/UL — SIGNIFICANT CHANGE UP (ref 0–0.5)
EOSINOPHIL NFR BLD AUTO: 0.7 % — SIGNIFICANT CHANGE UP (ref 0–6)
GLUCOSE SERPL-MCNC: 82 MG/DL — SIGNIFICANT CHANGE UP (ref 70–99)
HAPTOGLOB SERPL-MCNC: <20 MG/DL — LOW (ref 34–200)
HCT VFR BLD CALC: 24.1 % — LOW (ref 39–50)
HGB BLD-MCNC: 8 G/DL — LOW (ref 13–17)
IANC: 3.44 K/UL — SIGNIFICANT CHANGE UP (ref 1.8–7.4)
IMM GRANULOCYTES NFR BLD AUTO: 0.3 % — SIGNIFICANT CHANGE UP (ref 0–0.9)
LDH SERPL L TO P-CCNC: 641 U/L — HIGH (ref 135–225)
LYMPHOCYTES # BLD AUTO: 5.05 K/UL — HIGH (ref 1–3.3)
LYMPHOCYTES # BLD AUTO: 56 % — HIGH (ref 13–44)
MCHC RBC-ENTMCNC: 22.2 PG — LOW (ref 27–34)
MCHC RBC-ENTMCNC: 33.2 GM/DL — SIGNIFICANT CHANGE UP (ref 32–36)
MCV RBC AUTO: 66.8 FL — LOW (ref 80–100)
MONOCYTES # BLD AUTO: 0.37 K/UL — SIGNIFICANT CHANGE UP (ref 0–0.9)
MONOCYTES NFR BLD AUTO: 4.1 % — SIGNIFICANT CHANGE UP (ref 2–14)
NEUTROPHILS # BLD AUTO: 3.44 K/UL — SIGNIFICANT CHANGE UP (ref 1.8–7.4)
NEUTROPHILS NFR BLD AUTO: 38.2 % — LOW (ref 43–77)
NRBC # BLD: 81 /100 WBCS — HIGH (ref 0–0)
NRBC # FLD: 7.3 K/UL — HIGH (ref 0–0)
PHOSPHATE SERPL-MCNC: 3.9 MG/DL — SIGNIFICANT CHANGE UP (ref 2.5–4.5)
PLATELET # BLD AUTO: 467 K/UL — HIGH (ref 150–400)
POTASSIUM SERPL-MCNC: 4 MMOL/L — SIGNIFICANT CHANGE UP (ref 3.5–5.3)
POTASSIUM SERPL-SCNC: 4 MMOL/L — SIGNIFICANT CHANGE UP (ref 3.5–5.3)
PROT SERPL-MCNC: 7.5 G/DL — SIGNIFICANT CHANGE UP (ref 6–8.3)
RBC # BLD: 3.61 M/UL — LOW (ref 4.2–5.8)
RBC # BLD: 3.61 M/UL — LOW (ref 4.2–5.8)
RBC # FLD: 25.2 % — HIGH (ref 10.3–14.5)
RETICS #: 359.7 K/UL — HIGH (ref 25–125)
RETICS/RBC NFR: 9.9 % — HIGH (ref 0.5–2.5)
SODIUM SERPL-SCNC: 139 MMOL/L — SIGNIFICANT CHANGE UP (ref 135–145)
WBC # BLD: 9.01 K/UL — SIGNIFICANT CHANGE UP (ref 3.8–10.5)
WBC # FLD AUTO: 9.01 K/UL — SIGNIFICANT CHANGE UP (ref 3.8–10.5)

## 2024-09-07 PROCEDURE — 99232 SBSQ HOSP IP/OBS MODERATE 35: CPT | Mod: GC

## 2024-09-07 RX ORDER — KETOROLAC TROMETHAMINE 10 MG/1
15 TABLET, FILM COATED ORAL EVERY 6 HOURS
Refills: 0 | Status: DISCONTINUED | OUTPATIENT
Start: 2024-09-07 | End: 2024-09-12

## 2024-09-07 RX ADMIN — KETOROLAC TROMETHAMINE 15 MILLIGRAM(S): 10 TABLET, FILM COATED ORAL at 18:26

## 2024-09-07 RX ADMIN — KETOROLAC TROMETHAMINE 15 MILLIGRAM(S): 10 TABLET, FILM COATED ORAL at 17:26

## 2024-09-07 RX ADMIN — Medication 650 MILLIGRAM(S): at 05:58

## 2024-09-07 RX ADMIN — Medication 650 MILLIGRAM(S): at 01:05

## 2024-09-07 RX ADMIN — VOXELOTOR 1500 MILLIGRAM(S): 300 TABLET, FOR SUSPENSION ORAL at 12:00

## 2024-09-07 RX ADMIN — Medication 10 GRAM(S): at 17:27

## 2024-09-07 RX ADMIN — KETOROLAC TROMETHAMINE 15 MILLIGRAM(S): 10 TABLET, FILM COATED ORAL at 11:59

## 2024-09-07 RX ADMIN — KETOROLAC TROMETHAMINE 15 MILLIGRAM(S): 10 TABLET, FILM COATED ORAL at 12:59

## 2024-09-07 RX ADMIN — MORPHINE SULFATE 15 MILLIGRAM(S): 30 TABLET, FILM COATED, EXTENDED RELEASE ORAL at 05:57

## 2024-09-07 RX ADMIN — MORPHINE SULFATE 15 MILLIGRAM(S): 30 TABLET, FILM COATED, EXTENDED RELEASE ORAL at 17:26

## 2024-09-07 RX ADMIN — Medication 650 MILLIGRAM(S): at 00:05

## 2024-09-07 RX ADMIN — HYDROXYUREA 1500 MILLIGRAM(S): 500 CAPSULE ORAL at 11:59

## 2024-09-07 RX ADMIN — CHLORHEXIDINE GLUCONATE ORAL RINSE 1 APPLICATION(S): 1.2 SOLUTION DENTAL at 12:00

## 2024-09-07 RX ADMIN — FOLIC ACID 1 MILLIGRAM(S): 1 TABLET ORAL at 11:58

## 2024-09-07 RX ADMIN — MORPHINE SULFATE 15 MILLIGRAM(S): 30 TABLET, FILM COATED, EXTENDED RELEASE ORAL at 18:26

## 2024-09-07 RX ADMIN — Medication 75 MILLILITER(S): at 00:19

## 2024-09-07 RX ADMIN — MORPHINE SULFATE 30 MILLILITER(S): 30 TABLET, FILM COATED, EXTENDED RELEASE ORAL at 20:09

## 2024-09-07 RX ADMIN — Medication 10 GRAM(S): at 05:56

## 2024-09-07 RX ADMIN — ENOXAPARIN SODIUM 30 MILLIGRAM(S): 150 INJECTION SUBCUTANEOUS at 11:58

## 2024-09-07 RX ADMIN — MORPHINE SULFATE 30 MILLILITER(S): 30 TABLET, FILM COATED, EXTENDED RELEASE ORAL at 07:58

## 2024-09-07 RX ADMIN — Medication 75 MILLILITER(S): at 21:59

## 2024-09-07 NOTE — PROGRESS NOTE ADULT - ATTENDING COMMENTS
29M with PMH of sickle cell disease, hx of ACS, avascular necrosis here w/ VOC.     Pt seen and evaluated. Labs stable.    -Cont PCA at current dose.  -Add toradol 15 IV q6h standing.  -Trend labs.    Rest of plan as above.

## 2024-09-07 NOTE — PROGRESS NOTE ADULT - ASSESSMENT
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

## 2024-09-07 NOTE — PROGRESS NOTE ADULT - PROBLEM SELECTOR PLAN 1
#Transaminitis  - follows with Dr. Montano, missed monthly dose of Adakveo for 2 months which may explain SCC  - with R knee pain, b/l arm pain, L hip pain (now mostly resolved), cervical spine pain.  - low concern for acute chest given no opacities on CXR, chest pain more consistent with SCC  - transaminitis likely 2/2 to hepatopathy iso vasoocclusive crisus, no abdominal pain and LFTs improving, could otherwise consider RUQ US  - hematology consulted  - palliative deferred pain management to primary team     A/P:  - c/w 1/2 NS  - pain control with home morphine 15 MG ERBID , pca morphine pump (will adjust), toradol q6  - bowel regimen: miralax BID, senna 2 MG PRN  - home hydroxyurea (need to take home med), oxbryta (need to take home med) and endari  - incentive spirometer  - daily labs, CBC w/ diff, LDH, haptoglobin, CMP, retic count  - will continue to monitor for signs of acute chest syndrome. If fever, culture and repeat CXR stat  - trend CBC, and transfuse for symptomatic anemia,

## 2024-09-07 NOTE — PROGRESS NOTE ADULT - SUBJECTIVE AND OBJECTIVE BOX
Taurus Mayo M.D.   PGY-1 Internal Medicine  ** Note not finalized until signed by attending **   --------------------------  Taurus Mayo MD  Internal Medicine   PGY-1  --------------------------    SUBJECTIVE / OVERNIGHT EVENTS:    Pt seen in AM at bedside, resting comfortably in bed, endorses new cervical pain, improved L hip pain, persistent R knee and R foot pain. When asked, pt denies any recent or active fever, chills, nausea, vomiting, headache, acute sob, chest pain, abdominal pain, genitourinary sx.    MEDICATIONS  (STANDING):  chlorhexidine 2% Cloths 1 Application(s) Topical daily  enoxaparin Injectable 30 milliGRAM(s) SubCutaneous every 24 hours  folic acid 1 milliGRAM(s) Oral daily  glutamine Powder 10 Gram(s) Oral two times a day  hydroxyurea 1500 milliGRAM(s) Oral daily  ketorolac   Injectable 15 milliGRAM(s) IV Push every 6 hours  morphine ER Tablet 15 milliGRAM(s) Oral two times a day  morphine PCA (5 mG/mL) 30 milliLiter(s) PCA Continuous PCA Continuous  sodium chloride 0.45%. 1000 milliLiter(s) (75 mL/Hr) IV Continuous <Continuous>  voxelotor 1500 milliGRAM(s) Oral daily    MEDICATIONS  (PRN):  naloxone Injectable 0.1 milliGRAM(s) IV Push every 3 minutes PRN For ANY of the following changes in patient status:  A. RR LESS THAN 10 breaths per minute, B. Oxygen saturation LESS THAN 90%, C. Sedation score of 6  ondansetron Injectable 4 milliGRAM(s) IV Push every 6 hours PRN Nausea  polyethylene glycol 3350 17 Gram(s) Oral two times a day PRN Constipation  senna 2 Tablet(s) Oral at bedtime PRN Constipation    CAPILLARY BLOOD GLUCOSE    I&O's Summary    06 Sep 2024 07:01  -  07 Sep 2024 07:00  --------------------------------------------------------  IN: 2050 mL / OUT: 2100 mL / NET: -50 mL    PHYSICAL EXAM:  Vital Signs Last 24 Hrs  T(C): 36.9 (07 Sep 2024 08:00), Max: 36.9 (07 Sep 2024 04:00)  T(F): 98.4 (07 Sep 2024 08:00), Max: 98.5 (07 Sep 2024 04:00)  HR: 76 (07 Sep 2024 08:00) (68 - 83)  BP: 104/62 (07 Sep 2024 08:00) (100/61 - 111/66)  RR: 18 (07 Sep 2024 08:00) (17 - 18)  SpO2: 98% (07 Sep 2024 08:00) (97% - 100%)    Parameters below as of 07 Sep 2024 08:00  Patient On (Oxygen Delivery Method): room air    CONSTITUTIONAL: NAD; thin  HEENT: PERRL, clear conjunctiva  RESPIRATORY: Normal respiratory effort; lungs are clear to auscultation bilaterally; No Crackles/Rhonchi/Wheezing  CARDIOVASCULAR: Regular rate and rhythm, normal S1 and S2, no murmur/rub/gallop; No lower extremity edema; Peripheral pulses are 2+ bilaterally  ABDOMEN: Nontender to palpation, normoactive bowel sounds, no rebound/guarding; No hepatosplenomegaly  MUSCULOSKELETAL: +resolved swelling R knee; +resolved swelling R foot. Mild TP cervical spine + L hip. No clubbing or cyanosis of digits; no tenderness to palpation  EXTREMITY: Lower extremities Non-tender to palpation; non-erythematous B/L  NEURO: A&Ox3; no focal deficits   PSYCH: normal mood; Affect appropirate    LABS:                        8.0    9.01  )-----------( 467      ( 07 Sep 2024 07:49 )             24.1     09-07    139  |  102  |  7   ----------------------------<  82  4.0   |  24  |  0.34<L>    Ca    8.4      07 Sep 2024 07:49  Phos  3.9     09-07  Mg     2.20     09-06    TPro  7.5  /  Alb  3.1<L>  /  TBili  1.5<H>  /  DBili  x   /  AST  93<H>  /  ALT  35  /  AlkPhos  302<H>  09-07    Urinalysis Basic - ( 07 Sep 2024 07:49 )  Color: x / Appearance: x / SG: x / pH: x  Gluc: 82 mg/dL / Ketone: x  / Bili: x / Urobili: x   Blood: x / Protein: x / Nitrite: x   Leuk Esterase: x / RBC: x / WBC x   Sq Epi: x / Non Sq Epi: x / Bacteria: x    RADIOLOGY & ADDITIONAL TESTS:  Results Reviewed: X  Imaging Personally Reviewed: X  Electrocardiogram Personally Reviewed: X

## 2024-09-08 LAB
ALBUMIN SERPL ELPH-MCNC: 3 G/DL — LOW (ref 3.3–5)
ALP SERPL-CCNC: 272 U/L — HIGH (ref 40–120)
ALT FLD-CCNC: 29 U/L — SIGNIFICANT CHANGE UP (ref 4–41)
ANION GAP SERPL CALC-SCNC: 14 MMOL/L — SIGNIFICANT CHANGE UP (ref 7–14)
ANISOCYTOSIS BLD QL: SIGNIFICANT CHANGE UP
AST SERPL-CCNC: 80 U/L — HIGH (ref 4–40)
BASOPHILS # BLD AUTO: 0 K/UL — SIGNIFICANT CHANGE UP (ref 0–0.2)
BASOPHILS NFR BLD AUTO: 0 % — SIGNIFICANT CHANGE UP (ref 0–2)
BILIRUB SERPL-MCNC: 1.4 MG/DL — HIGH (ref 0.2–1.2)
BUN SERPL-MCNC: 7 MG/DL — SIGNIFICANT CHANGE UP (ref 7–23)
CALCIUM SERPL-MCNC: 8 MG/DL — LOW (ref 8.4–10.5)
CHLORIDE SERPL-SCNC: 103 MMOL/L — SIGNIFICANT CHANGE UP (ref 98–107)
CO2 SERPL-SCNC: 21 MMOL/L — LOW (ref 22–31)
CREAT SERPL-MCNC: 0.32 MG/DL — LOW (ref 0.5–1.3)
EGFR: 163 ML/MIN/1.73M2 — SIGNIFICANT CHANGE UP
ELLIPTOCYTES BLD QL SMEAR: SLIGHT — SIGNIFICANT CHANGE UP
EOSINOPHIL # BLD AUTO: 0.16 K/UL — SIGNIFICANT CHANGE UP (ref 0–0.5)
EOSINOPHIL NFR BLD AUTO: 2 % — SIGNIFICANT CHANGE UP (ref 0–6)
GIANT PLATELETS BLD QL SMEAR: PRESENT — SIGNIFICANT CHANGE UP
GLUCOSE SERPL-MCNC: 74 MG/DL — SIGNIFICANT CHANGE UP (ref 70–99)
HAPTOGLOB SERPL-MCNC: <20 MG/DL — LOW (ref 34–200)
HCT VFR BLD CALC: 23.9 % — LOW (ref 39–50)
HGB BLD-MCNC: 8 G/DL — LOW (ref 13–17)
IANC: 2.61 K/UL — SIGNIFICANT CHANGE UP (ref 1.8–7.4)
LDH SERPL L TO P-CCNC: 526 U/L — HIGH (ref 135–225)
LYMPHOCYTES # BLD AUTO: 3.72 K/UL — HIGH (ref 1–3.3)
LYMPHOCYTES # BLD AUTO: 46.5 % — HIGH (ref 13–44)
MANUAL SMEAR VERIFICATION: SIGNIFICANT CHANGE UP
MCHC RBC-ENTMCNC: 22.2 PG — LOW (ref 27–34)
MCHC RBC-ENTMCNC: 33.5 GM/DL — SIGNIFICANT CHANGE UP (ref 32–36)
MCV RBC AUTO: 66.2 FL — LOW (ref 80–100)
METAMYELOCYTES # FLD: 2 % — HIGH (ref 0–1)
MICROCYTES BLD QL: SIGNIFICANT CHANGE UP
MONOCYTES # BLD AUTO: 0.32 K/UL — SIGNIFICANT CHANGE UP (ref 0–0.9)
MONOCYTES NFR BLD AUTO: 4 % — SIGNIFICANT CHANGE UP (ref 2–14)
NEUTROPHILS # BLD AUTO: 3.56 K/UL — SIGNIFICANT CHANGE UP (ref 1.8–7.4)
NEUTROPHILS NFR BLD AUTO: 44.5 % — SIGNIFICANT CHANGE UP (ref 43–77)
NRBC # BLD: 122 /100 WBCS — HIGH (ref 0–0)
OVALOCYTES BLD QL SMEAR: SLIGHT — SIGNIFICANT CHANGE UP
PHOSPHATE SERPL-MCNC: 3.9 MG/DL — SIGNIFICANT CHANGE UP (ref 2.5–4.5)
PLAT MORPH BLD: NORMAL — SIGNIFICANT CHANGE UP
PLATELET # BLD AUTO: 421 K/UL — HIGH (ref 150–400)
PLATELET COUNT - ESTIMATE: NORMAL — SIGNIFICANT CHANGE UP
POIKILOCYTOSIS BLD QL AUTO: SIGNIFICANT CHANGE UP
POLYCHROMASIA BLD QL SMEAR: SIGNIFICANT CHANGE UP
POTASSIUM SERPL-MCNC: 3.5 MMOL/L — SIGNIFICANT CHANGE UP (ref 3.5–5.3)
POTASSIUM SERPL-SCNC: 3.5 MMOL/L — SIGNIFICANT CHANGE UP (ref 3.5–5.3)
PROT SERPL-MCNC: 6.9 G/DL — SIGNIFICANT CHANGE UP (ref 6–8.3)
RBC # BLD: 3.61 M/UL — LOW (ref 4.2–5.8)
RBC # BLD: 3.61 M/UL — LOW (ref 4.2–5.8)
RBC # FLD: 24.9 % — HIGH (ref 10.3–14.5)
RBC BLD AUTO: ABNORMAL
RETICS #: 381.1 K/UL — HIGH (ref 25–125)
RETICS/RBC NFR: 10.5 % — HIGH (ref 0.5–2.5)
SCHISTOCYTES BLD QL AUTO: SLIGHT — SIGNIFICANT CHANGE UP
SICKLE CELLS BLD QL SMEAR: SLIGHT — SIGNIFICANT CHANGE UP
SODIUM SERPL-SCNC: 138 MMOL/L — SIGNIFICANT CHANGE UP (ref 135–145)
TARGETS BLD QL SMEAR: SIGNIFICANT CHANGE UP
VARIANT LYMPHS # BLD: 1 % — SIGNIFICANT CHANGE UP (ref 0–6)
WBC # BLD: 8 K/UL — SIGNIFICANT CHANGE UP (ref 3.8–10.5)
WBC # FLD AUTO: 8 K/UL — SIGNIFICANT CHANGE UP (ref 3.8–10.5)

## 2024-09-08 PROCEDURE — 99232 SBSQ HOSP IP/OBS MODERATE 35: CPT | Mod: GC

## 2024-09-08 RX ORDER — PENICILLIN V POTASSIUM 500 MG/1
250 TABLET ORAL
Refills: 0 | Status: DISCONTINUED | OUTPATIENT
Start: 2024-09-08 | End: 2024-10-10

## 2024-09-08 RX ORDER — BENZOCAINE AND LEVOMENTHOL 200; 5 MG/G; MG/G
1 SPRAY TOPICAL ONCE
Refills: 0 | Status: COMPLETED | OUTPATIENT
Start: 2024-09-08 | End: 2024-09-08

## 2024-09-08 RX ADMIN — CHLORHEXIDINE GLUCONATE ORAL RINSE 1 APPLICATION(S): 1.2 SOLUTION DENTAL at 13:07

## 2024-09-08 RX ADMIN — PENICILLIN V POTASSIUM 250 MILLIGRAM(S): 500 TABLET ORAL at 17:23

## 2024-09-08 RX ADMIN — BENZOCAINE AND LEVOMENTHOL 1 LOZENGE: 200; 5 SPRAY TOPICAL at 23:08

## 2024-09-08 RX ADMIN — Medication 10 GRAM(S): at 06:59

## 2024-09-08 RX ADMIN — MORPHINE SULFATE 30 MILLILITER(S): 30 TABLET, FILM COATED, EXTENDED RELEASE ORAL at 09:17

## 2024-09-08 RX ADMIN — MORPHINE SULFATE 15 MILLIGRAM(S): 30 TABLET, FILM COATED, EXTENDED RELEASE ORAL at 06:58

## 2024-09-08 RX ADMIN — FOLIC ACID 1 MILLIGRAM(S): 1 TABLET ORAL at 13:06

## 2024-09-08 RX ADMIN — KETOROLAC TROMETHAMINE 15 MILLIGRAM(S): 10 TABLET, FILM COATED ORAL at 01:37

## 2024-09-08 RX ADMIN — VOXELOTOR 1500 MILLIGRAM(S): 300 TABLET, FOR SUSPENSION ORAL at 13:07

## 2024-09-08 RX ADMIN — KETOROLAC TROMETHAMINE 15 MILLIGRAM(S): 10 TABLET, FILM COATED ORAL at 18:22

## 2024-09-08 RX ADMIN — KETOROLAC TROMETHAMINE 15 MILLIGRAM(S): 10 TABLET, FILM COATED ORAL at 17:22

## 2024-09-08 RX ADMIN — KETOROLAC TROMETHAMINE 15 MILLIGRAM(S): 10 TABLET, FILM COATED ORAL at 07:02

## 2024-09-08 RX ADMIN — Medication 75 MILLILITER(S): at 23:08

## 2024-09-08 RX ADMIN — KETOROLAC TROMETHAMINE 15 MILLIGRAM(S): 10 TABLET, FILM COATED ORAL at 13:06

## 2024-09-08 RX ADMIN — MORPHINE SULFATE 15 MILLIGRAM(S): 30 TABLET, FILM COATED, EXTENDED RELEASE ORAL at 17:23

## 2024-09-08 RX ADMIN — HYDROXYUREA 1500 MILLIGRAM(S): 500 CAPSULE ORAL at 13:07

## 2024-09-08 RX ADMIN — MORPHINE SULFATE 15 MILLIGRAM(S): 30 TABLET, FILM COATED, EXTENDED RELEASE ORAL at 18:23

## 2024-09-08 RX ADMIN — MORPHINE SULFATE 30 MILLILITER(S): 30 TABLET, FILM COATED, EXTENDED RELEASE ORAL at 08:14

## 2024-09-08 RX ADMIN — Medication 10 GRAM(S): at 17:22

## 2024-09-08 RX ADMIN — KETOROLAC TROMETHAMINE 15 MILLIGRAM(S): 10 TABLET, FILM COATED ORAL at 00:37

## 2024-09-08 RX ADMIN — Medication 75 MILLILITER(S): at 09:20

## 2024-09-08 RX ADMIN — KETOROLAC TROMETHAMINE 15 MILLIGRAM(S): 10 TABLET, FILM COATED ORAL at 08:02

## 2024-09-08 RX ADMIN — ENOXAPARIN SODIUM 30 MILLIGRAM(S): 150 INJECTION SUBCUTANEOUS at 13:07

## 2024-09-08 RX ADMIN — MORPHINE SULFATE 30 MILLILITER(S): 30 TABLET, FILM COATED, EXTENDED RELEASE ORAL at 20:35

## 2024-09-08 RX ADMIN — KETOROLAC TROMETHAMINE 15 MILLIGRAM(S): 10 TABLET, FILM COATED ORAL at 14:06

## 2024-09-08 NOTE — PROGRESS NOTE ADULT - ATTENDING COMMENTS
29M with PMH of sickle cell disease, hx of ACS, avascular necrosis here w/ VOC.     Pt seen and evaluated. Pain better. Feels less stiff. Labs stable.    -Cont PCA at current dose.  -Cont toradol 15 IV q6h standing.  -Spoke w/ pt's mother at bedside. 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  -Trend labs.  -DC planning.    Rest of plan as above.

## 2024-09-08 NOTE — PROGRESS NOTE ADULT - SUBJECTIVE AND OBJECTIVE BOX
PROGRESS NOTE:     Patient is a 29y old  Male who presents with a chief complaint of sickle cell crisis (07 Sep 2024 08:00)      ---------------------------------------------------  Maria Lamb  PGY-2, Internal Medicine  Available on [employer] Teams  Pager: [phone] ([place]), or [phone] (NS)  ---------------------------------------------------    INTERVAL / OVERNIGHT EVENTS:  No acute events overnight.     SUBJECTIVE:  Patient examined at bedside with no acute complaints.     BRIEF DAILY PLAN:  - c/w pain control      MEDICATIONS  (STANDING):  chlorhexidine 2% Cloths 1 Application(s) Topical daily  enoxaparin Injectable 30 milliGRAM(s) SubCutaneous every 24 hours  folic acid 1 milliGRAM(s) Oral daily  glutamine Powder 10 Gram(s) Oral two times a day  hydroxyurea 1500 milliGRAM(s) Oral daily  ketorolac   Injectable 15 milliGRAM(s) IV Push every 6 hours  morphine ER Tablet 15 milliGRAM(s) Oral two times a day  morphine PCA (5 mG/mL) 30 milliLiter(s) PCA Continuous PCA Continuous  sodium chloride 0.45%. 1000 milliLiter(s) (75 mL/Hr) IV Continuous <Continuous>  voxelotor 1500 milliGRAM(s) Oral daily    MEDICATIONS  (PRN):  naloxone Injectable 0.1 milliGRAM(s) IV Push every 3 minutes PRN For ANY of the following changes in patient status:  A. RR LESS THAN 10 breaths per minute, B. Oxygen saturation LESS THAN 90%, C. Sedation score of 6  ondansetron Injectable 4 milliGRAM(s) IV Push every 6 hours PRN Nausea  polyethylene glycol 3350 17 Gram(s) Oral two times a day PRN Constipation  senna 2 Tablet(s) Oral at bedtime PRN Constipation      CAPILLARY BLOOD GLUCOSE        I&O's Summary    07 Sep 2024 07:01  -  08 Sep 2024 07:00  --------------------------------------------------------  IN: 0 mL / OUT: 675 mL / NET: -675 mL        VITALS:   T(C): 36.7 (09-08-24 @ 04:00), Max: 37.1 (09-08-24 @ 00:00)  HR: 85 (09-08-24 @ 04:00) (68 - 85)  BP: 96/60 (09-08-24 @ 04:00) (96/60 - 105/55)  RR: 18 (09-08-24 @ 04:00) (16 - 18)  SpO2: 99% (09-08-24 @ 04:00) (98% - 100%)    CONSTITUTIONAL: NAD; thin  HEENT: PERRL, clear conjunctiva  RESPIRATORY: Normal respiratory effort; lungs are clear to auscultation bilaterally; No Crackles/Rhonchi/Wheezing  CARDIOVASCULAR: Regular rate and rhythm, normal S1 and S2, no murmur/rub/gallop; No lower extremity edema; Peripheral pulses are 2+ bilaterally  ABDOMEN: Nontender to palpation, normoactive bowel sounds, no rebound/guarding; No hepatosplenomegaly  MUSCULOSKELETAL: +resolved swelling R knee; +resolved swelling R foot. Mild TP cervical spine + L hip. No clubbing or cyanosis of digits; no tenderness to palpation  EXTREMITY: Lower extremities Non-tender to palpation; non-erythematous B/L  NEURO: A&Ox3; no focal deficits   PSYCH: normal mood; Affect appropirate      LABS:                        8.0    9.01  )-----------( 467      ( 07 Sep 2024 07:49 )             24.1     09-07    139  |  102  |  7   ----------------------------<  82  4.0   |  24  |  0.34<L>    Ca    8.4      07 Sep 2024 07:49  Phos  3.9     09-07    TPro  7.5  /  Alb  3.1<L>  /  TBili  1.5<H>  /  DBili  x   /  AST  93<H>  /  ALT  35  /  AlkPhos  302<H>  09-07          Urinalysis Basic - ( 07 Sep 2024 07:49 )    Color: x / Appearance: x / SG: x / pH: x  Gluc: 82 mg/dL / Ketone: x  / Bili: x / Urobili: x   Blood: x / Protein: x / Nitrite: x   Leuk Esterase: x / RBC: x / WBC x   Sq Epi: x / Non Sq Epi: x / Bacteria: x        RADIOLOGY & ADDITIONAL TESTS:  Results Reviewed:   Imaging Personally Reviewed:  Electrocardiogram Personally Reviewed:    COORDINATION OF CARE:  Care Discussed with Consultants/Other Providers [Y/N]:  Prior or Outpatient Records Reviewed [Y/N]:     PROGRESS NOTE:     Patient is a 29y old  Male who presents with a chief complaint of sickle cell crisis (07 Sep 2024 08:00)      ---------------------------------------------------  Maria Lamb  PGY-2, Internal Medicine  Available on [employer] Teams  Pager: [phone] ([place]), or [phone] (NS)  ---------------------------------------------------    INTERVAL / OVERNIGHT EVENTS:  No acute events overnight.     SUBJECTIVE:  Patient examined at bedside with no acute complaints.     BRIEF DAILY PLAN:  - c/w current regimen for pain control  - repeat CRP tmrw AM  - restart home penicillin (s/p splenectomy)      MEDICATIONS  (STANDING):  chlorhexidine 2% Cloths 1 Application(s) Topical daily  enoxaparin Injectable 30 milliGRAM(s) SubCutaneous every 24 hours  folic acid 1 milliGRAM(s) Oral daily  glutamine Powder 10 Gram(s) Oral two times a day  hydroxyurea 1500 milliGRAM(s) Oral daily  ketorolac   Injectable 15 milliGRAM(s) IV Push every 6 hours  morphine ER Tablet 15 milliGRAM(s) Oral two times a day  morphine PCA (5 mG/mL) 30 milliLiter(s) PCA Continuous PCA Continuous  sodium chloride 0.45%. 1000 milliLiter(s) (75 mL/Hr) IV Continuous <Continuous>  voxelotor 1500 milliGRAM(s) Oral daily    MEDICATIONS  (PRN):  naloxone Injectable 0.1 milliGRAM(s) IV Push every 3 minutes PRN For ANY of the following changes in patient status:  A. RR LESS THAN 10 breaths per minute, B. Oxygen saturation LESS THAN 90%, C. Sedation score of 6  ondansetron Injectable 4 milliGRAM(s) IV Push every 6 hours PRN Nausea  polyethylene glycol 3350 17 Gram(s) Oral two times a day PRN Constipation  senna 2 Tablet(s) Oral at bedtime PRN Constipation      CAPILLARY BLOOD GLUCOSE        I&O's Summary    07 Sep 2024 07:01  -  08 Sep 2024 07:00  --------------------------------------------------------  IN: 0 mL / OUT: 675 mL / NET: -675 mL        VITALS:   T(C): 36.7 (09-08-24 @ 04:00), Max: 37.1 (09-08-24 @ 00:00)  HR: 85 (09-08-24 @ 04:00) (68 - 85)  BP: 96/60 (09-08-24 @ 04:00) (96/60 - 105/55)  RR: 18 (09-08-24 @ 04:00) (16 - 18)  SpO2: 99% (09-08-24 @ 04:00) (98% - 100%)    CONSTITUTIONAL: NAD; thin  HEENT: PERRL, clear conjunctiva  RESPIRATORY: Normal respiratory effort; lungs are clear to auscultation bilaterally; No Crackles/Rhonchi/Wheezing  CARDIOVASCULAR: Regular rate and rhythm, normal S1 and S2, no murmur/rub/gallop; No lower extremity edema; Peripheral pulses are 2+ bilaterally  ABDOMEN: Nontender to palpation, normoactive bowel sounds, no rebound/guarding; No hepatosplenomegaly  MUSCULOSKELETAL: +resolved swelling R knee; +resolved swelling R foot. Mild TP cervical spine + L hip. No clubbing or cyanosis of digits; no tenderness to palpation  EXTREMITY: Lower extremities Non-tender to palpation; non-erythematous B/L  NEURO: A&Ox3; no focal deficits   PSYCH: normal mood; Affect appropirate      LABS:                        8.0    9.01  )-----------( 467      ( 07 Sep 2024 07:49 )             24.1     09-07    139  |  102  |  7   ----------------------------<  82  4.0   |  24  |  0.34<L>    Ca    8.4      07 Sep 2024 07:49  Phos  3.9     09-07    TPro  7.5  /  Alb  3.1<L>  /  TBili  1.5<H>  /  DBili  x   /  AST  93<H>  /  ALT  35  /  AlkPhos  302<H>  09-07          Urinalysis Basic - ( 07 Sep 2024 07:49 )    Color: x / Appearance: x / SG: x / pH: x  Gluc: 82 mg/dL / Ketone: x  / Bili: x / Urobili: x   Blood: x / Protein: x / Nitrite: x   Leuk Esterase: x / RBC: x / WBC x   Sq Epi: x / Non Sq Epi: x / Bacteria: x        RADIOLOGY & ADDITIONAL TESTS:  Results Reviewed:   Imaging Personally Reviewed:  Electrocardiogram Personally Reviewed:    COORDINATION OF CARE:  Care Discussed with Consultants/Other Providers [Y/N]:  Prior or Outpatient Records Reviewed [Y/N]:

## 2024-09-09 LAB
ALBUMIN SERPL ELPH-MCNC: 2.9 G/DL — LOW (ref 3.3–5)
ALP SERPL-CCNC: 269 U/L — HIGH (ref 40–120)
ALT FLD-CCNC: 40 U/L — SIGNIFICANT CHANGE UP (ref 4–41)
ANION GAP SERPL CALC-SCNC: 14 MMOL/L — SIGNIFICANT CHANGE UP (ref 7–14)
AST SERPL-CCNC: 93 U/L — HIGH (ref 4–40)
BASOPHILS # BLD AUTO: 0.05 K/UL — SIGNIFICANT CHANGE UP (ref 0–0.2)
BASOPHILS NFR BLD AUTO: 0.6 % — SIGNIFICANT CHANGE UP (ref 0–2)
BILIRUB SERPL-MCNC: 1.5 MG/DL — HIGH (ref 0.2–1.2)
BUN SERPL-MCNC: 6 MG/DL — LOW (ref 7–23)
CALCIUM SERPL-MCNC: 8.2 MG/DL — LOW (ref 8.4–10.5)
CHLORIDE SERPL-SCNC: 103 MMOL/L — SIGNIFICANT CHANGE UP (ref 98–107)
CO2 SERPL-SCNC: 21 MMOL/L — LOW (ref 22–31)
CREAT SERPL-MCNC: 0.35 MG/DL — LOW (ref 0.5–1.3)
CRP SERPL-MCNC: 16.9 MG/L — HIGH
EGFR: 159 ML/MIN/1.73M2 — SIGNIFICANT CHANGE UP
EOSINOPHIL # BLD AUTO: 0.09 K/UL — SIGNIFICANT CHANGE UP (ref 0–0.5)
EOSINOPHIL NFR BLD AUTO: 1.1 % — SIGNIFICANT CHANGE UP (ref 0–6)
GLUCOSE SERPL-MCNC: 80 MG/DL — SIGNIFICANT CHANGE UP (ref 70–99)
HAPTOGLOB SERPL-MCNC: <20 MG/DL — LOW (ref 34–200)
HCT VFR BLD CALC: 24.9 % — LOW (ref 39–50)
HGB BLD-MCNC: 8.2 G/DL — LOW (ref 13–17)
IANC: 3.28 K/UL — SIGNIFICANT CHANGE UP (ref 1.8–7.4)
IMM GRANULOCYTES NFR BLD AUTO: 0.4 % — SIGNIFICANT CHANGE UP (ref 0–0.9)
LDH SERPL L TO P-CCNC: 509 U/L — HIGH (ref 135–225)
LYMPHOCYTES # BLD AUTO: 4.13 K/UL — HIGH (ref 1–3.3)
LYMPHOCYTES # BLD AUTO: 52.3 % — HIGH (ref 13–44)
MCHC RBC-ENTMCNC: 22.2 PG — LOW (ref 27–34)
MCHC RBC-ENTMCNC: 32.9 GM/DL — SIGNIFICANT CHANGE UP (ref 32–36)
MCV RBC AUTO: 67.3 FL — LOW (ref 80–100)
MONOCYTES # BLD AUTO: 0.31 K/UL — SIGNIFICANT CHANGE UP (ref 0–0.9)
MONOCYTES NFR BLD AUTO: 3.9 % — SIGNIFICANT CHANGE UP (ref 2–14)
NEUTROPHILS # BLD AUTO: 3.28 K/UL — SIGNIFICANT CHANGE UP (ref 1.8–7.4)
NEUTROPHILS NFR BLD AUTO: 41.7 % — LOW (ref 43–77)
NRBC # BLD: 88 /100 WBCS — HIGH (ref 0–0)
NRBC # FLD: 6.97 K/UL — HIGH (ref 0–0)
PHOSPHATE SERPL-MCNC: 3.9 MG/DL — SIGNIFICANT CHANGE UP (ref 2.5–4.5)
PLATELET # BLD AUTO: 398 K/UL — SIGNIFICANT CHANGE UP (ref 150–400)
POTASSIUM SERPL-MCNC: 3.8 MMOL/L — SIGNIFICANT CHANGE UP (ref 3.5–5.3)
POTASSIUM SERPL-SCNC: 3.8 MMOL/L — SIGNIFICANT CHANGE UP (ref 3.5–5.3)
PROT SERPL-MCNC: 7.1 G/DL — SIGNIFICANT CHANGE UP (ref 6–8.3)
RBC # BLD: 3.7 M/UL — LOW (ref 4.2–5.8)
RBC # BLD: 3.7 M/UL — LOW (ref 4.2–5.8)
RBC # FLD: 25.2 % — HIGH (ref 10.3–14.5)
RETICS #: 342.6 K/UL — HIGH (ref 25–125)
RETICS/RBC NFR: 9.3 % — HIGH (ref 0.5–2.5)
SODIUM SERPL-SCNC: 138 MMOL/L — SIGNIFICANT CHANGE UP (ref 135–145)
WBC # BLD: 7.89 K/UL — SIGNIFICANT CHANGE UP (ref 3.8–10.5)
WBC # FLD AUTO: 7.89 K/UL — SIGNIFICANT CHANGE UP (ref 3.8–10.5)

## 2024-09-09 PROCEDURE — 99232 SBSQ HOSP IP/OBS MODERATE 35: CPT | Mod: GC

## 2024-09-09 RX ORDER — MORPHINE SULFATE 30 MG/1
30 TABLET, FILM COATED, EXTENDED RELEASE ORAL
Refills: 0 | Status: DISCONTINUED | OUTPATIENT
Start: 2024-09-09 | End: 2024-09-10

## 2024-09-09 RX ORDER — KETOROLAC TROMETHAMINE 10 MG/1
15 TABLET, FILM COATED ORAL EVERY 6 HOURS
Refills: 0 | Status: DISCONTINUED | OUTPATIENT
Start: 2024-09-09 | End: 2024-09-09

## 2024-09-09 RX ADMIN — PENICILLIN V POTASSIUM 250 MILLIGRAM(S): 500 TABLET ORAL at 19:37

## 2024-09-09 RX ADMIN — ENOXAPARIN SODIUM 30 MILLIGRAM(S): 150 INJECTION SUBCUTANEOUS at 19:38

## 2024-09-09 RX ADMIN — MORPHINE SULFATE 30 MILLILITER(S): 30 TABLET, FILM COATED, EXTENDED RELEASE ORAL at 13:43

## 2024-09-09 RX ADMIN — Medication 10 GRAM(S): at 07:20

## 2024-09-09 RX ADMIN — MORPHINE SULFATE 30 MILLILITER(S): 30 TABLET, FILM COATED, EXTENDED RELEASE ORAL at 20:39

## 2024-09-09 RX ADMIN — MORPHINE SULFATE 15 MILLIGRAM(S): 30 TABLET, FILM COATED, EXTENDED RELEASE ORAL at 19:37

## 2024-09-09 RX ADMIN — Medication 75 MILLILITER(S): at 13:40

## 2024-09-09 RX ADMIN — HYDROXYUREA 1500 MILLIGRAM(S): 500 CAPSULE ORAL at 12:15

## 2024-09-09 RX ADMIN — KETOROLAC TROMETHAMINE 15 MILLIGRAM(S): 10 TABLET, FILM COATED ORAL at 20:38

## 2024-09-09 RX ADMIN — MORPHINE SULFATE 30 MILLILITER(S): 30 TABLET, FILM COATED, EXTENDED RELEASE ORAL at 08:31

## 2024-09-09 RX ADMIN — KETOROLAC TROMETHAMINE 15 MILLIGRAM(S): 10 TABLET, FILM COATED ORAL at 07:20

## 2024-09-09 RX ADMIN — MORPHINE SULFATE 15 MILLIGRAM(S): 30 TABLET, FILM COATED, EXTENDED RELEASE ORAL at 07:20

## 2024-09-09 RX ADMIN — VOXELOTOR 1500 MILLIGRAM(S): 300 TABLET, FOR SUSPENSION ORAL at 12:15

## 2024-09-09 RX ADMIN — KETOROLAC TROMETHAMINE 15 MILLIGRAM(S): 10 TABLET, FILM COATED ORAL at 00:08

## 2024-09-09 RX ADMIN — MORPHINE SULFATE 15 MILLIGRAM(S): 30 TABLET, FILM COATED, EXTENDED RELEASE ORAL at 08:00

## 2024-09-09 RX ADMIN — MORPHINE SULFATE 15 MILLIGRAM(S): 30 TABLET, FILM COATED, EXTENDED RELEASE ORAL at 20:37

## 2024-09-09 RX ADMIN — KETOROLAC TROMETHAMINE 15 MILLIGRAM(S): 10 TABLET, FILM COATED ORAL at 08:00

## 2024-09-09 RX ADMIN — KETOROLAC TROMETHAMINE 15 MILLIGRAM(S): 10 TABLET, FILM COATED ORAL at 19:38

## 2024-09-09 RX ADMIN — FOLIC ACID 1 MILLIGRAM(S): 1 TABLET ORAL at 12:15

## 2024-09-09 RX ADMIN — PENICILLIN V POTASSIUM 250 MILLIGRAM(S): 500 TABLET ORAL at 07:19

## 2024-09-09 RX ADMIN — KETOROLAC TROMETHAMINE 15 MILLIGRAM(S): 10 TABLET, FILM COATED ORAL at 13:15

## 2024-09-09 RX ADMIN — CHLORHEXIDINE GLUCONATE ORAL RINSE 1 APPLICATION(S): 1.2 SOLUTION DENTAL at 12:16

## 2024-09-09 RX ADMIN — KETOROLAC TROMETHAMINE 15 MILLIGRAM(S): 10 TABLET, FILM COATED ORAL at 01:00

## 2024-09-09 RX ADMIN — Medication 10 GRAM(S): at 19:37

## 2024-09-09 RX ADMIN — KETOROLAC TROMETHAMINE 15 MILLIGRAM(S): 10 TABLET, FILM COATED ORAL at 12:15

## 2024-09-09 RX ADMIN — MORPHINE SULFATE 30 MILLILITER(S): 30 TABLET, FILM COATED, EXTENDED RELEASE ORAL at 20:01

## 2024-09-09 NOTE — PROGRESS NOTE ADULT - ATTENDING COMMENTS
Seen and examined by me this afternoon, doing better. Pain on RLE and hands is better, same as L hip, +cervical/neck pain, wants to go down on PCA pump, doing better since toradol was started. Tolerating PO, +BM  Will decreased Morphine PCA to 1.5mg q6m, 4H limit decreased as well to 24mg and c/w toradol and MS contin forsickle cell crisis treatment  c/w bowel regimen while on opioids  C/w hydroxyurea/oxbryta and IVF's  Rest as above

## 2024-09-09 NOTE — PROGRESS NOTE ADULT - SUBJECTIVE AND OBJECTIVE BOX
Taurus Mayo M.D.   PGY-1 Internal Medicine  ** Note not finalized until signed by attending **   --------------------------  Taurus Mayo MD  Internal Medicine   PGY-1  --------------------------    SUBJECTIVE / OVERNIGHT EVENTS:    Pt seen in AM at bedside, resting comfortably in bed, reports much improved but minimal b/l arm pain, cervical neck pain, and L shoulder pain. When asked, pt denies any recent or active fever, chills, nausea, vomiting, headache, acute sob, chest pain, abdominal pain, genitourinary sx, extremity pain or swelling. Reports that he feels he will be much better to go in a few days.    MEDICATIONS  (STANDING):  chlorhexidine 2% Cloths 1 Application(s) Topical daily  enoxaparin Injectable 30 milliGRAM(s) SubCutaneous every 24 hours  folic acid 1 milliGRAM(s) Oral daily  glutamine Powder 10 Gram(s) Oral two times a day  hydroxyurea 1500 milliGRAM(s) Oral daily  ketorolac   Injectable 15 milliGRAM(s) IV Push every 6 hours  morphine ER Tablet 15 milliGRAM(s) Oral two times a day  morphine PCA (5 mG/mL) 30 milliLiter(s) PCA Continuous PCA Continuous  penicillin   milliGRAM(s) Oral two times a day  sodium chloride 0.45%. 1000 milliLiter(s) (75 mL/Hr) IV Continuous <Continuous>  voxelotor 1500 milliGRAM(s) Oral daily    MEDICATIONS  (PRN):  naloxone Injectable 0.1 milliGRAM(s) IV Push every 3 minutes PRN For ANY of the following changes in patient status:  A. RR LESS THAN 10 breaths per minute, B. Oxygen saturation LESS THAN 90%, C. Sedation score of 6  ondansetron Injectable 4 milliGRAM(s) IV Push every 6 hours PRN Nausea  polyethylene glycol 3350 17 Gram(s) Oral two times a day PRN Constipation  senna 2 Tablet(s) Oral at bedtime PRN Constipation    CAPILLARY BLOOD GLUCOSE    I&O's Summary    PHYSICAL EXAM:  Vital Signs Last 24 Hrs  T(C): 36.8 (09 Sep 2024 12:00), Max: 37.2 (09 Sep 2024 00:00)  T(F): 98.3 (09 Sep 2024 12:00), Max: 98.9 (09 Sep 2024 00:00)  HR: 84 (09 Sep 2024 12:00) (71 - 84)  BP: 104/66 (09 Sep 2024 12:00) (101/75 - 117/79)  RR: 17 (09 Sep 2024 12:00) (17 - 18)  SpO2: 100% (09 Sep 2024 12:00) (100% - 100%)    Parameters below as of 09 Sep 2024 12:00  Patient On (Oxygen Delivery Method): room air    CONSTITUTIONAL: NAD; thin  HEENT: PERRL, clear conjunctiva  RESPIRATORY: Normal respiratory effort; lungs are clear to auscultation bilaterally; No Crackles/Rhonchi/Wheezing  CARDIOVASCULAR: Regular rate and rhythm, normal S1 and S2, no murmur/rub/gallop; No lower extremity edema; Peripheral pulses are 2+ bilaterally  ABDOMEN: Nontender to palpation, normoactive bowel sounds, no rebound/guarding; No hepatosplenomegaly  MUSCULOSKELETAL: Mild TP cervical spine + L scapula. No clubbing or cyanosis of digits; no tenderness to palpation  EXTREMITY: Lower extremities Non-tender to palpation; non-erythematous B/L  NEURO: A&Ox3; no focal deficits   PSYCH: normal mood; Affect appropirate    LABS:                        8.2    7.89  )-----------( 398      ( 09 Sep 2024 06:07 )             24.9     09-09    138  |  103  |  6<L>  ----------------------------<  80  3.8   |  21<L>  |  0.35<L>    Ca    8.2<L>      09 Sep 2024 06:07  Phos  3.9     09-09    TPro  7.1  /  Alb  2.9<L>  /  TBili  1.5<H>  /  DBili  x   /  AST  93<H>  /  ALT  40  /  AlkPhos  269<H>  09-09    Urinalysis Basic - ( 09 Sep 2024 06:07 )    Color: x / Appearance: x / SG: x / pH: x  Gluc: 80 mg/dL / Ketone: x  / Bili: x / Urobili: x   Blood: x / Protein: x / Nitrite: x   Leuk Esterase: x / RBC: x / WBC x   Sq Epi: x / Non Sq Epi: x / Bacteria: x    RADIOLOGY & ADDITIONAL TESTS:  Results Reviewed: X  Imaging Personally Reviewed: X  Electrocardiogram Personally Reviewed:X

## 2024-09-10 LAB
ALBUMIN SERPL ELPH-MCNC: 3 G/DL — LOW (ref 3.3–5)
ALP SERPL-CCNC: 270 U/L — HIGH (ref 40–120)
ALT FLD-CCNC: 46 U/L — HIGH (ref 4–41)
ANION GAP SERPL CALC-SCNC: 14 MMOL/L — SIGNIFICANT CHANGE UP (ref 7–14)
AST SERPL-CCNC: 100 U/L — HIGH (ref 4–40)
BASOPHILS # BLD AUTO: 0.06 K/UL — SIGNIFICANT CHANGE UP (ref 0–0.2)
BASOPHILS NFR BLD AUTO: 0.7 % — SIGNIFICANT CHANGE UP (ref 0–2)
BILIRUB SERPL-MCNC: 1.4 MG/DL — HIGH (ref 0.2–1.2)
BUN SERPL-MCNC: 7 MG/DL — SIGNIFICANT CHANGE UP (ref 7–23)
CALCIUM SERPL-MCNC: 8 MG/DL — LOW (ref 8.4–10.5)
CHLORIDE SERPL-SCNC: 104 MMOL/L — SIGNIFICANT CHANGE UP (ref 98–107)
CO2 SERPL-SCNC: 21 MMOL/L — LOW (ref 22–31)
CREAT SERPL-MCNC: 0.34 MG/DL — LOW (ref 0.5–1.3)
CRP SERPL-MCNC: 21.6 MG/L — HIGH
EGFR: 160 ML/MIN/1.73M2 — SIGNIFICANT CHANGE UP
EOSINOPHIL # BLD AUTO: 0.19 K/UL — SIGNIFICANT CHANGE UP (ref 0–0.5)
EOSINOPHIL NFR BLD AUTO: 2.3 % — SIGNIFICANT CHANGE UP (ref 0–6)
GLUCOSE SERPL-MCNC: 72 MG/DL — SIGNIFICANT CHANGE UP (ref 70–99)
HAPTOGLOB SERPL-MCNC: <20 MG/DL — LOW (ref 34–200)
HCT VFR BLD CALC: 23.8 % — LOW (ref 39–50)
HGB BLD-MCNC: 7.9 G/DL — LOW (ref 13–17)
IANC: 3.1 K/UL — SIGNIFICANT CHANGE UP (ref 1.8–7.4)
IMM GRANULOCYTES NFR BLD AUTO: 0.2 % — SIGNIFICANT CHANGE UP (ref 0–0.9)
LDH SERPL L TO P-CCNC: 479 U/L — HIGH (ref 135–225)
LYMPHOCYTES # BLD AUTO: 4.43 K/UL — HIGH (ref 1–3.3)
LYMPHOCYTES # BLD AUTO: 54.6 % — HIGH (ref 13–44)
MCHC RBC-ENTMCNC: 22.3 PG — LOW (ref 27–34)
MCHC RBC-ENTMCNC: 33.2 GM/DL — SIGNIFICANT CHANGE UP (ref 32–36)
MCV RBC AUTO: 67 FL — LOW (ref 80–100)
MONOCYTES # BLD AUTO: 0.31 K/UL — SIGNIFICANT CHANGE UP (ref 0–0.9)
MONOCYTES NFR BLD AUTO: 3.8 % — SIGNIFICANT CHANGE UP (ref 2–14)
NEUTROPHILS # BLD AUTO: 3.1 K/UL — SIGNIFICANT CHANGE UP (ref 1.8–7.4)
NEUTROPHILS NFR BLD AUTO: 38.4 % — LOW (ref 43–77)
NRBC # BLD: 78 /100 WBCS — HIGH (ref 0–0)
NRBC # FLD: 6.37 K/UL — HIGH (ref 0–0)
PHOSPHATE SERPL-MCNC: 4 MG/DL — SIGNIFICANT CHANGE UP (ref 2.5–4.5)
PLATELET # BLD AUTO: 311 K/UL — SIGNIFICANT CHANGE UP (ref 150–400)
POTASSIUM SERPL-MCNC: 3.9 MMOL/L — SIGNIFICANT CHANGE UP (ref 3.5–5.3)
POTASSIUM SERPL-SCNC: 3.9 MMOL/L — SIGNIFICANT CHANGE UP (ref 3.5–5.3)
PROT SERPL-MCNC: 6.7 G/DL — SIGNIFICANT CHANGE UP (ref 6–8.3)
RBC # BLD: 3.55 M/UL — LOW (ref 4.2–5.8)
RBC # BLD: 3.55 M/UL — LOW (ref 4.2–5.8)
RBC # FLD: 25.5 % — HIGH (ref 10.3–14.5)
RETICS #: 320.7 K/UL — HIGH (ref 25–125)
RETICS/RBC NFR: 9.1 % — HIGH (ref 0.5–2.5)
SODIUM SERPL-SCNC: 139 MMOL/L — SIGNIFICANT CHANGE UP (ref 135–145)
WBC # BLD: 8.11 K/UL — SIGNIFICANT CHANGE UP (ref 3.8–10.5)
WBC # FLD AUTO: 8.11 K/UL — SIGNIFICANT CHANGE UP (ref 3.8–10.5)

## 2024-09-10 PROCEDURE — 99232 SBSQ HOSP IP/OBS MODERATE 35: CPT | Mod: GC

## 2024-09-10 RX ORDER — MORPHINE SULFATE 30 MG/1
30 TABLET, FILM COATED, EXTENDED RELEASE ORAL
Refills: 0 | Status: DISCONTINUED | OUTPATIENT
Start: 2024-09-10 | End: 2024-09-12

## 2024-09-10 RX ADMIN — MORPHINE SULFATE 30 MILLILITER(S): 30 TABLET, FILM COATED, EXTENDED RELEASE ORAL at 13:30

## 2024-09-10 RX ADMIN — KETOROLAC TROMETHAMINE 15 MILLIGRAM(S): 10 TABLET, FILM COATED ORAL at 06:17

## 2024-09-10 RX ADMIN — MORPHINE SULFATE 15 MILLIGRAM(S): 30 TABLET, FILM COATED, EXTENDED RELEASE ORAL at 18:23

## 2024-09-10 RX ADMIN — MORPHINE SULFATE 30 MILLILITER(S): 30 TABLET, FILM COATED, EXTENDED RELEASE ORAL at 20:08

## 2024-09-10 RX ADMIN — PENICILLIN V POTASSIUM 250 MILLIGRAM(S): 500 TABLET ORAL at 17:23

## 2024-09-10 RX ADMIN — KETOROLAC TROMETHAMINE 15 MILLIGRAM(S): 10 TABLET, FILM COATED ORAL at 17:24

## 2024-09-10 RX ADMIN — Medication 75 MILLILITER(S): at 00:59

## 2024-09-10 RX ADMIN — KETOROLAC TROMETHAMINE 15 MILLIGRAM(S): 10 TABLET, FILM COATED ORAL at 06:55

## 2024-09-10 RX ADMIN — PENICILLIN V POTASSIUM 250 MILLIGRAM(S): 500 TABLET ORAL at 06:18

## 2024-09-10 RX ADMIN — Medication 10 GRAM(S): at 17:24

## 2024-09-10 RX ADMIN — KETOROLAC TROMETHAMINE 15 MILLIGRAM(S): 10 TABLET, FILM COATED ORAL at 00:53

## 2024-09-10 RX ADMIN — KETOROLAC TROMETHAMINE 15 MILLIGRAM(S): 10 TABLET, FILM COATED ORAL at 12:13

## 2024-09-10 RX ADMIN — VOXELOTOR 1500 MILLIGRAM(S): 300 TABLET, FOR SUSPENSION ORAL at 12:14

## 2024-09-10 RX ADMIN — Medication 10 GRAM(S): at 06:17

## 2024-09-10 RX ADMIN — HYDROXYUREA 1500 MILLIGRAM(S): 500 CAPSULE ORAL at 12:14

## 2024-09-10 RX ADMIN — CHLORHEXIDINE GLUCONATE ORAL RINSE 1 APPLICATION(S): 1.2 SOLUTION DENTAL at 12:15

## 2024-09-10 RX ADMIN — ENOXAPARIN SODIUM 30 MILLIGRAM(S): 150 INJECTION SUBCUTANEOUS at 12:24

## 2024-09-10 RX ADMIN — MORPHINE SULFATE 15 MILLIGRAM(S): 30 TABLET, FILM COATED, EXTENDED RELEASE ORAL at 06:17

## 2024-09-10 RX ADMIN — Medication 75 MILLILITER(S): at 12:15

## 2024-09-10 RX ADMIN — MORPHINE SULFATE 30 MILLILITER(S): 30 TABLET, FILM COATED, EXTENDED RELEASE ORAL at 08:44

## 2024-09-10 RX ADMIN — MORPHINE SULFATE 15 MILLIGRAM(S): 30 TABLET, FILM COATED, EXTENDED RELEASE ORAL at 17:23

## 2024-09-10 RX ADMIN — MORPHINE SULFATE 15 MILLIGRAM(S): 30 TABLET, FILM COATED, EXTENDED RELEASE ORAL at 06:55

## 2024-09-10 RX ADMIN — FOLIC ACID 1 MILLIGRAM(S): 1 TABLET ORAL at 12:13

## 2024-09-10 RX ADMIN — MORPHINE SULFATE 15 MILLIGRAM(S): 30 TABLET, FILM COATED, EXTENDED RELEASE ORAL at 18:24

## 2024-09-10 RX ADMIN — KETOROLAC TROMETHAMINE 15 MILLIGRAM(S): 10 TABLET, FILM COATED ORAL at 13:13

## 2024-09-10 RX ADMIN — KETOROLAC TROMETHAMINE 15 MILLIGRAM(S): 10 TABLET, FILM COATED ORAL at 00:11

## 2024-09-10 NOTE — PROGRESS NOTE ADULT - SUBJECTIVE AND OBJECTIVE BOX
Taurus Mayo M.D.   PGY-1 Internal Medicine  ** Note not finalized until signed by attending **   --------------------------  Taurus Mayo MD  Internal Medicine   PGY-1  --------------------------    SUBJECTIVE / OVERNIGHT EVENTS:    Pt seen in AM at bedside, resting comfortably in bed, reports much improved but minimal b/l arm pain, cervical neck pain, and L shoulder pain. When asked, pt denies any recent or active fever, chills, nausea, vomiting, headache, acute sob, chest pain, abdominal pain, genitourinary sx, extremity pain or swelling. Reports that he feels he will be much better to go in a few days.    MEDICATIONS  (STANDING):  chlorhexidine 2% Cloths 1 Application(s) Topical daily  enoxaparin Injectable 30 milliGRAM(s) SubCutaneous every 24 hours  folic acid 1 milliGRAM(s) Oral daily  glutamine Powder 10 Gram(s) Oral two times a day  hydroxyurea 1500 milliGRAM(s) Oral daily  ketorolac   Injectable 15 milliGRAM(s) IV Push every 6 hours  morphine ER Tablet 15 milliGRAM(s) Oral two times a day  morphine PCA (5 mG/mL) 30 milliLiter(s) PCA Continuous PCA Continuous  penicillin   milliGRAM(s) Oral two times a day  sodium chloride 0.45%. 1000 milliLiter(s) (75 mL/Hr) IV Continuous <Continuous>  voxelotor 1500 milliGRAM(s) Oral daily    MEDICATIONS  (PRN):  naloxone Injectable 0.1 milliGRAM(s) IV Push every 3 minutes PRN For ANY of the following changes in patient status:  A. RR LESS THAN 10 breaths per minute, B. Oxygen saturation LESS THAN 90%, C. Sedation score of 6  ondansetron Injectable 4 milliGRAM(s) IV Push every 6 hours PRN Nausea  polyethylene glycol 3350 17 Gram(s) Oral two times a day PRN Constipation  senna 2 Tablet(s) Oral at bedtime PRN Constipation    CAPILLARY BLOOD GLUCOSE    I&O's Summary    PHYSICAL EXAM:  Vital Signs Last 24 Hrs  T(C): 36.6 (10 Sep 2024 08:00), Max: 36.8 (09 Sep 2024 12:00)  T(F): 97.8 (10 Sep 2024 08:00), Max: 98.3 (09 Sep 2024 12:00)  HR: 65 (10 Sep 2024 08:00) (65 - 89)  BP: 105/66 (10 Sep 2024 08:00) (103/64 - 114/60)  RR: 17 (10 Sep 2024 08:00) (17 - 18)  SpO2: 100% (10 Sep 2024 08:00) (100% - 100%)    Parameters below as of 10 Sep 2024 08:00  Patient On (Oxygen Delivery Method): room air    CONSTITUTIONAL: NAD; thin  HEENT: PERRL, clear conjunctiva  RESPIRATORY: Normal respiratory effort; lungs are clear to auscultation bilaterally; No Crackles/Rhonchi/Wheezing  CARDIOVASCULAR: Regular rate and rhythm, normal S1 and S2, no murmur/rub/gallop; No lower extremity edema; Peripheral pulses are 2+ bilaterally  ABDOMEN: Nontender to palpation, normoactive bowel sounds, no rebound/guarding; No hepatosplenomegaly  MUSCULOSKELETAL: Mild TP cervical spine + L scapula. No clubbing or cyanosis of digits; no tenderness to palpation  EXTREMITY: Lower extremities Non-tender to palpation; non-erythematous B/L  NEURO: A&Ox3; no focal deficits   PSYCH: normal mood; Affect appropirate      LABS:                        7.9    8.11  )-----------( 311      ( 10 Sep 2024 06:03 )             23.8     09-09    138  |  103  |  6<L>  ----------------------------<  80  3.8   |  21<L>  |  0.35<L>    Ca    8.2<L>      09 Sep 2024 06:07  Phos  3.9     09-09    TPro  7.1  /  Alb  2.9<L>  /  TBili  1.5<H>  /  DBili  x   /  AST  93<H>  /  ALT  40  /  AlkPhos  269<H>  09-09    Urinalysis Basic - ( 09 Sep 2024 06:07 )  Color: x / Appearance: x / SG: x / pH: x  Gluc: 80 mg/dL / Ketone: x  / Bili: x / Urobili: x   Blood: x / Protein: x / Nitrite: x   Leuk Esterase: x / RBC: x / WBC x   Sq Epi: x / Non Sq Epi: x / Bacteria: x    RADIOLOGY & ADDITIONAL TESTS:  Results Reviewed: X  Imaging Personally Reviewed: x  Electrocardiogram Personally Reviewed: X --------------------------  Taurus Mayo MD  Internal Medicine   PGY-1  --------------------------    SUBJECTIVE / OVERNIGHT EVENTS:    Pt seen in AM at bedside, resting comfortably in bed, reports set back overnight/this AM. Endorses worsened L hip pain and cervical back pain. 7/10 and 8/10 respectively, would like to continue with PCA settings as is, and not go down or up, as he's asked for for the past several days. When asked, pt denies any recent or active fever, chills, nausea, vomiting, headache, acute sob, chest pain, abdominal pain, genitourinary sx, extremity pain or swelling. Reports that he feels he will be much better to go in a few days.    MEDICATIONS  (STANDING):  chlorhexidine 2% Cloths 1 Application(s) Topical daily  enoxaparin Injectable 30 milliGRAM(s) SubCutaneous every 24 hours  folic acid 1 milliGRAM(s) Oral daily  glutamine Powder 10 Gram(s) Oral two times a day  hydroxyurea 1500 milliGRAM(s) Oral daily  ketorolac   Injectable 15 milliGRAM(s) IV Push every 6 hours  morphine ER Tablet 15 milliGRAM(s) Oral two times a day  morphine PCA (5 mG/mL) 30 milliLiter(s) PCA Continuous PCA Continuous  penicillin   milliGRAM(s) Oral two times a day  sodium chloride 0.45%. 1000 milliLiter(s) (75 mL/Hr) IV Continuous <Continuous>  voxelotor 1500 milliGRAM(s) Oral daily    MEDICATIONS  (PRN):  naloxone Injectable 0.1 milliGRAM(s) IV Push every 3 minutes PRN For ANY of the following changes in patient status:  A. RR LESS THAN 10 breaths per minute, B. Oxygen saturation LESS THAN 90%, C. Sedation score of 6  ondansetron Injectable 4 milliGRAM(s) IV Push every 6 hours PRN Nausea  polyethylene glycol 3350 17 Gram(s) Oral two times a day PRN Constipation  senna 2 Tablet(s) Oral at bedtime PRN Constipation    CAPILLARY BLOOD GLUCOSE    I&O's Summary    PHYSICAL EXAM:  Vital Signs Last 24 Hrs  T(C): 36.6 (10 Sep 2024 08:00), Max: 36.8 (09 Sep 2024 12:00)  T(F): 97.8 (10 Sep 2024 08:00), Max: 98.3 (09 Sep 2024 12:00)  HR: 65 (10 Sep 2024 08:00) (65 - 89)  BP: 105/66 (10 Sep 2024 08:00) (103/64 - 114/60)  RR: 17 (10 Sep 2024 08:00) (17 - 18)  SpO2: 100% (10 Sep 2024 08:00) (100% - 100%)    Parameters below as of 10 Sep 2024 08:00  Patient On (Oxygen Delivery Method): room air    CONSTITUTIONAL: NAD; thin  HEENT: PERRL, clear conjunctiva  RESPIRATORY: Normal respiratory effort; lungs are clear to auscultation bilaterally; No Crackles/Rhonchi/Wheezing  CARDIOVASCULAR: Regular rate and rhythm, normal S1 and S2, no murmur/rub/gallop; No lower extremity edema; Peripheral pulses are 2+ bilaterally  ABDOMEN: Nontender to palpation, normoactive bowel sounds, no rebound/guarding; No hepatosplenomegaly  MUSCULOSKELETAL: Mild TP cervical spine + L scapula. Mild L hip tenderness. No clubbing or cyanosis of digits; no tenderness to palpation  EXTREMITY: Lower extremities Non-tender to palpation; non-erythematous B/L  NEURO: A&Ox3; no focal deficits   PSYCH: normal mood; Affect appropirate      LABS:                        7.9    8.11  )-----------( 311      ( 10 Sep 2024 06:03 )             23.8     09-09    138  |  103  |  6<L>  ----------------------------<  80  3.8   |  21<L>  |  0.35<L>    Ca    8.2<L>      09 Sep 2024 06:07  Phos  3.9     09-09    TPro  7.1  /  Alb  2.9<L>  /  TBili  1.5<H>  /  DBili  x   /  AST  93<H>  /  ALT  40  /  AlkPhos  269<H>  09-09    Urinalysis Basic - ( 09 Sep 2024 06:07 )  Color: x / Appearance: x / SG: x / pH: x  Gluc: 80 mg/dL / Ketone: x  / Bili: x / Urobili: x   Blood: x / Protein: x / Nitrite: x   Leuk Esterase: x / RBC: x / WBC x   Sq Epi: x / Non Sq Epi: x / Bacteria: x    RADIOLOGY & ADDITIONAL TESTS:  Results Reviewed: X  Imaging Personally Reviewed: x  Electrocardiogram Personally Reviewed: X

## 2024-09-10 NOTE — CHART NOTE - NSCHARTNOTEFT_GEN_A_CORE
NUTRITION FOLLOW UP NOTE     Pt seen for malnutrition follow up.     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    Diet Prescription:   - Regular  - Ensure Plus High Protein 1 PO 2x daily    Nutrition Course:  - Pt reports fair appetite/PO intake, flowsheets indicate variable PO intake over the past week. Pt is consuming Ensure shakes, amenable to continued provision. No food preferences vocalized at this time. Pt is able to feed self independently. No noted GI distress, last BM 9/5 per flowsheets; ordered for senna 2 tablets qHS PRN and Miralax 17 gm BID PRN.     Pertinent Medications:   - folic acid, glutamine Powder, hydroxyurea, morphine ER Tablet, morphine PCA Continuous, sodium chloride 0.45% IV Continuous, ondansetron IV PRN, polyethylene glycol PRN, senna PRN    Pertinent Labs:   - (9/10) Na 139 mmol/L Glu 72 mg/dL K+ 3.9 mmol/L Cr  0.34 mg/dL<L> BUN 7 mg/dL Phos 4.0 mg/dL Alb 3.0 g/dL<L>    Weight: (9.4 dosing) 90.3 lbs / 41 kg, (8/27) 89.9 lbs / 40.8 kg   Height: 65 in / 165.1 cm  IBW: 136 lbs / 61.8 kg +/-10%  BMI: 15.0 kg/m^2 (at lowest weight)    Food Allergy/Intolerance:  - NKFA    Physical Assessment, per flowsheets:  Edema/Pressure Injury: none noted    Estimated Needs:   [X] No change since previous assessment, based on dosing weight 89.9 lbs / 40.8 kg   6987-5685 kcal daily @30-35 kcal/kg, 48.96-61.2 gm protein daily @1.2-1.5 gm/kg     Previous Nutrition Diagnosis: [X] Severe malnutrition in the context of acute illness or injury, ongoing   New Nutrition Diagnosis: [X] not applicable     Education:  [X] not applicable     Interventions:   1) Recommend continue regular diet + Ensure Plus High Protein 1 PO 2x daily (provides 350 kcal, 20 gm protein per 8 oz serving).  2) Obtain weekly weights.     Monitor & Evaluate:  PO intake, tolerance to diet/supplement, nutrition related lab values, weight trends, BMs/GI distress, hydration status, skin integrity.    Nhung Razo RDN, CDN #[id number]  Also available on [employer] Teams. NUTRITION FOLLOW UP NOTE      Pt seen for malnutrition follow up.     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    Diet Prescription:   - Regular  - Ensure Plus High Protein 1 PO 2x daily    Nutrition Course:  - Pt reports fair appetite/PO intake, flowsheets indicate variable PO intake over the past week. Pt is consuming Ensure shakes, amenable to continued provision. No food preferences vocalized at this time. Pt is able to feed self independently. No noted GI distress, last BM 9/5 per flowsheets; ordered for senna 2 tablets qHS PRN and Miralax 17 gm BID PRN.     Pertinent Medications:   - folic acid, glutamine Powder, hydroxyurea, morphine ER Tablet, morphine PCA Continuous, sodium chloride 0.45% IV Continuous, ondansetron IV PRN, polyethylene glycol PRN, senna PRN    Pertinent Labs:   - (9/10) Na 139 mmol/L Glu 72 mg/dL K+ 3.9 mmol/L Cr  0.34 mg/dL<L> BUN 7 mg/dL Phos 4.0 mg/dL Alb 3.0 g/dL<L>    Weight: (9.4 dosing) 90.3 lbs / 41 kg, (8/27) 89.9 lbs / 40.8 kg   Height: 65 in / 165.1 cm  IBW: 136 lbs / 61.8 kg +/-10%  BMI: 15.0 kg/m^2 (at lowest weight)    Food Allergy/Intolerance:  - NKFA    Physical Assessment, per flowsheets:  Edema/Pressure Injury: none noted    Estimated Needs:   [X] No change since previous assessment, based on dosing weight 89.9 lbs / 40.8 kg   9246-8627 kcal daily @30-35 kcal/kg, 48.96-61.2 gm protein daily @1.2-1.5 gm/kg     Previous Nutrition Diagnosis: [X] Severe malnutrition in the context of acute illness or injury, ongoing   New Nutrition Diagnosis: [X] not applicable     Education:  [X] not applicable     Interventions:   1) Recommend continue regular diet + Ensure Plus High Protein 1 PO 2x daily (provides 350 kcal, 20 gm protein per 8 oz serving).  2) Obtain weekly weights.     Monitor & Evaluate:  PO intake, tolerance to diet/supplement, nutrition related lab values, weight trends, BMs/GI distress, hydration status, skin integrity.    Nhung aRzo RDN, CDN #[id number]  Also available on [employer] Teams.

## 2024-09-10 NOTE — PROGRESS NOTE ADULT - ATTENDING COMMENTS
Seen and examined by me this afternoon, having cervical/neck pain and L Hip pain. Tolerating PO, +BM  Will increase 4H limit to 30mg on Morphine PCA and c/w 1.5mg q6m; will also c/w toradol thru 9/12 and MS contin for sickle cell crisis tx  c/w bowel regimen while on opioids  C/w hydroxyurea/oxbryta and IVF's  Rest as above

## 2024-09-11 LAB
ALBUMIN SERPL ELPH-MCNC: 3.1 G/DL — LOW (ref 3.3–5)
ALP SERPL-CCNC: 286 U/L — HIGH (ref 40–120)
ALT FLD-CCNC: 56 U/L — HIGH (ref 4–41)
ANION GAP SERPL CALC-SCNC: 13 MMOL/L — SIGNIFICANT CHANGE UP (ref 7–14)
AST SERPL-CCNC: 112 U/L — HIGH (ref 4–40)
BASOPHILS # BLD AUTO: 0.05 K/UL — SIGNIFICANT CHANGE UP (ref 0–0.2)
BASOPHILS NFR BLD AUTO: 0.6 % — SIGNIFICANT CHANGE UP (ref 0–2)
BILIRUB SERPL-MCNC: 1.4 MG/DL — HIGH (ref 0.2–1.2)
BUN SERPL-MCNC: 9 MG/DL — SIGNIFICANT CHANGE UP (ref 7–23)
CALCIUM SERPL-MCNC: 8.4 MG/DL — SIGNIFICANT CHANGE UP (ref 8.4–10.5)
CHLORIDE SERPL-SCNC: 105 MMOL/L — SIGNIFICANT CHANGE UP (ref 98–107)
CO2 SERPL-SCNC: 22 MMOL/L — SIGNIFICANT CHANGE UP (ref 22–31)
CREAT SERPL-MCNC: 0.37 MG/DL — LOW (ref 0.5–1.3)
CRP SERPL-MCNC: 26.3 MG/L — HIGH
EGFR: 156 ML/MIN/1.73M2 — SIGNIFICANT CHANGE UP
EOSINOPHIL # BLD AUTO: 0.06 K/UL — SIGNIFICANT CHANGE UP (ref 0–0.5)
EOSINOPHIL NFR BLD AUTO: 0.7 % — SIGNIFICANT CHANGE UP (ref 0–6)
GLUCOSE SERPL-MCNC: 78 MG/DL — SIGNIFICANT CHANGE UP (ref 70–99)
HAPTOGLOB SERPL-MCNC: <20 MG/DL — LOW (ref 34–200)
HCT VFR BLD CALC: 24.8 % — LOW (ref 39–50)
HGB BLD-MCNC: 8.1 G/DL — LOW (ref 13–17)
IANC: 4.08 K/UL — SIGNIFICANT CHANGE UP (ref 1.8–7.4)
IMM GRANULOCYTES NFR BLD AUTO: 0.3 % — SIGNIFICANT CHANGE UP (ref 0–0.9)
LDH SERPL L TO P-CCNC: 462 U/L — HIGH (ref 135–225)
LYMPHOCYTES # BLD AUTO: 4.29 K/UL — HIGH (ref 1–3.3)
LYMPHOCYTES # BLD AUTO: 47.8 % — HIGH (ref 13–44)
MCHC RBC-ENTMCNC: 22.3 PG — LOW (ref 27–34)
MCHC RBC-ENTMCNC: 32.7 GM/DL — SIGNIFICANT CHANGE UP (ref 32–36)
MCV RBC AUTO: 68.3 FL — LOW (ref 80–100)
MONOCYTES # BLD AUTO: 0.47 K/UL — SIGNIFICANT CHANGE UP (ref 0–0.9)
MONOCYTES NFR BLD AUTO: 5.2 % — SIGNIFICANT CHANGE UP (ref 2–14)
NEUTROPHILS # BLD AUTO: 4.08 K/UL — SIGNIFICANT CHANGE UP (ref 1.8–7.4)
NEUTROPHILS NFR BLD AUTO: 45.4 % — SIGNIFICANT CHANGE UP (ref 43–77)
NRBC # BLD: 60 /100 WBCS — HIGH (ref 0–0)
NRBC # FLD: 5.4 K/UL — HIGH (ref 0–0)
PHOSPHATE SERPL-MCNC: 3.8 MG/DL — SIGNIFICANT CHANGE UP (ref 2.5–4.5)
PLATELET # BLD AUTO: 292 K/UL — SIGNIFICANT CHANGE UP (ref 150–400)
POTASSIUM SERPL-MCNC: 4 MMOL/L — SIGNIFICANT CHANGE UP (ref 3.5–5.3)
POTASSIUM SERPL-SCNC: 4 MMOL/L — SIGNIFICANT CHANGE UP (ref 3.5–5.3)
PROT SERPL-MCNC: 7.4 G/DL — SIGNIFICANT CHANGE UP (ref 6–8.3)
RBC # BLD: 3.63 M/UL — LOW (ref 4.2–5.8)
RBC # BLD: 3.63 M/UL — LOW (ref 4.2–5.8)
RBC # FLD: 25.6 % — HIGH (ref 10.3–14.5)
RETICS #: 370.6 K/UL — HIGH (ref 25–125)
RETICS/RBC NFR: 9.9 % — HIGH (ref 0.5–2.5)
SODIUM SERPL-SCNC: 140 MMOL/L — SIGNIFICANT CHANGE UP (ref 135–145)
WBC # BLD: 8.98 K/UL — SIGNIFICANT CHANGE UP (ref 3.8–10.5)
WBC # FLD AUTO: 8.98 K/UL — SIGNIFICANT CHANGE UP (ref 3.8–10.5)

## 2024-09-11 PROCEDURE — 99232 SBSQ HOSP IP/OBS MODERATE 35: CPT | Mod: GC

## 2024-09-11 RX ADMIN — Medication 75 MILLILITER(S): at 13:47

## 2024-09-11 RX ADMIN — KETOROLAC TROMETHAMINE 15 MILLIGRAM(S): 10 TABLET, FILM COATED ORAL at 06:52

## 2024-09-11 RX ADMIN — KETOROLAC TROMETHAMINE 15 MILLIGRAM(S): 10 TABLET, FILM COATED ORAL at 12:04

## 2024-09-11 RX ADMIN — MORPHINE SULFATE 30 MILLILITER(S): 30 TABLET, FILM COATED, EXTENDED RELEASE ORAL at 20:12

## 2024-09-11 RX ADMIN — MORPHINE SULFATE 30 MILLILITER(S): 30 TABLET, FILM COATED, EXTENDED RELEASE ORAL at 00:36

## 2024-09-11 RX ADMIN — MORPHINE SULFATE 15 MILLIGRAM(S): 30 TABLET, FILM COATED, EXTENDED RELEASE ORAL at 05:54

## 2024-09-11 RX ADMIN — KETOROLAC TROMETHAMINE 15 MILLIGRAM(S): 10 TABLET, FILM COATED ORAL at 13:04

## 2024-09-11 RX ADMIN — KETOROLAC TROMETHAMINE 15 MILLIGRAM(S): 10 TABLET, FILM COATED ORAL at 18:02

## 2024-09-11 RX ADMIN — Medication 10 GRAM(S): at 05:53

## 2024-09-11 RX ADMIN — PENICILLIN V POTASSIUM 250 MILLIGRAM(S): 500 TABLET ORAL at 18:02

## 2024-09-11 RX ADMIN — KETOROLAC TROMETHAMINE 15 MILLIGRAM(S): 10 TABLET, FILM COATED ORAL at 01:43

## 2024-09-11 RX ADMIN — CHLORHEXIDINE GLUCONATE ORAL RINSE 1 APPLICATION(S): 1.2 SOLUTION DENTAL at 12:04

## 2024-09-11 RX ADMIN — KETOROLAC TROMETHAMINE 15 MILLIGRAM(S): 10 TABLET, FILM COATED ORAL at 05:52

## 2024-09-11 RX ADMIN — FOLIC ACID 1 MILLIGRAM(S): 1 TABLET ORAL at 12:04

## 2024-09-11 RX ADMIN — HYDROXYUREA 1500 MILLIGRAM(S): 500 CAPSULE ORAL at 12:03

## 2024-09-11 RX ADMIN — PENICILLIN V POTASSIUM 250 MILLIGRAM(S): 500 TABLET ORAL at 05:54

## 2024-09-11 RX ADMIN — MORPHINE SULFATE 15 MILLIGRAM(S): 30 TABLET, FILM COATED, EXTENDED RELEASE ORAL at 06:54

## 2024-09-11 RX ADMIN — MORPHINE SULFATE 30 MILLILITER(S): 30 TABLET, FILM COATED, EXTENDED RELEASE ORAL at 08:11

## 2024-09-11 RX ADMIN — MORPHINE SULFATE 15 MILLIGRAM(S): 30 TABLET, FILM COATED, EXTENDED RELEASE ORAL at 19:28

## 2024-09-11 RX ADMIN — Medication 10 GRAM(S): at 18:01

## 2024-09-11 RX ADMIN — VOXELOTOR 1500 MILLIGRAM(S): 300 TABLET, FOR SUSPENSION ORAL at 13:45

## 2024-09-11 RX ADMIN — ENOXAPARIN SODIUM 30 MILLIGRAM(S): 150 INJECTION SUBCUTANEOUS at 13:45

## 2024-09-11 RX ADMIN — MORPHINE SULFATE 15 MILLIGRAM(S): 30 TABLET, FILM COATED, EXTENDED RELEASE ORAL at 18:28

## 2024-09-11 RX ADMIN — Medication 75 MILLILITER(S): at 02:33

## 2024-09-11 RX ADMIN — KETOROLAC TROMETHAMINE 15 MILLIGRAM(S): 10 TABLET, FILM COATED ORAL at 19:01

## 2024-09-11 RX ADMIN — KETOROLAC TROMETHAMINE 15 MILLIGRAM(S): 10 TABLET, FILM COATED ORAL at 00:43

## 2024-09-11 NOTE — PROGRESS NOTE ADULT - SUBJECTIVE AND OBJECTIVE BOX
Taurus Mayo M.D.   PGY-1 Internal Medicine  ** Note not finalized until signed by attending **   --------------------------  Taurus Mayo MD  Internal Medicine   PGY-1  --------------------------    SUBJECTIVE / OVERNIGHT EVENTS:    Pt seen in AM at bedside, resting comfortably in bed, reports back, b/l arm pain, and L hip pain. When asked, pt denies any recent or active fever, chills, nausea, vomiting, headache, acute sob, chest pain, abdominal pain, genitourinary sx, or swelling.     MEDICATIONS  (STANDING):  chlorhexidine 2% Cloths 1 Application(s) Topical daily  enoxaparin Injectable 30 milliGRAM(s) SubCutaneous every 24 hours  folic acid 1 milliGRAM(s) Oral daily  glutamine Powder 10 Gram(s) Oral two times a day  hydroxyurea 1500 milliGRAM(s) Oral daily  ketorolac   Injectable 15 milliGRAM(s) IV Push every 6 hours  morphine ER Tablet 15 milliGRAM(s) Oral two times a day  morphine PCA (5 mG/mL) 30 milliLiter(s) PCA Continuous PCA Continuous  penicillin   milliGRAM(s) Oral two times a day  sodium chloride 0.45%. 1000 milliLiter(s) (75 mL/Hr) IV Continuous <Continuous>  voxelotor 1500 milliGRAM(s) Oral daily    MEDICATIONS  (PRN):  naloxone Injectable 0.1 milliGRAM(s) IV Push every 3 minutes PRN For ANY of the following changes in patient status:  A. RR LESS THAN 10 breaths per minute, B. Oxygen saturation LESS THAN 90%, C. Sedation score of 6  ondansetron Injectable 4 milliGRAM(s) IV Push every 6 hours PRN Nausea  polyethylene glycol 3350 17 Gram(s) Oral two times a day PRN Constipation  senna 2 Tablet(s) Oral at bedtime PRN Constipation    CAPILLARY BLOOD GLUCOSE    I&O's Summary    10 Sep 2024 07:01  -  11 Sep 2024 07:00  --------------------------------------------------------  IN: 1305 mL / OUT: 1575 mL / NET: -270 mL    PHYSICAL EXAM:  Vital Signs Last 24 Hrs  T(C): 36.9 (11 Sep 2024 12:00), Max: 37 (10 Sep 2024 16:00)  T(F): 98.5 (11 Sep 2024 12:00), Max: 98.6 (10 Sep 2024 16:00)  HR: 85 (11 Sep 2024 12:00) (71 - 89)  BP: 121/67 (11 Sep 2024 12:00) (104/64 - 121/67)  BP(mean): --  RR: 18 (11 Sep 2024 12:00) (17 - 18)  SpO2: 97% (11 Sep 2024 12:00) (97% - 100%)    Parameters below as of 11 Sep 2024 12:00  Patient On (Oxygen Delivery Method): room air    CONSTITUTIONAL: NAD; thin  HEENT: PERRL, clear conjunctiva  RESPIRATORY: Normal respiratory effort; lungs are clear to auscultation bilaterally; No Crackles/Rhonchi/Wheezing  CARDIOVASCULAR: Regular rate and rhythm, normal S1 and S2, no murmur/rub/gallop; No lower extremity edema; Peripheral pulses are 2+ bilaterally  ABDOMEN: Nontender to palpation, normoactive bowel sounds, no rebound/guarding; No hepatosplenomegaly  MUSCULOSKELETAL: Mild TP cervical spine + L scapula. Mild L hip tenderness. No clubbing or cyanosis of digits; no tenderness to palpation  EXTREMITY: Lower extremities Non-tender to palpation; non-erythematous B/L  NEURO: A&Ox3; no focal deficits   PSYCH: normal mood; Affect appropirate    LABS:                        8.1    8.98  )-----------( 292      ( 11 Sep 2024 05:50 )             24.8     09-11    140  |  105  |  9   ----------------------------<  78  4.0   |  22  |  0.37<L>    Ca    8.4      11 Sep 2024 05:50  Phos  3.8     09-11    TPro  7.4  /  Alb  3.1<L>  /  TBili  1.4<H>  /  DBili  x   /  AST  112<H>  /  ALT  56<H>  /  AlkPhos  286<H>  09-11    Urinalysis Basic - ( 11 Sep 2024 05:50 )    Color: x / Appearance: x / SG: x / pH: x  Gluc: 78 mg/dL / Ketone: x  / Bili: x / Urobili: x   Blood: x / Protein: x / Nitrite: x   Leuk Esterase: x / RBC: x / WBC x   Sq Epi: x / Non Sq Epi: x / Bacteria: x    RADIOLOGY & ADDITIONAL TESTS:  Results Reviewed: X  Imaging Personally Reviewed: X  Electrocardiogram Personally Reviewed: X

## 2024-09-11 NOTE — PROGRESS NOTE ADULT - ATTENDING COMMENTS
Seen and examined by me this afternoon, still w/ cervical/neck pain and L Hip pain 7/10, wants to keep current dose of PCA. Tolerating PO, +BM  C/w Morphine PCA 1.5mg q6m, 4H limit 30mg and will also c/w toradol thru 9/12 and MS contin for sickle cell crisis treatment  c/w bowel regimen while on opioids  C/w hydroxyurea/oxbryta and IVF's  Anticipate likely DC Home in 2-3 days  Rest as above

## 2024-09-12 DIAGNOSIS — M25.552 PAIN IN LEFT HIP: ICD-10-CM

## 2024-09-12 LAB
ALBUMIN SERPL ELPH-MCNC: 3.2 G/DL — LOW (ref 3.3–5)
ALP SERPL-CCNC: 323 U/L — HIGH (ref 40–120)
ALT FLD-CCNC: 64 U/L — HIGH (ref 4–41)
ANION GAP SERPL CALC-SCNC: 18 MMOL/L — HIGH (ref 7–14)
AST SERPL-CCNC: 128 U/L — HIGH (ref 4–40)
BASOPHILS # BLD AUTO: 0.07 K/UL — SIGNIFICANT CHANGE UP (ref 0–0.2)
BASOPHILS NFR BLD AUTO: 0.8 % — SIGNIFICANT CHANGE UP (ref 0–2)
BILIRUB SERPL-MCNC: 1.6 MG/DL — HIGH (ref 0.2–1.2)
BUN SERPL-MCNC: 8 MG/DL — SIGNIFICANT CHANGE UP (ref 7–23)
CALCIUM SERPL-MCNC: 8.6 MG/DL — SIGNIFICANT CHANGE UP (ref 8.4–10.5)
CHLORIDE SERPL-SCNC: 104 MMOL/L — SIGNIFICANT CHANGE UP (ref 98–107)
CO2 SERPL-SCNC: 18 MMOL/L — LOW (ref 22–31)
CREAT SERPL-MCNC: 0.39 MG/DL — LOW (ref 0.5–1.3)
CRP SERPL-MCNC: 32.4 MG/L — HIGH
EGFR: 154 ML/MIN/1.73M2 — SIGNIFICANT CHANGE UP
EOSINOPHIL # BLD AUTO: 0.11 K/UL — SIGNIFICANT CHANGE UP (ref 0–0.5)
EOSINOPHIL NFR BLD AUTO: 1.2 % — SIGNIFICANT CHANGE UP (ref 0–6)
GLUCOSE SERPL-MCNC: 66 MG/DL — LOW (ref 70–99)
HAPTOGLOB SERPL-MCNC: <20 MG/DL — LOW (ref 34–200)
HCT VFR BLD CALC: 24 % — LOW (ref 39–50)
HGB BLD-MCNC: 7.9 G/DL — LOW (ref 13–17)
IANC: 4.45 K/UL — SIGNIFICANT CHANGE UP (ref 1.8–7.4)
IMM GRANULOCYTES NFR BLD AUTO: 0.4 % — SIGNIFICANT CHANGE UP (ref 0–0.9)
LDH SERPL L TO P-CCNC: 461 U/L — HIGH (ref 135–225)
LYMPHOCYTES # BLD AUTO: 3.65 K/UL — HIGH (ref 1–3.3)
LYMPHOCYTES # BLD AUTO: 40.8 % — SIGNIFICANT CHANGE UP (ref 13–44)
MCHC RBC-ENTMCNC: 22.6 PG — LOW (ref 27–34)
MCHC RBC-ENTMCNC: 32.9 GM/DL — SIGNIFICANT CHANGE UP (ref 32–36)
MCV RBC AUTO: 68.6 FL — LOW (ref 80–100)
MONOCYTES # BLD AUTO: 0.63 K/UL — SIGNIFICANT CHANGE UP (ref 0–0.9)
MONOCYTES NFR BLD AUTO: 7 % — SIGNIFICANT CHANGE UP (ref 2–14)
NEUTROPHILS # BLD AUTO: 4.45 K/UL — SIGNIFICANT CHANGE UP (ref 1.8–7.4)
NEUTROPHILS NFR BLD AUTO: 49.8 % — SIGNIFICANT CHANGE UP (ref 43–77)
NRBC # BLD: 58 /100 WBCS — HIGH (ref 0–0)
NRBC # FLD: 5.17 K/UL — HIGH (ref 0–0)
PHOSPHATE SERPL-MCNC: 4 MG/DL — SIGNIFICANT CHANGE UP (ref 2.5–4.5)
PLATELET # BLD AUTO: 230 K/UL — SIGNIFICANT CHANGE UP (ref 150–400)
POTASSIUM SERPL-MCNC: 4.1 MMOL/L — SIGNIFICANT CHANGE UP (ref 3.5–5.3)
POTASSIUM SERPL-SCNC: 4.1 MMOL/L — SIGNIFICANT CHANGE UP (ref 3.5–5.3)
PROT SERPL-MCNC: 7.9 G/DL — SIGNIFICANT CHANGE UP (ref 6–8.3)
RBC # BLD: 3.5 M/UL — LOW (ref 4.2–5.8)
RBC # BLD: 3.5 M/UL — LOW (ref 4.2–5.8)
RBC # FLD: 26.1 % — HIGH (ref 10.3–14.5)
RETICS #: 333.3 K/UL — HIGH (ref 25–125)
RETICS/RBC NFR: 9.5 % — HIGH (ref 0.5–2.5)
SODIUM SERPL-SCNC: 140 MMOL/L — SIGNIFICANT CHANGE UP (ref 135–145)
WBC # BLD: 8.95 K/UL — SIGNIFICANT CHANGE UP (ref 3.8–10.5)
WBC # FLD AUTO: 8.95 K/UL — SIGNIFICANT CHANGE UP (ref 3.8–10.5)

## 2024-09-12 PROCEDURE — 73502 X-RAY EXAM HIP UNI 2-3 VIEWS: CPT | Mod: 26,LT

## 2024-09-12 PROCEDURE — 99232 SBSQ HOSP IP/OBS MODERATE 35: CPT | Mod: GC

## 2024-09-12 RX ORDER — MORPHINE SULFATE 30 MG/1
15 TABLET, FILM COATED, EXTENDED RELEASE ORAL
Refills: 0 | Status: DISCONTINUED | OUTPATIENT
Start: 2024-09-12 | End: 2024-09-14

## 2024-09-12 RX ORDER — MORPHINE SULFATE 30 MG/1
30 TABLET, FILM COATED, EXTENDED RELEASE ORAL
Refills: 0 | Status: DISCONTINUED | OUTPATIENT
Start: 2024-09-12 | End: 2024-09-18

## 2024-09-12 RX ORDER — MORPHINE SULFATE 30 MG/1
30 TABLET, FILM COATED, EXTENDED RELEASE ORAL
Refills: 0 | Status: DISCONTINUED | OUTPATIENT
Start: 2024-09-12 | End: 2024-09-12

## 2024-09-12 RX ADMIN — KETOROLAC TROMETHAMINE 15 MILLIGRAM(S): 10 TABLET, FILM COATED ORAL at 06:14

## 2024-09-12 RX ADMIN — MORPHINE SULFATE 15 MILLIGRAM(S): 30 TABLET, FILM COATED, EXTENDED RELEASE ORAL at 06:20

## 2024-09-12 RX ADMIN — CHLORHEXIDINE GLUCONATE ORAL RINSE 1 APPLICATION(S): 1.2 SOLUTION DENTAL at 13:17

## 2024-09-12 RX ADMIN — PENICILLIN V POTASSIUM 250 MILLIGRAM(S): 500 TABLET ORAL at 06:14

## 2024-09-12 RX ADMIN — Medication 10 GRAM(S): at 06:14

## 2024-09-12 RX ADMIN — MORPHINE SULFATE 30 MILLILITER(S): 30 TABLET, FILM COATED, EXTENDED RELEASE ORAL at 08:20

## 2024-09-12 RX ADMIN — KETOROLAC TROMETHAMINE 15 MILLIGRAM(S): 10 TABLET, FILM COATED ORAL at 13:16

## 2024-09-12 RX ADMIN — MORPHINE SULFATE 15 MILLIGRAM(S): 30 TABLET, FILM COATED, EXTENDED RELEASE ORAL at 19:10

## 2024-09-12 RX ADMIN — KETOROLAC TROMETHAMINE 15 MILLIGRAM(S): 10 TABLET, FILM COATED ORAL at 07:04

## 2024-09-12 RX ADMIN — FOLIC ACID 1 MILLIGRAM(S): 1 TABLET ORAL at 13:16

## 2024-09-12 RX ADMIN — Medication 75 MILLILITER(S): at 04:13

## 2024-09-12 RX ADMIN — KETOROLAC TROMETHAMINE 15 MILLIGRAM(S): 10 TABLET, FILM COATED ORAL at 14:16

## 2024-09-12 RX ADMIN — KETOROLAC TROMETHAMINE 15 MILLIGRAM(S): 10 TABLET, FILM COATED ORAL at 20:09

## 2024-09-12 RX ADMIN — MORPHINE SULFATE 15 MILLIGRAM(S): 30 TABLET, FILM COATED, EXTENDED RELEASE ORAL at 07:04

## 2024-09-12 RX ADMIN — MORPHINE SULFATE 30 MILLILITER(S): 30 TABLET, FILM COATED, EXTENDED RELEASE ORAL at 15:17

## 2024-09-12 RX ADMIN — KETOROLAC TROMETHAMINE 15 MILLIGRAM(S): 10 TABLET, FILM COATED ORAL at 01:10

## 2024-09-12 RX ADMIN — HYDROXYUREA 1500 MILLIGRAM(S): 500 CAPSULE ORAL at 13:17

## 2024-09-12 RX ADMIN — KETOROLAC TROMETHAMINE 15 MILLIGRAM(S): 10 TABLET, FILM COATED ORAL at 19:09

## 2024-09-12 RX ADMIN — MORPHINE SULFATE 30 MILLILITER(S): 30 TABLET, FILM COATED, EXTENDED RELEASE ORAL at 11:29

## 2024-09-12 RX ADMIN — Medication 75 MILLILITER(S): at 18:26

## 2024-09-12 RX ADMIN — VOXELOTOR 1500 MILLIGRAM(S): 300 TABLET, FOR SUSPENSION ORAL at 19:10

## 2024-09-12 RX ADMIN — KETOROLAC TROMETHAMINE 15 MILLIGRAM(S): 10 TABLET, FILM COATED ORAL at 00:13

## 2024-09-12 RX ADMIN — Medication 10 GRAM(S): at 19:10

## 2024-09-12 RX ADMIN — MORPHINE SULFATE 30 MILLILITER(S): 30 TABLET, FILM COATED, EXTENDED RELEASE ORAL at 20:23

## 2024-09-12 RX ADMIN — ENOXAPARIN SODIUM 30 MILLIGRAM(S): 150 INJECTION SUBCUTANEOUS at 13:17

## 2024-09-12 RX ADMIN — MORPHINE SULFATE 15 MILLIGRAM(S): 30 TABLET, FILM COATED, EXTENDED RELEASE ORAL at 20:09

## 2024-09-12 RX ADMIN — PENICILLIN V POTASSIUM 250 MILLIGRAM(S): 500 TABLET ORAL at 19:09

## 2024-09-12 NOTE — PROGRESS NOTE ADULT - PROBLEM SELECTOR PLAN 3
- EKG with TWI V1-V3 (anterior leads)  - troponin neg  - maybe 2/2 demand iso SCC  - TTE normal    Plan  - CTM - low concern for septic arthritis given improving tenderness, improved WBC, afebrile  - R knee XR with no effusion, osteonecrosis  - US knee with small effusion     Plan  - CTM

## 2024-09-12 NOTE — PROGRESS NOTE ADULT - SUBJECTIVE AND OBJECTIVE BOX
PROGRESS NOTE:   Authored by Dr. Komal Hand MD (PGY-2). Pager [facility] [phone] / [place] ( [id number]) or via TEAMS    Patient is a 29y old  Male who presents with a chief complaint of sickle cell crisis (11 Sep 2024 09:55)      SUBJECTIVE / OVERNIGHT EVENTS:  No acute events overnight.     ADDITIONAL REVIEW OF SYSTEMS:  Patient denies fevers, chills, chest pain, shortness of breath, nausea, abdominal pain, diarrhea, constipation, dysuria, leg swelling, headache, light headedness.    MEDICATIONS  (STANDING):  chlorhexidine 2% Cloths 1 Application(s) Topical daily  enoxaparin Injectable 30 milliGRAM(s) SubCutaneous every 24 hours  folic acid 1 milliGRAM(s) Oral daily  glutamine Powder 10 Gram(s) Oral two times a day  hydroxyurea 1500 milliGRAM(s) Oral daily  ketorolac   Injectable 15 milliGRAM(s) IV Push every 6 hours  morphine ER Tablet 15 milliGRAM(s) Oral two times a day  morphine PCA (5 mG/mL) 30 milliLiter(s) PCA Continuous PCA Continuous  penicillin   milliGRAM(s) Oral two times a day  sodium chloride 0.45%. 1000 milliLiter(s) (75 mL/Hr) IV Continuous <Continuous>  voxelotor 1500 milliGRAM(s) Oral daily    MEDICATIONS  (PRN):  naloxone Injectable 0.1 milliGRAM(s) IV Push every 3 minutes PRN For ANY of the following changes in patient status:  A. RR LESS THAN 10 breaths per minute, B. Oxygen saturation LESS THAN 90%, C. Sedation score of 6  ondansetron Injectable 4 milliGRAM(s) IV Push every 6 hours PRN Nausea  polyethylene glycol 3350 17 Gram(s) Oral two times a day PRN Constipation  senna 2 Tablet(s) Oral at bedtime PRN Constipation      CAPILLARY BLOOD GLUCOSE        I&O's Summary    11 Sep 2024 07:01  -  12 Sep 2024 07:00  --------------------------------------------------------  IN: 0 mL / OUT: 700 mL / NET: -700 mL        PHYSICAL EXAM:  Vital Signs Last 24 Hrs  T(C): 36.8 (12 Sep 2024 04:00), Max: 37.1 (12 Sep 2024 00:00)  T(F): 98.2 (12 Sep 2024 04:00), Max: 98.7 (12 Sep 2024 00:00)  HR: 87 (12 Sep 2024 04:00) (78 - 87)  BP: 107/67 (12 Sep 2024 04:00) (107/67 - 121/67)  BP(mean): --  RR: 17 (12 Sep 2024 04:00) (17 - 18)  SpO2: 99% (12 Sep 2024 04:00) (97% - 100%)    Parameters below as of 12 Sep 2024 04:00  Patient On (Oxygen Delivery Method): room air        CONSTITUTIONAL: NAD, well-developed  RESPIRATORY: Normal respiratory effort; lungs are clear to auscultation bilaterally  CARDIOVASCULAR: Regular rate and rhythm, normal S1 and S2, no murmur/rub/gallop; No lower extremity edema; Peripheral pulses are 2+ bilaterally  ABDOMEN: Nontender to palpation, normoactive bowel sounds, no rebound/guarding; No hepatosplenomegaly  MSK: no clubbing or cyanosis of digits; no joint swelling or tenderness to palpation  PSYCH: A+O to person, place, and time; affect appropriate    LABS:                        8.1    8.98  )-----------( 292      ( 11 Sep 2024 05:50 )             24.8     09-11    140  |  105  |  9   ----------------------------<  78  4.0   |  22  |  0.37<L>    Ca    8.4      11 Sep 2024 05:50  Phos  3.8     09-11    TPro  7.4  /  Alb  3.1<L>  /  TBili  1.4<H>  /  DBili  x   /  AST  112<H>  /  ALT  56<H>  /  AlkPhos  286<H>  09-11          Urinalysis Basic - ( 11 Sep 2024 05:50 )    Color: x / Appearance: x / SG: x / pH: x  Gluc: 78 mg/dL / Ketone: x  / Bili: x / Urobili: x   Blood: x / Protein: x / Nitrite: x   Leuk Esterase: x / RBC: x / WBC x   Sq Epi: x / Non Sq Epi: x / Bacteria: x          Tele Reviewed:    RADIOLOGY & ADDITIONAL TESTS:  Results Reviewed:   Imaging Personally Reviewed:  Electrocardiogram Personally Reviewed:     PROGRESS NOTE:   Authored by Dr. Komal Hand MD (PGY-2). Pager [facility] [phone] / [place] ( [id number]) or via TEAMS    Patient is a 29y old  Male who presents with a chief complaint of sickle cell crisis (11 Sep 2024 09:55)      SUBJECTIVE / OVERNIGHT EVENTS:  No acute events overnight. States pain over body is better except for left hip, still painful however less warm and swollen. Asking to go down on PCA pump demand dose.         MEDICATIONS  (STANDING):  chlorhexidine 2% Cloths 1 Application(s) Topical daily  enoxaparin Injectable 30 milliGRAM(s) SubCutaneous every 24 hours  folic acid 1 milliGRAM(s) Oral daily  glutamine Powder 10 Gram(s) Oral two times a day  hydroxyurea 1500 milliGRAM(s) Oral daily  ketorolac   Injectable 15 milliGRAM(s) IV Push every 6 hours  morphine ER Tablet 15 milliGRAM(s) Oral two times a day  morphine PCA (5 mG/mL) 30 milliLiter(s) PCA Continuous PCA Continuous  penicillin   milliGRAM(s) Oral two times a day  sodium chloride 0.45%. 1000 milliLiter(s) (75 mL/Hr) IV Continuous <Continuous>  voxelotor 1500 milliGRAM(s) Oral daily    MEDICATIONS  (PRN):  naloxone Injectable 0.1 milliGRAM(s) IV Push every 3 minutes PRN For ANY of the following changes in patient status:  A. RR LESS THAN 10 breaths per minute, B. Oxygen saturation LESS THAN 90%, C. Sedation score of 6  ondansetron Injectable 4 milliGRAM(s) IV Push every 6 hours PRN Nausea  polyethylene glycol 3350 17 Gram(s) Oral two times a day PRN Constipation  senna 2 Tablet(s) Oral at bedtime PRN Constipation      CAPILLARY BLOOD GLUCOSE        I&O's Summary    11 Sep 2024 07:01  -  12 Sep 2024 07:00  --------------------------------------------------------  IN: 0 mL / OUT: 700 mL / NET: -700 mL        PHYSICAL EXAM:  Vital Signs Last 24 Hrs  T(C): 36.8 (12 Sep 2024 04:00), Max: 37.1 (12 Sep 2024 00:00)  T(F): 98.2 (12 Sep 2024 04:00), Max: 98.7 (12 Sep 2024 00:00)  HR: 87 (12 Sep 2024 04:00) (78 - 87)  BP: 107/67 (12 Sep 2024 04:00) (107/67 - 121/67)  BP(mean): --  RR: 17 (12 Sep 2024 04:00) (17 - 18)  SpO2: 99% (12 Sep 2024 04:00) (97% - 100%)    Parameters below as of 12 Sep 2024 04:00  Patient On (Oxygen Delivery Method): room air        CONSTITUTIONAL: NAD, well-developed  RESPIRATORY: Normal respiratory effort; lungs are clear to auscultation bilaterally  CARDIOVASCULAR: Regular rate and rhythm, normal S1 and S2, no murmur/rub/gallop; No lower extremity edema; Peripheral pulses are 2+ bilaterally  ABDOMEN: Nontender to palpation, normoactive bowel sounds, no rebound/guarding; No hepatosplenomegaly  MSK: no clubbing or cyanosis of digits; no joint swelling or tenderness to palpation  PSYCH: A+O to person, place, and time; affect appropriate    LABS:                        8.1    8.98  )-----------( 292      ( 11 Sep 2024 05:50 )             24.8     09-11    140  |  105  |  9   ----------------------------<  78  4.0   |  22  |  0.37<L>    Ca    8.4      11 Sep 2024 05:50  Phos  3.8     09-11    TPro  7.4  /  Alb  3.1<L>  /  TBili  1.4<H>  /  DBili  x   /  AST  112<H>  /  ALT  56<H>  /  AlkPhos  286<H>  09-11          Urinalysis Basic - ( 11 Sep 2024 05:50 )    Color: x / Appearance: x / SG: x / pH: x  Gluc: 78 mg/dL / Ketone: x  / Bili: x / Urobili: x   Blood: x / Protein: x / Nitrite: x   Leuk Esterase: x / RBC: x / WBC x   Sq Epi: x / Non Sq Epi: x / Bacteria: x          Tele Reviewed:    RADIOLOGY & ADDITIONAL TESTS:  Results Reviewed:   Imaging Personally Reviewed:  Electrocardiogram Personally Reviewed:

## 2024-09-12 NOTE — PROGRESS NOTE ADULT - PROBLEM SELECTOR PLAN 4
- Fluids: 1/2 NS  - Electrolytes: Will replete to maintain K>4, Phos>3, and Mag>2  - Diet: regular  - Activity: as tolerated  - DVT Prophylaxis: lovenox  - PT: outpatient PT  - Disposition: Home, nearing end of clinical course - EKG with TWI V1-V3 (anterior leads)  - troponin neg  - maybe 2/2 demand iso SCC  - TTE normal    Plan  - CTM

## 2024-09-12 NOTE — PROGRESS NOTE ADULT - PROBLEM SELECTOR PLAN 2
- low concern for septic arthritis given improving tenderness, improved WBC, afebrile  - R knee XR with no effusion, osteonecrosis  - US knee with small effusion     Plan  - CTM - noting swelling and warmth over left hip for several days, retic and LDH markers stable however will X ray left hip to eval  - known occlusive crisis in the past/ osteonecrosis from MRI in 2019

## 2024-09-12 NOTE — PROGRESS NOTE ADULT - ATTENDING COMMENTS
Seen and examined by me this morning, still w/ pain on L hip but overall feels better, wants to go down on PCA pump. Tolerating PO  Will decrease Morphine PCA to 1.25 q6m and 4H limit to 28mg, c/w toradol thru today and c/w MS contin for sickle cell crisis treatment  X-Ray of L HIP shows-Generalized heterogeneous osteosclerosis compatible with chronic stigmata of the underlying sickle cell disease. Maintained femoral head sphericity without interval articular surface collapse irregularity or fragmentation and preserved left hip joint space.  No acute fractures or dislocations  c/w bowel regimen while on opioids  C/w hydroxyurea/oxbryta and IVF's  Anticipate likely DC Home in 2-3 days  Rest as above

## 2024-09-13 LAB
ALBUMIN SERPL ELPH-MCNC: 3.2 G/DL — LOW (ref 3.3–5)
ALP SERPL-CCNC: 314 U/L — HIGH (ref 40–120)
ALT FLD-CCNC: 74 U/L — HIGH (ref 4–41)
ANION GAP SERPL CALC-SCNC: 12 MMOL/L — SIGNIFICANT CHANGE UP (ref 7–14)
ANISOCYTOSIS BLD QL: SIGNIFICANT CHANGE UP
AST SERPL-CCNC: 124 U/L — HIGH (ref 4–40)
BASOPHILS # BLD AUTO: 0 K/UL — SIGNIFICANT CHANGE UP (ref 0–0.2)
BASOPHILS NFR BLD AUTO: 0 % — SIGNIFICANT CHANGE UP (ref 0–2)
BILIRUB SERPL-MCNC: 1.7 MG/DL — HIGH (ref 0.2–1.2)
BUN SERPL-MCNC: 8 MG/DL — SIGNIFICANT CHANGE UP (ref 7–23)
CALCIUM SERPL-MCNC: 8.1 MG/DL — LOW (ref 8.4–10.5)
CHLORIDE SERPL-SCNC: 101 MMOL/L — SIGNIFICANT CHANGE UP (ref 98–107)
CO2 SERPL-SCNC: 22 MMOL/L — SIGNIFICANT CHANGE UP (ref 22–31)
CREAT SERPL-MCNC: 0.31 MG/DL — LOW (ref 0.5–1.3)
CRP SERPL-MCNC: 39.8 MG/L — HIGH
EGFR: 165 ML/MIN/1.73M2 — SIGNIFICANT CHANGE UP
EOSINOPHIL # BLD AUTO: 0.09 K/UL — SIGNIFICANT CHANGE UP (ref 0–0.5)
EOSINOPHIL NFR BLD AUTO: 1.1 % — SIGNIFICANT CHANGE UP (ref 0–6)
GIANT PLATELETS BLD QL SMEAR: PRESENT — SIGNIFICANT CHANGE UP
GLUCOSE SERPL-MCNC: 84 MG/DL — SIGNIFICANT CHANGE UP (ref 70–99)
HAPTOGLOB SERPL-MCNC: <20 MG/DL — LOW (ref 34–200)
HCT VFR BLD CALC: 22.4 % — LOW (ref 39–50)
HGB BLD-MCNC: 7.5 G/DL — LOW (ref 13–17)
HOWELL-JOLLY BOD BLD QL SMEAR: PRESENT — SIGNIFICANT CHANGE UP
HYPOCHROMIA BLD QL: SIGNIFICANT CHANGE UP
IANC: 3.6 K/UL — SIGNIFICANT CHANGE UP (ref 1.8–7.4)
LDH SERPL L TO P-CCNC: 484 U/L — HIGH (ref 135–225)
LYMPHOCYTES # BLD AUTO: 2.98 K/UL — SIGNIFICANT CHANGE UP (ref 1–3.3)
LYMPHOCYTES # BLD AUTO: 36 % — SIGNIFICANT CHANGE UP (ref 13–44)
MACROCYTES BLD QL: SIGNIFICANT CHANGE UP
MANUAL SMEAR VERIFICATION: SIGNIFICANT CHANGE UP
MCHC RBC-ENTMCNC: 22.7 PG — LOW (ref 27–34)
MCHC RBC-ENTMCNC: 33.5 GM/DL — SIGNIFICANT CHANGE UP (ref 32–36)
MCV RBC AUTO: 67.9 FL — LOW (ref 80–100)
MICROCYTES BLD QL: SIGNIFICANT CHANGE UP
MONOCYTES # BLD AUTO: 0.75 K/UL — SIGNIFICANT CHANGE UP (ref 0–0.9)
MONOCYTES NFR BLD AUTO: 9 % — SIGNIFICANT CHANGE UP (ref 2–14)
MYELOCYTES NFR BLD: 1.1 % — HIGH (ref 0–0)
NEUTROPHILS # BLD AUTO: 3.82 K/UL — SIGNIFICANT CHANGE UP (ref 1.8–7.4)
NEUTROPHILS NFR BLD AUTO: 46.1 % — SIGNIFICANT CHANGE UP (ref 43–77)
NRBC # BLD: 111 /100 WBCS — HIGH (ref 0–0)
PHOSPHATE SERPL-MCNC: 3.6 MG/DL — SIGNIFICANT CHANGE UP (ref 2.5–4.5)
PLAT MORPH BLD: NORMAL — SIGNIFICANT CHANGE UP
PLATELET # BLD AUTO: 229 K/UL — SIGNIFICANT CHANGE UP (ref 150–400)
PLATELET COUNT - ESTIMATE: NORMAL — SIGNIFICANT CHANGE UP
POIKILOCYTOSIS BLD QL AUTO: SIGNIFICANT CHANGE UP
POLYCHROMASIA BLD QL SMEAR: SIGNIFICANT CHANGE UP
POTASSIUM SERPL-MCNC: 3.4 MMOL/L — LOW (ref 3.5–5.3)
POTASSIUM SERPL-SCNC: 3.4 MMOL/L — LOW (ref 3.5–5.3)
PROT SERPL-MCNC: 7.5 G/DL — SIGNIFICANT CHANGE UP (ref 6–8.3)
RBC # BLD: 3.3 M/UL — LOW (ref 4.2–5.8)
RBC # BLD: 3.3 M/UL — LOW (ref 4.2–5.8)
RBC # FLD: 25.2 % — HIGH (ref 10.3–14.5)
RBC BLD AUTO: ABNORMAL
RETICS #: 292.3 K/UL — HIGH (ref 25–125)
RETICS/RBC NFR: 8.9 % — HIGH (ref 0.5–2.5)
SCHISTOCYTES BLD QL AUTO: SLIGHT — SIGNIFICANT CHANGE UP
SICKLE CELLS BLD QL SMEAR: SLIGHT — SIGNIFICANT CHANGE UP
SMUDGE CELLS # BLD: PRESENT — SIGNIFICANT CHANGE UP
SODIUM SERPL-SCNC: 135 MMOL/L — SIGNIFICANT CHANGE UP (ref 135–145)
TARGETS BLD QL SMEAR: SIGNIFICANT CHANGE UP
VARIANT LYMPHS # BLD: 6.7 % — HIGH (ref 0–6)
WBC # BLD: 8.29 K/UL — SIGNIFICANT CHANGE UP (ref 3.8–10.5)
WBC # FLD AUTO: 8.29 K/UL — SIGNIFICANT CHANGE UP (ref 3.8–10.5)

## 2024-09-13 PROCEDURE — 99232 SBSQ HOSP IP/OBS MODERATE 35: CPT | Mod: GC

## 2024-09-13 RX ORDER — PANTOPRAZOLE SODIUM 40 MG/1
40 TABLET, DELAYED RELEASE ORAL DAILY
Refills: 0 | Status: COMPLETED | OUTPATIENT
Start: 2024-09-13 | End: 2024-09-17

## 2024-09-13 RX ADMIN — PANTOPRAZOLE SODIUM 40 MILLIGRAM(S): 40 TABLET, DELAYED RELEASE ORAL at 13:06

## 2024-09-13 RX ADMIN — FOLIC ACID 1 MILLIGRAM(S): 1 TABLET ORAL at 13:05

## 2024-09-13 RX ADMIN — MORPHINE SULFATE 15 MILLIGRAM(S): 30 TABLET, FILM COATED, EXTENDED RELEASE ORAL at 05:34

## 2024-09-13 RX ADMIN — Medication 10 GRAM(S): at 19:03

## 2024-09-13 RX ADMIN — Medication 600 MILLIGRAM(S): at 14:05

## 2024-09-13 RX ADMIN — Medication 10 GRAM(S): at 05:34

## 2024-09-13 RX ADMIN — Medication 600 MILLIGRAM(S): at 19:03

## 2024-09-13 RX ADMIN — MORPHINE SULFATE 30 MILLILITER(S): 30 TABLET, FILM COATED, EXTENDED RELEASE ORAL at 08:46

## 2024-09-13 RX ADMIN — MORPHINE SULFATE 15 MILLIGRAM(S): 30 TABLET, FILM COATED, EXTENDED RELEASE ORAL at 05:58

## 2024-09-13 RX ADMIN — ENOXAPARIN SODIUM 30 MILLIGRAM(S): 150 INJECTION SUBCUTANEOUS at 13:06

## 2024-09-13 RX ADMIN — Medication 75 MILLILITER(S): at 05:35

## 2024-09-13 RX ADMIN — VOXELOTOR 1500 MILLIGRAM(S): 300 TABLET, FOR SUSPENSION ORAL at 14:36

## 2024-09-13 RX ADMIN — Medication 600 MILLIGRAM(S): at 23:19

## 2024-09-13 RX ADMIN — CHLORHEXIDINE GLUCONATE ORAL RINSE 1 APPLICATION(S): 1.2 SOLUTION DENTAL at 13:06

## 2024-09-13 RX ADMIN — MORPHINE SULFATE 15 MILLIGRAM(S): 30 TABLET, FILM COATED, EXTENDED RELEASE ORAL at 19:03

## 2024-09-13 RX ADMIN — MORPHINE SULFATE 30 MILLILITER(S): 30 TABLET, FILM COATED, EXTENDED RELEASE ORAL at 20:11

## 2024-09-13 RX ADMIN — HYDROXYUREA 1500 MILLIGRAM(S): 500 CAPSULE ORAL at 13:20

## 2024-09-13 RX ADMIN — PENICILLIN V POTASSIUM 250 MILLIGRAM(S): 500 TABLET ORAL at 19:03

## 2024-09-13 RX ADMIN — Medication 600 MILLIGRAM(S): at 13:05

## 2024-09-13 RX ADMIN — PENICILLIN V POTASSIUM 250 MILLIGRAM(S): 500 TABLET ORAL at 05:35

## 2024-09-13 NOTE — PROGRESS NOTE ADULT - ATTENDING COMMENTS
Seen and examined by me this afternoon, pain on R Hip, R Knee and Cervical area, feels that he is going backwards but does not want to increase the dose of the PCA, tolerating PO, +BM, has been walking in hallway, wants to go home soon; last dose of toradol was last night  C/w Morphine PCA 1.25 q6m and 4H limit 28mg, c/w MS contin and will add ibuprofen ATC/PPI for sickle cell crisis treatment, will monitor response to new regimen, pain has overall improved since admission, current worsening of pain could be related to not being on toradol any longer, hence we added ibuprofen ATC. C/w bowel regimen while on opioids  C/w hydroxyurea/oxbryta and IVF's  Anticipate likely DC Home in next 2-3 days  Rest as above Seen and examined by me this afternoon, pain on R Hip, R Knee and Cervical area, feels that he is going backwards but does not want to increase the dose of the PCA, tolerating PO, +BM, has been walking in hallway, wants to go home soon; last dose of toradol was last night    C/w Morphine PCA 1.25 q6m and 4H limit 28mg, c/w MS contin and will add ibuprofen ATC/PPI for sickle cell crisis treatment, will monitor response to new regimen, pain has overall improved since admission, current worsening of pain could be related to not being on toradol any longer, hence we added ibuprofen ATC. C/w bowel regimen while on opioids  C/w hydroxyurea/oxbryta and IVF's  Anticipate likely DC Home in next 2-3 days  Rest as above

## 2024-09-13 NOTE — PROGRESS NOTE ADULT - PROBLEM SELECTOR PLAN 2
- noting swelling and warmth over left hip for several days, retic and LDH markers stable however will X ray left hip to eval  - known occlusive crisis in the past/ osteonecrosis from MRI in 2019    Plan:   - XR L Hip: No acute pathology  - CTM

## 2024-09-13 NOTE — PROGRESS NOTE ADULT - SUBJECTIVE AND OBJECTIVE BOX
--------------------------  Taurus Mayo MD  Internal Medicine   PGY-1  --------------------------    SUBJECTIVE / OVERNIGHT EVENTS:    Pt seen in AM at bedside, reports worsening pain. Worsening in R knee, cervical spine, R hip. Does not want to go up on PCA: "feels like we would be going backwards". Frustrated pain is not improving. When asked, pt denies any recent or active fever, chills, nausea, vomiting, headache, acute sob, chest pain, abdominal pain, genitourinary sx.     MEDICATIONS  (STANDING):  chlorhexidine 2% Cloths 1 Application(s) Topical daily  enoxaparin Injectable 30 milliGRAM(s) SubCutaneous every 24 hours  folic acid 1 milliGRAM(s) Oral daily  glutamine Powder 10 Gram(s) Oral two times a day  hydroxyurea 1500 milliGRAM(s) Oral daily  morphine ER Tablet 15 milliGRAM(s) Oral two times a day  morphine PCA (5 mG/mL) 30 milliLiter(s) PCA Continuous PCA Continuous  penicillin   milliGRAM(s) Oral two times a day  sodium chloride 0.45%. 1000 milliLiter(s) (75 mL/Hr) IV Continuous <Continuous>  voxelotor 1500 milliGRAM(s) Oral daily    MEDICATIONS  (PRN):  naloxone Injectable 0.1 milliGRAM(s) IV Push every 3 minutes PRN For ANY of the following changes in patient status:  A. RR LESS THAN 10 breaths per minute, B. Oxygen saturation LESS THAN 90%, C. Sedation score of 6  ondansetron Injectable 4 milliGRAM(s) IV Push every 6 hours PRN Nausea  polyethylene glycol 3350 17 Gram(s) Oral two times a day PRN Constipation  senna 2 Tablet(s) Oral at bedtime PRN Constipation    CAPILLARY BLOOD GLUCOSE    I&O's Summary    12 Sep 2024 07:01  -  13 Sep 2024 07:00  --------------------------------------------------------  IN: 500 mL / OUT: 1500 mL / NET: -1000 mL    PHYSICAL EXAM:  Vital Signs Last 24 Hrs  T(C): 37.2 (13 Sep 2024 08:30), Max: 37.2 (13 Sep 2024 04:30)  T(F): 98.9 (13 Sep 2024 08:30), Max: 99 (13 Sep 2024 04:30)  HR: 91 (13 Sep 2024 08:30) (86 - 96)  BP: 110/78 (13 Sep 2024 08:30) (99/62 - 119/80)  RR: 16 (13 Sep 2024 08:30) (16 - 18)  SpO2: 99% (13 Sep 2024 08:30) (99% - 100%)    Parameters below as of 13 Sep 2024 08:30  Patient On (Oxygen Delivery Method): room air    CONSTITUTIONAL: NAD; thin  HEENT: PERRL, clear conjunctiva  RESPIRATORY: Normal respiratory effort; lungs are clear to auscultation bilaterally; No Crackles/Rhonchi/Wheezing  CARDIOVASCULAR: Regular rate and rhythm, normal S1 and S2, no murmur/rub/gallop; No lower extremity edema; Peripheral pulses are 2+ bilaterally  ABDOMEN: Nontender to palpation, normoactive bowel sounds, no rebound/guarding; No hepatosplenomegaly  MUSCULOSKELETAL: Mild TP cervical spine + L scapula. Mild L hip tenderness. No clubbing or cyanosis of digits; no tenderness to palpation  EXTREMITY: Lower extremities Non-tender to palpation; non-erythematous B/L  NEURO: A&Ox3; no focal deficits   PSYCH: normal mood; Affect appropirate    LABS:                        7.5    8.29  )-----------( 229      ( 13 Sep 2024 04:50 )             22.4     09-13    135  |  101  |  8   ----------------------------<  84  3.4<L>   |  22  |  0.31<L>    Ca    8.1<L>      13 Sep 2024 04:50  Phos  3.6     09-13    TPro  7.5  /  Alb  3.2<L>  /  TBili  1.7<H>  /  DBili  x   /  AST  124<H>  /  ALT  74<H>  /  AlkPhos  314<H>  09-13    Urinalysis Basic - ( 13 Sep 2024 04:50 )    Color: x / Appearance: x / SG: x / pH: x  Gluc: 84 mg/dL / Ketone: x  / Bili: x / Urobili: x   Blood: x / Protein: x / Nitrite: x   Leuk Esterase: x / RBC: x / WBC x   Sq Epi: x / Non Sq Epi: x / Bacteria: x    RADIOLOGY & ADDITIONAL TESTS:  Results Reviewed: X  Imaging Personally Reviewed: X  Electrocardiogram Personally Reviewed: X

## 2024-09-14 PROCEDURE — 99233 SBSQ HOSP IP/OBS HIGH 50: CPT | Mod: GC

## 2024-09-14 RX ORDER — MORPHINE SULFATE 30 MG/1
30 TABLET, FILM COATED, EXTENDED RELEASE ORAL
Refills: 0 | Status: DISCONTINUED | OUTPATIENT
Start: 2024-09-14 | End: 2024-09-14

## 2024-09-14 RX ORDER — MORPHINE SULFATE 30 MG/1
30 TABLET, FILM COATED, EXTENDED RELEASE ORAL
Refills: 0 | Status: DISCONTINUED | OUTPATIENT
Start: 2024-09-14 | End: 2024-09-20

## 2024-09-14 RX ORDER — MORPHINE SULFATE 30 MG/1
1.5 TABLET, FILM COATED, EXTENDED RELEASE ORAL EVERY 4 HOURS
Refills: 0 | Status: DISCONTINUED | OUTPATIENT
Start: 2024-09-14 | End: 2024-09-14

## 2024-09-14 RX ORDER — MORPHINE SULFATE 30 MG/1
15 TABLET, FILM COATED, EXTENDED RELEASE ORAL ONCE
Refills: 0 | Status: DISCONTINUED | OUTPATIENT
Start: 2024-09-14 | End: 2024-09-14

## 2024-09-14 RX ORDER — MORPHINE SULFATE 30 MG/1
1.5 TABLET, FILM COATED, EXTENDED RELEASE ORAL EVERY 4 HOURS
Refills: 0 | Status: DISCONTINUED | OUTPATIENT
Start: 2024-09-14 | End: 2024-09-17

## 2024-09-14 RX ADMIN — MORPHINE SULFATE 30 MILLILITER(S): 30 TABLET, FILM COATED, EXTENDED RELEASE ORAL at 01:40

## 2024-09-14 RX ADMIN — Medication 75 MILLILITER(S): at 05:17

## 2024-09-14 RX ADMIN — Medication 75 MILLILITER(S): at 11:20

## 2024-09-14 RX ADMIN — PANTOPRAZOLE SODIUM 40 MILLIGRAM(S): 40 TABLET, DELAYED RELEASE ORAL at 12:53

## 2024-09-14 RX ADMIN — VOXELOTOR 1500 MILLIGRAM(S): 300 TABLET, FOR SUSPENSION ORAL at 12:52

## 2024-09-14 RX ADMIN — Medication 600 MILLIGRAM(S): at 13:50

## 2024-09-14 RX ADMIN — ENOXAPARIN SODIUM 30 MILLIGRAM(S): 150 INJECTION SUBCUTANEOUS at 12:53

## 2024-09-14 RX ADMIN — MORPHINE SULFATE 1.5 MILLIGRAM(S): 30 TABLET, FILM COATED, EXTENDED RELEASE ORAL at 23:21

## 2024-09-14 RX ADMIN — MORPHINE SULFATE 15 MILLIGRAM(S): 30 TABLET, FILM COATED, EXTENDED RELEASE ORAL at 12:30

## 2024-09-14 RX ADMIN — PENICILLIN V POTASSIUM 250 MILLIGRAM(S): 500 TABLET ORAL at 18:42

## 2024-09-14 RX ADMIN — MORPHINE SULFATE 30 MILLILITER(S): 30 TABLET, FILM COATED, EXTENDED RELEASE ORAL at 20:06

## 2024-09-14 RX ADMIN — PENICILLIN V POTASSIUM 250 MILLIGRAM(S): 500 TABLET ORAL at 05:16

## 2024-09-14 RX ADMIN — FOLIC ACID 1 MILLIGRAM(S): 1 TABLET ORAL at 12:53

## 2024-09-14 RX ADMIN — Medication 600 MILLIGRAM(S): at 18:41

## 2024-09-14 RX ADMIN — HYDROXYUREA 1500 MILLIGRAM(S): 500 CAPSULE ORAL at 12:52

## 2024-09-14 RX ADMIN — Medication 600 MILLIGRAM(S): at 12:52

## 2024-09-14 RX ADMIN — MORPHINE SULFATE 15 MILLIGRAM(S): 30 TABLET, FILM COATED, EXTENDED RELEASE ORAL at 11:34

## 2024-09-14 RX ADMIN — MORPHINE SULFATE 30 MILLIGRAM(S): 30 TABLET, FILM COATED, EXTENDED RELEASE ORAL at 19:29

## 2024-09-14 RX ADMIN — Medication 600 MILLIGRAM(S): at 19:29

## 2024-09-14 RX ADMIN — MORPHINE SULFATE 30 MILLIGRAM(S): 30 TABLET, FILM COATED, EXTENDED RELEASE ORAL at 18:42

## 2024-09-14 RX ADMIN — MORPHINE SULFATE 15 MILLIGRAM(S): 30 TABLET, FILM COATED, EXTENDED RELEASE ORAL at 05:42

## 2024-09-14 RX ADMIN — Medication 10 GRAM(S): at 18:43

## 2024-09-14 RX ADMIN — Medication 600 MILLIGRAM(S): at 05:16

## 2024-09-14 RX ADMIN — MORPHINE SULFATE 15 MILLIGRAM(S): 30 TABLET, FILM COATED, EXTENDED RELEASE ORAL at 05:16

## 2024-09-14 RX ADMIN — CHLORHEXIDINE GLUCONATE ORAL RINSE 1 APPLICATION(S): 1.2 SOLUTION DENTAL at 11:23

## 2024-09-14 RX ADMIN — MORPHINE SULFATE 30 MILLILITER(S): 30 TABLET, FILM COATED, EXTENDED RELEASE ORAL at 08:21

## 2024-09-14 RX ADMIN — Medication 10 GRAM(S): at 05:16

## 2024-09-14 NOTE — PROGRESS NOTE ADULT - ATTENDING COMMENTS
Pt with up-and-down pain symptoms, largely in the left hip and shoulders, especially when ambulating. Pt is only using modest PCA doses and is interested in increasing long-acting morphine. Will trial going from morphine ER 15mg to 30mg during the day, watching closely for sedation. If pt notes an improvement, will continue with increased dose with the goal of using less short-acting analgesia.

## 2024-09-14 NOTE — PROGRESS NOTE ADULT - SUBJECTIVE AND OBJECTIVE BOX
Taurus Mayo M.D.   PGY-1 Internal Medicine  ** Note not finalized until signed by attending **   --------------------------  Taurus Mayo MD  Internal Medicine   PGY-1  --------------------------    SUBJECTIVE / OVERNIGHT EVENTS:    Pt seen in AM at bedside, resting comfortably in bed. Reports 7/10 pain in cervical spine, R hip, R knee pain when ambulates. When asked, pt denies any recent or active fever, chills, nausea, vomiting, headache, acute sob, chest pain, abdominal pain, genitourinary sx, or swelling.    MEDICATIONS  (STANDING):  chlorhexidine 2% Cloths 1 Application(s) Topical daily  enoxaparin Injectable 30 milliGRAM(s) SubCutaneous every 24 hours  folic acid 1 milliGRAM(s) Oral daily  glutamine Powder 10 Gram(s) Oral two times a day  hydroxyurea 1500 milliGRAM(s) Oral daily  ibuprofen  Tablet. 600 milliGRAM(s) Oral every 6 hours  morphine ER Tablet 15 milliGRAM(s) Oral two times a day  morphine PCA (5 mG/mL) 30 milliLiter(s) PCA Continuous PCA Continuous  pantoprazole  Injectable 40 milliGRAM(s) IV Push daily  penicillin   milliGRAM(s) Oral two times a day  sodium chloride 0.45%. 1000 milliLiter(s) (75 mL/Hr) IV Continuous <Continuous>  voxelotor 1500 milliGRAM(s) Oral daily    MEDICATIONS  (PRN):  naloxone Injectable 0.1 milliGRAM(s) IV Push every 3 minutes PRN For ANY of the following changes in patient status:  A. RR LESS THAN 10 breaths per minute, B. Oxygen saturation LESS THAN 90%, C. Sedation score of 6  ondansetron Injectable 4 milliGRAM(s) IV Push every 6 hours PRN Nausea  polyethylene glycol 3350 17 Gram(s) Oral two times a day PRN Constipation  senna 2 Tablet(s) Oral at bedtime PRN Constipation    CAPILLARY BLOOD GLUCOSE    I&O's Summary    13 Sep 2024 07:01  -  14 Sep 2024 07:00  --------------------------------------------------------  IN: 1000 mL / OUT: 800 mL / NET: 200 mL    PHYSICAL EXAM:  Vital Signs Last 24 Hrs  T(C): 36.6 (14 Sep 2024 08:00), Max: 36.8 (13 Sep 2024 23:38)  T(F): 97.8 (14 Sep 2024 08:00), Max: 98.2 (13 Sep 2024 23:38)  HR: 68 (14 Sep 2024 08:00) (68 - 87)  BP: 101/61 (14 Sep 2024 08:00) (95/57 - 110/61)  RR: 17 (14 Sep 2024 08:00) (16 - 19)  SpO2: 100% (14 Sep 2024 08:00) (99% - 100%)    Parameters below as of 14 Sep 2024 08:00  Patient On (Oxygen Delivery Method): room air    CONSTITUTIONAL: NAD; thin  HEENT: PERRL, clear conjunctiva  RESPIRATORY: Normal respiratory effort; lungs are clear to auscultation bilaterally; No Crackles/Rhonchi/Wheezing  CARDIOVASCULAR: Regular rate and rhythm, normal S1 and S2, no murmur/rub/gallop; No lower extremity edema; Peripheral pulses are 2+ bilaterally  ABDOMEN: Nontender to palpation, normoactive bowel sounds, no rebound/guarding; No hepatosplenomegaly  MUSCULOSKELETAL: Mild TP cervical spine + L scapula. Mild L hip tenderness. No clubbing or cyanosis of digits; no tenderness to palpation  EXTREMITY: Lower extremities Non-tender to palpation; non-erythematous B/L  NEURO: A&Ox3; no focal deficits   PSYCH: normal mood; Affect appropirate      LABS:                        7.5    8.29  )-----------( 229      ( 13 Sep 2024 04:50 )             22.4     09-13    135  |  101  |  8   ----------------------------<  84  3.4<L>   |  22  |  0.31<L>    Ca    8.1<L>      13 Sep 2024 04:50  Phos  3.6     09-13    TPro  7.5  /  Alb  3.2<L>  /  TBili  1.7<H>  /  DBili  x   /  AST  124<H>  /  ALT  74<H>  /  AlkPhos  314<H>  09-13    Urinalysis Basic - ( 13 Sep 2024 04:50 )  Color: x / Appearance: x / SG: x / pH: x  Gluc: 84 mg/dL / Ketone: x  / Bili: x / Urobili: x   Blood: x / Protein: x / Nitrite: x   Leuk Esterase: x / RBC: x / WBC x   Sq Epi: x / Non Sq Epi: x / Bacteria: x    RADIOLOGY & ADDITIONAL TESTS:  Results Reviewed: X  Imaging Personally Reviewed: X  Electrocardiogram Personally Reviewed: X

## 2024-09-14 NOTE — PROGRESS NOTE ADULT - PROBLEM SELECTOR PLAN 1
#Transaminitis  - follows with Dr. Montano, missed monthly dose of Adakveo for 2 months which may explain SCC  - with R knee pain, b/l arm pain, L hip pain (now mostly resolved), cervical spine pain.  - low concern for acute chest given no opacities on CXR, chest pain more consistent with SCC  - transaminitis likely 2/2 to hepatopathy iso vasoocclusive crisus, no abdominal pain and LFTs improving, could otherwise consider RUQ US  - hematology consulted  - palliative deferred pain management to primary team     A/P:  - c/w 1/2 NS  - pain control w/ pca morphine pump (will adjust), toradol q6  - go up on home morphine 15 MG ERBID to 20 MG.   - breakthrough IVP 1 mg morphine PRN q4 for breakthrough (requested for this for after he ambulates, pain worse)  - bowel regimen: miralax BID, senna 2 MG PRN  - home hydroxyurea (need to take home med), oxbryta (need to take home med) and endari  - incentive spirometer  - daily labs, CBC w/ diff, LDH, haptoglobin, CMP, retic count  - will continue to monitor for signs of acute chest syndrome. If fever, culture and repeat CXR stat  - trend CBC, and transfuse for symptomatic anemia, #Transaminitis  - follows with Dr. Montano, missed monthly dose of Adakveo for 2 months which may explain SCC  - with R knee pain, b/l arm pain, L hip pain (now mostly resolved), cervical spine pain.  - low concern for acute chest given no opacities on CXR, chest pain more consistent with SCC  - transaminitis likely 2/2 to hepatopathy iso vasoocclusive crisus, no abdominal pain and LFTs improving, could otherwise consider RUQ US  - hematology consulted  - palliative deferred pain management to primary team     A/P:  - c/w 1/2 NS  - pain control w/ pca morphine pump (will adjust), toradol q6  - go up on home morphine 15 MG ERBID to 30 MG.   - breakthrough IVP 1.5 mg morphine PRN q4 for breakthrough (requested this for after he ambulates, pain worse)  - bowel regimen: miralax BID, senna 2 MG PRN  - home hydroxyurea (need to take home med), oxbryta (need to take home med) and endari  - incentive spirometer  - daily labs, CBC w/ diff, LDH, haptoglobin, CMP, retic count  - will continue to monitor for signs of acute chest syndrome. If fever, culture and repeat CXR stat  - trend CBC, and transfuse for symptomatic anemia,

## 2024-09-15 LAB
ALBUMIN SERPL ELPH-MCNC: 3.1 G/DL — LOW (ref 3.3–5)
ALP SERPL-CCNC: 315 U/L — HIGH (ref 40–120)
ALT FLD-CCNC: 73 U/L — HIGH (ref 4–41)
ANION GAP SERPL CALC-SCNC: 14 MMOL/L — SIGNIFICANT CHANGE UP (ref 7–14)
AST SERPL-CCNC: 102 U/L — HIGH (ref 4–40)
BASOPHILS # BLD AUTO: 0.06 K/UL — SIGNIFICANT CHANGE UP (ref 0–0.2)
BASOPHILS NFR BLD AUTO: 0.9 % — SIGNIFICANT CHANGE UP (ref 0–2)
BILIRUB SERPL-MCNC: 1.7 MG/DL — HIGH (ref 0.2–1.2)
BUN SERPL-MCNC: 9 MG/DL — SIGNIFICANT CHANGE UP (ref 7–23)
CALCIUM SERPL-MCNC: 8 MG/DL — LOW (ref 8.4–10.5)
CHLORIDE SERPL-SCNC: 103 MMOL/L — SIGNIFICANT CHANGE UP (ref 98–107)
CO2 SERPL-SCNC: 22 MMOL/L — SIGNIFICANT CHANGE UP (ref 22–31)
CREAT SERPL-MCNC: 0.3 MG/DL — LOW (ref 0.5–1.3)
CRP SERPL-MCNC: 29.4 MG/L — HIGH
EGFR: 166 ML/MIN/1.73M2 — SIGNIFICANT CHANGE UP
EOSINOPHIL # BLD AUTO: 0.07 K/UL — SIGNIFICANT CHANGE UP (ref 0–0.5)
EOSINOPHIL NFR BLD AUTO: 1.1 % — SIGNIFICANT CHANGE UP (ref 0–6)
GLUCOSE SERPL-MCNC: 79 MG/DL — SIGNIFICANT CHANGE UP (ref 70–99)
HAPTOGLOB SERPL-MCNC: <20 MG/DL — LOW (ref 34–200)
HCT VFR BLD CALC: 21.5 % — LOW (ref 39–50)
HGB BLD-MCNC: 7.1 G/DL — LOW (ref 13–17)
IANC: 2.27 K/UL — SIGNIFICANT CHANGE UP (ref 1.8–7.4)
IMM GRANULOCYTES NFR BLD AUTO: 0.3 % — SIGNIFICANT CHANGE UP (ref 0–0.9)
LDH SERPL L TO P-CCNC: 485 U/L — HIGH (ref 135–225)
LYMPHOCYTES # BLD AUTO: 3.66 K/UL — HIGH (ref 1–3.3)
LYMPHOCYTES # BLD AUTO: 55.1 % — HIGH (ref 13–44)
MCHC RBC-ENTMCNC: 22.6 PG — LOW (ref 27–34)
MCHC RBC-ENTMCNC: 33 GM/DL — SIGNIFICANT CHANGE UP (ref 32–36)
MCV RBC AUTO: 68.5 FL — LOW (ref 80–100)
MONOCYTES # BLD AUTO: 0.56 K/UL — SIGNIFICANT CHANGE UP (ref 0–0.9)
MONOCYTES NFR BLD AUTO: 8.4 % — SIGNIFICANT CHANGE UP (ref 2–14)
NEUTROPHILS # BLD AUTO: 2.27 K/UL — SIGNIFICANT CHANGE UP (ref 1.8–7.4)
NEUTROPHILS NFR BLD AUTO: 34.2 % — LOW (ref 43–77)
NRBC # BLD: 53 /100 WBCS — HIGH (ref 0–0)
NRBC # FLD: 3.52 K/UL — HIGH (ref 0–0)
PHOSPHATE SERPL-MCNC: 4 MG/DL — SIGNIFICANT CHANGE UP (ref 2.5–4.5)
PLATELET # BLD AUTO: 136 K/UL — LOW (ref 150–400)
POTASSIUM SERPL-MCNC: 4 MMOL/L — SIGNIFICANT CHANGE UP (ref 3.5–5.3)
POTASSIUM SERPL-SCNC: 4 MMOL/L — SIGNIFICANT CHANGE UP (ref 3.5–5.3)
PROT SERPL-MCNC: 7.5 G/DL — SIGNIFICANT CHANGE UP (ref 6–8.3)
RBC # BLD: 3.14 M/UL — LOW (ref 4.2–5.8)
RBC # BLD: 3.14 M/UL — LOW (ref 4.2–5.8)
RBC # FLD: 25.8 % — HIGH (ref 10.3–14.5)
RETICS #: 253.1 K/UL — HIGH (ref 25–125)
RETICS/RBC NFR: 8 % — HIGH (ref 0.5–2.5)
SODIUM SERPL-SCNC: 139 MMOL/L — SIGNIFICANT CHANGE UP (ref 135–145)
WBC # BLD: 6.64 K/UL — SIGNIFICANT CHANGE UP (ref 3.8–10.5)
WBC # FLD AUTO: 6.64 K/UL — SIGNIFICANT CHANGE UP (ref 3.8–10.5)

## 2024-09-15 PROCEDURE — 99233 SBSQ HOSP IP/OBS HIGH 50: CPT | Mod: GC

## 2024-09-15 RX ADMIN — MORPHINE SULFATE 30 MILLIGRAM(S): 30 TABLET, FILM COATED, EXTENDED RELEASE ORAL at 19:17

## 2024-09-15 RX ADMIN — Medication 600 MILLIGRAM(S): at 19:17

## 2024-09-15 RX ADMIN — Medication 600 MILLIGRAM(S): at 05:55

## 2024-09-15 RX ADMIN — HYDROXYUREA 1500 MILLIGRAM(S): 500 CAPSULE ORAL at 12:44

## 2024-09-15 RX ADMIN — Medication 75 MILLILITER(S): at 12:54

## 2024-09-15 RX ADMIN — MORPHINE SULFATE 30 MILLILITER(S): 30 TABLET, FILM COATED, EXTENDED RELEASE ORAL at 20:27

## 2024-09-15 RX ADMIN — PENICILLIN V POTASSIUM 250 MILLIGRAM(S): 500 TABLET ORAL at 18:17

## 2024-09-15 RX ADMIN — Medication 600 MILLIGRAM(S): at 12:45

## 2024-09-15 RX ADMIN — Medication 75 MILLILITER(S): at 06:10

## 2024-09-15 RX ADMIN — Medication 600 MILLIGRAM(S): at 18:17

## 2024-09-15 RX ADMIN — MORPHINE SULFATE 30 MILLILITER(S): 30 TABLET, FILM COATED, EXTENDED RELEASE ORAL at 08:36

## 2024-09-15 RX ADMIN — Medication 600 MILLIGRAM(S): at 06:17

## 2024-09-15 RX ADMIN — MORPHINE SULFATE 30 MILLIGRAM(S): 30 TABLET, FILM COATED, EXTENDED RELEASE ORAL at 18:17

## 2024-09-15 RX ADMIN — MORPHINE SULFATE 1.5 MILLIGRAM(S): 30 TABLET, FILM COATED, EXTENDED RELEASE ORAL at 00:20

## 2024-09-15 RX ADMIN — MORPHINE SULFATE 30 MILLIGRAM(S): 30 TABLET, FILM COATED, EXTENDED RELEASE ORAL at 06:17

## 2024-09-15 RX ADMIN — MORPHINE SULFATE 30 MILLIGRAM(S): 30 TABLET, FILM COATED, EXTENDED RELEASE ORAL at 06:10

## 2024-09-15 RX ADMIN — VOXELOTOR 1500 MILLIGRAM(S): 300 TABLET, FOR SUSPENSION ORAL at 12:44

## 2024-09-15 RX ADMIN — FOLIC ACID 1 MILLIGRAM(S): 1 TABLET ORAL at 12:45

## 2024-09-15 RX ADMIN — MORPHINE SULFATE 1.5 MILLIGRAM(S): 30 TABLET, FILM COATED, EXTENDED RELEASE ORAL at 05:15

## 2024-09-15 RX ADMIN — PANTOPRAZOLE SODIUM 40 MILLIGRAM(S): 40 TABLET, DELAYED RELEASE ORAL at 12:44

## 2024-09-15 RX ADMIN — MORPHINE SULFATE 1.5 MILLIGRAM(S): 30 TABLET, FILM COATED, EXTENDED RELEASE ORAL at 04:18

## 2024-09-15 RX ADMIN — Medication 600 MILLIGRAM(S): at 00:15

## 2024-09-15 RX ADMIN — MORPHINE SULFATE 30 MILLILITER(S): 30 TABLET, FILM COATED, EXTENDED RELEASE ORAL at 17:33

## 2024-09-15 RX ADMIN — CHLORHEXIDINE GLUCONATE ORAL RINSE 1 APPLICATION(S): 1.2 SOLUTION DENTAL at 12:45

## 2024-09-15 RX ADMIN — Medication 10 GRAM(S): at 18:17

## 2024-09-15 RX ADMIN — ENOXAPARIN SODIUM 30 MILLIGRAM(S): 150 INJECTION SUBCUTANEOUS at 12:45

## 2024-09-15 RX ADMIN — Medication 10 GRAM(S): at 06:09

## 2024-09-15 RX ADMIN — Medication 600 MILLIGRAM(S): at 13:45

## 2024-09-15 RX ADMIN — PENICILLIN V POTASSIUM 250 MILLIGRAM(S): 500 TABLET ORAL at 06:10

## 2024-09-15 RX ADMIN — Medication 600 MILLIGRAM(S): at 06:10

## 2024-09-15 NOTE — PROGRESS NOTE ADULT - PROBLEM SELECTOR PLAN 1
#Transaminitis  - follows with Dr. Montano, missed monthly dose of Adakveo for 2 months which may explain SCC  - with R knee pain, b/l arm pain, L hip pain (now mostly resolved), cervical spine pain.  - low concern for acute chest given no opacities on CXR, chest pain more consistent with SCC  - transaminitis likely 2/2 to hepatopathy iso vasoocclusive crisus, no abdominal pain and LFTs improving, could otherwise consider RUQ US  - hematology consulted  - palliative deferred pain management to primary team     A/P:  - c/w 1/2 NS  - pain control w/ pca morphine pump (will adjust), ibuprofen q6 standing x5 days w/ IV protonix 40 x5 days  - c/w 30 mg ERBID Morphine (home medication 15 mg ER BID, would like to go home on home dose when DC)  - breakthrough IVP 1.5 mg morphine PRN q4 for breakthrough (requested this for after he ambulates, pain worse)  - bowel regimen: miralax BID, senna 2 MG PRN    - home hydroxyurea (need to take home med), oxbryta (need to take home med) and endari  - incentive spirometer  - daily labs, CBC w/ diff, LDH, haptoglobin, CMP, retic count  - will continue to monitor for signs of acute chest syndrome. If fever, culture and repeat CXR stat  - trend CBC, and transfuse for symptomatic anemia,

## 2024-09-15 NOTE — PROGRESS NOTE ADULT - SUBJECTIVE AND OBJECTIVE BOX
Taurus Mayo M.D.   PGY-1 Internal Medicine  ** Note not finalized until signed by attending **   --------------------------  Taurus Mayo MD  Internal Medicine   PGY-1  --------------------------    SUBJECTIVE / OVERNIGHT EVENTS:    Pt seen in AM at bedside, resting comfortably in bed, reports severe pain overnight requiring 2x 1.5 PRNs. Reports pain in b/l hips, cervical spine. When asked, pt denies any recent or active fever, chills, nausea, vomiting, headache, acute sob, chest pain, abdominal pain, genitourinary sx, extremity pain or swelling.    MEDICATIONS  (STANDING):  chlorhexidine 2% Cloths 1 Application(s) Topical daily  enoxaparin Injectable 30 milliGRAM(s) SubCutaneous every 24 hours  folic acid 1 milliGRAM(s) Oral daily  glutamine Powder 10 Gram(s) Oral two times a day  hydroxyurea 1500 milliGRAM(s) Oral daily  ibuprofen  Tablet. 600 milliGRAM(s) Oral every 6 hours  morphine ER Tablet 30 milliGRAM(s) Oral two times a day  morphine PCA (5 mG/mL) 30 milliLiter(s) PCA Continuous PCA Continuous  pantoprazole  Injectable 40 milliGRAM(s) IV Push daily  penicillin   milliGRAM(s) Oral two times a day  sodium chloride 0.45%. 1000 milliLiter(s) (75 mL/Hr) IV Continuous <Continuous>  voxelotor 1500 milliGRAM(s) Oral daily    MEDICATIONS  (PRN):  morphine  - Injectable 1.5 milliGRAM(s) IV Push every 4 hours PRN Breakthrough Pain  naloxone Injectable 0.1 milliGRAM(s) IV Push every 3 minutes PRN For ANY of the following changes in patient status:  A. RR LESS THAN 10 breaths per minute, B. Oxygen saturation LESS THAN 90%, C. Sedation score of 6  ondansetron Injectable 4 milliGRAM(s) IV Push every 6 hours PRN Nausea  polyethylene glycol 3350 17 Gram(s) Oral two times a day PRN Constipation  senna 2 Tablet(s) Oral at bedtime PRN Constipation  CAPILLARY BLOOD GLUCOSE    I&O's Summary    14 Sep 2024 07:01  -  15 Sep 2024 07:00  --------------------------------------------------------  IN: 1800 mL / OUT: 750 mL / NET: 1050 mL    PHYSICAL EXAM:  Vital Signs Last 24 Hrs  T(C): 36.7 (15 Sep 2024 08:00), Max: 37 (14 Sep 2024 19:56)  T(F): 98.1 (15 Sep 2024 08:00), Max: 98.6 (14 Sep 2024 19:56)  HR: 70 (15 Sep 2024 08:00) (68 - 82)  BP: 101/61 (15 Sep 2024 08:00) (96/59 - 112/64)  RR: 15 (15 Sep 2024 08:00) (15 - 18)  SpO2: 99% (15 Sep 2024 08:00) (99% - 100%)    Parameters below as of 15 Sep 2024 08:00  Patient On (Oxygen Delivery Method): room air    CONSTITUTIONAL: NAD; thin  HEENT: PERRL, clear conjunctiva  RESPIRATORY: Normal respiratory effort; lungs are clear to auscultation bilaterally; No Crackles/Rhonchi/Wheezing  CARDIOVASCULAR: Regular rate and rhythm, normal S1 and S2, no murmur/rub/gallop; No lower extremity edema; Peripheral pulses are 2+ bilaterally  ABDOMEN: Nontender to palpation, normoactive bowel sounds, no rebound/guarding; No hepatosplenomegaly  MUSCULOSKELETAL: Mild TP cervical spine + L scapula. Mild L hip tenderness. No clubbing or cyanosis of digits; no tenderness to palpation  EXTREMITY: Lower extremities Non-tender to palpation; non-erythematous B/L  NEURO: A&Ox3; no focal deficits   PSYCH: normal mood; Affect appropirate    LABS:                        7.1    6.64  )-----------( 136      ( 15 Sep 2024 05:58 )             21.5     09-15    139  |  103  |  9   ----------------------------<  79  4.0   |  22  |  0.30<L>    Ca    8.0<L>      15 Sep 2024 05:58  Phos  4.0     09-15    TPro  7.5  /  Alb  3.1<L>  /  TBili  1.7<H>  /  DBili  x   /  AST  102<H>  /  ALT  73<H>  /  AlkPhos  315<H>  09-15    Urinalysis Basic - ( 15 Sep 2024 05:58 )  Color: x / Appearance: x / SG: x / pH: x  Gluc: 79 mg/dL / Ketone: x  / Bili: x / Urobili: x   Blood: x / Protein: x / Nitrite: x   Leuk Esterase: x / RBC: x / WBC x   Sq Epi: x / Non Sq Epi: x / Bacteria: x    RADIOLOGY & ADDITIONAL TESTS:  Results Reviewed: X  Imaging Personally Reviewed: X  Electrocardiogram Personally Reviewed: X

## 2024-09-15 NOTE — PROGRESS NOTE ADULT - ATTENDING COMMENTS
Pt reports some improvement s/p increasing morphine ER to 30mg twice daily. He is "a little groggy" but states this is not prohibitive and thinks it is going to help him titrate off the PCA. Continue to monitor pain and signs of sedation.

## 2024-09-16 LAB
ALBUMIN SERPL ELPH-MCNC: 3.4 G/DL — SIGNIFICANT CHANGE UP (ref 3.3–5)
ALP SERPL-CCNC: 366 U/L — HIGH (ref 40–120)
ALT FLD-CCNC: 70 U/L — HIGH (ref 4–41)
ANION GAP SERPL CALC-SCNC: 14 MMOL/L — SIGNIFICANT CHANGE UP (ref 7–14)
ANISOCYTOSIS BLD QL: SIGNIFICANT CHANGE UP
AST SERPL-CCNC: 93 U/L — HIGH (ref 4–40)
BASOPHILS # BLD AUTO: 0 K/UL — SIGNIFICANT CHANGE UP (ref 0–0.2)
BASOPHILS NFR BLD AUTO: 0 % — SIGNIFICANT CHANGE UP (ref 0–2)
BILIRUB SERPL-MCNC: 1.8 MG/DL — HIGH (ref 0.2–1.2)
BUN SERPL-MCNC: 6 MG/DL — LOW (ref 7–23)
CALCIUM SERPL-MCNC: 8.6 MG/DL — SIGNIFICANT CHANGE UP (ref 8.4–10.5)
CHLORIDE SERPL-SCNC: 104 MMOL/L — SIGNIFICANT CHANGE UP (ref 98–107)
CO2 SERPL-SCNC: 21 MMOL/L — LOW (ref 22–31)
CREAT SERPL-MCNC: 0.3 MG/DL — LOW (ref 0.5–1.3)
CRP SERPL-MCNC: 38.2 MG/L — HIGH
EGFR: 166 ML/MIN/1.73M2 — SIGNIFICANT CHANGE UP
EOSINOPHIL # BLD AUTO: 0.21 K/UL — SIGNIFICANT CHANGE UP (ref 0–0.5)
EOSINOPHIL NFR BLD AUTO: 2.7 % — SIGNIFICANT CHANGE UP (ref 0–6)
GIANT PLATELETS BLD QL SMEAR: PRESENT — SIGNIFICANT CHANGE UP
GLUCOSE SERPL-MCNC: 83 MG/DL — SIGNIFICANT CHANGE UP (ref 70–99)
HAPTOGLOB SERPL-MCNC: <20 MG/DL — LOW (ref 34–200)
HCT VFR BLD CALC: 21.4 % — LOW (ref 39–50)
HGB BLD-MCNC: 7.1 G/DL — LOW (ref 13–17)
HYPOCHROMIA BLD QL: SIGNIFICANT CHANGE UP
IANC: 2.85 K/UL — SIGNIFICANT CHANGE UP (ref 1.8–7.4)
LDH SERPL L TO P-CCNC: 473 U/L — HIGH (ref 135–225)
LYMPHOCYTES # BLD AUTO: 3.42 K/UL — HIGH (ref 1–3.3)
LYMPHOCYTES # BLD AUTO: 44.5 % — HIGH (ref 13–44)
MACROCYTES BLD QL: SIGNIFICANT CHANGE UP
MANUAL SMEAR VERIFICATION: SIGNIFICANT CHANGE UP
MCHC RBC-ENTMCNC: 23 PG — LOW (ref 27–34)
MCHC RBC-ENTMCNC: 33.2 GM/DL — SIGNIFICANT CHANGE UP (ref 32–36)
MCV RBC AUTO: 69.3 FL — LOW (ref 80–100)
MICROCYTES BLD QL: SLIGHT — SIGNIFICANT CHANGE UP
MONOCYTES # BLD AUTO: 0.35 K/UL — SIGNIFICANT CHANGE UP (ref 0–0.9)
MONOCYTES NFR BLD AUTO: 4.6 % — SIGNIFICANT CHANGE UP (ref 2–14)
NEUTROPHILS # BLD AUTO: 3.43 K/UL — SIGNIFICANT CHANGE UP (ref 1.8–7.4)
NEUTROPHILS NFR BLD AUTO: 44.6 % — SIGNIFICANT CHANGE UP (ref 43–77)
NRBC # BLD: 89 /100 WBCS — HIGH (ref 0–0)
PHOSPHATE SERPL-MCNC: 3.7 MG/DL — SIGNIFICANT CHANGE UP (ref 2.5–4.5)
PLAT MORPH BLD: NORMAL — SIGNIFICANT CHANGE UP
PLATELET # BLD AUTO: 119 K/UL — LOW (ref 150–400)
PLATELET COUNT - ESTIMATE: ABNORMAL
POIKILOCYTOSIS BLD QL AUTO: SIGNIFICANT CHANGE UP
POTASSIUM SERPL-MCNC: 3.8 MMOL/L — SIGNIFICANT CHANGE UP (ref 3.5–5.3)
POTASSIUM SERPL-SCNC: 3.8 MMOL/L — SIGNIFICANT CHANGE UP (ref 3.5–5.3)
PROT SERPL-MCNC: 8.1 G/DL — SIGNIFICANT CHANGE UP (ref 6–8.3)
RBC # BLD: 3.09 M/UL — LOW (ref 4.2–5.8)
RBC # BLD: 3.09 M/UL — LOW (ref 4.2–5.8)
RBC # FLD: 26.5 % — HIGH (ref 10.3–14.5)
RBC BLD AUTO: ABNORMAL
RETICS #: 244.1 K/UL — HIGH (ref 25–125)
RETICS/RBC NFR: 7.9 % — HIGH (ref 0.5–2.5)
SCHISTOCYTES BLD QL AUTO: SLIGHT — SIGNIFICANT CHANGE UP
SICKLE CELLS BLD QL SMEAR: SIGNIFICANT CHANGE UP
SMUDGE CELLS # BLD: PRESENT — SIGNIFICANT CHANGE UP
SODIUM SERPL-SCNC: 139 MMOL/L — SIGNIFICANT CHANGE UP (ref 135–145)
TARGETS BLD QL SMEAR: SIGNIFICANT CHANGE UP
VARIANT LYMPHS # BLD: 3.6 % — SIGNIFICANT CHANGE UP (ref 0–6)
WBC # BLD: 7.69 K/UL — SIGNIFICANT CHANGE UP (ref 3.8–10.5)
WBC # FLD AUTO: 7.69 K/UL — SIGNIFICANT CHANGE UP (ref 3.8–10.5)

## 2024-09-16 PROCEDURE — 99233 SBSQ HOSP IP/OBS HIGH 50: CPT | Mod: GC

## 2024-09-16 RX ORDER — KETOROLAC TROMETHAMINE 10 MG/1
15 TABLET, FILM COATED ORAL EVERY 6 HOURS
Refills: 0 | Status: DISCONTINUED | OUTPATIENT
Start: 2024-09-16 | End: 2024-09-21

## 2024-09-16 RX ADMIN — Medication 10 GRAM(S): at 18:26

## 2024-09-16 RX ADMIN — KETOROLAC TROMETHAMINE 15 MILLIGRAM(S): 10 TABLET, FILM COATED ORAL at 13:29

## 2024-09-16 RX ADMIN — Medication 75 MILLILITER(S): at 14:16

## 2024-09-16 RX ADMIN — Medication 600 MILLIGRAM(S): at 06:17

## 2024-09-16 RX ADMIN — PANTOPRAZOLE SODIUM 40 MILLIGRAM(S): 40 TABLET, DELAYED RELEASE ORAL at 12:56

## 2024-09-16 RX ADMIN — PENICILLIN V POTASSIUM 250 MILLIGRAM(S): 500 TABLET ORAL at 18:26

## 2024-09-16 RX ADMIN — VOXELOTOR 1500 MILLIGRAM(S): 300 TABLET, FOR SUSPENSION ORAL at 12:55

## 2024-09-16 RX ADMIN — ENOXAPARIN SODIUM 30 MILLIGRAM(S): 150 INJECTION SUBCUTANEOUS at 12:57

## 2024-09-16 RX ADMIN — Medication 600 MILLIGRAM(S): at 07:17

## 2024-09-16 RX ADMIN — Medication 600 MILLIGRAM(S): at 00:06

## 2024-09-16 RX ADMIN — Medication 600 MILLIGRAM(S): at 01:06

## 2024-09-16 RX ADMIN — MORPHINE SULFATE 30 MILLIGRAM(S): 30 TABLET, FILM COATED, EXTENDED RELEASE ORAL at 08:34

## 2024-09-16 RX ADMIN — MORPHINE SULFATE 30 MILLIGRAM(S): 30 TABLET, FILM COATED, EXTENDED RELEASE ORAL at 19:25

## 2024-09-16 RX ADMIN — Medication 75 MILLILITER(S): at 00:37

## 2024-09-16 RX ADMIN — HYDROXYUREA 1500 MILLIGRAM(S): 500 CAPSULE ORAL at 12:56

## 2024-09-16 RX ADMIN — MORPHINE SULFATE 30 MILLIGRAM(S): 30 TABLET, FILM COATED, EXTENDED RELEASE ORAL at 18:25

## 2024-09-16 RX ADMIN — MORPHINE SULFATE 30 MILLILITER(S): 30 TABLET, FILM COATED, EXTENDED RELEASE ORAL at 20:22

## 2024-09-16 RX ADMIN — KETOROLAC TROMETHAMINE 15 MILLIGRAM(S): 10 TABLET, FILM COATED ORAL at 14:29

## 2024-09-16 RX ADMIN — KETOROLAC TROMETHAMINE 15 MILLIGRAM(S): 10 TABLET, FILM COATED ORAL at 20:51

## 2024-09-16 RX ADMIN — MORPHINE SULFATE 30 MILLILITER(S): 30 TABLET, FILM COATED, EXTENDED RELEASE ORAL at 08:46

## 2024-09-16 RX ADMIN — Medication 40 MILLIEQUIVALENT(S): at 18:25

## 2024-09-16 RX ADMIN — PENICILLIN V POTASSIUM 250 MILLIGRAM(S): 500 TABLET ORAL at 06:17

## 2024-09-16 RX ADMIN — Medication 10 GRAM(S): at 06:15

## 2024-09-16 RX ADMIN — CHLORHEXIDINE GLUCONATE ORAL RINSE 1 APPLICATION(S): 1.2 SOLUTION DENTAL at 12:56

## 2024-09-16 RX ADMIN — KETOROLAC TROMETHAMINE 15 MILLIGRAM(S): 10 TABLET, FILM COATED ORAL at 19:51

## 2024-09-16 RX ADMIN — MORPHINE SULFATE 30 MILLIGRAM(S): 30 TABLET, FILM COATED, EXTENDED RELEASE ORAL at 08:31

## 2024-09-16 RX ADMIN — FOLIC ACID 1 MILLIGRAM(S): 1 TABLET ORAL at 12:55

## 2024-09-16 NOTE — PROGRESS NOTE ADULT - SUBJECTIVE AND OBJECTIVE BOX
*******************************  Joelle Skelton MD (PGY-1)  Internal Medicine  Contact via [employer] TEAMS  *******************************    DATE OF SERVICE: 09-16-24 @ 06:51    Patient is a 29y old  Male who presents with a chief complaint of sickle cell crisis (15 Sep 2024 08:05)      SUBJECTIVE / OVERNIGHT EVENTS:  Overnight,  Pt seen and examined at bedside.    ROS negative except as above.    MEDICATIONS  (STANDING):  chlorhexidine 2% Cloths 1 Application(s) Topical daily  enoxaparin Injectable 30 milliGRAM(s) SubCutaneous every 24 hours  folic acid 1 milliGRAM(s) Oral daily  glutamine Powder 10 Gram(s) Oral two times a day  hydroxyurea 1500 milliGRAM(s) Oral daily  ibuprofen  Tablet. 600 milliGRAM(s) Oral every 6 hours  morphine ER Tablet 30 milliGRAM(s) Oral two times a day  morphine PCA (5 mG/mL) 30 milliLiter(s) PCA Continuous PCA Continuous  pantoprazole  Injectable 40 milliGRAM(s) IV Push daily  penicillin   milliGRAM(s) Oral two times a day  sodium chloride 0.45%. 1000 milliLiter(s) (75 mL/Hr) IV Continuous <Continuous>  voxelotor 1500 milliGRAM(s) Oral daily    MEDICATIONS  (PRN):  morphine  - Injectable 1.5 milliGRAM(s) IV Push every 4 hours PRN Breakthrough Pain  naloxone Injectable 0.1 milliGRAM(s) IV Push every 3 minutes PRN For ANY of the following changes in patient status:  A. RR LESS THAN 10 breaths per minute, B. Oxygen saturation LESS THAN 90%, C. Sedation score of 6  ondansetron Injectable 4 milliGRAM(s) IV Push every 6 hours PRN Nausea  polyethylene glycol 3350 17 Gram(s) Oral two times a day PRN Constipation  senna 2 Tablet(s) Oral at bedtime PRN Constipation      Vital Signs Last 24 Hrs  T(C): 36.9 (16 Sep 2024 04:00), Max: 37.1 (15 Sep 2024 20:00)  T(F): 98.5 (16 Sep 2024 04:00), Max: 98.8 (16 Sep 2024 00:00)  HR: 84 (16 Sep 2024 04:00) (70 - 92)  BP: 100/63 (16 Sep 2024 04:00) (96/69 - 102/58)  BP(mean): --  RR: 17 (16 Sep 2024 04:00) (15 - 18)  SpO2: 99% (16 Sep 2024 04:00) (97% - 100%)    Parameters below as of 16 Sep 2024 04:00  Patient On (Oxygen Delivery Method): room air      CAPILLARY BLOOD GLUCOSE        I&O's Summary    14 Sep 2024 07:01  -  15 Sep 2024 07:00  --------------------------------------------------------  IN: 1800 mL / OUT: 750 mL / NET: 1050 mL    15 Sep 2024 07:01  -  16 Sep 2024 06:51  --------------------------------------------------------  IN: 675 mL / OUT: 0 mL / NET: 675 mL        PHYSICAL EXAM:  GENERAL: NAD, well-developed  HEAD:  Atraumatic, Normocephalic  EYES: EOMI, conjunctiva and sclera clear  NECK: Supple, No JVD  CHEST/LUNG: Clear to auscultation bilaterally; No wheeze  HEART: Regular rate and rhythm; No murmurs, rubs, or gallops  ABDOMEN: Soft, Nontender, Nondistended; Bowel sounds present  EXTREMITIES:  2+ Peripheral Pulses, No clubbing, cyanosis, or edema  NEUROLOGY: AOx3, non-focal  SKIN: No rashes or lesions    LABS:                        7.1    6.64  )-----------( 136      ( 15 Sep 2024 05:58 )             21.5     09-15    139  |  103  |  9   ----------------------------<  79  4.0   |  22  |  0.30<L>    Ca    8.0<L>      15 Sep 2024 05:58  Phos  4.0     09-15    TPro  7.5  /  Alb  3.1<L>  /  TBili  1.7<H>  /  DBili  x   /  AST  102<H>  /  ALT  73<H>  /  AlkPhos  315<H>  09-15            MICRO:      RADIOLOGY & ADDITIONAL TESTS:           *******************************  Joelle Skelton MD (PGY-1)  Internal Medicine  Contact via [employer] TEAMS  *******************************    DATE OF SERVICE: 09-16-24 @ 06:51    Patient is a 29y old  Male who presents with a chief complaint of sickle cell crisis (15 Sep 2024 08:05)      SUBJECTIVE / OVERNIGHT EVENTS:  Overnight, no acute events.  Pt seen and examined at bedside. Pt describes pain in left hip, occasionally right hip, across shoulders, and R knee pain that is primarily positional. When discussing pain regimen, pt hesitant to increase PCA pump doses, as he does not want to "go backwards". Otherwise denies fever, SOB, cough, chest pain.     ROS negative except as above.    MEDICATIONS  (STANDING):  chlorhexidine 2% Cloths 1 Application(s) Topical daily  enoxaparin Injectable 30 milliGRAM(s) SubCutaneous every 24 hours  folic acid 1 milliGRAM(s) Oral daily  glutamine Powder 10 Gram(s) Oral two times a day  hydroxyurea 1500 milliGRAM(s) Oral daily  ibuprofen  Tablet. 600 milliGRAM(s) Oral every 6 hours  morphine ER Tablet 30 milliGRAM(s) Oral two times a day  morphine PCA (5 mG/mL) 30 milliLiter(s) PCA Continuous PCA Continuous  pantoprazole  Injectable 40 milliGRAM(s) IV Push daily  penicillin   milliGRAM(s) Oral two times a day  sodium chloride 0.45%. 1000 milliLiter(s) (75 mL/Hr) IV Continuous <Continuous>  voxelotor 1500 milliGRAM(s) Oral daily    MEDICATIONS  (PRN):  morphine  - Injectable 1.5 milliGRAM(s) IV Push every 4 hours PRN Breakthrough Pain  naloxone Injectable 0.1 milliGRAM(s) IV Push every 3 minutes PRN For ANY of the following changes in patient status:  A. RR LESS THAN 10 breaths per minute, B. Oxygen saturation LESS THAN 90%, C. Sedation score of 6  ondansetron Injectable 4 milliGRAM(s) IV Push every 6 hours PRN Nausea  polyethylene glycol 3350 17 Gram(s) Oral two times a day PRN Constipation  senna 2 Tablet(s) Oral at bedtime PRN Constipation      Vital Signs Last 24 Hrs  T(C): 36.9 (16 Sep 2024 04:00), Max: 37.1 (15 Sep 2024 20:00)  T(F): 98.5 (16 Sep 2024 04:00), Max: 98.8 (16 Sep 2024 00:00)  HR: 84 (16 Sep 2024 04:00) (70 - 92)  BP: 100/63 (16 Sep 2024 04:00) (96/69 - 102/58)  BP(mean): --  RR: 17 (16 Sep 2024 04:00) (15 - 18)  SpO2: 99% (16 Sep 2024 04:00) (97% - 100%)    Parameters below as of 16 Sep 2024 04:00  Patient On (Oxygen Delivery Method): room air      CAPILLARY BLOOD GLUCOSE        I&O's Summary    14 Sep 2024 07:01  -  15 Sep 2024 07:00  --------------------------------------------------------  IN: 1800 mL / OUT: 750 mL / NET: 1050 mL    15 Sep 2024 07:01  -  16 Sep 2024 06:51  --------------------------------------------------------  IN: 675 mL / OUT: 0 mL / NET: 675 mL        PHYSICAL EXAM:  GENERAL: NAD, cachetic  HEAD:  Atraumatic, Normocephalic  EYES: EOMI, conjunctiva and sclera clear  NECK: Supple, No JVD  CHEST/LUNG: Clear to auscultation bilaterally; No wheeze  HEART: Regular rate and rhythm; No murmurs, rubs, or gallops  ABDOMEN: Soft, Nontender, Nondistended; Bowel sounds present  MUSCULOSKELETAL: Mild TP cervical spine + L scapula. Mild L hip tenderness. No clubbing or cyanosis of digits; NEUROLOGY: AOx3, non-focal  SKIN: No rashes or lesions    LABS:                        7.1    6.64  )-----------( 136      ( 15 Sep 2024 05:58 )             21.5     09-15    139  |  103  |  9   ----------------------------<  79  4.0   |  22  |  0.30<L>    Ca    8.0<L>      15 Sep 2024 05:58  Phos  4.0     09-15    TPro  7.5  /  Alb  3.1<L>  /  TBili  1.7<H>  /  DBili  x   /  AST  102<H>  /  ALT  73<H>  /  AlkPhos  315<H>  09-15            MICRO:      RADIOLOGY & ADDITIONAL TESTS:

## 2024-09-16 NOTE — PROGRESS NOTE ADULT - ATTENDING COMMENTS
Pt is frustrated as his pain has plateaued in the 6-8/10 range. Today we will add toradol to try to augment analgesia regimen. C/w morphine ER 30mg bid as well as PCA.

## 2024-09-16 NOTE — PROGRESS NOTE ADULT - PROBLEM SELECTOR PLAN 1
#Transaminitis  - follows with Dr. Montano, missed monthly dose of Adakveo for 2 months which may explain SCC  - with R knee pain, b/l arm pain, L hip pain (now mostly resolved), cervical spine pain.  - low concern for acute chest given no opacities on CXR, chest pain more consistent with SCC  - transaminitis likely 2/2 to hepatopathy iso vasoocclusive crisus, no abdominal pain and LFTs improving, could otherwise consider RUQ US  - hematology consulted  - palliative deferred pain management to primary team     A/P:  - c/w 1/2 NS  - pain control w/ pca morphine pump (will adjust), ibuprofen q6 standing x5 days w/ IV protonix 40 x5 days  - c/w 30 mg ERBID Morphine (home medication 15 mg ER BID, would like to go home on home dose when DC)  - breakthrough IVP 1.5 mg morphine PRN q4 for breakthrough (requested this for after he ambulates, pain worse)  - bowel regimen: miralax BID, senna 2 MG PRN    - home hydroxyurea (need to take home med), oxbryta (need to take home med) and endari  - incentive spirometer  - daily labs, CBC w/ diff, LDH, haptoglobin, CMP, retic count  - will continue to monitor for signs of acute chest syndrome. If fever, culture and repeat CXR stat  - trend CBC, and transfuse for symptomatic anemia, - follows with Dr. Montano, missed monthly dose of Adakveo for 2 months which may explain SCC  - with R knee pain, b/l arm pain, L hip pain (now mostly resolved), cervical spine pain.  - low concern for acute chest given no opacities on CXR, chest pain more consistent with SCC  - transaminitis likely 2/2 to hepatopathy iso vasoocclusive crisus, no abdominal pain and LFTs improving, could otherwise consider RUQ US  - hematology consulted  - palliative deferred pain management to primary team     A/P:  - c/w 1/2 NS  - pain control w/ pca morphine pump, toradol 15mg q6hr x5 days w/ IV protonix 40 x5 days  - c/w 30 mg ERBID Morphine (home medication 15 mg ER BID, would like to go home on home dose when DC)  - breakthrough IVP 1.5 mg morphine PRN q4 for breakthrough (requested this for after he ambulates, pain worse)  - bowel regimen: miralax BID, senna 2 MG PRN    - home hydroxyurea (need to take home med), oxbryta (need to take home med) and endari  - incentive spirometer  - Labs q48, CBC w/ diff, LDH, BMP  - will continue to monitor for signs of acute chest syndrome. If fever, culture and repeat CXR stat  - trend CBC, and transfuse for symptomatic anemia,

## 2024-09-17 PROCEDURE — 99233 SBSQ HOSP IP/OBS HIGH 50: CPT | Mod: GC

## 2024-09-17 RX ORDER — MORPHINE SULFATE 30 MG/1
1.5 TABLET, FILM COATED, EXTENDED RELEASE ORAL
Refills: 0 | Status: DISCONTINUED | OUTPATIENT
Start: 2024-09-17 | End: 2024-09-19

## 2024-09-17 RX ADMIN — MORPHINE SULFATE 1.5 MILLIGRAM(S): 30 TABLET, FILM COATED, EXTENDED RELEASE ORAL at 13:31

## 2024-09-17 RX ADMIN — KETOROLAC TROMETHAMINE 15 MILLIGRAM(S): 10 TABLET, FILM COATED ORAL at 08:15

## 2024-09-17 RX ADMIN — Medication 75 MILLILITER(S): at 02:32

## 2024-09-17 RX ADMIN — MORPHINE SULFATE 30 MILLILITER(S): 30 TABLET, FILM COATED, EXTENDED RELEASE ORAL at 12:22

## 2024-09-17 RX ADMIN — FOLIC ACID 1 MILLIGRAM(S): 1 TABLET ORAL at 12:33

## 2024-09-17 RX ADMIN — CHLORHEXIDINE GLUCONATE ORAL RINSE 1 APPLICATION(S): 1.2 SOLUTION DENTAL at 12:32

## 2024-09-17 RX ADMIN — MORPHINE SULFATE 30 MILLILITER(S): 30 TABLET, FILM COATED, EXTENDED RELEASE ORAL at 20:22

## 2024-09-17 RX ADMIN — MORPHINE SULFATE 30 MILLIGRAM(S): 30 TABLET, FILM COATED, EXTENDED RELEASE ORAL at 17:27

## 2024-09-17 RX ADMIN — MORPHINE SULFATE 1.5 MILLIGRAM(S): 30 TABLET, FILM COATED, EXTENDED RELEASE ORAL at 12:31

## 2024-09-17 RX ADMIN — PENICILLIN V POTASSIUM 250 MILLIGRAM(S): 500 TABLET ORAL at 17:28

## 2024-09-17 RX ADMIN — KETOROLAC TROMETHAMINE 15 MILLIGRAM(S): 10 TABLET, FILM COATED ORAL at 14:55

## 2024-09-17 RX ADMIN — KETOROLAC TROMETHAMINE 15 MILLIGRAM(S): 10 TABLET, FILM COATED ORAL at 15:55

## 2024-09-17 RX ADMIN — VOXELOTOR 1500 MILLIGRAM(S): 300 TABLET, FOR SUSPENSION ORAL at 12:32

## 2024-09-17 RX ADMIN — KETOROLAC TROMETHAMINE 15 MILLIGRAM(S): 10 TABLET, FILM COATED ORAL at 19:10

## 2024-09-17 RX ADMIN — KETOROLAC TROMETHAMINE 15 MILLIGRAM(S): 10 TABLET, FILM COATED ORAL at 01:13

## 2024-09-17 RX ADMIN — MORPHINE SULFATE 30 MILLIGRAM(S): 30 TABLET, FILM COATED, EXTENDED RELEASE ORAL at 06:23

## 2024-09-17 RX ADMIN — KETOROLAC TROMETHAMINE 15 MILLIGRAM(S): 10 TABLET, FILM COATED ORAL at 07:19

## 2024-09-17 RX ADMIN — ENOXAPARIN SODIUM 30 MILLIGRAM(S): 150 INJECTION SUBCUTANEOUS at 12:32

## 2024-09-17 RX ADMIN — MORPHINE SULFATE 30 MILLIGRAM(S): 30 TABLET, FILM COATED, EXTENDED RELEASE ORAL at 05:33

## 2024-09-17 RX ADMIN — MORPHINE SULFATE 1.5 MILLIGRAM(S): 30 TABLET, FILM COATED, EXTENDED RELEASE ORAL at 22:26

## 2024-09-17 RX ADMIN — HYDROXYUREA 1500 MILLIGRAM(S): 500 CAPSULE ORAL at 12:32

## 2024-09-17 RX ADMIN — PENICILLIN V POTASSIUM 250 MILLIGRAM(S): 500 TABLET ORAL at 05:33

## 2024-09-17 RX ADMIN — MORPHINE SULFATE 30 MILLILITER(S): 30 TABLET, FILM COATED, EXTENDED RELEASE ORAL at 00:34

## 2024-09-17 RX ADMIN — Medication 10 GRAM(S): at 05:33

## 2024-09-17 RX ADMIN — Medication 75 MILLILITER(S): at 19:12

## 2024-09-17 RX ADMIN — KETOROLAC TROMETHAMINE 15 MILLIGRAM(S): 10 TABLET, FILM COATED ORAL at 02:13

## 2024-09-17 RX ADMIN — MORPHINE SULFATE 30 MILLILITER(S): 30 TABLET, FILM COATED, EXTENDED RELEASE ORAL at 08:25

## 2024-09-17 RX ADMIN — MORPHINE SULFATE 30 MILLIGRAM(S): 30 TABLET, FILM COATED, EXTENDED RELEASE ORAL at 18:27

## 2024-09-17 RX ADMIN — Medication 10 GRAM(S): at 17:27

## 2024-09-17 RX ADMIN — PANTOPRAZOLE SODIUM 40 MILLIGRAM(S): 40 TABLET, DELAYED RELEASE ORAL at 12:32

## 2024-09-17 RX ADMIN — KETOROLAC TROMETHAMINE 15 MILLIGRAM(S): 10 TABLET, FILM COATED ORAL at 20:06

## 2024-09-17 NOTE — PROGRESS NOTE ADULT - SUBJECTIVE AND OBJECTIVE BOX
*******************************  Joelle Skelton MD (PGY-1)  Internal Medicine  Contact via [employer] TEAMS  *******************************    DATE OF SERVICE: 09-17-24 @ 09:15    Patient is a 29y old  Male who presents with a chief complaint of sickle cell crisis (17 Sep 2024 07:12)      SUBJECTIVE / OVERNIGHT EVENTS:  Overnight, no acute events  Pt seen and examined at bedside w/ Mom. Mom stated that patient was not able to sleep due to pain. Pt confirmed and described pain in hips, shoulders, and knees. States toradol helped "loosened" his joints, but still having significant pain. However, still hesitant to increase PCA pump dose but is amenable to adding morphine.     ROS negative except as above.    MEDICATIONS  (STANDING):  chlorhexidine 2% Cloths 1 Application(s) Topical daily  enoxaparin Injectable 30 milliGRAM(s) SubCutaneous every 24 hours  folic acid 1 milliGRAM(s) Oral daily  glutamine Powder 10 Gram(s) Oral two times a day  hydroxyurea 1500 milliGRAM(s) Oral daily  ketorolac   Injectable 15 milliGRAM(s) IV Push every 6 hours  morphine ER Tablet 30 milliGRAM(s) Oral two times a day  morphine PCA (5 mG/mL) 30 milliLiter(s) PCA Continuous PCA Continuous  pantoprazole  Injectable 40 milliGRAM(s) IV Push daily  penicillin   milliGRAM(s) Oral two times a day  sodium chloride 0.45%. 1000 milliLiter(s) (75 mL/Hr) IV Continuous <Continuous>  voxelotor 1500 milliGRAM(s) Oral daily    MEDICATIONS  (PRN):  morphine  - Injectable 1.5 milliGRAM(s) IV Push every 4 hours PRN Breakthrough Pain  naloxone Injectable 0.1 milliGRAM(s) IV Push every 3 minutes PRN For ANY of the following changes in patient status:  A. RR LESS THAN 10 breaths per minute, B. Oxygen saturation LESS THAN 90%, C. Sedation score of 6  ondansetron Injectable 4 milliGRAM(s) IV Push every 6 hours PRN Nausea  polyethylene glycol 3350 17 Gram(s) Oral two times a day PRN Constipation  senna 2 Tablet(s) Oral at bedtime PRN Constipation      Vital Signs Last 24 Hrs  T(C): 37 (17 Sep 2024 08:00), Max: 37 (17 Sep 2024 08:00)  T(F): 98.6 (17 Sep 2024 08:00), Max: 98.6 (17 Sep 2024 08:00)  HR: 81 (17 Sep 2024 08:00) (80 - 91)  BP: 95/56 (17 Sep 2024 08:00) (95/56 - 111/63)  BP(mean): --  RR: 16 (17 Sep 2024 08:00) (15 - 16)  SpO2: 99% (17 Sep 2024 08:00) (97% - 100%)    Parameters below as of 17 Sep 2024 08:00  Patient On (Oxygen Delivery Method): room air      CAPILLARY BLOOD GLUCOSE        I&O's Summary    16 Sep 2024 07:01  -  17 Sep 2024 07:00  --------------------------------------------------------  IN: 3325 mL / OUT: 400 mL / NET: 2925 mL        PHYSICAL EXAM:  GENERAL: NAD, well-developed  HEAD:  Atraumatic, Normocephalic  EYES: EOMI, conjunctiva and sclera clear  NECK: Supple, No JVD  CHEST/LUNG: Clear to auscultation bilaterally; No wheeze  HEART: Regular rate and rhythm; No murmurs, rubs, or gallops  ABDOMEN: Soft, Nontender, Nondistended; Bowel sounds present  EXTREMITIES: Mild TP cervical spine + L scapula. Mild L hip tenderness. No clubbing or cyanosis of digits  NEUROLOGY: AOx3, non-focal  SKIN: No rashes or lesions    LABS:                        7.1    7.69  )-----------( 119      ( 16 Sep 2024 05:34 )             21.4     09-16    139  |  104  |  6<L>  ----------------------------<  83  3.8   |  21<L>  |  0.30<L>    Ca    8.6      16 Sep 2024 05:34  Phos  3.7     09-16    TPro  8.1  /  Alb  3.4  /  TBili  1.8<H>  /  DBili  x   /  AST  93<H>  /  ALT  70<H>  /  AlkPhos  366<H>  09-16            MICRO:      RADIOLOGY & ADDITIONAL TESTS:

## 2024-09-17 NOTE — PROGRESS NOTE ADULT - PROBLEM SELECTOR PLAN 1
- follows with Dr. Montano, missed monthly dose of Adakveo for 2 months which may explain SCC  - with R knee pain, b/l arm pain, L hip pain (now mostly resolved), cervical spine pain.  - low concern for acute chest given no opacities on CXR, chest pain more consistent with SCC  - transaminitis likely 2/2 to hepatopathy iso vasoocclusive crisus, no abdominal pain and LFTs improving, could otherwise consider RUQ US  - hematology consulted  - palliative deferred pain management to primary team     A/P:  - c/w 1/2 NS  - pain control w/ pca morphine pump, toradol 15mg q6hr x5 days w/ IV protonix 40 x5 days  - c/w 30 mg ERBID Morphine (home medication 15 mg ER BID, would like to go home on home dose when DC)  - breakthrough IVP 1.5 mg morphine PRN q4 for breakthrough (requested this for after he ambulates, pain worse)  - bowel regimen: miralax BID, senna 2 MG PRN    - home hydroxyurea (need to take home med), oxbryta (need to take home med) and endari  - incentive spirometer  - Labs q48, CBC w/ diff, LDH, BMP  - will continue to monitor for signs of acute chest syndrome. If fever, culture and repeat CXR stat  - trend CBC, and transfuse for symptomatic anemia, - follows with Dr. Montano, missed monthly dose of Adakveo for 2 months which may explain SCC  - with R knee pain, b/l arm pain, L hip pain (now mostly resolved), cervical spine pain.  - low concern for acute chest given no opacities on CXR, chest pain more consistent with SCC  - transaminitis likely 2/2 to hepatopathy iso vasoocclusive crisus, no abdominal pain and LFTs improving, could otherwise consider RUQ US  - hematology consulted  - palliative deferred pain management to primary team     A/P:  - c/w 1/2 NS  - pain control w/ pca morphine pump, toradol 15mg q6hr x5 days w/ IV protonix 40 x5 days  - c/w 30 mg ERBID Morphine (home medication 15 mg ER BID, would like to go home on home dose when DC)  - breakthrough IVP 1.5 mg morphine PRN q4 for breakthrough (requested this for after he ambulates, pain worse)  - Add 1.5 mg morphine q3  - bowel regimen: miralax BID, senna 2 MG PRN  - home hydroxyurea (need to take home med), oxbryta (need to take home med) and endari  - incentive spirometer  - Labs q48, CBC w/ diff, LDH, BMP  - will continue to monitor for signs of acute chest syndrome. If fever, culture and repeat CXR stat - follows with Dr. Montano, missed monthly dose of Adakveo for 2 months which may explain SCC  - with R knee pain, b/l arm pain, L hip pain (now mostly resolved), cervical spine pain.  - low concern for acute chest given no opacities on CXR, chest pain more consistent with SCC  - transaminitis likely 2/2 to hepatopathy iso vasoocclusive crisus, no abdominal pain and LFTs improving, could otherwise consider RUQ US  - hematology consulted  - palliative deferred pain management to primary team     A/P:  - c/w 1/2 NS  - pain control w/ pca morphine pump, toradol 15mg q6hr x5 days w/ IV protonix 40 x5 days  - c/w 30 mg ERBID Morphine (home medication 15 mg ER BID, would like to go home on home dose when DC)  - breakthrough IVP 1.5 mg morphine PRN q4 for breakthrough (requested this for after he ambulates, pain worse)  - Add 1.5 mg morphine q3  - Can trial humidified O2 for pain  - bowel regimen: miralax BID, senna 2 MG PRN  - home hydroxyurea (need to take home med), oxbryta (need to take home med) and endari  - incentive spirometer  - Labs q48, CBC w/ diff, LDH, BMP  - will continue to monitor for signs of acute chest syndrome. If fever, culture and repeat CXR stat

## 2024-09-17 NOTE — PROGRESS NOTE ADULT - ATTENDING COMMENTS
Pt still experiencing fairly persistent pain. He does endorse some improvement with ketorolac: his knees are now less stiff when he ambulates. He is hesitant to increase opioid analgesia at this time hand to concerns about further dependence. He would like to keep the regimen as is and see if ketorolac can help him make further improvement. I will reach out to his sickle cell disease provider Dr. Jesica Montano to see if she can recommend adjunct therapies or other strategies for helping this pt progress through what is already a prolonged admission for vaso-occlusive crisis.

## 2024-09-18 DIAGNOSIS — D69.6 THROMBOCYTOPENIA, UNSPECIFIED: ICD-10-CM

## 2024-09-18 LAB
ANION GAP SERPL CALC-SCNC: 11 MMOL/L — SIGNIFICANT CHANGE UP (ref 7–14)
BASOPHILS # BLD AUTO: 0.06 K/UL — SIGNIFICANT CHANGE UP (ref 0–0.2)
BASOPHILS NFR BLD AUTO: 0.7 % — SIGNIFICANT CHANGE UP (ref 0–2)
BUN SERPL-MCNC: 6 MG/DL — LOW (ref 7–23)
CALCIUM SERPL-MCNC: 8.8 MG/DL — SIGNIFICANT CHANGE UP (ref 8.4–10.5)
CHLORIDE SERPL-SCNC: 103 MMOL/L — SIGNIFICANT CHANGE UP (ref 98–107)
CO2 SERPL-SCNC: 23 MMOL/L — SIGNIFICANT CHANGE UP (ref 22–31)
CREAT SERPL-MCNC: 0.27 MG/DL — LOW (ref 0.5–1.3)
EGFR: 172 ML/MIN/1.73M2 — SIGNIFICANT CHANGE UP
EOSINOPHIL # BLD AUTO: 0.05 K/UL — SIGNIFICANT CHANGE UP (ref 0–0.5)
EOSINOPHIL NFR BLD AUTO: 0.6 % — SIGNIFICANT CHANGE UP (ref 0–6)
GLUCOSE SERPL-MCNC: 69 MG/DL — LOW (ref 70–99)
HCT VFR BLD CALC: 19.6 % — CRITICAL LOW (ref 39–50)
HGB BLD-MCNC: 6.6 G/DL — CRITICAL LOW (ref 13–17)
IANC: 3.46 K/UL — SIGNIFICANT CHANGE UP (ref 1.8–7.4)
IMM GRANULOCYTES NFR BLD AUTO: 0.3 % — SIGNIFICANT CHANGE UP (ref 0–0.9)
LDH SERPL L TO P-CCNC: 500 U/L — HIGH (ref 135–225)
LYMPHOCYTES # BLD AUTO: 4.46 K/UL — HIGH (ref 1–3.3)
LYMPHOCYTES # BLD AUTO: 51.5 % — HIGH (ref 13–44)
MCHC RBC-ENTMCNC: 23.4 PG — LOW (ref 27–34)
MCHC RBC-ENTMCNC: 33.7 GM/DL — SIGNIFICANT CHANGE UP (ref 32–36)
MCV RBC AUTO: 69.5 FL — LOW (ref 80–100)
MONOCYTES # BLD AUTO: 0.6 K/UL — SIGNIFICANT CHANGE UP (ref 0–0.9)
MONOCYTES NFR BLD AUTO: 6.9 % — SIGNIFICANT CHANGE UP (ref 2–14)
NEUTROPHILS # BLD AUTO: 3.46 K/UL — SIGNIFICANT CHANGE UP (ref 1.8–7.4)
NEUTROPHILS NFR BLD AUTO: 40 % — LOW (ref 43–77)
NRBC # BLD: 10 /100 WBCS — HIGH (ref 0–0)
NRBC # FLD: 0.88 K/UL — HIGH (ref 0–0)
PLATELET # BLD AUTO: 95 K/UL — LOW (ref 150–400)
POTASSIUM SERPL-MCNC: 3.8 MMOL/L — SIGNIFICANT CHANGE UP (ref 3.5–5.3)
POTASSIUM SERPL-SCNC: 3.8 MMOL/L — SIGNIFICANT CHANGE UP (ref 3.5–5.3)
RBC # BLD: 2.82 M/UL — LOW (ref 4.2–5.8)
RBC # FLD: 25.8 % — HIGH (ref 10.3–14.5)
SODIUM SERPL-SCNC: 137 MMOL/L — SIGNIFICANT CHANGE UP (ref 135–145)
WBC # BLD: 8.66 K/UL — SIGNIFICANT CHANGE UP (ref 3.8–10.5)
WBC # FLD AUTO: 8.66 K/UL — SIGNIFICANT CHANGE UP (ref 3.8–10.5)

## 2024-09-18 PROCEDURE — 99233 SBSQ HOSP IP/OBS HIGH 50: CPT | Mod: GC

## 2024-09-18 RX ORDER — MORPHINE SULFATE 30 MG/1
30 TABLET, FILM COATED, EXTENDED RELEASE ORAL
Refills: 0 | Status: DISCONTINUED | OUTPATIENT
Start: 2024-09-18 | End: 2024-09-19

## 2024-09-18 RX ADMIN — KETOROLAC TROMETHAMINE 15 MILLIGRAM(S): 10 TABLET, FILM COATED ORAL at 01:14

## 2024-09-18 RX ADMIN — MORPHINE SULFATE 30 MILLIGRAM(S): 30 TABLET, FILM COATED, EXTENDED RELEASE ORAL at 18:03

## 2024-09-18 RX ADMIN — KETOROLAC TROMETHAMINE 15 MILLIGRAM(S): 10 TABLET, FILM COATED ORAL at 18:57

## 2024-09-18 RX ADMIN — MORPHINE SULFATE 1.5 MILLIGRAM(S): 30 TABLET, FILM COATED, EXTENDED RELEASE ORAL at 09:21

## 2024-09-18 RX ADMIN — MORPHINE SULFATE 1.5 MILLIGRAM(S): 30 TABLET, FILM COATED, EXTENDED RELEASE ORAL at 01:14

## 2024-09-18 RX ADMIN — Medication 75 MILLILITER(S): at 15:45

## 2024-09-18 RX ADMIN — Medication 75 MILLILITER(S): at 04:37

## 2024-09-18 RX ADMIN — MORPHINE SULFATE 30 MILLILITER(S): 30 TABLET, FILM COATED, EXTENDED RELEASE ORAL at 20:12

## 2024-09-18 RX ADMIN — MORPHINE SULFATE 1.5 MILLIGRAM(S): 30 TABLET, FILM COATED, EXTENDED RELEASE ORAL at 06:10

## 2024-09-18 RX ADMIN — MORPHINE SULFATE 1.5 MILLIGRAM(S): 30 TABLET, FILM COATED, EXTENDED RELEASE ORAL at 02:58

## 2024-09-18 RX ADMIN — MORPHINE SULFATE 1.5 MILLIGRAM(S): 30 TABLET, FILM COATED, EXTENDED RELEASE ORAL at 22:00

## 2024-09-18 RX ADMIN — KETOROLAC TROMETHAMINE 15 MILLIGRAM(S): 10 TABLET, FILM COATED ORAL at 14:40

## 2024-09-18 RX ADMIN — MORPHINE SULFATE 30 MILLILITER(S): 30 TABLET, FILM COATED, EXTENDED RELEASE ORAL at 15:54

## 2024-09-18 RX ADMIN — MORPHINE SULFATE 30 MILLILITER(S): 30 TABLET, FILM COATED, EXTENDED RELEASE ORAL at 16:11

## 2024-09-18 RX ADMIN — MORPHINE SULFATE 1.5 MILLIGRAM(S): 30 TABLET, FILM COATED, EXTENDED RELEASE ORAL at 12:22

## 2024-09-18 RX ADMIN — MORPHINE SULFATE 30 MILLILITER(S): 30 TABLET, FILM COATED, EXTENDED RELEASE ORAL at 08:33

## 2024-09-18 RX ADMIN — MORPHINE SULFATE 1.5 MILLIGRAM(S): 30 TABLET, FILM COATED, EXTENDED RELEASE ORAL at 13:22

## 2024-09-18 RX ADMIN — VOXELOTOR 1500 MILLIGRAM(S): 300 TABLET, FOR SUSPENSION ORAL at 12:01

## 2024-09-18 RX ADMIN — KETOROLAC TROMETHAMINE 15 MILLIGRAM(S): 10 TABLET, FILM COATED ORAL at 06:10

## 2024-09-18 RX ADMIN — MORPHINE SULFATE 1.5 MILLIGRAM(S): 30 TABLET, FILM COATED, EXTENDED RELEASE ORAL at 21:19

## 2024-09-18 RX ADMIN — PENICILLIN V POTASSIUM 250 MILLIGRAM(S): 500 TABLET ORAL at 06:12

## 2024-09-18 RX ADMIN — MORPHINE SULFATE 30 MILLIGRAM(S): 30 TABLET, FILM COATED, EXTENDED RELEASE ORAL at 06:10

## 2024-09-18 RX ADMIN — KETOROLAC TROMETHAMINE 15 MILLIGRAM(S): 10 TABLET, FILM COATED ORAL at 13:40

## 2024-09-18 RX ADMIN — HYDROXYUREA 1500 MILLIGRAM(S): 500 CAPSULE ORAL at 12:02

## 2024-09-18 RX ADMIN — Medication 10 GRAM(S): at 06:11

## 2024-09-18 RX ADMIN — FOLIC ACID 1 MILLIGRAM(S): 1 TABLET ORAL at 12:02

## 2024-09-18 RX ADMIN — CHLORHEXIDINE GLUCONATE ORAL RINSE 1 APPLICATION(S): 1.2 SOLUTION DENTAL at 12:01

## 2024-09-18 RX ADMIN — MORPHINE SULFATE 1.5 MILLIGRAM(S): 30 TABLET, FILM COATED, EXTENDED RELEASE ORAL at 10:21

## 2024-09-18 RX ADMIN — Medication 10 GRAM(S): at 17:03

## 2024-09-18 RX ADMIN — MORPHINE SULFATE 1.5 MILLIGRAM(S): 30 TABLET, FILM COATED, EXTENDED RELEASE ORAL at 03:58

## 2024-09-18 RX ADMIN — MORPHINE SULFATE 30 MILLIGRAM(S): 30 TABLET, FILM COATED, EXTENDED RELEASE ORAL at 17:03

## 2024-09-18 RX ADMIN — PENICILLIN V POTASSIUM 250 MILLIGRAM(S): 500 TABLET ORAL at 17:03

## 2024-09-18 RX ADMIN — MORPHINE SULFATE 1.5 MILLIGRAM(S): 30 TABLET, FILM COATED, EXTENDED RELEASE ORAL at 18:56

## 2024-09-18 RX ADMIN — MORPHINE SULFATE 1.5 MILLIGRAM(S): 30 TABLET, FILM COATED, EXTENDED RELEASE ORAL at 18:11

## 2024-09-18 RX ADMIN — ENOXAPARIN SODIUM 30 MILLIGRAM(S): 150 INJECTION SUBCUTANEOUS at 12:02

## 2024-09-18 RX ADMIN — MORPHINE SULFATE 1.5 MILLIGRAM(S): 30 TABLET, FILM COATED, EXTENDED RELEASE ORAL at 00:14

## 2024-09-18 RX ADMIN — Medication 40 MILLIEQUIVALENT(S): at 18:18

## 2024-09-18 RX ADMIN — KETOROLAC TROMETHAMINE 15 MILLIGRAM(S): 10 TABLET, FILM COATED ORAL at 00:14

## 2024-09-18 NOTE — PROGRESS NOTE ADULT - PROBLEM SELECTOR PLAN 3
- low concern for septic arthritis given improving tenderness, improved WBC, afebrile  - R knee XR with no effusion, osteonecrosis  - US knee with small effusion     Plan  - CTM - noting swelling and warmth over left hip for several days, retic and LDH markers stable however will X ray left hip to eval  - known occlusive crisis in the past/ osteonecrosis from MRI in 2019    Plan:   - XR L Hip: No acute pathology  - CTM

## 2024-09-18 NOTE — PROGRESS NOTE ADULT - PROBLEM SELECTOR PLAN 2
- noting swelling and warmth over left hip for several days, retic and LDH markers stable however will X ray left hip to eval  - known occlusive crisis in the past/ osteonecrosis from MRI in 2019    Plan:   - XR L Hip: No acute pathology  - CTM - platelet count 9/18: 95, unclear baseline as pt admitted w/ thrombocytosis. Likely ~170  - 4 T score: 4 points for intermediate probability of HIT    PLAN  - Unlikely HIT as pt on lovenox for >2 weeks   - Consider d/c lovenox if platelets continue to decrease tomorrow  - Consider HIT workup on Fri AM if platelets continue to fall

## 2024-09-18 NOTE — PROGRESS NOTE ADULT - SUBJECTIVE AND OBJECTIVE BOX
*******************************  Joelle Skelton MD (PGY-1)  Internal Medicine  Contact via [employer] TEAMS  *******************************    DATE OF SERVICE: 09-18-24 @ 07:25    Patient is a 29y old  Male who presents with a chief complaint of sickle cell crisis (17 Sep 2024 07:12)      SUBJECTIVE / OVERNIGHT EVENTS:  Overnight,  Pt seen and examined at bedside.    ROS negative except as above.    MEDICATIONS  (STANDING):  chlorhexidine 2% Cloths 1 Application(s) Topical daily  enoxaparin Injectable 30 milliGRAM(s) SubCutaneous every 24 hours  folic acid 1 milliGRAM(s) Oral daily  glutamine Powder 10 Gram(s) Oral two times a day  hydroxyurea 1500 milliGRAM(s) Oral daily  ketorolac   Injectable 15 milliGRAM(s) IV Push every 6 hours  morphine  - Injectable 1.5 milliGRAM(s) IV Push every 3 hours  morphine ER Tablet 30 milliGRAM(s) Oral two times a day  morphine PCA (5 mG/mL) 30 milliLiter(s) PCA Continuous PCA Continuous  penicillin   milliGRAM(s) Oral two times a day  sodium chloride 0.45%. 1000 milliLiter(s) (75 mL/Hr) IV Continuous <Continuous>  voxelotor 1500 milliGRAM(s) Oral daily    MEDICATIONS  (PRN):  naloxone Injectable 0.1 milliGRAM(s) IV Push every 3 minutes PRN For ANY of the following changes in patient status:  A. RR LESS THAN 10 breaths per minute, B. Oxygen saturation LESS THAN 90%, C. Sedation score of 6  ondansetron Injectable 4 milliGRAM(s) IV Push every 6 hours PRN Nausea  polyethylene glycol 3350 17 Gram(s) Oral two times a day PRN Constipation  senna 2 Tablet(s) Oral at bedtime PRN Constipation      Vital Signs Last 24 Hrs  T(C): 36.8 (18 Sep 2024 05:55), Max: 37 (17 Sep 2024 08:00)  T(F): 98.2 (18 Sep 2024 05:55), Max: 98.6 (17 Sep 2024 08:00)  HR: 78 (18 Sep 2024 05:55) (62 - 91)  BP: 105/65 (18 Sep 2024 05:55) (95/56 - 115/66)  BP(mean): --  RR: 18 (18 Sep 2024 05:55) (16 - 18)  SpO2: 100% (18 Sep 2024 05:55) (98% - 100%)    Parameters below as of 18 Sep 2024 05:55  Patient On (Oxygen Delivery Method): nasal cannula  O2 Flow (L/min): 4    CAPILLARY BLOOD GLUCOSE        I&O's Summary    17 Sep 2024 07:01  -  18 Sep 2024 07:00  --------------------------------------------------------  IN: 2740 mL / OUT: 1500 mL / NET: 1240 mL        PHYSICAL EXAM:  GENERAL: NAD, well-developed  HEAD:  Atraumatic, Normocephalic  EYES: EOMI, conjunctiva and sclera clear  NECK: Supple, No JVD  CHEST/LUNG: Clear to auscultation bilaterally; No wheeze  HEART: Regular rate and rhythm; No murmurs, rubs, or gallops  ABDOMEN: Soft, Nontender, Nondistended; Bowel sounds present  EXTREMITIES:  2+ Peripheral Pulses, No clubbing, cyanosis, or edema  NEUROLOGY: AOx3, non-focal  SKIN: No rashes or lesions    LABS:                  MICRO:      RADIOLOGY & ADDITIONAL TESTS:           *******************************  Joelle Skelton MD (PGY-1)  Internal Medicine  Contact via [employer] TEAMS  *******************************    DATE OF SERVICE: 09-18-24 @ 07:25    Patient is a 29y old  Male who presents with a chief complaint of sickle cell crisis (17 Sep 2024 07:12)      SUBJECTIVE / OVERNIGHT EVENTS:  No acute events overnight. Hgb this AM 6.6  Pt seen and examined at bedside. Stated that pain fluctuates b/w 5-8, states morphine 1.5mg, toradol, and humidified oxygen have all been helping with pain. Still resistant to increasing dose of PCA pump. No other acute complaints.     ROS negative except as above.    MEDICATIONS  (STANDING):  chlorhexidine 2% Cloths 1 Application(s) Topical daily  enoxaparin Injectable 30 milliGRAM(s) SubCutaneous every 24 hours  folic acid 1 milliGRAM(s) Oral daily  glutamine Powder 10 Gram(s) Oral two times a day  hydroxyurea 1500 milliGRAM(s) Oral daily  ketorolac   Injectable 15 milliGRAM(s) IV Push every 6 hours  morphine  - Injectable 1.5 milliGRAM(s) IV Push every 3 hours  morphine ER Tablet 30 milliGRAM(s) Oral two times a day  morphine PCA (5 mG/mL) 30 milliLiter(s) PCA Continuous PCA Continuous  penicillin   milliGRAM(s) Oral two times a day  sodium chloride 0.45%. 1000 milliLiter(s) (75 mL/Hr) IV Continuous <Continuous>  voxelotor 1500 milliGRAM(s) Oral daily    MEDICATIONS  (PRN):  naloxone Injectable 0.1 milliGRAM(s) IV Push every 3 minutes PRN For ANY of the following changes in patient status:  A. RR LESS THAN 10 breaths per minute, B. Oxygen saturation LESS THAN 90%, C. Sedation score of 6  ondansetron Injectable 4 milliGRAM(s) IV Push every 6 hours PRN Nausea  polyethylene glycol 3350 17 Gram(s) Oral two times a day PRN Constipation  senna 2 Tablet(s) Oral at bedtime PRN Constipation      Vital Signs Last 24 Hrs  T(C): 36.8 (18 Sep 2024 05:55), Max: 37 (17 Sep 2024 08:00)  T(F): 98.2 (18 Sep 2024 05:55), Max: 98.6 (17 Sep 2024 08:00)  HR: 78 (18 Sep 2024 05:55) (62 - 91)  BP: 105/65 (18 Sep 2024 05:55) (95/56 - 115/66)  BP(mean): --  RR: 18 (18 Sep 2024 05:55) (16 - 18)  SpO2: 100% (18 Sep 2024 05:55) (98% - 100%)    Parameters below as of 18 Sep 2024 05:55  Patient On (Oxygen Delivery Method): nasal cannula  O2 Flow (L/min): 4    CAPILLARY BLOOD GLUCOSE        I&O's Summary    17 Sep 2024 07:01  -  18 Sep 2024 07:00  --------------------------------------------------------  IN: 2740 mL / OUT: 1500 mL / NET: 1240 mL        PHYSICAL EXAM:  GENERAL: NAD, well-developed  HEAD:  Atraumatic, Normocephalic  EYES: EOMI, conjunctiva and sclera clear  NECK: Supple, No JVD  CHEST/LUNG: Clear to auscultation bilaterally; No wheeze  HEART: Regular rate and rhythm; No murmurs, rubs, or gallops  ABDOMEN: Soft, Nontender, Nondistended; Bowel sounds present  EXTREMITIES:  Tenderness to palpation over cervical spine, 2+ Peripheral Pulses  NEUROLOGY: AOx3, non-focal  SKIN: No rashes or lesions    LABS:                  MICRO:      RADIOLOGY & ADDITIONAL TESTS:           *******************************  Joelle Skelton MD (PGY-1)  Internal Medicine  Contact via [employer] TEAMS  *******************************    DATE OF SERVICE: 09-18-24 @ 07:25    Patient is a 29y old  Male who presents with a chief complaint of sickle cell crisis (17 Sep 2024 07:12)      SUBJECTIVE / OVERNIGHT EVENTS:  No acute events overnight. Hgb this AM 6.6  Pt seen and examined at bedside. Stated that pain fluctuates b/w 5-8, states morphine 1.5mg, toradol, and humidified oxygen have all been helping with pain. Still resistant to increasing dose of PCA pump. No other acute complaints.     ROS negative except as above.    MEDICATIONS  (STANDING):  chlorhexidine 2% Cloths 1 Application(s) Topical daily  enoxaparin Injectable 30 milliGRAM(s) SubCutaneous every 24 hours  folic acid 1 milliGRAM(s) Oral daily  glutamine Powder 10 Gram(s) Oral two times a day  hydroxyurea 1500 milliGRAM(s) Oral daily  ketorolac   Injectable 15 milliGRAM(s) IV Push every 6 hours  morphine  - Injectable 1.5 milliGRAM(s) IV Push every 3 hours  morphine ER Tablet 30 milliGRAM(s) Oral two times a day  morphine PCA (5 mG/mL) 30 milliLiter(s) PCA Continuous PCA Continuous  penicillin   milliGRAM(s) Oral two times a day  sodium chloride 0.45%. 1000 milliLiter(s) (75 mL/Hr) IV Continuous <Continuous>  voxelotor 1500 milliGRAM(s) Oral daily    MEDICATIONS  (PRN):  naloxone Injectable 0.1 milliGRAM(s) IV Push every 3 minutes PRN For ANY of the following changes in patient status:  A. RR LESS THAN 10 breaths per minute, B. Oxygen saturation LESS THAN 90%, C. Sedation score of 6  ondansetron Injectable 4 milliGRAM(s) IV Push every 6 hours PRN Nausea  polyethylene glycol 3350 17 Gram(s) Oral two times a day PRN Constipation  senna 2 Tablet(s) Oral at bedtime PRN Constipation      Vital Signs Last 24 Hrs  T(C): 36.8 (18 Sep 2024 05:55), Max: 37 (17 Sep 2024 08:00)  T(F): 98.2 (18 Sep 2024 05:55), Max: 98.6 (17 Sep 2024 08:00)  HR: 78 (18 Sep 2024 05:55) (62 - 91)  BP: 105/65 (18 Sep 2024 05:55) (95/56 - 115/66)  BP(mean): --  RR: 18 (18 Sep 2024 05:55) (16 - 18)  SpO2: 100% (18 Sep 2024 05:55) (98% - 100%)    Parameters below as of 18 Sep 2024 05:55  Patient On (Oxygen Delivery Method): nasal cannula  O2 Flow (L/min): 4    CAPILLARY BLOOD GLUCOSE        I&O's Summary    17 Sep 2024 07:01  -  18 Sep 2024 07:00  --------------------------------------------------------  IN: 2740 mL / OUT: 1500 mL / NET: 1240 mL        PHYSICAL EXAM:  GENERAL: NAD, cachetic  HEAD:  Atraumatic, Normocephalic  EYES: EOMI, conjunctiva and sclera clear  NECK: Supple, No JVD  CHEST/LUNG: Clear to auscultation bilaterally; No wheeze  HEART: Regular rate and rhythm; No murmurs, rubs, or gallops  ABDOMEN: Soft, Nontender, Nondistended; Bowel sounds present  EXTREMITIES:  Tenderness to palpation over cervical spine, 2+ Peripheral Pulses  NEUROLOGY: AOx3, non-focal  SKIN: No rashes or lesions    LABS:                  MICRO:      RADIOLOGY & ADDITIONAL TESTS:

## 2024-09-18 NOTE — CHART NOTE - NSCHARTNOTEFT_GEN_A_CORE
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      Diet Prescription: Diet, Regular:   Supplement Feeding Modality:  Oral  Ensure Plus High Protein Cans or Servings Per Day:  1       Frequency:  Two Times a day (08-28-24 @ 11:03)      Nutrition Course: Pt seen at bedside. Pt reported fair appetite/PO intake in house. Per RN flowsheet, Pt with fair PO intake 51-75% noted.  Pt ordered for Ensure plus HP with good PO intake, amenable to continue provision. Pt observed with snacks at bedside (cookies and sodas), per Pt  family brings him snacks. No GI distress reported i.e. nausea, vomiting, diarrhea. Per chart, last BM 9/16. Bowel regimen PRN. Skin is intact, no edema noted.      Pertinent Medications: MEDICATIONS  (STANDING):  chlorhexidine 2% Cloths 1 Application(s) Topical daily  enoxaparin Injectable 30 milliGRAM(s) SubCutaneous every 24 hours  folic acid 1 milliGRAM(s) Oral daily  glutamine Powder 10 Gram(s) Oral two times a day  hydroxyurea 1500 milliGRAM(s) Oral daily  ketorolac   Injectable 15 milliGRAM(s) IV Push every 6 hours  morphine  - Injectable 1.5 milliGRAM(s) IV Push every 3 hours  morphine ER Tablet 30 milliGRAM(s) Oral two times a day  morphine PCA (5 mG/mL) 30 milliLiter(s) PCA Continuous PCA Continuous  penicillin   milliGRAM(s) Oral two times a day  potassium chloride   Powder 40 milliEquivalent(s) Oral once  sodium chloride 0.45%. 1000 milliLiter(s) (75 mL/Hr) IV Continuous <Continuous>  voxelotor 1500 milliGRAM(s) Oral daily    MEDICATIONS  (PRN):  naloxone Injectable 0.1 milliGRAM(s) IV Push every 3 minutes PRN For ANY of the following changes in patient status:  A. RR LESS THAN 10 breaths per minute, B. Oxygen saturation LESS THAN 90%, C. Sedation score of 6  ondansetron Injectable 4 milliGRAM(s) IV Push every 6 hours PRN Nausea  polyethylene glycol 3350 17 Gram(s) Oral two times a day PRN Constipation  senna 2 Tablet(s) Oral at bedtime PRN Constipation      Pertinent Labs: 09-18 Na137 mmol/L Glu 69 mg/dL[L] K+ 3.8 mmol/L Cr  0.27 mg/dL[L] BUN 6 mg/dL[L] 09-16 Phos 3.7 mg/dL 09-16 Alb 3.4 g/dL     CAPILLARY BLOOD GLUCOSE    Weight: 41kg (9/4), 41.2kg (9/18)  Weight Assessment: Weight stable  Height: 65in / 165.1cm  IBW: 136lbs / 61.8kg +/-10%  BMI: 15.0kg/m^2    Physical Assessment, per flowsheets:  Edema: No edema noted.  Pressure Injury: No pressure injury noted.      Estimated Needs:   [X] No change since previous assessment, based on dosing weight 89.9 lbs / 40.8 kg   9594-7127 kcal daily @30-35 kcal/kg, 48.96-61.2 gm protein daily @1.2-1.5 gm/kg     Previous Nutrition Diagnosis:   [X] Severe malnutrition in the context of acute illness or injury.   Nutrition Diagnosis is [X] ongoing  [ ] resolved [ ] not applicable   New Nutrition Diagnosis: [X] not applicable     Education:  [ ] Provided pt with verbal / written education on   [ ] Provided on previous assessment by RD  [X] Not applicable   [ ] Pt refused     Interventions:   1) - Continue current diet order, which remains appropriate at this time.   2) - Continue Ensure Plus High Protein BID (provides 350 kcal, 20 gm protein per 8 oz serving)   3) - RDN services remain available as needed.     Monitor & Evaluate:  PO intake, tolerance to diet/supplement, nutrition related lab values, weight trends, BMs/GI distress, hydration status, skin integrity.    Gurinder Gaona RD, CDN. Available on [employer] teams,  Pager #[phone]

## 2024-09-18 NOTE — PROGRESS NOTE ADULT - PROBLEM SELECTOR PLAN 1
- follows with Dr. Montano, missed monthly dose of Adakveo for 2 months which may explain SCC  - with R knee pain, b/l arm pain, L hip pain (now mostly resolved), cervical spine pain.  - low concern for acute chest given no opacities on CXR, chest pain more consistent with SCC  - transaminitis likely 2/2 to hepatopathy iso vasoocclusive crisus, no abdominal pain and LFTs improving, could otherwise consider RUQ US  - hematology consulted  - palliative deferred pain management to primary team     A/P:  - c/w 1/2 NS  - pain control w/ pca morphine pump, toradol 15mg q6hr x5 days w/ IV protonix 40 x5 days  - c/w 30 mg ERBID Morphine (home medication 15 mg ER BID, would like to go home on home dose when DC)  - breakthrough IVP 1.5 mg morphine PRN q4 for breakthrough (requested this for after he ambulates, pain worse)  - Add 1.5 mg morphine q3  - Can trial humidified O2 for pain  - bowel regimen: miralax BID, senna 2 MG PRN  - home hydroxyurea (need to take home med), oxbryta (need to take home med) and endari  - incentive spirometer  - Labs q48, CBC w/ diff, LDH, BMP  - will continue to monitor for signs of acute chest syndrome. If fever, culture and repeat CXR stat - follows with Dr. Montano, missed monthly dose of Adakveo for 2 months which may explain SCC  - low concern for acute chest given no opacities on CXR, chest pain more consistent with SCC  - transaminitis likely 2/2 to hepatopathy iso vasoocclusive crisus, no abdominal pain and LFTs improving, could otherwise consider RUQ US  - palliative deferred pain management to primary team   - Hgb 9/18/24 6.6    A/P:  - c/w 1/2 NS  - repeat CBC, LDH 9/19 AM    - pain control w/ pca morphine pump, toradol 15mg q6hr x5 days w/ IV protonix 40 x5 days  - c/w 30 mg ERBID Morphine (home medication 15 mg ER BID, would like to go home on home dose when DC)  - breakthrough IVP 1.5 mg morphine PRN q4 for breakthrough (requested this for after he ambulates, pain worse)  - Add 1.5 mg morphine q3  - C/w humidified O2 for pain  - bowel regimen: miralax BID, senna 2 MG PRN  - home hydroxyurea (need to take home med), oxbryta (need to take home med) and endari  - incentive spirometer  - Labs q48, CBC w/ diff, LDH, BMP  - will continue to monitor for signs of acute chest syndrome. If fever, culture and repeat CXR stat

## 2024-09-18 NOTE — PROGRESS NOTE ADULT - ATTENDING COMMENTS
Pt is reporting slow improvement over the last 2 days, which he attributes mainly to the additional or toradol and use of supplemental O2. He is still attempting to trigger the PCA bolus quite frequently but is firm in declining any increases in demand dose. I have attempted to reach his sickle cell disease provider, Dr. Jesica Montano, and will try her again today to see if she can suggest any other means to help resolve this vaso-occlusive crisis. Encouragingly, the pt does not have any dyspnea, chest pain, or sign of acute infection. Hgb slightly lower today but not an indication to transfuse given clinical improvement. Will continue to monitor CBC closely.

## 2024-09-19 LAB
ANION GAP SERPL CALC-SCNC: 11 MMOL/L — SIGNIFICANT CHANGE UP (ref 7–14)
BILIRUB DIRECT SERPL-MCNC: 0.9 MG/DL — HIGH (ref 0–0.3)
BILIRUB SERPL-MCNC: 1.7 MG/DL — HIGH (ref 0.2–1.2)
BUN SERPL-MCNC: 8 MG/DL — SIGNIFICANT CHANGE UP (ref 7–23)
CALCIUM SERPL-MCNC: 8.6 MG/DL — SIGNIFICANT CHANGE UP (ref 8.4–10.5)
CHLORIDE SERPL-SCNC: 103 MMOL/L — SIGNIFICANT CHANGE UP (ref 98–107)
CO2 SERPL-SCNC: 23 MMOL/L — SIGNIFICANT CHANGE UP (ref 22–31)
CREAT SERPL-MCNC: 0.31 MG/DL — LOW (ref 0.5–1.3)
EGFR: 165 ML/MIN/1.73M2 — SIGNIFICANT CHANGE UP
GLUCOSE SERPL-MCNC: 81 MG/DL — SIGNIFICANT CHANGE UP (ref 70–99)
HAPTOGLOB SERPL-MCNC: <20 MG/DL — LOW (ref 34–200)
HCT VFR BLD CALC: 18.4 % — CRITICAL LOW (ref 39–50)
HGB BLD-MCNC: 6.2 G/DL — CRITICAL LOW (ref 13–17)
LDH SERPL L TO P-CCNC: 513 U/L — HIGH (ref 135–225)
MAGNESIUM SERPL-MCNC: 1.9 MG/DL — SIGNIFICANT CHANGE UP (ref 1.6–2.6)
MCHC RBC-ENTMCNC: 23.7 PG — LOW (ref 27–34)
MCHC RBC-ENTMCNC: 33.7 GM/DL — SIGNIFICANT CHANGE UP (ref 32–36)
MCV RBC AUTO: 70.2 FL — LOW (ref 80–100)
NRBC # BLD: 72 /100 WBCS — HIGH (ref 0–0)
NRBC # FLD: 6.55 K/UL — HIGH (ref 0–0)
PHOSPHATE SERPL-MCNC: 3.7 MG/DL — SIGNIFICANT CHANGE UP (ref 2.5–4.5)
PLATELET # BLD AUTO: 76 K/UL — LOW (ref 150–400)
POTASSIUM SERPL-MCNC: 3.7 MMOL/L — SIGNIFICANT CHANGE UP (ref 3.5–5.3)
POTASSIUM SERPL-SCNC: 3.7 MMOL/L — SIGNIFICANT CHANGE UP (ref 3.5–5.3)
RBC # BLD: 2.62 M/UL — LOW (ref 4.2–5.8)
RBC # FLD: 26.5 % — HIGH (ref 10.3–14.5)
RETICS #: 272.5 K/UL — HIGH (ref 25–125)
RETICS/RBC NFR: 10.4 % — HIGH (ref 0.5–2.5)
SODIUM SERPL-SCNC: 137 MMOL/L — SIGNIFICANT CHANGE UP (ref 135–145)
WBC # BLD: 9.09 K/UL — SIGNIFICANT CHANGE UP (ref 3.8–10.5)
WBC # FLD AUTO: 9.09 K/UL — SIGNIFICANT CHANGE UP (ref 3.8–10.5)

## 2024-09-19 PROCEDURE — 99233 SBSQ HOSP IP/OBS HIGH 50: CPT

## 2024-09-19 PROCEDURE — 99233 SBSQ HOSP IP/OBS HIGH 50: CPT | Mod: GC

## 2024-09-19 RX ORDER — MORPHINE SULFATE 30 MG/1
30 TABLET, FILM COATED, EXTENDED RELEASE ORAL
Refills: 0 | Status: DISCONTINUED | OUTPATIENT
Start: 2024-09-19 | End: 2024-09-21

## 2024-09-19 RX ORDER — OXYCODONE HYDROCHLORIDE 30 MG/1
5 TABLET, FILM COATED, EXTENDED RELEASE ORAL EVERY 4 HOURS
Refills: 0 | Status: DISCONTINUED | OUTPATIENT
Start: 2024-09-19 | End: 2024-09-19

## 2024-09-19 RX ADMIN — MORPHINE SULFATE 1.5 MILLIGRAM(S): 30 TABLET, FILM COATED, EXTENDED RELEASE ORAL at 00:50

## 2024-09-19 RX ADMIN — CHLORHEXIDINE GLUCONATE ORAL RINSE 1 APPLICATION(S): 1.2 SOLUTION DENTAL at 12:00

## 2024-09-19 RX ADMIN — MORPHINE SULFATE 1.5 MILLIGRAM(S): 30 TABLET, FILM COATED, EXTENDED RELEASE ORAL at 13:00

## 2024-09-19 RX ADMIN — Medication 10 GRAM(S): at 06:18

## 2024-09-19 RX ADMIN — OXYCODONE HYDROCHLORIDE 5 MILLIGRAM(S): 30 TABLET, FILM COATED, EXTENDED RELEASE ORAL at 15:17

## 2024-09-19 RX ADMIN — KETOROLAC TROMETHAMINE 15 MILLIGRAM(S): 10 TABLET, FILM COATED ORAL at 17:39

## 2024-09-19 RX ADMIN — KETOROLAC TROMETHAMINE 15 MILLIGRAM(S): 10 TABLET, FILM COATED ORAL at 12:59

## 2024-09-19 RX ADMIN — KETOROLAC TROMETHAMINE 15 MILLIGRAM(S): 10 TABLET, FILM COATED ORAL at 18:39

## 2024-09-19 RX ADMIN — MORPHINE SULFATE 1.5 MILLIGRAM(S): 30 TABLET, FILM COATED, EXTENDED RELEASE ORAL at 07:00

## 2024-09-19 RX ADMIN — KETOROLAC TROMETHAMINE 15 MILLIGRAM(S): 10 TABLET, FILM COATED ORAL at 00:54

## 2024-09-19 RX ADMIN — MORPHINE SULFATE 30 MILLIGRAM(S): 30 TABLET, FILM COATED, EXTENDED RELEASE ORAL at 17:38

## 2024-09-19 RX ADMIN — MORPHINE SULFATE 30 MILLIGRAM(S): 30 TABLET, FILM COATED, EXTENDED RELEASE ORAL at 18:38

## 2024-09-19 RX ADMIN — MORPHINE SULFATE 1.5 MILLIGRAM(S): 30 TABLET, FILM COATED, EXTENDED RELEASE ORAL at 10:05

## 2024-09-19 RX ADMIN — VOXELOTOR 1500 MILLIGRAM(S): 300 TABLET, FOR SUSPENSION ORAL at 12:00

## 2024-09-19 RX ADMIN — OXYCODONE HYDROCHLORIDE 5 MILLIGRAM(S): 30 TABLET, FILM COATED, EXTENDED RELEASE ORAL at 14:17

## 2024-09-19 RX ADMIN — KETOROLAC TROMETHAMINE 15 MILLIGRAM(S): 10 TABLET, FILM COATED ORAL at 08:00

## 2024-09-19 RX ADMIN — KETOROLAC TROMETHAMINE 15 MILLIGRAM(S): 10 TABLET, FILM COATED ORAL at 01:50

## 2024-09-19 RX ADMIN — MORPHINE SULFATE 1.5 MILLIGRAM(S): 30 TABLET, FILM COATED, EXTENDED RELEASE ORAL at 06:18

## 2024-09-19 RX ADMIN — MORPHINE SULFATE 1.5 MILLIGRAM(S): 30 TABLET, FILM COATED, EXTENDED RELEASE ORAL at 09:05

## 2024-09-19 RX ADMIN — KETOROLAC TROMETHAMINE 15 MILLIGRAM(S): 10 TABLET, FILM COATED ORAL at 11:59

## 2024-09-19 RX ADMIN — MORPHINE SULFATE 30 MILLIGRAM(S): 30 TABLET, FILM COATED, EXTENDED RELEASE ORAL at 07:00

## 2024-09-19 RX ADMIN — MORPHINE SULFATE 1.5 MILLIGRAM(S): 30 TABLET, FILM COATED, EXTENDED RELEASE ORAL at 12:00

## 2024-09-19 RX ADMIN — MORPHINE SULFATE 1.5 MILLIGRAM(S): 30 TABLET, FILM COATED, EXTENDED RELEASE ORAL at 03:15

## 2024-09-19 RX ADMIN — MORPHINE SULFATE 30 MILLILITER(S): 30 TABLET, FILM COATED, EXTENDED RELEASE ORAL at 20:18

## 2024-09-19 RX ADMIN — MORPHINE SULFATE 1.5 MILLIGRAM(S): 30 TABLET, FILM COATED, EXTENDED RELEASE ORAL at 00:17

## 2024-09-19 RX ADMIN — Medication 10 GRAM(S): at 17:39

## 2024-09-19 RX ADMIN — MORPHINE SULFATE 30 MILLILITER(S): 30 TABLET, FILM COATED, EXTENDED RELEASE ORAL at 08:13

## 2024-09-19 RX ADMIN — MORPHINE SULFATE 30 MILLIGRAM(S): 30 TABLET, FILM COATED, EXTENDED RELEASE ORAL at 06:18

## 2024-09-19 RX ADMIN — FOLIC ACID 1 MILLIGRAM(S): 1 TABLET ORAL at 11:59

## 2024-09-19 RX ADMIN — HYDROXYUREA 1500 MILLIGRAM(S): 500 CAPSULE ORAL at 12:00

## 2024-09-19 RX ADMIN — PENICILLIN V POTASSIUM 250 MILLIGRAM(S): 500 TABLET ORAL at 06:18

## 2024-09-19 RX ADMIN — KETOROLAC TROMETHAMINE 15 MILLIGRAM(S): 10 TABLET, FILM COATED ORAL at 07:11

## 2024-09-19 RX ADMIN — MORPHINE SULFATE 30 MILLILITER(S): 30 TABLET, FILM COATED, EXTENDED RELEASE ORAL at 15:16

## 2024-09-19 RX ADMIN — Medication 20 MILLIEQUIVALENT(S): at 18:24

## 2024-09-19 RX ADMIN — Medication 20 MILLIEQUIVALENT(S): at 20:18

## 2024-09-19 RX ADMIN — PENICILLIN V POTASSIUM 250 MILLIGRAM(S): 500 TABLET ORAL at 17:39

## 2024-09-19 RX ADMIN — MORPHINE SULFATE 1.5 MILLIGRAM(S): 30 TABLET, FILM COATED, EXTENDED RELEASE ORAL at 04:00

## 2024-09-19 NOTE — PROGRESS NOTE ADULT - ASSESSMENT
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 Hematology consulted for sickle cell crisis and to rule out acute chest syndrome.    Pt follows with Dr. Montano. Missed dose of adakveo for last 2 months. Pt with inadeduate pain control at this time , will need palliative care for pain regimen. Chest pain has resolved, no hypoxia, no consolidation in CXR. Acute chest syndrome less likely.     # Sickle cell pain crisis   - Continue PCA per palliative recommendations  - Continue endari 10mg PO BID, oxbryta 1500mg daily  - Continue 1/2 NS, supp O2 for support   - HOLD hydrea   - Incentive spirometry encouraged   - Send Hb electrophoresis. Last electrophoresis with 72% S  - Can offer 1 unit pRBC for symptoms   - No role for RBC exchange   - Daily CBC, CMP, retic. No role for continuing to check hapto. Can use pediatric tubes to conserve blood.     Jose A Galan MD, PGY-6  Hematology/Medical Oncology Fellow  Pager: [phone]  Available on [employer] Teams  After 5pm or on weekends please contact  to page on-call fellow  29 M with SCD (hx acute chest syndrome and splenectomy), osteonecrosis L hip and b/l shoulder admitted for sickle cell crisis. Hematology consulted for sickle cell crisis and to rule out acute chest syndrome.    Pt follows with Dr. Montano. Missed dose of adakveo for last 2 months. Pt with inadeduate pain control at this time , will need palliative care for pain regimen. Chest pain has resolved, no hypoxia, no consolidation in CXR. Acute chest syndrome less likely.     # Sickle cell pain crisis   - Continue PCA per palliative recommendations  - Continue endari 10g PO BID, oxbryta 1500mg daily  - Continue 1/2 NS, supp O2 for support   - HOLD hydrea   - Incentive spirometry encouraged   - Send Hb electrophoresis. Last electrophoresis with 72% S  - Can offer 1 unit pRBC for symptoms   - No role for RBC exchange   - Daily CBC, CMP, retic. No role for continuing to check hapto. Can use pediatric tubes to conserve blood.     Jose A Galan MD, PGY-6  Hematology/Medical Oncology Fellow  Pager: [phone]  Available on [employer] Teams  After 5pm or on weekends please contact  to page on-call fellow

## 2024-09-19 NOTE — PROGRESS NOTE ADULT - SUBJECTIVE AND OBJECTIVE BOX
*******************************  Joelle Skelton MD (PGY-1)  Internal Medicine  Contact via [employer] TEAMS  *******************************    DATE OF SERVICE: 09-19-24 @ 07:25    Patient is a 29y old  Male who presents with a chief complaint of sickle cell crisis (18 Sep 2024 07:24)      SUBJECTIVE / OVERNIGHT EVENTS:  Overnight,  Pt seen and examined at bedside.    ROS negative except as above.    MEDICATIONS  (STANDING):  chlorhexidine 2% Cloths 1 Application(s) Topical daily  enoxaparin Injectable 30 milliGRAM(s) SubCutaneous every 24 hours  folic acid 1 milliGRAM(s) Oral daily  glutamine Powder 10 Gram(s) Oral two times a day  hydroxyurea 1500 milliGRAM(s) Oral daily  ketorolac   Injectable 15 milliGRAM(s) IV Push every 6 hours  morphine  - Injectable 1.5 milliGRAM(s) IV Push every 3 hours  morphine ER Tablet 30 milliGRAM(s) Oral two times a day  morphine PCA (5 mG/mL) 30 milliLiter(s) PCA Continuous PCA Continuous  penicillin   milliGRAM(s) Oral two times a day  sodium chloride 0.45%. 1000 milliLiter(s) (75 mL/Hr) IV Continuous <Continuous>  voxelotor 1500 milliGRAM(s) Oral daily    MEDICATIONS  (PRN):  naloxone Injectable 0.1 milliGRAM(s) IV Push every 3 minutes PRN For ANY of the following changes in patient status:  A. RR LESS THAN 10 breaths per minute, B. Oxygen saturation LESS THAN 90%, C. Sedation score of 6  ondansetron Injectable 4 milliGRAM(s) IV Push every 6 hours PRN Nausea  polyethylene glycol 3350 17 Gram(s) Oral two times a day PRN Constipation  senna 2 Tablet(s) Oral at bedtime PRN Constipation      Vital Signs Last 24 Hrs  T(C): 36.7 (19 Sep 2024 06:15), Max: 37.3 (18 Sep 2024 18:00)  T(F): 98.1 (19 Sep 2024 06:15), Max: 99.1 (18 Sep 2024 18:00)  HR: 82 (19 Sep 2024 06:15) (67 - 88)  BP: 103/66 (19 Sep 2024 06:15) (101/66 - 120/68)  BP(mean): --  RR: 18 (19 Sep 2024 06:15) (16 - 18)  SpO2: 100% (19 Sep 2024 06:15) (96% - 100%)    Parameters below as of 19 Sep 2024 06:15  Patient On (Oxygen Delivery Method): nasal cannula  O2 Flow (L/min): 4    CAPILLARY BLOOD GLUCOSE        I&O's Summary    18 Sep 2024 07:01  -  19 Sep 2024 07:00  --------------------------------------------------------  IN: 2025 mL / OUT: 700 mL / NET: 1325 mL        PHYSICAL EXAM:  GENERAL: NAD, well-developed  HEAD:  Atraumatic, Normocephalic  EYES: EOMI, conjunctiva and sclera clear  NECK: Supple, No JVD  CHEST/LUNG: Clear to auscultation bilaterally; No wheeze  HEART: Regular rate and rhythm; No murmurs, rubs, or gallops  ABDOMEN: Soft, Nontender, Nondistended; Bowel sounds present  EXTREMITIES:  2+ Peripheral Pulses, No clubbing, cyanosis, or edema  NEUROLOGY: AOx3, non-focal  SKIN: No rashes or lesions    LABS:                        6.2    9.09  )-----------( 76       ( 19 Sep 2024 05:45 )             18.4     09-19    137  |  103  |  8   ----------------------------<  81  3.7   |  23  |  0.31[L]    Ca    8.6      19 Sep 2024 05:45  Phos  3.7     09-19  Mg     1.90     09-19              MICRO:      RADIOLOGY & ADDITIONAL TESTS:           *******************************  Joelle Skelton MD (PGY-1)  Internal Medicine  Contact via [employer] TEAMS  *******************************    DATE OF SERVICE: 09-19-24 @ 07:25    Patient is a 29y old  Male who presents with a chief complaint of sickle cell crisis (18 Sep 2024 07:24)      SUBJECTIVE / OVERNIGHT EVENTS:  Overnight, no acute events. Critical value hgb reported in AM of 6.2  Pt seen and examined at bedside. Stated that he felt lightheaded/dizzy earlier that morning but denied symptoms currently. Endorsed pain in left hip. Discussed increasing PCA pump again, patient once again refused and requested oxy, as that worked for him in the past. Denied SOB, chest pain, fever, chills, muscle weakness/numbness.     ROS negative except as above.    MEDICATIONS  (STANDING):  chlorhexidine 2% Cloths 1 Application(s) Topical daily  enoxaparin Injectable 30 milliGRAM(s) SubCutaneous every 24 hours  folic acid 1 milliGRAM(s) Oral daily  glutamine Powder 10 Gram(s) Oral two times a day  hydroxyurea 1500 milliGRAM(s) Oral daily  ketorolac   Injectable 15 milliGRAM(s) IV Push every 6 hours  morphine  - Injectable 1.5 milliGRAM(s) IV Push every 3 hours  morphine ER Tablet 30 milliGRAM(s) Oral two times a day  morphine PCA (5 mG/mL) 30 milliLiter(s) PCA Continuous PCA Continuous  penicillin   milliGRAM(s) Oral two times a day  sodium chloride 0.45%. 1000 milliLiter(s) (75 mL/Hr) IV Continuous <Continuous>  voxelotor 1500 milliGRAM(s) Oral daily    MEDICATIONS  (PRN):  naloxone Injectable 0.1 milliGRAM(s) IV Push every 3 minutes PRN For ANY of the following changes in patient status:  A. RR LESS THAN 10 breaths per minute, B. Oxygen saturation LESS THAN 90%, C. Sedation score of 6  ondansetron Injectable 4 milliGRAM(s) IV Push every 6 hours PRN Nausea  polyethylene glycol 3350 17 Gram(s) Oral two times a day PRN Constipation  senna 2 Tablet(s) Oral at bedtime PRN Constipation      Vital Signs Last 24 Hrs  T(C): 36.7 (19 Sep 2024 06:15), Max: 37.3 (18 Sep 2024 18:00)  T(F): 98.1 (19 Sep 2024 06:15), Max: 99.1 (18 Sep 2024 18:00)  HR: 82 (19 Sep 2024 06:15) (67 - 88)  BP: 103/66 (19 Sep 2024 06:15) (101/66 - 120/68)  BP(mean): --  RR: 18 (19 Sep 2024 06:15) (16 - 18)  SpO2: 100% (19 Sep 2024 06:15) (96% - 100%)    Parameters below as of 19 Sep 2024 06:15  Patient On (Oxygen Delivery Method): nasal cannula  O2 Flow (L/min): 4    CAPILLARY BLOOD GLUCOSE        I&O's Summary    18 Sep 2024 07:01  -  19 Sep 2024 07:00  --------------------------------------------------------  IN: 2025 mL / OUT: 700 mL / NET: 1325 mL        PHYSICAL EXAM:  GENERAL: NAD, cachetic   HEAD:  Atraumatic, Normocephalic  EYES: EOMI, conjunctiva and sclera clear  NECK: Supple, No JVD  CHEST/LUNG: Clear to auscultation bilaterally; No wheeze  HEART: Regular rate and rhythm; No murmurs, rubs, or gallops  ABDOMEN: Soft, Nontender, Nondistended; Bowel sounds present  EXTREMITIES:  2+ Peripheral Pulses, No clubbing, cyanosis, or edema  MSK: TTP left hip and cervical spine   NEUROLOGY: AOx3, non-focal  SKIN: No rashes or lesions    LABS:                        6.2    9.09  )-----------( 76       ( 19 Sep 2024 05:45 )             18.4     09-19    137  |  103  |  8   ----------------------------<  81  3.7   |  23  |  0.31[L]    Ca    8.6      19 Sep 2024 05:45  Phos  3.7     09-19  Mg     1.90     09-19              MICRO:      RADIOLOGY & ADDITIONAL TESTS:

## 2024-09-19 NOTE — PROGRESS NOTE ADULT - PROBLEM SELECTOR PLAN 2
- platelet count 9/18: 95, unclear baseline as pt admitted w/ thrombocytosis. Likely ~170  - 4 T score: 4 points for intermediate probability of HIT    PLAN  - Unlikely HIT as pt on lovenox for >2 weeks   - Consider d/c lovenox if platelets continue to decrease tomorrow  - Consider HIT workup on Fri AM if platelets continue to fall - platelet count 9/18: 95, unclear baseline as pt admitted w/ thrombocytosis. Likely ~170  - 4 T score: 4 points for intermediate probability of HIT  - 9/19 platelet 76    PLAN  - Consult heme regarding potential HIT   - D/c lovenox, platelet decreased concerning for HIT   - HIT and serotonin release assay

## 2024-09-19 NOTE — PROGRESS NOTE ADULT - ATTENDING COMMENTS
Pt had been very slowly improving but now notes worsening pain in previously identified areas (left hip, b/l shoulders). Supplemental O2 and toradol had seemed to be helping but no longer. Also hgb continues to decline, 6.2 today. Reached out to Dr. Montano and Hematology again today, as we may need to consider transfusion or exchange at this point. Continue to monitor closely.

## 2024-09-19 NOTE — PROGRESS NOTE ADULT - SUBJECTIVE AND OBJECTIVE BOX
ANTONI NUNEZ 29y Male      Patient is a 29y old  Male who presents with a chief complaint of sickle cell crisis (19 Sep 2024 07:25)        INTERVAL HPI/OVERNIGHT EVENTS: No acute events overnight. Patient was seen and evaluated at the bedside. The patient endorses body pain. Vitals stable. Patient denies fever/chills, chest pain, shortness of breath, headaches, nausea/vomiting, and diarrhea/constipation.      PHYSICAL EXAM:  GENERAL: NAD  HEAD:  Normocephalic  EYES:  conjunctiva and sclera clear  ENMT: Moist mucous membranes  NECK: Supple   CHEST/LUNG: Good air exchange bilaterally, no wheeze  HEART: Regular rate and rhythm  ABD: Soft, nontender   EXT: No edema, cyanosis, or deformity  NERVOUS SYSTEM:  Alert, awake      Vital Signs Last 24 Hrs  T(C): 37.1 (19 Sep 2024 16:00), Max: 37.2 (18 Sep 2024 18:50)  T(F): 98.7 (19 Sep 2024 16:00), Max: 98.9 (18 Sep 2024 18:50)  HR: 86 (19 Sep 2024 16:00) (67 - 86)  BP: 105/65 (19 Sep 2024 16:00) (100/60 - 120/68)  BP(mean): --  RR: 17 (19 Sep 2024 16:00) (16 - 18)  SpO2: 99% (19 Sep 2024 16:00) (98% - 100%)    Parameters below as of 19 Sep 2024 16:00  Patient On (Oxygen Delivery Method): room air          MEDICATIONS  (STANDING):  chlorhexidine 2% Cloths 1 Application(s) Topical daily  folic acid 1 milliGRAM(s) Oral daily  glutamine Powder 10 Gram(s) Oral two times a day  ketorolac   Injectable 15 milliGRAM(s) IV Push every 6 hours  morphine ER Tablet 30 milliGRAM(s) Oral two times a day  morphine PCA (5 mG/mL) 30 milliLiter(s) PCA Continuous PCA Continuous  penicillin   milliGRAM(s) Oral two times a day  potassium chloride    Tablet ER 20 milliEquivalent(s) Oral every 2 hours  sodium chloride 0.45%. 1000 milliLiter(s) (75 mL/Hr) IV Continuous <Continuous>  voxelotor 1500 milliGRAM(s) Oral daily    MEDICATIONS  (PRN):  naloxone Injectable 0.1 milliGRAM(s) IV Push every 3 minutes PRN For ANY of the following changes in patient status:  A. RR LESS THAN 10 breaths per minute, B. Oxygen saturation LESS THAN 90%, C. Sedation score of 6  ondansetron Injectable 4 milliGRAM(s) IV Push every 6 hours PRN Nausea  polyethylene glycol 3350 17 Gram(s) Oral two times a day PRN Constipation  senna 2 Tablet(s) Oral at bedtime PRN Constipation      Consultant(s) Notes Reviewed:  [X] YES  [ ] NO  Care Discussed with Other Providers [X] YES  [ ] NO  Imaging Personally Reviewed:  [X] YES  [ ] NO      LABS:                        6.2    9.09  )-----------( 76       ( 19 Sep 2024 05:45 )             18.4     09-19    137  |  103  |  8   ----------------------------<  81  3.7   |  23  |  0.31[L]    Ca    8.6      19 Sep 2024 05:45  Phos  3.7     09-19  Mg     1.90     09-19    TPro  x   /  Alb  x   /  TBili  1.7[H]  /  DBili  0.9[H]  /  AST  x   /  ALT  x   /  AlkPhos  x   09-19        Urinalysis Basic - ( 19 Sep 2024 05:45 )    Color: x / Appearance: x / SG: x / pH: x  Gluc: 81 mg/dL / Ketone: x  / Bili: x / Urobili: x   Blood: x / Protein: x / Nitrite: x   Leuk Esterase: x / RBC: x / WBC x   Sq Epi: x / Non Sq Epi: x / Bacteria: x

## 2024-09-19 NOTE — PROGRESS NOTE ADULT - ATTENDING COMMENTS
This is a 29 year old man with sickle cell disease, patient following with Elysia Sanchez. Patient Receiving Adakveo missed the last 2 months of therapy.  Patient admitted for pain crisis. No chris for restarting Adakveo during acute crisis. Continue Endari 10g PO BID.  Oxbryta 150mg Daily for anemia.  Can hold hydroxyurea.  Repeat hemoglobin electrophoresis.    If pain continues can offer simple transfusion to decrease sickle cell proportion 1 unit at a time.

## 2024-09-19 NOTE — PROGRESS NOTE ADULT - PROBLEM SELECTOR PLAN 1
- follows with Dr. Montano, missed monthly dose of Adakveo for 2 months which may explain SCC  - low concern for acute chest given no opacities on CXR, chest pain more consistent with SCC  - transaminitis likely 2/2 to hepatopathy iso vasoocclusive crisus, no abdominal pain and LFTs improving, could otherwise consider RUQ US  - palliative deferred pain management to primary team   - Hgb 9/18/24 6.6    A/P:  - c/w 1/2 NS  - repeat CBC, LDH 9/19 AM    - pain control w/ pca morphine pump, toradol 15mg q6hr x5 days w/ IV protonix 40 x5 days  - c/w 30 mg ERBID Morphine (home medication 15 mg ER BID, would like to go home on home dose when DC)  - breakthrough IVP 1.5 mg morphine PRN q4 for breakthrough (requested this for after he ambulates, pain worse)  - Add 1.5 mg morphine q3  - C/w humidified O2 for pain  - bowel regimen: miralax BID, senna 2 MG PRN  - home hydroxyurea (need to take home med), oxbryta (need to take home med) and endari  - incentive spirometer  - Labs q48, CBC w/ diff, LDH, BMP  - will continue to monitor for signs of acute chest syndrome. If fever, culture and repeat CXR stat - follows with Dr. Montano, missed monthly dose of Adakveo for 2 months which may explain SCC  - low concern for acute chest given no opacities on CXR, chest pain more consistent with SCC  - transaminitis likely 2/2 to hepatopathy iso vasoocclusive crisis, no abdominal pain and LFTs improving, could otherwise consider RUQ US  - palliative deferred pain management to primary team   - Hgb 9/18/24 6.6    A/P:  - c/w 1/2 NS  - repeat CBC, LDH 9/19 AM    - pain control w/ pca morphine pump, toradol 15mg q6hr x5 days w/ IV protonix 40 x5 days  - c/w 30 mg ERBID Morphine (home medication 15 mg ER BID, would like to go home on home dose when DC)  - breakthrough IVP 1.5 mg morphine PRN q4 for breakthrough (requested this for after he ambulates, pain worse)  - Add 1.5 mg morphine q3  - C/w humidified O2 for pain  - bowel regimen: miralax BID, senna 2 MG PRN  - home hydroxyurea (need to take home med), oxbryta (need to take home med) and endari  - incentive spirometer  - Labs q48, CBC w/ diff, LDH, BMP  - will continue to monitor for signs of acute chest syndrome. If fever, culture and repeat CXR stat - follows with Dr. Montano, missed monthly dose of Adakveo for 2 months which may explain SCC  - low concern for acute chest given no opacities on CXR, chest pain more consistent with SCC  - transaminitis likely 2/2 to hepatopathy iso vasoocclusive crisis, no abdominal pain and LFTs improving, could otherwise consider RUQ US  - palliative deferred pain management to primary team   - Hgb 9/18/24 6.6 -> 9/19 6.2    A/P:  - Consult heme today, see if any indication for exchange transfusion or simple transfusion   - c/w 1/2 NS  - Total/indirect bili, LDH, Hapto, % reticulocyte count today   - pain control w/ pca morphine pump, toradol 15mg q6hr x5 days w/ IV protonix 40 x5 days  - c/w 30 mg ERBID Morphine (home medication 15 mg ER BID, would like to go home on home dose when DC)  - c/w 1.5 mg morphine q3  - Add Oxy 5mg q4   - C/w humidified O2 for pain  - bowel regimen: miralax BID, senna 2 MG PRN  - home hydroxyurea (need to take home med), oxbryta (need to take home med) and endari  - incentive spirometer  - will continue to monitor for signs of acute chest syndrome. If fever, culture and repeat CXR stat

## 2024-09-20 LAB
ADD ON TEST-SPECIMEN IN LAB: SIGNIFICANT CHANGE UP
ALBUMIN SERPL ELPH-MCNC: 3.5 G/DL — SIGNIFICANT CHANGE UP (ref 3.3–5)
ALP SERPL-CCNC: 428 U/L — HIGH (ref 40–120)
ALT FLD-CCNC: 56 U/L — HIGH (ref 4–41)
ANION GAP SERPL CALC-SCNC: 12 MMOL/L — SIGNIFICANT CHANGE UP (ref 7–14)
ANION GAP SERPL CALC-SCNC: 13 MMOL/L — SIGNIFICANT CHANGE UP (ref 7–14)
ANISOCYTOSIS BLD QL: SIGNIFICANT CHANGE UP
AST SERPL-CCNC: 81 U/L — HIGH (ref 4–40)
BASOPHILS # BLD AUTO: 0.08 K/UL — SIGNIFICANT CHANGE UP (ref 0–0.2)
BASOPHILS # BLD AUTO: 0.08 K/UL — SIGNIFICANT CHANGE UP (ref 0–0.2)
BASOPHILS NFR BLD AUTO: 0.8 % — SIGNIFICANT CHANGE UP (ref 0–2)
BASOPHILS NFR BLD AUTO: 0.9 % — SIGNIFICANT CHANGE UP (ref 0–2)
BILIRUB DIRECT SERPL-MCNC: 1 MG/DL — HIGH (ref 0–0.3)
BILIRUB DIRECT SERPL-MCNC: 1 MG/DL — HIGH (ref 0–0.3)
BILIRUB INDIRECT FLD-MCNC: 0.8 MG/DL — SIGNIFICANT CHANGE UP (ref 0–1)
BILIRUB SERPL-MCNC: 1.8 MG/DL — HIGH (ref 0.2–1.2)
BILIRUB SERPL-MCNC: 1.8 MG/DL — HIGH (ref 0.2–1.2)
BILIRUB SERPL-MCNC: 1.9 MG/DL — HIGH (ref 0.2–1.2)
BLD GP AB SCN SERPL QL: NEGATIVE — SIGNIFICANT CHANGE UP
BUN SERPL-MCNC: 8 MG/DL — SIGNIFICANT CHANGE UP (ref 7–23)
BUN SERPL-MCNC: 9 MG/DL — SIGNIFICANT CHANGE UP (ref 7–23)
CALCIUM SERPL-MCNC: 8.2 MG/DL — LOW (ref 8.4–10.5)
CALCIUM SERPL-MCNC: 8.7 MG/DL — SIGNIFICANT CHANGE UP (ref 8.4–10.5)
CHLORIDE SERPL-SCNC: 101 MMOL/L — SIGNIFICANT CHANGE UP (ref 98–107)
CHLORIDE SERPL-SCNC: 104 MMOL/L — SIGNIFICANT CHANGE UP (ref 98–107)
CO2 SERPL-SCNC: 22 MMOL/L — SIGNIFICANT CHANGE UP (ref 22–31)
CO2 SERPL-SCNC: 23 MMOL/L — SIGNIFICANT CHANGE UP (ref 22–31)
CREAT SERPL-MCNC: 0.28 MG/DL — LOW (ref 0.5–1.3)
CREAT SERPL-MCNC: 0.3 MG/DL — LOW (ref 0.5–1.3)
DACRYOCYTES BLD QL SMEAR: SLIGHT — SIGNIFICANT CHANGE UP
EGFR: 166 ML/MIN/1.73M2 — SIGNIFICANT CHANGE UP
EGFR: 170 ML/MIN/1.73M2 — SIGNIFICANT CHANGE UP
ELLIPTOCYTES BLD QL SMEAR: SLIGHT — SIGNIFICANT CHANGE UP
EOSINOPHIL # BLD AUTO: 0 K/UL — SIGNIFICANT CHANGE UP (ref 0–0.5)
EOSINOPHIL # BLD AUTO: 0.06 K/UL — SIGNIFICANT CHANGE UP (ref 0–0.5)
EOSINOPHIL NFR BLD AUTO: 0 % — SIGNIFICANT CHANGE UP (ref 0–6)
EOSINOPHIL NFR BLD AUTO: 0.6 % — SIGNIFICANT CHANGE UP (ref 0–6)
GIANT PLATELETS BLD QL SMEAR: PRESENT — SIGNIFICANT CHANGE UP
GLUCOSE SERPL-MCNC: 106 MG/DL — HIGH (ref 70–99)
GLUCOSE SERPL-MCNC: 86 MG/DL — SIGNIFICANT CHANGE UP (ref 70–99)
HAPTOGLOB SERPL-MCNC: <20 MG/DL — LOW (ref 34–200)
HCT VFR BLD CALC: 18.6 % — CRITICAL LOW (ref 39–50)
HCT VFR BLD CALC: 23.2 % — LOW (ref 39–50)
HEMOGLOBIN INTERPRETATION: SIGNIFICANT CHANGE UP
HEPARIN-PF4 AB RESULT: <0.6 U/ML — SIGNIFICANT CHANGE UP (ref 0–0.9)
HGB A MFR BLD: 0 % — LOW (ref 95–97.6)
HGB A2 MFR BLD: 5.7 % — HIGH (ref 2.4–3.5)
HGB BLD-MCNC: 6.2 G/DL — CRITICAL LOW (ref 13–17)
HGB BLD-MCNC: 7.8 G/DL — LOW (ref 13–17)
HGB F MFR BLD: 7.8 % — HIGH (ref 0–1.5)
HGB OTHER MFR BLD ELPH: 20.6 % — HIGH
HGB S MFR BLD: 65.9 % — HIGH
HYPOCHROMIA BLD QL: SIGNIFICANT CHANGE UP
IANC: 3.49 K/UL — SIGNIFICANT CHANGE UP (ref 1.8–7.4)
IANC: 3.93 K/UL — SIGNIFICANT CHANGE UP (ref 1.8–7.4)
IMM GRANULOCYTES NFR BLD AUTO: 0.8 % — SIGNIFICANT CHANGE UP (ref 0–0.9)
LDH SERPL L TO P-CCNC: 593 U/L — HIGH (ref 135–225)
LDH SERPL L TO P-CCNC: 601 U/L — HIGH (ref 135–225)
LYMPHOCYTES # BLD AUTO: 2.74 K/UL — SIGNIFICANT CHANGE UP (ref 1–3.3)
LYMPHOCYTES # BLD AUTO: 32.7 % — SIGNIFICANT CHANGE UP (ref 13–44)
LYMPHOCYTES # BLD AUTO: 5.55 K/UL — HIGH (ref 1–3.3)
LYMPHOCYTES # BLD AUTO: 53.8 % — HIGH (ref 13–44)
MACROCYTES BLD QL: SIGNIFICANT CHANGE UP
MAGNESIUM SERPL-MCNC: 2 MG/DL — SIGNIFICANT CHANGE UP (ref 1.6–2.6)
MCHC RBC-ENTMCNC: 23.5 PG — LOW (ref 27–34)
MCHC RBC-ENTMCNC: 25.1 PG — LOW (ref 27–34)
MCHC RBC-ENTMCNC: 33.3 GM/DL — SIGNIFICANT CHANGE UP (ref 32–36)
MCHC RBC-ENTMCNC: 33.6 GM/DL — SIGNIFICANT CHANGE UP (ref 32–36)
MCV RBC AUTO: 70.5 FL — LOW (ref 80–100)
MCV RBC AUTO: 74.6 FL — LOW (ref 80–100)
MICROCYTES BLD QL: SLIGHT — SIGNIFICANT CHANGE UP
MONOCYTES # BLD AUTO: 0.24 K/UL — SIGNIFICANT CHANGE UP (ref 0–0.9)
MONOCYTES # BLD AUTO: 0.62 K/UL — SIGNIFICANT CHANGE UP (ref 0–0.9)
MONOCYTES NFR BLD AUTO: 2.9 % — SIGNIFICANT CHANGE UP (ref 2–14)
MONOCYTES NFR BLD AUTO: 6 % — SIGNIFICANT CHANGE UP (ref 2–14)
NEUTROPHILS # BLD AUTO: 3.93 K/UL — SIGNIFICANT CHANGE UP (ref 1.8–7.4)
NEUTROPHILS # BLD AUTO: 5.08 K/UL — SIGNIFICANT CHANGE UP (ref 1.8–7.4)
NEUTROPHILS NFR BLD AUTO: 38 % — LOW (ref 43–77)
NEUTROPHILS NFR BLD AUTO: 60.6 % — SIGNIFICANT CHANGE UP (ref 43–77)
NRBC # BLD: 190 /100 WBCS — HIGH (ref 0–0)
NRBC # BLD: 78 /100 WBCS — HIGH (ref 0–0)
NRBC # FLD: 8.09 K/UL — HIGH (ref 0–0)
OVALOCYTES BLD QL SMEAR: SLIGHT — SIGNIFICANT CHANGE UP
PF4 HEPARIN CMPLX AB SER-ACNC: NEGATIVE — SIGNIFICANT CHANGE UP
PHOSPHATE SERPL-MCNC: 3.3 MG/DL — SIGNIFICANT CHANGE UP (ref 2.5–4.5)
PLAT MORPH BLD: NORMAL — SIGNIFICANT CHANGE UP
PLATELET # BLD AUTO: 77 K/UL — LOW (ref 150–400)
PLATELET # BLD AUTO: 79 K/UL — LOW (ref 150–400)
PLATELET COUNT - ESTIMATE: ABNORMAL
POIKILOCYTOSIS BLD QL AUTO: SIGNIFICANT CHANGE UP
POLYCHROMASIA BLD QL SMEAR: SLIGHT — SIGNIFICANT CHANGE UP
POTASSIUM SERPL-MCNC: 4.1 MMOL/L — SIGNIFICANT CHANGE UP (ref 3.5–5.3)
POTASSIUM SERPL-MCNC: 4.2 MMOL/L — SIGNIFICANT CHANGE UP (ref 3.5–5.3)
POTASSIUM SERPL-SCNC: 4.1 MMOL/L — SIGNIFICANT CHANGE UP (ref 3.5–5.3)
POTASSIUM SERPL-SCNC: 4.2 MMOL/L — SIGNIFICANT CHANGE UP (ref 3.5–5.3)
PROT SERPL-MCNC: 8.2 G/DL — SIGNIFICANT CHANGE UP (ref 6–8.3)
RBC # BLD: 2.64 M/UL — LOW (ref 4.2–5.8)
RBC # BLD: 3.11 M/UL — LOW (ref 4.2–5.8)
RBC # BLD: 3.11 M/UL — LOW (ref 4.2–5.8)
RBC # FLD: 26.8 % — HIGH (ref 10.3–14.5)
RBC # FLD: 26.9 % — HIGH (ref 10.3–14.5)
RBC BLD AUTO: ABNORMAL
RETICS #: 25.8 K/UL — SIGNIFICANT CHANGE UP (ref 25–125)
RETICS #: 306.8 K/UL — HIGH (ref 25–125)
RETICS/RBC NFR: 0.8 % — SIGNIFICANT CHANGE UP (ref 0.5–2.5)
RETICS/RBC NFR: 11.9 % — HIGH (ref 0.5–2.5)
RH IG SCN BLD-IMP: POSITIVE — SIGNIFICANT CHANGE UP
SCHISTOCYTES BLD QL AUTO: SLIGHT — SIGNIFICANT CHANGE UP
SICKLE CELLS BLD QL SMEAR: SLIGHT — SIGNIFICANT CHANGE UP
SODIUM SERPL-SCNC: 137 MMOL/L — SIGNIFICANT CHANGE UP (ref 135–145)
SODIUM SERPL-SCNC: 138 MMOL/L — SIGNIFICANT CHANGE UP (ref 135–145)
TARGETS BLD QL SMEAR: SIGNIFICANT CHANGE UP
VARIANT LYMPHS # BLD: 2.9 % — SIGNIFICANT CHANGE UP (ref 0–6)
WBC # BLD: 10.32 K/UL — SIGNIFICANT CHANGE UP (ref 3.8–10.5)
WBC # BLD: 8.39 K/UL — SIGNIFICANT CHANGE UP (ref 3.8–10.5)
WBC # FLD AUTO: 10.32 K/UL — SIGNIFICANT CHANGE UP (ref 3.8–10.5)
WBC # FLD AUTO: 8.39 K/UL — SIGNIFICANT CHANGE UP (ref 3.8–10.5)

## 2024-09-20 PROCEDURE — 99233 SBSQ HOSP IP/OBS HIGH 50: CPT | Mod: GC

## 2024-09-20 RX ORDER — MORPHINE SULFATE 30 MG/1
30 TABLET, FILM COATED, EXTENDED RELEASE ORAL
Refills: 0 | Status: DISCONTINUED | OUTPATIENT
Start: 2024-09-20 | End: 2024-09-25

## 2024-09-20 RX ADMIN — KETOROLAC TROMETHAMINE 15 MILLIGRAM(S): 10 TABLET, FILM COATED ORAL at 17:41

## 2024-09-20 RX ADMIN — KETOROLAC TROMETHAMINE 15 MILLIGRAM(S): 10 TABLET, FILM COATED ORAL at 18:41

## 2024-09-20 RX ADMIN — Medication 10 GRAM(S): at 05:59

## 2024-09-20 RX ADMIN — KETOROLAC TROMETHAMINE 15 MILLIGRAM(S): 10 TABLET, FILM COATED ORAL at 06:00

## 2024-09-20 RX ADMIN — FOLIC ACID 1 MILLIGRAM(S): 1 TABLET ORAL at 12:12

## 2024-09-20 RX ADMIN — MORPHINE SULFATE 30 MILLILITER(S): 30 TABLET, FILM COATED, EXTENDED RELEASE ORAL at 20:26

## 2024-09-20 RX ADMIN — PENICILLIN V POTASSIUM 250 MILLIGRAM(S): 500 TABLET ORAL at 05:59

## 2024-09-20 RX ADMIN — MORPHINE SULFATE 30 MILLIGRAM(S): 30 TABLET, FILM COATED, EXTENDED RELEASE ORAL at 18:40

## 2024-09-20 RX ADMIN — Medication 75 MILLILITER(S): at 03:44

## 2024-09-20 RX ADMIN — PENICILLIN V POTASSIUM 250 MILLIGRAM(S): 500 TABLET ORAL at 17:40

## 2024-09-20 RX ADMIN — MORPHINE SULFATE 30 MILLIGRAM(S): 30 TABLET, FILM COATED, EXTENDED RELEASE ORAL at 17:40

## 2024-09-20 RX ADMIN — KETOROLAC TROMETHAMINE 15 MILLIGRAM(S): 10 TABLET, FILM COATED ORAL at 01:04

## 2024-09-20 RX ADMIN — KETOROLAC TROMETHAMINE 15 MILLIGRAM(S): 10 TABLET, FILM COATED ORAL at 07:00

## 2024-09-20 RX ADMIN — MORPHINE SULFATE 30 MILLIGRAM(S): 30 TABLET, FILM COATED, EXTENDED RELEASE ORAL at 07:00

## 2024-09-20 RX ADMIN — MORPHINE SULFATE 30 MILLIGRAM(S): 30 TABLET, FILM COATED, EXTENDED RELEASE ORAL at 06:00

## 2024-09-20 RX ADMIN — MORPHINE SULFATE 30 MILLILITER(S): 30 TABLET, FILM COATED, EXTENDED RELEASE ORAL at 08:10

## 2024-09-20 RX ADMIN — KETOROLAC TROMETHAMINE 15 MILLIGRAM(S): 10 TABLET, FILM COATED ORAL at 00:04

## 2024-09-20 RX ADMIN — KETOROLAC TROMETHAMINE 15 MILLIGRAM(S): 10 TABLET, FILM COATED ORAL at 23:03

## 2024-09-20 RX ADMIN — Medication 10 GRAM(S): at 17:41

## 2024-09-20 RX ADMIN — MORPHINE SULFATE 30 MILLILITER(S): 30 TABLET, FILM COATED, EXTENDED RELEASE ORAL at 03:34

## 2024-09-20 RX ADMIN — VOXELOTOR 1500 MILLIGRAM(S): 300 TABLET, FOR SUSPENSION ORAL at 12:12

## 2024-09-20 RX ADMIN — CHLORHEXIDINE GLUCONATE ORAL RINSE 1 APPLICATION(S): 1.2 SOLUTION DENTAL at 12:12

## 2024-09-20 RX ADMIN — KETOROLAC TROMETHAMINE 15 MILLIGRAM(S): 10 TABLET, FILM COATED ORAL at 13:12

## 2024-09-20 RX ADMIN — KETOROLAC TROMETHAMINE 15 MILLIGRAM(S): 10 TABLET, FILM COATED ORAL at 12:12

## 2024-09-20 NOTE — PROVIDER CONTACT NOTE (CRITICAL VALUE NOTIFICATION) - ASSESSMENT
Attempted to contact patient regarding rescheduling appt for 03/28/2023 due to the provider being out of the office that day. No answer. Voicemail not set up/unable to leave a message.
C/o extremities pain
patient otherwise stable, on PCA pump
No acute distress. baseline labs
Patient is A&Ox^4,denies, shortness of breath, nausea, vomiting or dizziness.
patient otherwise stable, on PCA pump
PT alert and stable. No signs of acute distress, thought pt is in constant pain due to disease process. Pt is on PCA pump set to correct settings for pain management

## 2024-09-20 NOTE — PROVIDER CONTACT NOTE (CRITICAL VALUE NOTIFICATION) - ACTION/TREATMENT ORDERED:
md banegas
As per Mariluz Woody, 1 unit PRBC ordered.
MD made aware, awaiting orders
MD notified and aware
Provider notified. After type and screen results generate, pt will receive 1 unit PRBCs.
MD notified and aware

## 2024-09-20 NOTE — PROVIDER CONTACT NOTE (CRITICAL VALUE NOTIFICATION) - BACKGROUND
29 year old male admitted for Sickle Cell Crisis
patient admitted with sickle cell crisis
patient admitted with sickle cell crisis
Dx Hemoglobin SS disease with crisis
Pt admitted for sickle cell crisis
Admitted for sickle cell crisis

## 2024-09-20 NOTE — PROVIDER CONTACT NOTE (CRITICAL VALUE NOTIFICATION) - NAME OF MD/NP/PA/DO NOTIFIED:
Kerrie Bautista MD
Luis Antonio, Mariluz
Kerrie Bautista
MD Nafisa Mills
Mariluz Salmon MD
Luis Antonio, Mariluz

## 2024-09-20 NOTE — PROGRESS NOTE ADULT - SUBJECTIVE AND OBJECTIVE BOX
*******************************  Joelle Skelton MD (PGY-1)  Internal Medicine  Contact via [employer] TEAMS  *******************************    DATE OF SERVICE: 09-20-24 @ 07:28    Patient is a 29y old  Male who presents with a chief complaint of sickle cell crisis (19 Sep 2024 18:25)      SUBJECTIVE / OVERNIGHT EVENTS:  Overnight, pt  Pt seen and examined at bedside.    ROS negative except as above.    MEDICATIONS  (STANDING):  chlorhexidine 2% Cloths 1 Application(s) Topical daily  folic acid 1 milliGRAM(s) Oral daily  glutamine Powder 10 Gram(s) Oral two times a day  ketorolac   Injectable 15 milliGRAM(s) IV Push every 6 hours  morphine ER Tablet 30 milliGRAM(s) Oral two times a day  morphine PCA (5 mG/mL) 30 milliLiter(s) PCA Continuous PCA Continuous  penicillin   milliGRAM(s) Oral two times a day  sodium chloride 0.45%. 1000 milliLiter(s) (75 mL/Hr) IV Continuous <Continuous>  voxelotor 1500 milliGRAM(s) Oral daily    MEDICATIONS  (PRN):  naloxone Injectable 0.1 milliGRAM(s) IV Push every 3 minutes PRN For ANY of the following changes in patient status:  A. RR LESS THAN 10 breaths per minute, B. Oxygen saturation LESS THAN 90%, C. Sedation score of 6  ondansetron Injectable 4 milliGRAM(s) IV Push every 6 hours PRN Nausea  polyethylene glycol 3350 17 Gram(s) Oral two times a day PRN Constipation  senna 2 Tablet(s) Oral at bedtime PRN Constipation      Vital Signs Last 24 Hrs  T(C): 36.9 (20 Sep 2024 04:00), Max: 37.1 (19 Sep 2024 16:00)  T(F): 98.5 (20 Sep 2024 04:00), Max: 98.8 (20 Sep 2024 00:00)  HR: 81 (20 Sep 2024 04:00) (78 - 95)  BP: 107/71 (20 Sep 2024 04:00) (100/60 - 114/72)  BP(mean): --  RR: 16 (20 Sep 2024 04:00) (16 - 18)  SpO2: 100% (20 Sep 2024 04:00) (98% - 100%)    Parameters below as of 20 Sep 2024 04:00  Patient On (Oxygen Delivery Method): nasal cannula w/ humidification  O2 Flow (L/min): 4    CAPILLARY BLOOD GLUCOSE        I&O's Summary      PHYSICAL EXAM:  GENERAL: NAD, well-developed  HEAD:  Atraumatic, Normocephalic  EYES: EOMI, conjunctiva and sclera clear  NECK: Supple, No JVD  CHEST/LUNG: Clear to auscultation bilaterally; No wheeze  HEART: Regular rate and rhythm; No murmurs, rubs, or gallops  ABDOMEN: Soft, Nontender, Nondistended; Bowel sounds present  EXTREMITIES:  2+ Peripheral Pulses, No clubbing, cyanosis, or edema  NEUROLOGY: AOx3, non-focal  SKIN: No rashes or lesions    LABS:                        6.2    10.32 )-----------( 77       ( 20 Sep 2024 00:20 )             18.6     09-20    137  |  101  |  8   ----------------------------<  86  4.1   |  23  |  0.28[L]    Ca    8.2[L]      20 Sep 2024 00:20  Phos  3.3     09-20  Mg     2.00     09-20    TPro  x   /  Alb  x   /  TBili  1.7[H]  /  DBili  0.9[H]  /  AST  x   /  ALT  x   /  AlkPhos  x   09-19            MICRO:      RADIOLOGY & ADDITIONAL TESTS:           *******************************  Joelle Skelton MD (PGY-1)  Internal Medicine  Contact via [employer] TEAMS  *******************************    DATE OF SERVICE: 09-20-24 @ 07:28    Patient is a 29y old  Male who presents with a chief complaint of sickle cell crisis (19 Sep 2024 18:25)      SUBJECTIVE / OVERNIGHT EVENTS:  Overnight, Hgb 6.2. 1 U PRBC ordered.   Pt seen and examined at bedside.    ROS negative except as above.    MEDICATIONS  (STANDING):  chlorhexidine 2% Cloths 1 Application(s) Topical daily  folic acid 1 milliGRAM(s) Oral daily  glutamine Powder 10 Gram(s) Oral two times a day  ketorolac   Injectable 15 milliGRAM(s) IV Push every 6 hours  morphine ER Tablet 30 milliGRAM(s) Oral two times a day  morphine PCA (5 mG/mL) 30 milliLiter(s) PCA Continuous PCA Continuous  penicillin   milliGRAM(s) Oral two times a day  sodium chloride 0.45%. 1000 milliLiter(s) (75 mL/Hr) IV Continuous <Continuous>  voxelotor 1500 milliGRAM(s) Oral daily    MEDICATIONS  (PRN):  naloxone Injectable 0.1 milliGRAM(s) IV Push every 3 minutes PRN For ANY of the following changes in patient status:  A. RR LESS THAN 10 breaths per minute, B. Oxygen saturation LESS THAN 90%, C. Sedation score of 6  ondansetron Injectable 4 milliGRAM(s) IV Push every 6 hours PRN Nausea  polyethylene glycol 3350 17 Gram(s) Oral two times a day PRN Constipation  senna 2 Tablet(s) Oral at bedtime PRN Constipation      Vital Signs Last 24 Hrs  T(C): 36.9 (20 Sep 2024 04:00), Max: 37.1 (19 Sep 2024 16:00)  T(F): 98.5 (20 Sep 2024 04:00), Max: 98.8 (20 Sep 2024 00:00)  HR: 81 (20 Sep 2024 04:00) (78 - 95)  BP: 107/71 (20 Sep 2024 04:00) (100/60 - 114/72)  BP(mean): --  RR: 16 (20 Sep 2024 04:00) (16 - 18)  SpO2: 100% (20 Sep 2024 04:00) (98% - 100%)    Parameters below as of 20 Sep 2024 04:00  Patient On (Oxygen Delivery Method): nasal cannula w/ humidification  O2 Flow (L/min): 4    CAPILLARY BLOOD GLUCOSE        I&O's Summary      PHYSICAL EXAM:  GENERAL: NAD, well-developed  HEAD:  Atraumatic, Normocephalic  EYES: EOMI, conjunctiva and sclera clear  NECK: Supple, No JVD  CHEST/LUNG: Clear to auscultation bilaterally; No wheeze  HEART: Regular rate and rhythm; No murmurs, rubs, or gallops  ABDOMEN: Soft, Nontender, Nondistended; Bowel sounds present  EXTREMITIES:  2+ Peripheral Pulses, No clubbing, cyanosis, or edema  NEUROLOGY: AOx3, non-focal  SKIN: No rashes or lesions    LABS:                        6.2    10.32 )-----------( 77       ( 20 Sep 2024 00:20 )             18.6     09-20    137  |  101  |  8   ----------------------------<  86  4.1   |  23  |  0.28[L]    Ca    8.2[L]      20 Sep 2024 00:20  Phos  3.3     09-20  Mg     2.00     09-20    TPro  x   /  Alb  x   /  TBili  1.7[H]  /  DBili  0.9[H]  /  AST  x   /  ALT  x   /  AlkPhos  x   09-19            MICRO:      RADIOLOGY & ADDITIONAL TESTS:           *******************************  Joelle Skelton MD (PGY-1)  Internal Medicine  Contact via [employer] TEAMS  *******************************    DATE OF SERVICE: 09-20-24 @ 07:28    Patient is a 29y old  Male who presents with a chief complaint of sickle cell crisis (19 Sep 2024 18:25)      SUBJECTIVE / OVERNIGHT EVENTS:  Overnight, Hgb 6.2. 1 U PRBC ordered.   Pt seen and examined at bedside. Seemed lethargic, patient stated that it was due to the medication. Metrics show patient pressed ~170 times and received medication ~60 times in last 24 hours. Endorsed R knee pain and stated that pain is still ongoing. Denied chest pain, fever, chills, SOB, weakness.     ROS negative except as above.    MEDICATIONS  (STANDING):  chlorhexidine 2% Cloths 1 Application(s) Topical daily  folic acid 1 milliGRAM(s) Oral daily  glutamine Powder 10 Gram(s) Oral two times a day  ketorolac   Injectable 15 milliGRAM(s) IV Push every 6 hours  morphine ER Tablet 30 milliGRAM(s) Oral two times a day  morphine PCA (5 mG/mL) 30 milliLiter(s) PCA Continuous PCA Continuous  penicillin   milliGRAM(s) Oral two times a day  sodium chloride 0.45%. 1000 milliLiter(s) (75 mL/Hr) IV Continuous <Continuous>  voxelotor 1500 milliGRAM(s) Oral daily    MEDICATIONS  (PRN):  naloxone Injectable 0.1 milliGRAM(s) IV Push every 3 minutes PRN For ANY of the following changes in patient status:  A. RR LESS THAN 10 breaths per minute, B. Oxygen saturation LESS THAN 90%, C. Sedation score of 6  ondansetron Injectable 4 milliGRAM(s) IV Push every 6 hours PRN Nausea  polyethylene glycol 3350 17 Gram(s) Oral two times a day PRN Constipation  senna 2 Tablet(s) Oral at bedtime PRN Constipation      Vital Signs Last 24 Hrs  T(C): 36.9 (20 Sep 2024 04:00), Max: 37.1 (19 Sep 2024 16:00)  T(F): 98.5 (20 Sep 2024 04:00), Max: 98.8 (20 Sep 2024 00:00)  HR: 81 (20 Sep 2024 04:00) (78 - 95)  BP: 107/71 (20 Sep 2024 04:00) (100/60 - 114/72)  BP(mean): --  RR: 16 (20 Sep 2024 04:00) (16 - 18)  SpO2: 100% (20 Sep 2024 04:00) (98% - 100%)    Parameters below as of 20 Sep 2024 04:00  Patient On (Oxygen Delivery Method): nasal cannula w/ humidification  O2 Flow (L/min): 4    CAPILLARY BLOOD GLUCOSE        I&O's Summary      PHYSICAL EXAM:  GENERAL: NAD, cachetic   HEAD:  Atraumatic, Normocephalic  EYES: EOMI, conjunctiva and sclera clear  NECK: Supple, No JVD  CHEST/LUNG: Clear to auscultation bilaterally; No wheeze  HEART: Regular rate and rhythm; No murmurs, rubs, or gallops  ABDOMEN: Soft, Nontender, Nondistended; Bowel sounds present  EXTREMITIES:  2+ Peripheral Pulses, No clubbing, cyanosis, or edema  MSK: TTP cervical spine  NEUROLOGY: AOx3, non-focal  SKIN: No rashes or lesions    LABS:                        6.2    10.32 )-----------( 77       ( 20 Sep 2024 00:20 )             18.6     09-20    137  |  101  |  8   ----------------------------<  86  4.1   |  23  |  0.28[L]    Ca    8.2[L]      20 Sep 2024 00:20  Phos  3.3     09-20  Mg     2.00     09-20    TPro  x   /  Alb  x   /  TBili  1.7[H]  /  DBili  0.9[H]  /  AST  x   /  ALT  x   /  AlkPhos  x   09-19            MICRO:      RADIOLOGY & ADDITIONAL TESTS:

## 2024-09-20 NOTE — PROGRESS NOTE ADULT - ATTENDING COMMENTS
Pt received 1 unit PRBCs in consultation with Hematology for worsening anemia and evidence of active sickle cell crisis. Pain is fairly similar today, perhaps slightly better, since increasing PCA demand to 1.5mg IV morphine. Hydroxurea has been stopped. Will monitor CBC closely and anticipate pt may need additional transfusions or exchange considering prolonged crisis.

## 2024-09-20 NOTE — CHART NOTE - NSCHARTNOTEFT_GEN_A_CORE
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 Hematology consulted for sickle cell crisis and to rule out acute chest syndrome. Pt follows with Dr. Montano. Missed dose of adakveo for last 2 months. Pt with inadeduate pain control at this time , will need palliative care for pain regimen. Chest pain has resolved, no hypoxia, no consolidation in CXR. Acute chest syndrome less likely. Pt is being treated for sickle cell pain crisis.   Drop in hemoglobin noted (6.2) and uptrended to 7.8 after 1 Unit PRBC. Repeat Hb electrophoresis  reviewed. No plan for exchange transfusion. Continue with pain management, and supportive management ( 1/2 NS, supp O2 for support , folic acid). .Continue endari 10g PO BID, oxbryta 1500mg daily. Hold hydrea inpatient, can resume on discharge.     Hematology will sign off. Reach out to us prior to discharge to setup outpatient appt.    Timi Green MD  PGY4  Hematology-Oncology Fellow  [facility]

## 2024-09-20 NOTE — PROVIDER CONTACT NOTE (CRITICAL VALUE NOTIFICATION) - RECOMMENDATIONS
notify MD
Notify provider to confirm the whether pt will receive transfusion
notify md
MD to assess
notify MD

## 2024-09-20 NOTE — PROGRESS NOTE ADULT - PROBLEM SELECTOR PLAN 2
- platelet count 9/18: 95, unclear baseline as pt admitted w/ thrombocytosis. Likely ~170  - 4 T score: 4 points for intermediate probability of HIT  - 9/19 platelet 76    PLAN  - Consult heme regarding potential HIT   - D/c lovenox, platelet decreased concerning for HIT   - HIT and serotonin release assay - platelet count 9/18: 95, unclear baseline as pt admitted w/ thrombocytosis. Likely ~170  - 4 T score: 4 points for intermediate probability of HIT  - 9/19 platelet 76  - Heparin platelets negative      PLAN  - F/u serotonin assay  - D/c lovenox

## 2024-09-20 NOTE — PROVIDER CONTACT NOTE (CRITICAL VALUE NOTIFICATION) - PERSON GIVING RESULT:
JENNA Rahman
MIKE Zacarias/Laboratory
Siapno, V / Hematology
Brannon Gerardo
Tim Mott [facility]
Pancho Krishnamurthy

## 2024-09-20 NOTE — PROGRESS NOTE ADULT - PROBLEM SELECTOR PLAN 1
- follows with Dr. Montano, missed monthly dose of Adakveo for 2 months which may explain SCC  - low concern for acute chest given no opacities on CXR, chest pain more consistent with SCC  - transaminitis likely 2/2 to hepatopathy iso vasoocclusive crisis, no abdominal pain and LFTs improving, could otherwise consider RUQ US  - palliative deferred pain management to primary team   - Hgb 9/18/24 6.6 -> 9/19 6.2    A/P:  - Consult heme today, see if any indication for exchange transfusion or simple transfusion   - c/w 1/2 NS  - Total/indirect bili, LDH, Hapto, % reticulocyte count today   - pain control w/ pca morphine pump, toradol 15mg q6hr x5 days w/ IV protonix 40 x5 days  - c/w 30 mg ERBID Morphine (home medication 15 mg ER BID, would like to go home on home dose when DC)  - c/w 1.5 mg morphine q3  - Add Oxy 5mg q4   - C/w humidified O2 for pain  - bowel regimen: miralax BID, senna 2 MG PRN  - home hydroxyurea (need to take home med), oxbryta (need to take home med) and endari  - incentive spirometer  - will continue to monitor for signs of acute chest syndrome. If fever, culture and repeat CXR stat - follows with Dr. Montano, missed monthly dose of Adakveo for 2 months which may explain SCC  - low concern for acute chest given no opacities on CXR, chest pain more consistent with SCC  - transaminitis likely 2/2 to hepatopathy iso vasoocclusive crisis, no abdominal pain and LFTs improving, could otherwise consider RUQ US  - palliative deferred pain management to primary team   - Hgb 9/18/24 6.6 -> 9/19 6.2 -> 9/20 6.2  - 1 U PRBC today     A/P:  - Heme consults appreciated   - c/w 1/2 NS  - Total/indirect bili, LDH, Hapto, % reticulocyte count today   - pain control w/ pca morphine pump, toradol 15mg q6hr x5 days w/ IV protonix 40 x5 days  - c/w 30 mg ERBID Morphine (home medication 15 mg ER BID, would like to go home on home dose when DC)  - c/w 1.5 mg morphine q3, oxy 5mg q4  - C/w humidified O2 for pain  - bowel regimen: miralax BID, senna 2 MG PRN  - Hold home hydroxyurea (need to take home med)  - C/w oxbryta (need to take home med) and endari  - incentive spirometer  - will continue to monitor for signs of acute chest syndrome. If fever, culture and repeat CXR stat - follows with Dr. Montano, missed monthly dose of Adakveo for 2 months which may explain SCC  - low concern for acute chest given no opacities on CXR, chest pain more consistent with SCC  - transaminitis likely 2/2 to hepatopathy iso vasoocclusive crisis, no abdominal pain and LFTs improving, could otherwise consider RUQ US  - palliative deferred pain management to primary team   - Hgb 9/18/24 6.6 -> 9/19 6.2 -> 9/20 6.2  - 1 U PRBC this AM     A/P:  - Heme consults appreciated   - c/w 1/2 NS  - pain control w/ pca morphine pump, toradol 15mg q6hr x5 days w/ IV protonix 40 x5 days  - c/w 30 mg ERBID Morphine (home medication 15 mg ER BID, would like to go home on home dose when DC)  - c/w 1.5 mg morphine q3, oxy 5mg q4  - C/w humidified O2 for pain  - bowel regimen: miralax BID, senna 2 MG PRN  - Hold home hydroxyurea (need to take home med)  - C/w oxbryta (need to take home med) and endari  - incentive spirometer  - will continue to monitor for signs of acute chest syndrome. If fever, culture and repeat CXR stat

## 2024-09-20 NOTE — PROVIDER CONTACT NOTE (CRITICAL VALUE NOTIFICATION) - SITUATION
Hgb 6.2, Hct 18.4
Hgb 6.9
hemoglobin 7.0
Patient has a critical lab value of 6.8
hemoglobin 6.6 hematocrit 19.6
Hgb 6.2, Hct 18.6

## 2024-09-21 LAB
ALBUMIN SERPL ELPH-MCNC: 3.4 G/DL — SIGNIFICANT CHANGE UP (ref 3.3–5)
ALP SERPL-CCNC: 442 U/L — HIGH (ref 40–120)
ALT FLD-CCNC: 54 U/L — HIGH (ref 4–41)
ANION GAP SERPL CALC-SCNC: 14 MMOL/L — SIGNIFICANT CHANGE UP (ref 7–14)
AST SERPL-CCNC: 75 U/L — HIGH (ref 4–40)
BASOPHILS # BLD AUTO: 0.07 K/UL — SIGNIFICANT CHANGE UP (ref 0–0.2)
BASOPHILS NFR BLD AUTO: 0.7 % — SIGNIFICANT CHANGE UP (ref 0–2)
BILIRUB DIRECT SERPL-MCNC: 1 MG/DL — HIGH (ref 0–0.3)
BILIRUB INDIRECT FLD-MCNC: 0.9 MG/DL — SIGNIFICANT CHANGE UP (ref 0–1)
BILIRUB SERPL-MCNC: 1.9 MG/DL — HIGH (ref 0.2–1.2)
BILIRUB SERPL-MCNC: 1.9 MG/DL — HIGH (ref 0.2–1.2)
BUN SERPL-MCNC: 10 MG/DL — SIGNIFICANT CHANGE UP (ref 7–23)
CALCIUM SERPL-MCNC: 8.7 MG/DL — SIGNIFICANT CHANGE UP (ref 8.4–10.5)
CHLORIDE SERPL-SCNC: 102 MMOL/L — SIGNIFICANT CHANGE UP (ref 98–107)
CO2 SERPL-SCNC: 22 MMOL/L — SIGNIFICANT CHANGE UP (ref 22–31)
CREAT SERPL-MCNC: 0.37 MG/DL — LOW (ref 0.5–1.3)
EGFR: 156 ML/MIN/1.73M2 — SIGNIFICANT CHANGE UP
EOSINOPHIL # BLD AUTO: 0.06 K/UL — SIGNIFICANT CHANGE UP (ref 0–0.5)
EOSINOPHIL NFR BLD AUTO: 0.6 % — SIGNIFICANT CHANGE UP (ref 0–6)
GLUCOSE SERPL-MCNC: 86 MG/DL — SIGNIFICANT CHANGE UP (ref 70–99)
HCT VFR BLD CALC: 23.2 % — LOW (ref 39–50)
HGB BLD-MCNC: 7.8 G/DL — LOW (ref 13–17)
IANC: 3.53 K/UL — SIGNIFICANT CHANGE UP (ref 1.8–7.4)
IMM GRANULOCYTES NFR BLD AUTO: 0.9 % — SIGNIFICANT CHANGE UP (ref 0–0.9)
LDH SERPL L TO P-CCNC: 629 U/L — HIGH (ref 135–225)
LYMPHOCYTES # BLD AUTO: 5.31 K/UL — HIGH (ref 1–3.3)
LYMPHOCYTES # BLD AUTO: 54.5 % — HIGH (ref 13–44)
MCHC RBC-ENTMCNC: 25.3 PG — LOW (ref 27–34)
MCHC RBC-ENTMCNC: 33.6 GM/DL — SIGNIFICANT CHANGE UP (ref 32–36)
MCV RBC AUTO: 75.3 FL — LOW (ref 80–100)
MONOCYTES # BLD AUTO: 0.69 K/UL — SIGNIFICANT CHANGE UP (ref 0–0.9)
MONOCYTES NFR BLD AUTO: 7.1 % — SIGNIFICANT CHANGE UP (ref 2–14)
NEUTROPHILS # BLD AUTO: 3.53 K/UL — SIGNIFICANT CHANGE UP (ref 1.8–7.4)
NEUTROPHILS NFR BLD AUTO: 36.2 % — LOW (ref 43–77)
NRBC # BLD: 105 /100 WBCS — HIGH (ref 0–0)
NRBC # FLD: 10.27 K/UL — HIGH (ref 0–0)
PLATELET # BLD AUTO: 88 K/UL — LOW (ref 150–400)
POTASSIUM SERPL-MCNC: 3.8 MMOL/L — SIGNIFICANT CHANGE UP (ref 3.5–5.3)
POTASSIUM SERPL-SCNC: 3.8 MMOL/L — SIGNIFICANT CHANGE UP (ref 3.5–5.3)
PROT SERPL-MCNC: 8.3 G/DL — SIGNIFICANT CHANGE UP (ref 6–8.3)
RBC # BLD: 3.08 M/UL — LOW (ref 4.2–5.8)
RBC # BLD: 3.08 M/UL — LOW (ref 4.2–5.8)
RBC # FLD: 27 % — HIGH (ref 10.3–14.5)
RETICS #: 27.1 K/UL — SIGNIFICANT CHANGE UP (ref 25–125)
RETICS/RBC NFR: 0.9 % — SIGNIFICANT CHANGE UP (ref 0.5–2.5)
SODIUM SERPL-SCNC: 138 MMOL/L — SIGNIFICANT CHANGE UP (ref 135–145)
WBC # BLD: 9.75 K/UL — SIGNIFICANT CHANGE UP (ref 3.8–10.5)
WBC # FLD AUTO: 9.75 K/UL — SIGNIFICANT CHANGE UP (ref 3.8–10.5)

## 2024-09-21 PROCEDURE — 99233 SBSQ HOSP IP/OBS HIGH 50: CPT | Mod: GC

## 2024-09-21 RX ORDER — MORPHINE SULFATE 30 MG/1
30 TABLET, FILM COATED, EXTENDED RELEASE ORAL
Refills: 0 | Status: DISCONTINUED | OUTPATIENT
Start: 2024-09-21 | End: 2024-09-25

## 2024-09-21 RX ADMIN — Medication 10 GRAM(S): at 17:52

## 2024-09-21 RX ADMIN — PENICILLIN V POTASSIUM 250 MILLIGRAM(S): 500 TABLET ORAL at 17:52

## 2024-09-21 RX ADMIN — KETOROLAC TROMETHAMINE 15 MILLIGRAM(S): 10 TABLET, FILM COATED ORAL at 05:38

## 2024-09-21 RX ADMIN — VOXELOTOR 1500 MILLIGRAM(S): 300 TABLET, FOR SUSPENSION ORAL at 13:21

## 2024-09-21 RX ADMIN — MORPHINE SULFATE 30 MILLILITER(S): 30 TABLET, FILM COATED, EXTENDED RELEASE ORAL at 13:52

## 2024-09-21 RX ADMIN — MORPHINE SULFATE 30 MILLIGRAM(S): 30 TABLET, FILM COATED, EXTENDED RELEASE ORAL at 06:37

## 2024-09-21 RX ADMIN — MORPHINE SULFATE 30 MILLILITER(S): 30 TABLET, FILM COATED, EXTENDED RELEASE ORAL at 09:23

## 2024-09-21 RX ADMIN — Medication 10 GRAM(S): at 05:38

## 2024-09-21 RX ADMIN — MORPHINE SULFATE 30 MILLIGRAM(S): 30 TABLET, FILM COATED, EXTENDED RELEASE ORAL at 05:37

## 2024-09-21 RX ADMIN — MORPHINE SULFATE 30 MILLIGRAM(S): 30 TABLET, FILM COATED, EXTENDED RELEASE ORAL at 17:52

## 2024-09-21 RX ADMIN — PENICILLIN V POTASSIUM 250 MILLIGRAM(S): 500 TABLET ORAL at 05:38

## 2024-09-21 RX ADMIN — FOLIC ACID 1 MILLIGRAM(S): 1 TABLET ORAL at 13:21

## 2024-09-21 RX ADMIN — Medication 75 MILLILITER(S): at 20:00

## 2024-09-21 RX ADMIN — CHLORHEXIDINE GLUCONATE ORAL RINSE 1 APPLICATION(S): 1.2 SOLUTION DENTAL at 13:21

## 2024-09-21 RX ADMIN — KETOROLAC TROMETHAMINE 15 MILLIGRAM(S): 10 TABLET, FILM COATED ORAL at 00:03

## 2024-09-21 RX ADMIN — MORPHINE SULFATE 30 MILLILITER(S): 30 TABLET, FILM COATED, EXTENDED RELEASE ORAL at 20:25

## 2024-09-21 RX ADMIN — KETOROLAC TROMETHAMINE 15 MILLIGRAM(S): 10 TABLET, FILM COATED ORAL at 06:38

## 2024-09-21 RX ADMIN — MORPHINE SULFATE 30 MILLILITER(S): 30 TABLET, FILM COATED, EXTENDED RELEASE ORAL at 08:25

## 2024-09-21 NOTE — PROGRESS NOTE ADULT - ATTENDING COMMENTS
Pt states he has maybe slight improvement in pain today. I mentioned to him that I had spoken extensively with his outpatient sickle cell disease provider Dr. Montano, and that she had suggested ways to optimize analgesia. Pt has now agreed to titrate up the PCA demand dose to 1.75mg q6min lockout. Will try this modest increase today and monitor response. Hgb had a good response to 1 unit PRBC so no need for additional transfusion at this time. F/u CBC tomorrow.

## 2024-09-21 NOTE — PROGRESS NOTE ADULT - SUBJECTIVE AND OBJECTIVE BOX
PROGRESS NOTE:   Authored by Dr. Pradeep Roa  Patient is a 29y old  Male who presents with a chief complaint of sickle cell crisis (20 Sep 2024 07:27)      SUBJECTIVE / OVERNIGHT EVENTS:    MEDICATIONS  (STANDING):  chlorhexidine 2% Cloths 1 Application(s) Topical daily  folic acid 1 milliGRAM(s) Oral daily  glutamine Powder 10 Gram(s) Oral two times a day  morphine ER Tablet 30 milliGRAM(s) Oral two times a day  morphine PCA (5 mG/mL) 30 milliLiter(s) PCA Continuous PCA Continuous  penicillin   milliGRAM(s) Oral two times a day  sodium chloride 0.45%. 1000 milliLiter(s) (75 mL/Hr) IV Continuous <Continuous>  voxelotor 1500 milliGRAM(s) Oral daily    MEDICATIONS  (PRN):  naloxone Injectable 0.1 milliGRAM(s) IV Push every 3 minutes PRN For ANY of the following changes in patient status:  A. RR LESS THAN 10 breaths per minute, B. Oxygen saturation LESS THAN 90%, C. Sedation score of 6  ondansetron Injectable 4 milliGRAM(s) IV Push every 6 hours PRN Nausea  polyethylene glycol 3350 17 Gram(s) Oral two times a day PRN Constipation  senna 2 Tablet(s) Oral at bedtime PRN Constipation      CAPILLARY BLOOD GLUCOSE        I&O's Summary    20 Sep 2024 07:01  -  21 Sep 2024 07:00  --------------------------------------------------------  IN: 1350 mL / OUT: 900 mL / NET: 450 mL        PHYSICAL EXAM:  Vital Signs Last 24 Hrs  T(C): 37.1 (21 Sep 2024 05:08), Max: 37.6 (20 Sep 2024 16:00)  T(F): 98.8 (21 Sep 2024 05:08), Max: 99.7 (20 Sep 2024 16:00)  HR: 70 (21 Sep 2024 05:08) (69 - 88)  BP: 110/69 (21 Sep 2024 05:08) (105/71 - 114/74)  BP(mean): --  RR: 16 (21 Sep 2024 05:08) (16 - 17)  SpO2: 100% (21 Sep 2024 05:08) (97% - 100%)    Parameters below as of 21 Sep 2024 05:08  Patient On (Oxygen Delivery Method): nasal cannula  O2 Flow (L/min): 4      GENERAL: NAD, lying comfortably in bed  HEAD: Atraumatic, normocephalic  EYES: EOMI b/l PERRLA b/l, conjunctiva and sclera clear  NECK: Supple, No JVD, No LAD  RESPIRATORY: Normal respiratory effort; lungs are clear to auscultation bilaterally  CARDIOVASCULAR: Regular rate and rhythm, normal S1 and S2, no murmur/rub/gallop; No lower extremity edema  ABDOMEN: Nontender, normoactive bowel sounds, no rebound/guarding; No hepatosplenomegaly  MUSCULOSKELETAL: no clubbing or cyanosis of digits; no joint swelling or tenderness to palpation  NEURO: Non focal   PSYCH: A+O to person, place, and time; affect appropriate     PROGRESS NOTE:   Authored by Dr. Pradeep Roa  Patient is a 29y old  Male who presents with a chief complaint of sickle cell crisis (20 Sep 2024 07:27)      SUBJECTIVE / OVERNIGHT EVENTS:  pain not well controlled, no other complains. nose bleeding, changed the oxygen to humidified. denied any sob.     MEDICATIONS  (STANDING):  chlorhexidine 2% Cloths 1 Application(s) Topical daily  folic acid 1 milliGRAM(s) Oral daily  glutamine Powder 10 Gram(s) Oral two times a day  morphine ER Tablet 30 milliGRAM(s) Oral two times a day  morphine PCA (5 mG/mL) 30 milliLiter(s) PCA Continuous PCA Continuous  penicillin   milliGRAM(s) Oral two times a day  sodium chloride 0.45%. 1000 milliLiter(s) (75 mL/Hr) IV Continuous <Continuous>  voxelotor 1500 milliGRAM(s) Oral daily    MEDICATIONS  (PRN):  naloxone Injectable 0.1 milliGRAM(s) IV Push every 3 minutes PRN For ANY of the following changes in patient status:  A. RR LESS THAN 10 breaths per minute, B. Oxygen saturation LESS THAN 90%, C. Sedation score of 6  ondansetron Injectable 4 milliGRAM(s) IV Push every 6 hours PRN Nausea  polyethylene glycol 3350 17 Gram(s) Oral two times a day PRN Constipation  senna 2 Tablet(s) Oral at bedtime PRN Constipation      CAPILLARY BLOOD GLUCOSE        I&O's Summary    20 Sep 2024 07:01  -  21 Sep 2024 07:00  --------------------------------------------------------  IN: 1350 mL / OUT: 900 mL / NET: 450 mL        PHYSICAL EXAM:  Vital Signs Last 24 Hrs  T(C): 37.1 (21 Sep 2024 05:08), Max: 37.6 (20 Sep 2024 16:00)  T(F): 98.8 (21 Sep 2024 05:08), Max: 99.7 (20 Sep 2024 16:00)  HR: 70 (21 Sep 2024 05:08) (69 - 88)  BP: 110/69 (21 Sep 2024 05:08) (105/71 - 114/74)  BP(mean): --  RR: 16 (21 Sep 2024 05:08) (16 - 17)  SpO2: 100% (21 Sep 2024 05:08) (97% - 100%)    Parameters below as of 21 Sep 2024 05:08  Patient On (Oxygen Delivery Method): nasal cannula  O2 Flow (L/min): 4      GENERAL: NAD, lying comfortably in bed  HEAD: Atraumatic, normocephalic  EYES: EOMI b/l PERRLA b/l, conjunctiva and sclera clear  NECK: Supple, No JVD, No LAD  RESPIRATORY: Normal respiratory effort; lungs are clear to auscultation bilaterally  CARDIOVASCULAR: Regular rate and rhythm, normal S1 and S2, no murmur/rub/gallop; No lower extremity edema  ABDOMEN: Nontender, normoactive bowel sounds, no rebound/guarding; No hepatosplenomegaly  MUSCULOSKELETAL: no clubbing or cyanosis of digits; no joint swelling or tenderness to palpation  NEURO: Non focal   PSYCH: A+O to person, place, and time; affect appropriate        LABS:                        7.8    9.75  )-----------( 88       ( 21 Sep 2024 06:22 )             23.2     09-21    138  |  102  |  10  ----------------------------<  86  3.8   |  22  |  0.37[L]    Ca    8.7      21 Sep 2024 06:22  Phos  3.3     09-20  Mg     2.00     09-20    TPro  8.3  /  Alb  3.4  /  TBili  1.9[H]  /  DBili  1.0[H]  /  AST  75[H]  /  ALT  54[H]  /  AlkPhos  442[H]  09-21          Urinalysis Basic - ( 21 Sep 2024 06:22 )    Color: x / Appearance: x / SG: x / pH: x  Gluc: 86 mg/dL / Ketone: x  / Bili: x / Urobili: x   Blood: x / Protein: x / Nitrite: x   Leuk Esterase: x / RBC: x / WBC x   Sq Epi: x / Non Sq Epi: x / Bacteria: x

## 2024-09-21 NOTE — PROGRESS NOTE ADULT - PROBLEM SELECTOR PLAN 1
- follows with Dr. Montano, missed monthly dose of Adakveo for 2 months which may explain SCC  - low concern for acute chest given no opacities on CXR, chest pain more consistent with SCC  - transaminitis likely 2/2 to hepatopathy iso vasoocclusive crisis, no abdominal pain and LFTs improving, could otherwise consider RUQ US  - palliative deferred pain management to primary team   - Hgb 9/18/24 6.6 -> 9/19 6.2 -> 9/20 6.2  - 1 U PRBC this AM     A/P:  - Heme consults appreciated   - c/w 1/2 NS  - pain control w/ pca morphine pump, toradol 15mg q6hr x5 days w/ IV protonix 40 x5 days  - c/w 30 mg ERBID Morphine (home medication 15 mg ER BID, would like to go home on home dose when DC)  - c/w 1.5 mg morphine q3, oxy 5mg q4  - C/w humidified O2 for pain  - bowel regimen: miralax BID, senna 2 MG PRN  - Hold home hydroxyurea (need to take home med)  - C/w oxbryta (need to take home med) and endari  - incentive spirometer  - will continue to monitor for signs of acute chest syndrome. If fever, culture and repeat CXR stat - follows with Dr. Montano, missed monthly dose of Adakveo for 2 months which may explain SCC  - low concern for acute chest given no opacities on CXR, chest pain more consistent with SCC  - transaminitis likely 2/2 to hepatopathy iso vasoocclusive crisis, no abdominal pain and LFTs improving, could otherwise consider RUQ US  - palliative deferred pain management to primary team   - Hgb 9/18/24 6.6 -> 9/19 6.2 -> 9/20 6.2  - 1 U PRBC this AM     A/P:  - Heme consults appreciated   - c/w 1/2 NS  - pain control w/ pca morphine pump increase to 1.75, toradol 15mg q6hr x5 days w/ IV protonix 40 x5 days  - c/w 30 mg ERBID Morphine (home medication 15 mg ER BID, would like to go home on home dose when DC)  - c/w 1.5 mg morphine q3, oxy 5mg q4  - C/w humidified O2 for pain  - bowel regimen: miralax BID, senna 2 MG PRN  - Hold home hydroxyurea (need to take home med)  - C/w oxbryta (need to take home med) and endari  - incentive spirometer  - will continue to monitor for signs of acute chest syndrome. If fever, culture and repeat CXR stat

## 2024-09-21 NOTE — PROGRESS NOTE ADULT - PROBLEM SELECTOR PLAN 2
- platelet count 9/18: 95, unclear baseline as pt admitted w/ thrombocytosis. Likely ~170  - 4 T score: 4 points for intermediate probability of HIT  - 9/19 platelet 76  - Heparin platelets negative      PLAN  - F/u serotonin assay  - D/c lovenox

## 2024-09-22 LAB
ALBUMIN SERPL ELPH-MCNC: 3.6 G/DL — SIGNIFICANT CHANGE UP (ref 3.3–5)
ALP SERPL-CCNC: 431 U/L — HIGH (ref 40–120)
ALT FLD-CCNC: 47 U/L — HIGH (ref 4–41)
ANION GAP SERPL CALC-SCNC: 12 MMOL/L — SIGNIFICANT CHANGE UP (ref 7–14)
AST SERPL-CCNC: 66 U/L — HIGH (ref 4–40)
BASOPHILS # BLD AUTO: 0.07 K/UL — SIGNIFICANT CHANGE UP (ref 0–0.2)
BASOPHILS NFR BLD AUTO: 0.8 % — SIGNIFICANT CHANGE UP (ref 0–2)
BILIRUB SERPL-MCNC: 2 MG/DL — HIGH (ref 0.2–1.2)
BUN SERPL-MCNC: 9 MG/DL — SIGNIFICANT CHANGE UP (ref 7–23)
CALCIUM SERPL-MCNC: 8.5 MG/DL — SIGNIFICANT CHANGE UP (ref 8.4–10.5)
CHLORIDE SERPL-SCNC: 102 MMOL/L — SIGNIFICANT CHANGE UP (ref 98–107)
CO2 SERPL-SCNC: 25 MMOL/L — SIGNIFICANT CHANGE UP (ref 22–31)
CREAT SERPL-MCNC: 0.33 MG/DL — LOW (ref 0.5–1.3)
EGFR: 162 ML/MIN/1.73M2 — SIGNIFICANT CHANGE UP
EOSINOPHIL # BLD AUTO: 0.04 K/UL — SIGNIFICANT CHANGE UP (ref 0–0.5)
EOSINOPHIL NFR BLD AUTO: 0.4 % — SIGNIFICANT CHANGE UP (ref 0–6)
GLUCOSE SERPL-MCNC: 72 MG/DL — SIGNIFICANT CHANGE UP (ref 70–99)
HCT VFR BLD CALC: 22.2 % — LOW (ref 39–50)
HGB BLD-MCNC: 7.4 G/DL — LOW (ref 13–17)
IANC: 3.93 K/UL — SIGNIFICANT CHANGE UP (ref 1.8–7.4)
IMM GRANULOCYTES NFR BLD AUTO: 1.1 % — HIGH (ref 0–0.9)
LYMPHOCYTES # BLD AUTO: 4.35 K/UL — HIGH (ref 1–3.3)
LYMPHOCYTES # BLD AUTO: 47.7 % — HIGH (ref 13–44)
MCHC RBC-ENTMCNC: 25.1 PG — LOW (ref 27–34)
MCHC RBC-ENTMCNC: 33.3 GM/DL — SIGNIFICANT CHANGE UP (ref 32–36)
MCV RBC AUTO: 75.3 FL — LOW (ref 80–100)
MONOCYTES # BLD AUTO: 0.62 K/UL — SIGNIFICANT CHANGE UP (ref 0–0.9)
MONOCYTES NFR BLD AUTO: 6.8 % — SIGNIFICANT CHANGE UP (ref 2–14)
NEUTROPHILS # BLD AUTO: 3.93 K/UL — SIGNIFICANT CHANGE UP (ref 1.8–7.4)
NEUTROPHILS NFR BLD AUTO: 43.2 % — SIGNIFICANT CHANGE UP (ref 43–77)
NRBC # BLD: 140 /100 WBCS — HIGH (ref 0–0)
NRBC # FLD: 12.73 K/UL — HIGH (ref 0–0)
PLATELET # BLD AUTO: 110 K/UL — LOW (ref 150–400)
POTASSIUM SERPL-MCNC: 3.6 MMOL/L — SIGNIFICANT CHANGE UP (ref 3.5–5.3)
POTASSIUM SERPL-SCNC: 3.6 MMOL/L — SIGNIFICANT CHANGE UP (ref 3.5–5.3)
PROT SERPL-MCNC: 8.2 G/DL — SIGNIFICANT CHANGE UP (ref 6–8.3)
RBC # BLD: 2.95 M/UL — LOW (ref 4.2–5.8)
RBC # BLD: 2.95 M/UL — LOW (ref 4.2–5.8)
RBC # FLD: 27.7 % — HIGH (ref 10.3–14.5)
RETICS #: 23.3 K/UL — LOW (ref 25–125)
RETICS/RBC NFR: 0.8 % — SIGNIFICANT CHANGE UP (ref 0.5–2.5)
SODIUM SERPL-SCNC: 139 MMOL/L — SIGNIFICANT CHANGE UP (ref 135–145)
WBC # BLD: 9.11 K/UL — SIGNIFICANT CHANGE UP (ref 3.8–10.5)
WBC # FLD AUTO: 9.11 K/UL — SIGNIFICANT CHANGE UP (ref 3.8–10.5)

## 2024-09-22 PROCEDURE — 99233 SBSQ HOSP IP/OBS HIGH 50: CPT | Mod: GC

## 2024-09-22 RX ORDER — KETOROLAC TROMETHAMINE 10 MG/1
15 TABLET, FILM COATED ORAL EVERY 6 HOURS
Refills: 0 | Status: DISCONTINUED | OUTPATIENT
Start: 2024-09-22 | End: 2024-09-25

## 2024-09-22 RX ADMIN — KETOROLAC TROMETHAMINE 15 MILLIGRAM(S): 10 TABLET, FILM COATED ORAL at 09:26

## 2024-09-22 RX ADMIN — PENICILLIN V POTASSIUM 250 MILLIGRAM(S): 500 TABLET ORAL at 05:15

## 2024-09-22 RX ADMIN — VOXELOTOR 1500 MILLIGRAM(S): 300 TABLET, FOR SUSPENSION ORAL at 11:21

## 2024-09-22 RX ADMIN — MORPHINE SULFATE 30 MILLILITER(S): 30 TABLET, FILM COATED, EXTENDED RELEASE ORAL at 08:39

## 2024-09-22 RX ADMIN — MORPHINE SULFATE 30 MILLIGRAM(S): 30 TABLET, FILM COATED, EXTENDED RELEASE ORAL at 05:15

## 2024-09-22 RX ADMIN — Medication 40 MILLIEQUIVALENT(S): at 17:28

## 2024-09-22 RX ADMIN — MORPHINE SULFATE 30 MILLILITER(S): 30 TABLET, FILM COATED, EXTENDED RELEASE ORAL at 20:20

## 2024-09-22 RX ADMIN — KETOROLAC TROMETHAMINE 15 MILLIGRAM(S): 10 TABLET, FILM COATED ORAL at 15:46

## 2024-09-22 RX ADMIN — KETOROLAC TROMETHAMINE 15 MILLIGRAM(S): 10 TABLET, FILM COATED ORAL at 16:30

## 2024-09-22 RX ADMIN — MORPHINE SULFATE 30 MILLIGRAM(S): 30 TABLET, FILM COATED, EXTENDED RELEASE ORAL at 17:28

## 2024-09-22 RX ADMIN — FOLIC ACID 1 MILLIGRAM(S): 1 TABLET ORAL at 11:20

## 2024-09-22 RX ADMIN — MORPHINE SULFATE 30 MILLILITER(S): 30 TABLET, FILM COATED, EXTENDED RELEASE ORAL at 23:27

## 2024-09-22 RX ADMIN — MORPHINE SULFATE 30 MILLIGRAM(S): 30 TABLET, FILM COATED, EXTENDED RELEASE ORAL at 18:13

## 2024-09-22 RX ADMIN — PENICILLIN V POTASSIUM 250 MILLIGRAM(S): 500 TABLET ORAL at 17:30

## 2024-09-22 RX ADMIN — Medication 10 GRAM(S): at 05:15

## 2024-09-22 RX ADMIN — Medication 4 MILLIGRAM(S): at 06:29

## 2024-09-22 RX ADMIN — CHLORHEXIDINE GLUCONATE ORAL RINSE 1 APPLICATION(S): 1.2 SOLUTION DENTAL at 11:21

## 2024-09-22 RX ADMIN — MORPHINE SULFATE 30 MILLIGRAM(S): 30 TABLET, FILM COATED, EXTENDED RELEASE ORAL at 06:15

## 2024-09-22 RX ADMIN — Medication 10 GRAM(S): at 17:29

## 2024-09-22 RX ADMIN — KETOROLAC TROMETHAMINE 15 MILLIGRAM(S): 10 TABLET, FILM COATED ORAL at 09:54

## 2024-09-22 NOTE — PROGRESS NOTE ADULT - SUBJECTIVE AND OBJECTIVE BOX
*******************************  Joelle Skelton MD (PGY-1)  Internal Medicine  Contact via [employer] TEAMS  *******************************    DATE OF SERVICE: 09-22-24 @ 12:18    Patient is a 29y old  Male who presents with a chief complaint of sickle cell crisis (22 Sep 2024 07:22)      SUBJECTIVE / OVERNIGHT EVENTS:  Overnight, no acute events.   Pt seen and examined at bedside. Stated that he had left hip pain, rated 7/10. Pt felt that worst of crisis was over and feels that pain is overall improving. Denies CP, SOB, cough, fever, muscle weakness/numbness.     ROS negative except as above.    MEDICATIONS  (STANDING):  chlorhexidine 2% Cloths 1 Application(s) Topical daily  folic acid 1 milliGRAM(s) Oral daily  glutamine Powder 10 Gram(s) Oral two times a day  ketorolac   Injectable 15 milliGRAM(s) IV Push every 6 hours  morphine ER Tablet 30 milliGRAM(s) Oral two times a day  morphine PCA (5 mG/mL) 30 milliLiter(s) PCA Continuous PCA Continuous  penicillin   milliGRAM(s) Oral two times a day  sodium chloride 0.45%. 1000 milliLiter(s) (75 mL/Hr) IV Continuous <Continuous>  voxelotor 1500 milliGRAM(s) Oral daily    MEDICATIONS  (PRN):  naloxone Injectable 0.1 milliGRAM(s) IV Push every 3 minutes PRN For ANY of the following changes in patient status:  A. RR LESS THAN 10 breaths per minute, B. Oxygen saturation LESS THAN 90%, C. Sedation score of 6  ondansetron Injectable 4 milliGRAM(s) IV Push every 6 hours PRN Nausea  polyethylene glycol 3350 17 Gram(s) Oral two times a day PRN Constipation  senna 2 Tablet(s) Oral at bedtime PRN Constipation      Vital Signs Last 24 Hrs  T(C): 36.7 (22 Sep 2024 12:00), Max: 37.6 (22 Sep 2024 00:00)  T(F): 98.1 (22 Sep 2024 12:00), Max: 99.6 (22 Sep 2024 00:00)  HR: 73 (22 Sep 2024 12:00) (73 - 94)  BP: 104/57 (22 Sep 2024 12:00) (103/63 - 114/75)  BP(mean): --  RR: 18 (22 Sep 2024 12:00) (16 - 18)  SpO2: 100% (22 Sep 2024 12:00) (95% - 100%)    Parameters below as of 22 Sep 2024 12:00  Patient On (Oxygen Delivery Method): room air      CAPILLARY BLOOD GLUCOSE        I&O's Summary    21 Sep 2024 07:01  -  22 Sep 2024 07:00  --------------------------------------------------------  IN: 1725 mL / OUT: 1700 mL / NET: 25 mL        PHYSICAL EXAM:  GENERAL: NAD, cachetic  HEAD:  Atraumatic, Normocephalic  EYES: EOMI, conjunctiva and sclera clear  NECK: Supple, No JVD  CHEST/LUNG: Clear to auscultation bilaterally; No wheeze  HEART: Regular rate and rhythm; No murmurs, rubs, or gallops  ABDOMEN: Soft, Nontender, Nondistended; Bowel sounds present  EXTREMITIES:  2+ Peripheral Pulses, No clubbing, cyanosis, or edema; left hip pain w/ palpation   NEUROLOGY: AOx3, non-focal  SKIN: No rashes or lesions    LABS:                        7.4    9.11  )-----------( 110      ( 22 Sep 2024 05:12 )             22.2     09-22    139  |  102  |  9   ----------------------------<  72  3.6   |  25  |  0.33[L]    Ca    8.5      22 Sep 2024 05:12    TPro  8.2  /  Alb  3.6  /  TBili  2.0[H]  /  DBili  x   /  AST  66[H]  /  ALT  47[H]  /  AlkPhos  431[H]  09-22            MICRO:      RADIOLOGY & ADDITIONAL TESTS:

## 2024-09-22 NOTE — PROGRESS NOTE ADULT - PROBLEM SELECTOR PLAN 1
- follows with Dr. Montano, missed monthly dose of Adakveo for 2 months which may explain SCC  - low concern for acute chest given no opacities on CXR, chest pain more consistent with SCC  - transaminitis likely 2/2 to hepatopathy iso vasoocclusive crisis, no abdominal pain and LFTs improving, could otherwise consider RUQ US  - palliative deferred pain management to primary team   - Hgb 9/18/24 6.6 -> 9/19 6.2 -> 9/20 6.2  - 1 U PRBC this AM     A/P:  - Heme consults appreciated   - c/w 1/2 NS  - pain control w/ pca morphine pump increase to 1.75, toradol 15mg q6hr x5 days w/ IV protonix 40 x5 days  - c/w 30 mg ERBID Morphine (home medication 15 mg ER BID, would like to go home on home dose when DC)  - c/w 1.5 mg morphine q3, oxy 5mg q4  - C/w humidified O2 for pain  - bowel regimen: miralax BID, senna 2 MG PRN  - Hold home hydroxyurea (need to take home med)  - C/w oxbryta (need to take home med) and endari  - incentive spirometer  - will continue to monitor for signs of acute chest syndrome. If fever, culture and repeat CXR stat - follows with Dr. Montano, missed monthly dose of Adakveo for 2 months which may explain SCC  - low concern for acute chest given no opacities on CXR, chest pain more consistent with SCC  - transaminitis likely 2/2 to hepatopathy iso vasoocclusive crisis, no abdominal pain and LFTs improving, could otherwise consider RUQ US  - palliative deferred pain management to primary team        A/P:  - Pt amenable to receiving 1 U PRBC today   - Heme consults appreciated   - c/w 1/2 NS  - pain control w/ pca morphine pump increase to 1.75, toradol 15mg q6hr x5 days w/ IV protonix 40 x5 days  - c/w 30 mg ERBID Morphine (home medication 15 mg ER BID, would like to go home on home dose when DC)  - c/w 1.5 mg morphine q3, oxy 5mg q4  - C/w humidified O2 for pain  - bowel regimen: miralax BID, senna 2 MG PRN  - Hold home hydroxyurea (need to take home med)  - C/w oxbryta (need to take home med) and endari  - incentive spirometer  - will continue to monitor for signs of acute chest syndrome. If fever, culture and repeat CXR stat

## 2024-09-22 NOTE — PROGRESS NOTE ADULT - ATTENDING COMMENTS
Pt still with moderate pain throughout the day, but he feels as though he has passed the worst part of this vaso-occlusive crisis. He is amenable to continue current PCA/oral morphine regimen, with prn toradol. Will consider additional PRBCs today. Pt's mother at bedside and in agreement with plan.

## 2024-09-23 LAB
ANION GAP SERPL CALC-SCNC: 13 MMOL/L — SIGNIFICANT CHANGE UP (ref 7–14)
BASOPHILS # BLD AUTO: 0.07 K/UL — SIGNIFICANT CHANGE UP (ref 0–0.2)
BASOPHILS NFR BLD AUTO: 0.8 % — SIGNIFICANT CHANGE UP (ref 0–2)
BLD GP AB SCN SERPL QL: NEGATIVE — SIGNIFICANT CHANGE UP
BUN SERPL-MCNC: 6 MG/DL — LOW (ref 7–23)
CALCIUM SERPL-MCNC: 8.4 MG/DL — SIGNIFICANT CHANGE UP (ref 8.4–10.5)
CHLORIDE SERPL-SCNC: 102 MMOL/L — SIGNIFICANT CHANGE UP (ref 98–107)
CO2 SERPL-SCNC: 23 MMOL/L — SIGNIFICANT CHANGE UP (ref 22–31)
CREAT SERPL-MCNC: 0.3 MG/DL — LOW (ref 0.5–1.3)
EGFR: 166 ML/MIN/1.73M2 — SIGNIFICANT CHANGE UP
EOSINOPHIL # BLD AUTO: 0.03 K/UL — SIGNIFICANT CHANGE UP (ref 0–0.5)
EOSINOPHIL NFR BLD AUTO: 0.3 % — SIGNIFICANT CHANGE UP (ref 0–6)
GLUCOSE SERPL-MCNC: 81 MG/DL — SIGNIFICANT CHANGE UP (ref 70–99)
HCT VFR BLD CALC: 27 % — LOW (ref 39–50)
HGB BLD-MCNC: 9.2 G/DL — LOW (ref 13–17)
IANC: 2.15 K/UL — SIGNIFICANT CHANGE UP (ref 1.8–7.4)
IMM GRANULOCYTES NFR BLD AUTO: 0.6 % — SIGNIFICANT CHANGE UP (ref 0–0.9)
LDH SERPL L TO P-CCNC: 625 U/L — HIGH (ref 135–225)
LYMPHOCYTES # BLD AUTO: 6 K/UL — HIGH (ref 1–3.3)
LYMPHOCYTES # BLD AUTO: 69.5 % — HIGH (ref 13–44)
MAGNESIUM SERPL-MCNC: 2.1 MG/DL — SIGNIFICANT CHANGE UP (ref 1.6–2.6)
MCHC RBC-ENTMCNC: 25.8 PG — LOW (ref 27–34)
MCHC RBC-ENTMCNC: 34.1 GM/DL — SIGNIFICANT CHANGE UP (ref 32–36)
MCV RBC AUTO: 75.8 FL — LOW (ref 80–100)
MONOCYTES # BLD AUTO: 0.33 K/UL — SIGNIFICANT CHANGE UP (ref 0–0.9)
MONOCYTES NFR BLD AUTO: 3.8 % — SIGNIFICANT CHANGE UP (ref 2–14)
NEUTROPHILS # BLD AUTO: 2.15 K/UL — SIGNIFICANT CHANGE UP (ref 1.8–7.4)
NEUTROPHILS NFR BLD AUTO: 25 % — LOW (ref 43–77)
NRBC # BLD: 13 /100 WBCS — HIGH (ref 0–0)
NRBC # FLD: 1.12 K/UL — HIGH (ref 0–0)
PHOSPHATE SERPL-MCNC: 3.1 MG/DL — SIGNIFICANT CHANGE UP (ref 2.5–4.5)
PLATELET # BLD AUTO: 148 K/UL — LOW (ref 150–400)
POTASSIUM SERPL-MCNC: 3.6 MMOL/L — SIGNIFICANT CHANGE UP (ref 3.5–5.3)
POTASSIUM SERPL-SCNC: 3.6 MMOL/L — SIGNIFICANT CHANGE UP (ref 3.5–5.3)
RBC # BLD: 3.56 M/UL — LOW (ref 4.2–5.8)
RBC # BLD: 3.56 M/UL — LOW (ref 4.2–5.8)
RBC # FLD: 26.1 % — HIGH (ref 10.3–14.5)
RETICS #: SIGNIFICANT CHANGE UP (ref 25–125)
RETICS/RBC NFR: >22.2 % — HIGH (ref 0.5–2.5)
RH IG SCN BLD-IMP: POSITIVE — SIGNIFICANT CHANGE UP
SODIUM SERPL-SCNC: 138 MMOL/L — SIGNIFICANT CHANGE UP (ref 135–145)
UNFRACTIONATED HEPARIN INTERPRETATION: SIGNIFICANT CHANGE UP
UNFRACTIONATED HEPARIN RESULT: NEGATIVE — SIGNIFICANT CHANGE UP
UNFRACTIONATED HEPARIN-HIGH DOSE: 9 % — SIGNIFICANT CHANGE UP
UNFRACTIONATED HEPARIN-LOW DOSE: 8 % — SIGNIFICANT CHANGE UP
WBC # BLD: 8.89 K/UL — SIGNIFICANT CHANGE UP (ref 3.8–10.5)
WBC # FLD AUTO: 8.89 K/UL — SIGNIFICANT CHANGE UP (ref 3.8–10.5)

## 2024-09-23 PROCEDURE — 99233 SBSQ HOSP IP/OBS HIGH 50: CPT | Mod: GC

## 2024-09-23 RX ORDER — PANTOPRAZOLE SODIUM 40 MG/1
40 TABLET, DELAYED RELEASE ORAL DAILY
Refills: 0 | Status: COMPLETED | OUTPATIENT
Start: 2024-09-23 | End: 2024-09-26

## 2024-09-23 RX ADMIN — FOLIC ACID 1 MILLIGRAM(S): 1 TABLET ORAL at 12:45

## 2024-09-23 RX ADMIN — MORPHINE SULFATE 30 MILLIGRAM(S): 30 TABLET, FILM COATED, EXTENDED RELEASE ORAL at 17:22

## 2024-09-23 RX ADMIN — PENICILLIN V POTASSIUM 250 MILLIGRAM(S): 500 TABLET ORAL at 17:22

## 2024-09-23 RX ADMIN — KETOROLAC TROMETHAMINE 15 MILLIGRAM(S): 10 TABLET, FILM COATED ORAL at 18:22

## 2024-09-23 RX ADMIN — MORPHINE SULFATE 30 MILLILITER(S): 30 TABLET, FILM COATED, EXTENDED RELEASE ORAL at 20:06

## 2024-09-23 RX ADMIN — Medication 10 GRAM(S): at 05:39

## 2024-09-23 RX ADMIN — Medication 40 MILLIEQUIVALENT(S): at 17:32

## 2024-09-23 RX ADMIN — VOXELOTOR 1500 MILLIGRAM(S): 300 TABLET, FOR SUSPENSION ORAL at 12:38

## 2024-09-23 RX ADMIN — MORPHINE SULFATE 30 MILLIGRAM(S): 30 TABLET, FILM COATED, EXTENDED RELEASE ORAL at 06:30

## 2024-09-23 RX ADMIN — Medication 75 MILLILITER(S): at 17:31

## 2024-09-23 RX ADMIN — KETOROLAC TROMETHAMINE 15 MILLIGRAM(S): 10 TABLET, FILM COATED ORAL at 01:00

## 2024-09-23 RX ADMIN — PANTOPRAZOLE SODIUM 40 MILLIGRAM(S): 40 TABLET, DELAYED RELEASE ORAL at 12:44

## 2024-09-23 RX ADMIN — MORPHINE SULFATE 30 MILLIGRAM(S): 30 TABLET, FILM COATED, EXTENDED RELEASE ORAL at 18:22

## 2024-09-23 RX ADMIN — CHLORHEXIDINE GLUCONATE ORAL RINSE 1 APPLICATION(S): 1.2 SOLUTION DENTAL at 12:45

## 2024-09-23 RX ADMIN — KETOROLAC TROMETHAMINE 15 MILLIGRAM(S): 10 TABLET, FILM COATED ORAL at 13:41

## 2024-09-23 RX ADMIN — Medication 10 GRAM(S): at 17:22

## 2024-09-23 RX ADMIN — KETOROLAC TROMETHAMINE 15 MILLIGRAM(S): 10 TABLET, FILM COATED ORAL at 06:30

## 2024-09-23 RX ADMIN — KETOROLAC TROMETHAMINE 15 MILLIGRAM(S): 10 TABLET, FILM COATED ORAL at 17:22

## 2024-09-23 RX ADMIN — MORPHINE SULFATE 30 MILLIGRAM(S): 30 TABLET, FILM COATED, EXTENDED RELEASE ORAL at 05:39

## 2024-09-23 RX ADMIN — KETOROLAC TROMETHAMINE 15 MILLIGRAM(S): 10 TABLET, FILM COATED ORAL at 12:41

## 2024-09-23 RX ADMIN — KETOROLAC TROMETHAMINE 15 MILLIGRAM(S): 10 TABLET, FILM COATED ORAL at 05:39

## 2024-09-23 RX ADMIN — KETOROLAC TROMETHAMINE 15 MILLIGRAM(S): 10 TABLET, FILM COATED ORAL at 00:05

## 2024-09-23 RX ADMIN — PENICILLIN V POTASSIUM 250 MILLIGRAM(S): 500 TABLET ORAL at 05:39

## 2024-09-23 RX ADMIN — MORPHINE SULFATE 30 MILLILITER(S): 30 TABLET, FILM COATED, EXTENDED RELEASE ORAL at 08:36

## 2024-09-23 NOTE — PROGRESS NOTE ADULT - PROBLEM SELECTOR PLAN 1
- follows with Dr. Montano, missed monthly dose of Adakveo for 2 months which may explain SCC  - low concern for acute chest given no opacities on CXR, chest pain more consistent with SCC  - transaminitis likely 2/2 to hepatopathy iso vasoocclusive crisis, no abdominal pain and LFTs improving, could otherwise consider RUQ US  - palliative deferred pain management to primary team        A/P:  - Pt amenable to receiving 1 U PRBC today   - Heme consults appreciated   - c/w 1/2 NS  - pain control w/ pca morphine pump increase to 1.75, toradol 15mg q6hr x5 days w/ IV protonix 40 x5 days  - c/w 30 mg ERBID Morphine (home medication 15 mg ER BID, would like to go home on home dose when DC)  - c/w 1.5 mg morphine q3, oxy 5mg q4  - C/w humidified O2 for pain  - bowel regimen: miralax BID, senna 2 MG PRN  - Hold home hydroxyurea (need to take home med)  - C/w oxbryta (need to take home med) and endari  - incentive spirometer  - will continue to monitor for signs of acute chest syndrome. If fever, culture and repeat CXR stat - follows with Dr. Montano, missed monthly dose of Adakveo for 2 months which may explain SCC  - low concern for acute chest given no opacities on CXR, chest pain more consistent with SCC  - transaminitis likely 2/2 to hepatopathy iso vasoocclusive crisis, no abdominal pain and LFTs improving, could otherwise consider RUQ US  - palliative deferred pain management to primary team   - s/p 1 U PRBC 9/23    A/P:  - Heme consults appreciated   - c/w 1/2 NS  - pain control w/ pca morphine pump increase to 1.75, toradol 15mg q6hr x5 days w/ IV protonix 40 x5 days  - c/w 30 mg ERBID Morphine (home medication 15 mg ER BID, would like to go home on home dose when DC)  - C/w humidified O2 for pain  - bowel regimen: miralax BID, senna 2 MG PRN  - Hold home hydroxyurea (need to take home med)  - C/w oxbryta (need to take home med) and endari  - incentive spirometer  - will continue to monitor for signs of acute chest syndrome. If fever, culture and repeat CXR stat - follows with Dr. Montano, missed monthly dose of Adakveo for 2 months which may explain SCC  - low concern for acute chest given no opacities on CXR, chest pain more consistent with SCC  - transaminitis likely 2/2 to hepatopathy iso vasoocclusive crisis, no abdominal pain and LFTs improving, could otherwise consider RUQ US  - palliative deferred pain management to primary team   - s/p 1 U PRBC 9/23    A/P:  - Heme consults appreciated   - c/w 1/2 NS  - pain control w/ pca morphine pump 1.75, toradol 15mg q6hr x5 days w/ IV protonix 40 x5 days  - c/w 30 mg ERBID Morphine (home medication 15 mg ER BID, would like to go home on home dose when DC)  - C/w humidified O2 for pain  - bowel regimen: miralax BID, senna 2 MG PRN  - Hold home hydroxyurea (need to take home med)  - C/w oxbryta (need to take home med) and endari  - incentive spirometer  - will continue to monitor for signs of acute chest syndrome. If fever, culture and repeat CXR stat

## 2024-09-23 NOTE — PROGRESS NOTE ADULT - SUBJECTIVE AND OBJECTIVE BOX
*******************************  Joelle Skelton MD (PGY-1)  Internal Medicine  Contact via [employer] TEAMS  *******************************    DATE OF SERVICE: 09-23-24 @ 07:12    Patient is a 29y old  Male who presents with a chief complaint of sickle cell crisis (22 Sep 2024 07:22)      SUBJECTIVE / OVERNIGHT EVENTS:  Overnight,  Pt seen and examined at bedside.    ROS negative except as above.    MEDICATIONS  (STANDING):  chlorhexidine 2% Cloths 1 Application(s) Topical daily  folic acid 1 milliGRAM(s) Oral daily  glutamine Powder 10 Gram(s) Oral two times a day  ketorolac   Injectable 15 milliGRAM(s) IV Push every 6 hours  morphine ER Tablet 30 milliGRAM(s) Oral two times a day  morphine PCA (5 mG/mL) 30 milliLiter(s) PCA Continuous PCA Continuous  penicillin   milliGRAM(s) Oral two times a day  sodium chloride 0.45%. 1000 milliLiter(s) (75 mL/Hr) IV Continuous <Continuous>  voxelotor 1500 milliGRAM(s) Oral daily    MEDICATIONS  (PRN):  naloxone Injectable 0.1 milliGRAM(s) IV Push every 3 minutes PRN For ANY of the following changes in patient status:  A. RR LESS THAN 10 breaths per minute, B. Oxygen saturation LESS THAN 90%, C. Sedation score of 6  ondansetron Injectable 4 milliGRAM(s) IV Push every 6 hours PRN Nausea  polyethylene glycol 3350 17 Gram(s) Oral two times a day PRN Constipation  senna 2 Tablet(s) Oral at bedtime PRN Constipation      Vital Signs Last 24 Hrs  T(C): 36.7 (23 Sep 2024 04:00), Max: 36.9 (22 Sep 2024 08:00)  T(F): 98.1 (23 Sep 2024 04:00), Max: 98.5 (22 Sep 2024 08:00)  HR: 73 (23 Sep 2024 04:00) (67 - 89)  BP: 109/79 (23 Sep 2024 04:00) (104/57 - 116/69)  BP(mean): --  RR: 18 (23 Sep 2024 04:00) (16 - 18)  SpO2: 100% (23 Sep 2024 04:00) (99% - 100%)    Parameters below as of 23 Sep 2024 04:00  Patient On (Oxygen Delivery Method): room air      CAPILLARY BLOOD GLUCOSE        I&O's Summary    22 Sep 2024 07:01  -  23 Sep 2024 07:00  --------------------------------------------------------  IN: 0 mL / OUT: 1900 mL / NET: -1900 mL        PHYSICAL EXAM:  GENERAL: NAD, well-developed  HEAD:  Atraumatic, Normocephalic  EYES: EOMI, conjunctiva and sclera clear  NECK: Supple, No JVD  CHEST/LUNG: Clear to auscultation bilaterally; No wheeze  HEART: Regular rate and rhythm; No murmurs, rubs, or gallops  ABDOMEN: Soft, Nontender, Nondistended; Bowel sounds present  EXTREMITIES:  2+ Peripheral Pulses, No clubbing, cyanosis, or edema  NEUROLOGY: AOx3, non-focal  SKIN: No rashes or lesions    LABS:                        7.4    9.11  )-----------( 110      ( 22 Sep 2024 05:12 )             22.2     09-22    139  |  102  |  9   ----------------------------<  72  3.6   |  25  |  0.33[L]    Ca    8.5      22 Sep 2024 05:12    TPro  8.2  /  Alb  3.6  /  TBili  2.0[H]  /  DBili  x   /  AST  66[H]  /  ALT  47[H]  /  AlkPhos  431[H]  09-22            MICRO:      RADIOLOGY & ADDITIONAL TESTS:           *******************************  Joelle Skelton MD (PGY-1)  Internal Medicine  Contact via [employer] TEAMS  *******************************    DATE OF SERVICE: 09-23-24 @ 07:12    Patient is a 29y old  Male who presents with a chief complaint of sickle cell crisis (22 Sep 2024 07:22)      SUBJECTIVE / OVERNIGHT EVENTS:  Overnight,  Pt seen and examined at bedside.    ROS negative except as above.    MEDICATIONS  (STANDING):  chlorhexidine 2% Cloths 1 Application(s) Topical daily  folic acid 1 milliGRAM(s) Oral daily  glutamine Powder 10 Gram(s) Oral two times a day  ketorolac   Injectable 15 milliGRAM(s) IV Push every 6 hours  morphine ER Tablet 30 milliGRAM(s) Oral two times a day  morphine PCA (5 mG/mL) 30 milliLiter(s) PCA Continuous PCA Continuous  penicillin   milliGRAM(s) Oral two times a day  sodium chloride 0.45%. 1000 milliLiter(s) (75 mL/Hr) IV Continuous <Continuous>  voxelotor 1500 milliGRAM(s) Oral daily    MEDICATIONS  (PRN):  naloxone Injectable 0.1 milliGRAM(s) IV Push every 3 minutes PRN For ANY of the following changes in patient status:  A. RR LESS THAN 10 breaths per minute, B. Oxygen saturation LESS THAN 90%, C. Sedation score of 6  ondansetron Injectable 4 milliGRAM(s) IV Push every 6 hours PRN Nausea  polyethylene glycol 3350 17 Gram(s) Oral two times a day PRN Constipation  senna 2 Tablet(s) Oral at bedtime PRN Constipation      Vital Signs Last 24 Hrs  T(C): 36.7 (23 Sep 2024 04:00), Max: 36.9 (22 Sep 2024 08:00)  T(F): 98.1 (23 Sep 2024 04:00), Max: 98.5 (22 Sep 2024 08:00)  HR: 73 (23 Sep 2024 04:00) (67 - 89)  BP: 109/79 (23 Sep 2024 04:00) (104/57 - 116/69)  BP(mean): --  RR: 18 (23 Sep 2024 04:00) (16 - 18)  SpO2: 100% (23 Sep 2024 04:00) (99% - 100%)    Parameters below as of 23 Sep 2024 04:00  Patient On (Oxygen Delivery Method): room air      CAPILLARY BLOOD GLUCOSE        I&O's Summary    22 Sep 2024 07:01  -  23 Sep 2024 07:00  --------------------------------------------------------  IN: 0 mL / OUT: 1900 mL / NET: -1900 mL        PHYSICAL EXAM:  GENERAL: NAD, cachetic  HEAD:  Atraumatic, Normocephalic  EYES: EOMI, conjunctiva and sclera clear  NECK: Supple, No JVD  CHEST/LUNG: Clear to auscultation bilaterally; No wheeze  HEART: Regular rate and rhythm; No murmurs, rubs, or gallops  ABDOMEN: Soft, Nontender, Nondistended; Bowel sounds present  EXTREMITIES:  2+ Peripheral Pulses, No clubbing, cyanosis, or edema; left hip pain w/ palpation   NEUROLOGY: AOx3, non-focal  SKIN: No rashes or lesions    LABS:                        7.4    9.11  )-----------( 110      ( 22 Sep 2024 05:12 )             22.2     09-22    139  |  102  |  9   ----------------------------<  72  3.6   |  25  |  0.33[L]    Ca    8.5      22 Sep 2024 05:12    TPro  8.2  /  Alb  3.6  /  TBili  2.0[H]  /  DBili  x   /  AST  66[H]  /  ALT  47[H]  /  AlkPhos  431[H]  09-22            MICRO:      RADIOLOGY & ADDITIONAL TESTS:           *******************************  Joelle Skelton MD (PGY-1)  Internal Medicine  Contact via [employer] TEAMS  *******************************    DATE OF SERVICE: 09-23-24 @ 07:12    Patient is a 29y old  Male who presents with a chief complaint of sickle cell crisis (22 Sep 2024 07:22)      SUBJECTIVE / OVERNIGHT EVENTS:  Overnight, no acute events.  Pt seen and examined at bedside. Stated that he is overall feeling better, but still has pain in primarily, knees and feet. Right knee most painful, rates 6/10. Denies SOB, cough, chest pain, abdominal pain, muscle weakness/numbness. States that last BM was 2 days ago.   PCA pump attempts last 24: 146. # Dose given: 56    ROS negative except as above.    MEDICATIONS  (STANDING):  chlorhexidine 2% Cloths 1 Application(s) Topical daily  folic acid 1 milliGRAM(s) Oral daily  glutamine Powder 10 Gram(s) Oral two times a day  ketorolac   Injectable 15 milliGRAM(s) IV Push every 6 hours  morphine ER Tablet 30 milliGRAM(s) Oral two times a day  morphine PCA (5 mG/mL) 30 milliLiter(s) PCA Continuous PCA Continuous  penicillin   milliGRAM(s) Oral two times a day  sodium chloride 0.45%. 1000 milliLiter(s) (75 mL/Hr) IV Continuous <Continuous>  voxelotor 1500 milliGRAM(s) Oral daily    MEDICATIONS  (PRN):  naloxone Injectable 0.1 milliGRAM(s) IV Push every 3 minutes PRN For ANY of the following changes in patient status:  A. RR LESS THAN 10 breaths per minute, B. Oxygen saturation LESS THAN 90%, C. Sedation score of 6  ondansetron Injectable 4 milliGRAM(s) IV Push every 6 hours PRN Nausea  polyethylene glycol 3350 17 Gram(s) Oral two times a day PRN Constipation  senna 2 Tablet(s) Oral at bedtime PRN Constipation      Vital Signs Last 24 Hrs  T(C): 36.7 (23 Sep 2024 04:00), Max: 36.9 (22 Sep 2024 08:00)  T(F): 98.1 (23 Sep 2024 04:00), Max: 98.5 (22 Sep 2024 08:00)  HR: 73 (23 Sep 2024 04:00) (67 - 89)  BP: 109/79 (23 Sep 2024 04:00) (104/57 - 116/69)  BP(mean): --  RR: 18 (23 Sep 2024 04:00) (16 - 18)  SpO2: 100% (23 Sep 2024 04:00) (99% - 100%)    Parameters below as of 23 Sep 2024 04:00  Patient On (Oxygen Delivery Method): room air      CAPILLARY BLOOD GLUCOSE        I&O's Summary    22 Sep 2024 07:01  -  23 Sep 2024 07:00  --------------------------------------------------------  IN: 0 mL / OUT: 1900 mL / NET: -1900 mL        PHYSICAL EXAM:  GENERAL: NAD, cachetic  HEAD:  Atraumatic, Normocephalic  EYES: EOMI, conjunctiva and sclera clear  NECK: Supple, No JVD  CHEST/LUNG: Clear to auscultation bilaterally; No wheeze  HEART: Regular rate and rhythm; No murmurs, rubs, or gallops  ABDOMEN: Soft, Nontender, Nondistended; Bowel sounds present  EXTREMITIES: Pain with right knee flexion; no LLE, cyanosis  NEUROLOGY: AOx3, non-focal  SKIN: No rashes or lesions    LABS:                        7.4    9.11  )-----------( 110      ( 22 Sep 2024 05:12 )             22.2     09-22    139  |  102  |  9   ----------------------------<  72  3.6   |  25  |  0.33[L]    Ca    8.5      22 Sep 2024 05:12    TPro  8.2  /  Alb  3.6  /  TBili  2.0[H]  /  DBili  x   /  AST  66[H]  /  ALT  47[H]  /  AlkPhos  431[H]  09-22            MICRO:      RADIOLOGY & ADDITIONAL TESTS:

## 2024-09-23 NOTE — PROGRESS NOTE ADULT - PROBLEM SELECTOR PLAN 3
- noting swelling and warmth over left hip for several days, retic and LDH markers stable however will X ray left hip to eval  - known occlusive crisis in the past/ osteonecrosis from MRI in 2019    Plan:   - XR L Hip: No acute pathology  - CTM - XR L hip: no acute pathology  - known occlusive crisis in the past/ osteonecrosis from MRI in 2019    Plan:   - CTM

## 2024-09-23 NOTE — PROGRESS NOTE ADULT - ATTENDING COMMENTS
Pt continues to have slow, steady improvement over the past 2-3 days. Will continue current PCA with morphine, demand dose 1.75mg q6min, along with oral morphine ER 30mg twice daily and prn toradol. Excellent response to PRBCs, no indication for additional transfusion at this time.

## 2024-09-24 PROCEDURE — 99233 SBSQ HOSP IP/OBS HIGH 50: CPT | Mod: GC

## 2024-09-24 RX ORDER — ENOXAPARIN SODIUM 150 MG/ML
30 INJECTION SUBCUTANEOUS EVERY 24 HOURS
Refills: 0 | Status: DISCONTINUED | OUTPATIENT
Start: 2024-09-24 | End: 2024-10-10

## 2024-09-24 RX ADMIN — MORPHINE SULFATE 30 MILLIGRAM(S): 30 TABLET, FILM COATED, EXTENDED RELEASE ORAL at 19:19

## 2024-09-24 RX ADMIN — MORPHINE SULFATE 30 MILLILITER(S): 30 TABLET, FILM COATED, EXTENDED RELEASE ORAL at 08:25

## 2024-09-24 RX ADMIN — KETOROLAC TROMETHAMINE 15 MILLIGRAM(S): 10 TABLET, FILM COATED ORAL at 01:00

## 2024-09-24 RX ADMIN — KETOROLAC TROMETHAMINE 15 MILLIGRAM(S): 10 TABLET, FILM COATED ORAL at 12:20

## 2024-09-24 RX ADMIN — PANTOPRAZOLE SODIUM 40 MILLIGRAM(S): 40 TABLET, DELAYED RELEASE ORAL at 12:21

## 2024-09-24 RX ADMIN — KETOROLAC TROMETHAMINE 15 MILLIGRAM(S): 10 TABLET, FILM COATED ORAL at 18:19

## 2024-09-24 RX ADMIN — KETOROLAC TROMETHAMINE 15 MILLIGRAM(S): 10 TABLET, FILM COATED ORAL at 05:10

## 2024-09-24 RX ADMIN — CHLORHEXIDINE GLUCONATE ORAL RINSE 1 APPLICATION(S): 1.2 SOLUTION DENTAL at 12:19

## 2024-09-24 RX ADMIN — MORPHINE SULFATE 30 MILLIGRAM(S): 30 TABLET, FILM COATED, EXTENDED RELEASE ORAL at 18:18

## 2024-09-24 RX ADMIN — VOXELOTOR 1500 MILLIGRAM(S): 300 TABLET, FOR SUSPENSION ORAL at 12:22

## 2024-09-24 RX ADMIN — Medication 10 GRAM(S): at 18:19

## 2024-09-24 RX ADMIN — KETOROLAC TROMETHAMINE 15 MILLIGRAM(S): 10 TABLET, FILM COATED ORAL at 00:26

## 2024-09-24 RX ADMIN — Medication 75 MILLILITER(S): at 12:20

## 2024-09-24 RX ADMIN — MORPHINE SULFATE 30 MILLILITER(S): 30 TABLET, FILM COATED, EXTENDED RELEASE ORAL at 05:07

## 2024-09-24 RX ADMIN — KETOROLAC TROMETHAMINE 15 MILLIGRAM(S): 10 TABLET, FILM COATED ORAL at 19:19

## 2024-09-24 RX ADMIN — KETOROLAC TROMETHAMINE 15 MILLIGRAM(S): 10 TABLET, FILM COATED ORAL at 13:20

## 2024-09-24 RX ADMIN — MORPHINE SULFATE 30 MILLILITER(S): 30 TABLET, FILM COATED, EXTENDED RELEASE ORAL at 20:30

## 2024-09-24 RX ADMIN — FOLIC ACID 1 MILLIGRAM(S): 1 TABLET ORAL at 12:21

## 2024-09-24 RX ADMIN — ENOXAPARIN SODIUM 30 MILLIGRAM(S): 150 INJECTION SUBCUTANEOUS at 12:20

## 2024-09-24 RX ADMIN — PENICILLIN V POTASSIUM 250 MILLIGRAM(S): 500 TABLET ORAL at 18:19

## 2024-09-24 RX ADMIN — PENICILLIN V POTASSIUM 250 MILLIGRAM(S): 500 TABLET ORAL at 05:10

## 2024-09-24 RX ADMIN — Medication 10 GRAM(S): at 05:10

## 2024-09-24 RX ADMIN — MORPHINE SULFATE 30 MILLIGRAM(S): 30 TABLET, FILM COATED, EXTENDED RELEASE ORAL at 05:10

## 2024-09-24 NOTE — PROGRESS NOTE ADULT - PROBLEM SELECTOR PLAN 1
- follows with Dr. Montano, missed monthly dose of Adakveo for 2 months which may explain SCC  - low concern for acute chest given no opacities on CXR, chest pain more consistent with SCC  - transaminitis likely 2/2 to hepatopathy iso vasoocclusive crisis, no abdominal pain and LFTs improving, could otherwise consider RUQ US  - palliative deferred pain management to primary team   - s/p 1 U PRBC 9/23    A/P:  - Heme consults appreciated   - c/w 1/2 NS  - pain control w/ pca morphine pump 1.75, toradol 15mg q6hr x5 days w/ IV protonix 40 x5 days  - c/w 30 mg ERBID Morphine (home medication 15 mg ER BID, would like to go home on home dose when DC)  - C/w humidified O2 for pain  - bowel regimen: miralax BID, senna 2 MG PRN  - Hold home hydroxyurea (need to take home med)  - C/w oxbryta (need to take home med) and endari  - incentive spirometer  - will continue to monitor for signs of acute chest syndrome. If fever, culture and repeat CXR stat

## 2024-09-24 NOTE — PROGRESS NOTE ADULT - SUBJECTIVE AND OBJECTIVE BOX
*******************************  Joelle Skelton MD (PGY-1)  Internal Medicine  Contact via [employer] TEAMS  *******************************    DATE OF SERVICE: 09-24-24 @ 07:21    Patient is a 29y old  Male who presents with a chief complaint of sickle cell crisis (23 Sep 2024 07:12)      SUBJECTIVE / OVERNIGHT EVENTS:  Last 24 hours, PCA administered 72 times, attempted 119 times   Overnight, no acute events.   Pt seen and examined at bedside.    ROS negative except as above.    MEDICATIONS  (STANDING):  chlorhexidine 2% Cloths 1 Application(s) Topical daily  enoxaparin Injectable 30 milliGRAM(s) SubCutaneous every 24 hours  folic acid 1 milliGRAM(s) Oral daily  glutamine Powder 10 Gram(s) Oral two times a day  ketorolac   Injectable 15 milliGRAM(s) IV Push every 6 hours  morphine ER Tablet 30 milliGRAM(s) Oral two times a day  morphine PCA (5 mG/mL) 30 milliLiter(s) PCA Continuous PCA Continuous  pantoprazole  Injectable 40 milliGRAM(s) IV Push daily  penicillin   milliGRAM(s) Oral two times a day  sodium chloride 0.45%. 1000 milliLiter(s) (75 mL/Hr) IV Continuous <Continuous>  voxelotor 1500 milliGRAM(s) Oral daily    MEDICATIONS  (PRN):  naloxone Injectable 0.1 milliGRAM(s) IV Push every 3 minutes PRN For ANY of the following changes in patient status:  A. RR LESS THAN 10 breaths per minute, B. Oxygen saturation LESS THAN 90%, C. Sedation score of 6  ondansetron Injectable 4 milliGRAM(s) IV Push every 6 hours PRN Nausea  polyethylene glycol 3350 17 Gram(s) Oral two times a day PRN Constipation  senna 2 Tablet(s) Oral at bedtime PRN Constipation      Vital Signs Last 24 Hrs  T(C): 36.7 (24 Sep 2024 04:00), Max: 37 (23 Sep 2024 16:00)  T(F): 98.1 (24 Sep 2024 04:00), Max: 98.6 (23 Sep 2024 16:00)  HR: 73 (24 Sep 2024 04:00) (60 - 78)  BP: 111/74 (24 Sep 2024 04:00) (103/76 - 115/83)  BP(mean): --  RR: 18 (24 Sep 2024 04:00) (18 - 18)  SpO2: 97% (24 Sep 2024 04:00) (97% - 100%)    Parameters below as of 24 Sep 2024 04:00  Patient On (Oxygen Delivery Method): room air      CAPILLARY BLOOD GLUCOSE        I&O's Summary        PHYSICAL EXAM:  GENERAL: NAD, cachetic  HEAD:  Atraumatic, Normocephalic  EYES: EOMI, conjunctiva and sclera clear  NECK: Supple, No JVD  CHEST/LUNG: Clear to auscultation bilaterally; No wheeze  HEART: Regular rate and rhythm; No murmurs, rubs, or gallops  ABDOMEN: Soft, Nontender, Nondistended; Bowel sounds present  EXTREMITIES:  2+ Peripheral Pulses, No clubbing, cyanosis, or edema; left hip pain w/ palpation   NEUROLOGY: AOx3, non-focal  SKIN: No rashes or lesions      LABS:                        9.2    8.89  )-----------( 148      ( 23 Sep 2024 06:11 )             27.0     09-23    138  |  102  |  6[L]  ----------------------------<  81  3.6   |  23  |  0.30[L]    Ca    8.4      23 Sep 2024 06:11  Phos  3.1     09-23  Mg     2.10     09-23              MICRO:      RADIOLOGY & ADDITIONAL TESTS:           *******************************  Joelle Skelton MD (PGY-1)  Internal Medicine  Contact via [employer] TEAMS  *******************************    DATE OF SERVICE: 09-24-24 @ 07:21    Patient is a 29y old  Male who presents with a chief complaint of sickle cell crisis (23 Sep 2024 07:12)      SUBJECTIVE / OVERNIGHT EVENTS:  Last 24 hours, PCA administered 72 times, attempted 119 times   Overnight, no acute events.   Pt seen and examined at bedside. Stated that he only feels pain when he goes to sleep and does not press PCA. Believes pain is well controlled if awake. Pain currently left hip. Denies chest pain, SOB, abdominal pain, muscle weakness/numbness. Last BM was last night.     ROS negative except as above.    MEDICATIONS  (STANDING):  chlorhexidine 2% Cloths 1 Application(s) Topical daily  enoxaparin Injectable 30 milliGRAM(s) SubCutaneous every 24 hours  folic acid 1 milliGRAM(s) Oral daily  glutamine Powder 10 Gram(s) Oral two times a day  ketorolac   Injectable 15 milliGRAM(s) IV Push every 6 hours  morphine ER Tablet 30 milliGRAM(s) Oral two times a day  morphine PCA (5 mG/mL) 30 milliLiter(s) PCA Continuous PCA Continuous  pantoprazole  Injectable 40 milliGRAM(s) IV Push daily  penicillin   milliGRAM(s) Oral two times a day  sodium chloride 0.45%. 1000 milliLiter(s) (75 mL/Hr) IV Continuous <Continuous>  voxelotor 1500 milliGRAM(s) Oral daily    MEDICATIONS  (PRN):  naloxone Injectable 0.1 milliGRAM(s) IV Push every 3 minutes PRN For ANY of the following changes in patient status:  A. RR LESS THAN 10 breaths per minute, B. Oxygen saturation LESS THAN 90%, C. Sedation score of 6  ondansetron Injectable 4 milliGRAM(s) IV Push every 6 hours PRN Nausea  polyethylene glycol 3350 17 Gram(s) Oral two times a day PRN Constipation  senna 2 Tablet(s) Oral at bedtime PRN Constipation      Vital Signs Last 24 Hrs  T(C): 36.7 (24 Sep 2024 04:00), Max: 37 (23 Sep 2024 16:00)  T(F): 98.1 (24 Sep 2024 04:00), Max: 98.6 (23 Sep 2024 16:00)  HR: 73 (24 Sep 2024 04:00) (60 - 78)  BP: 111/74 (24 Sep 2024 04:00) (103/76 - 115/83)  BP(mean): --  RR: 18 (24 Sep 2024 04:00) (18 - 18)  SpO2: 97% (24 Sep 2024 04:00) (97% - 100%)    Parameters below as of 24 Sep 2024 04:00  Patient On (Oxygen Delivery Method): room air      CAPILLARY BLOOD GLUCOSE        I&O's Summary        PHYSICAL EXAM:  GENERAL: NAD, cachetic  HEAD:  Atraumatic, Normocephalic  EYES: EOMI, conjunctiva and sclera clear  NECK: Supple, No JVD  CHEST/LUNG: Clear to auscultation bilaterally; No wheeze  HEART: Regular rate and rhythm; No murmurs, rubs, or gallops  ABDOMEN: Soft, Nontender, Nondistended; Bowel sounds present  EXTREMITIES:  2+ Peripheral Pulses, No clubbing, cyanosis, or edema; left hip pain w/ palpation   NEUROLOGY: AOx3, non-focal  SKIN: No rashes or lesions      LABS:                        9.2    8.89  )-----------( 148      ( 23 Sep 2024 06:11 )             27.0     09-23    138  |  102  |  6[L]  ----------------------------<  81  3.6   |  23  |  0.30[L]    Ca    8.4      23 Sep 2024 06:11  Phos  3.1     09-23  Mg     2.10     09-23              MICRO:      RADIOLOGY & ADDITIONAL TESTS:

## 2024-09-24 NOTE — PROGRESS NOTE ADULT - ATTENDING COMMENTS
Pt now definitively improving for several days, with declining PCA use and reported pain. Pt denies sob, palpitations. Continue current regimen and monitor CBC/sickle cell labs.

## 2024-09-24 NOTE — PROGRESS NOTE ADULT - PROBLEM SELECTOR PLAN 3
- XR L hip: no acute pathology  - known occlusive crisis in the past/ osteonecrosis from MRI in 2019    Plan:   - CTM

## 2024-09-24 NOTE — PROGRESS NOTE ADULT - PROBLEM SELECTOR PLAN 2
- platelet count 9/18: 95, unclear baseline as pt admitted w/ thrombocytosis. Likely ~170  - 4 T score: 4 points for intermediate probability of HIT  - 9/19 platelet 76  - Heparin platelets negative, serotonin assay negative, platelets 148 on 9/24      PLAN  - c/w lovenox

## 2024-09-25 PROCEDURE — 99233 SBSQ HOSP IP/OBS HIGH 50: CPT | Mod: GC

## 2024-09-25 RX ORDER — KETOROLAC TROMETHAMINE 10 MG/1
15 TABLET, FILM COATED ORAL EVERY 6 HOURS
Refills: 0 | Status: DISCONTINUED | OUTPATIENT
Start: 2024-09-25 | End: 2024-09-28

## 2024-09-25 RX ORDER — MORPHINE SULFATE 30 MG/1
30 TABLET, FILM COATED, EXTENDED RELEASE ORAL
Refills: 0 | Status: DISCONTINUED | OUTPATIENT
Start: 2024-09-25 | End: 2024-10-02

## 2024-09-25 RX ORDER — MORPHINE SULFATE 30 MG/1
30 TABLET, FILM COATED, EXTENDED RELEASE ORAL
Refills: 0 | Status: DISCONTINUED | OUTPATIENT
Start: 2024-09-25 | End: 2024-09-26

## 2024-09-25 RX ADMIN — CHLORHEXIDINE GLUCONATE ORAL RINSE 1 APPLICATION(S): 1.2 SOLUTION DENTAL at 12:06

## 2024-09-25 RX ADMIN — KETOROLAC TROMETHAMINE 15 MILLIGRAM(S): 10 TABLET, FILM COATED ORAL at 05:33

## 2024-09-25 RX ADMIN — PENICILLIN V POTASSIUM 250 MILLIGRAM(S): 500 TABLET ORAL at 18:16

## 2024-09-25 RX ADMIN — KETOROLAC TROMETHAMINE 15 MILLIGRAM(S): 10 TABLET, FILM COATED ORAL at 01:36

## 2024-09-25 RX ADMIN — PANTOPRAZOLE SODIUM 40 MILLIGRAM(S): 40 TABLET, DELAYED RELEASE ORAL at 12:06

## 2024-09-25 RX ADMIN — KETOROLAC TROMETHAMINE 15 MILLIGRAM(S): 10 TABLET, FILM COATED ORAL at 12:02

## 2024-09-25 RX ADMIN — Medication 10 GRAM(S): at 05:33

## 2024-09-25 RX ADMIN — MORPHINE SULFATE 30 MILLIGRAM(S): 30 TABLET, FILM COATED, EXTENDED RELEASE ORAL at 05:33

## 2024-09-25 RX ADMIN — VOXELOTOR 1500 MILLIGRAM(S): 300 TABLET, FOR SUSPENSION ORAL at 12:00

## 2024-09-25 RX ADMIN — Medication 75 MILLILITER(S): at 16:46

## 2024-09-25 RX ADMIN — MORPHINE SULFATE 30 MILLIGRAM(S): 30 TABLET, FILM COATED, EXTENDED RELEASE ORAL at 18:16

## 2024-09-25 RX ADMIN — FOLIC ACID 1 MILLIGRAM(S): 1 TABLET ORAL at 12:00

## 2024-09-25 RX ADMIN — PENICILLIN V POTASSIUM 250 MILLIGRAM(S): 500 TABLET ORAL at 05:33

## 2024-09-25 RX ADMIN — KETOROLAC TROMETHAMINE 15 MILLIGRAM(S): 10 TABLET, FILM COATED ORAL at 01:00

## 2024-09-25 RX ADMIN — MORPHINE SULFATE 30 MILLILITER(S): 30 TABLET, FILM COATED, EXTENDED RELEASE ORAL at 08:26

## 2024-09-25 RX ADMIN — KETOROLAC TROMETHAMINE 15 MILLIGRAM(S): 10 TABLET, FILM COATED ORAL at 18:15

## 2024-09-25 RX ADMIN — Medication 10 GRAM(S): at 18:16

## 2024-09-25 RX ADMIN — MORPHINE SULFATE 30 MILLILITER(S): 30 TABLET, FILM COATED, EXTENDED RELEASE ORAL at 20:49

## 2024-09-25 RX ADMIN — MORPHINE SULFATE 30 MILLILITER(S): 30 TABLET, FILM COATED, EXTENDED RELEASE ORAL at 10:54

## 2024-09-25 RX ADMIN — ENOXAPARIN SODIUM 30 MILLIGRAM(S): 150 INJECTION SUBCUTANEOUS at 12:05

## 2024-09-25 NOTE — PROGRESS NOTE ADULT - PROBLEM SELECTOR PLAN 1
- follows with Dr. Montano, missed monthly dose of Adakveo for 2 months which may explain SCC  - low concern for acute chest given no opacities on CXR, chest pain more consistent with SCC  - transaminitis likely 2/2 to hepatopathy iso vasoocclusive crisis, no abdominal pain and LFTs improving, could otherwise consider RUQ US  - palliative deferred pain management to primary team   - s/p 1 U PRBC 9/23    A/P:  - Heme consults appreciated   - c/w 1/2 NS  - pain control w/ pca morphine pump 1.75, toradol 15mg q6hr x5 days w/ IV protonix 40 x5 days  - c/w 30 mg ERBID Morphine (home medication 15 mg ER BID, would like to go home on home dose when DC)  - C/w humidified O2 for pain  - bowel regimen: miralax BID, senna 2 MG PRN  - Hold home hydroxyurea (need to take home med)  - C/w oxbryta (need to take home med) and endari  - incentive spirometer  - will continue to monitor for signs of acute chest syndrome. If fever, culture and repeat CXR stat - follows with Dr. Montano, missed monthly dose of Adakveo for 2 months which may explain SCC  - low concern for acute chest given no opacities on CXR, chest pain more consistent with SCC  - transaminitis likely 2/2 to hepatopathy iso vasoocclusive crisis, no abdominal pain and LFTs improving, could otherwise consider RUQ US  - palliative deferred pain management to primary team   - s/p 1 U PRBC 9/23    A/P:  - Heme consults appreciated   - c/w 1/2 NS  - increase pain control w/ pca morphine pump 2.0 mg, toradol 15mg q6h w/ IV protonix 40 x5 days  - c/w 30 mg ERBID Morphine (home medication 15 mg ER BID, would like to go home on home dose when DC)  - C/w humidified O2 for pain  - bowel regimen: miralax BID, senna 2 MG PRN  - Hold home hydroxyurea (need to take home med)  - C/w oxbryta (need to take home med) and endari  - incentive spirometer  - will continue to monitor for signs of acute chest syndrome. If fever, culture and repeat CXR stat

## 2024-09-25 NOTE — PROGRESS NOTE ADULT - SUBJECTIVE AND OBJECTIVE BOX
*******************************  Joelle Skelton MD (PGY-1)  Internal Medicine  Contact via [employer] TEAMS  *******************************    DATE OF SERVICE: 09-25-24 @ 07:27    Patient is a 29y old  Male who presents with a chief complaint of sickle cell crisis (25 Sep 2024 07:16)      SUBJECTIVE / OVERNIGHT EVENTS:  Overnight, no acute events  Pt seen and examined at bedside.    ROS negative except as above.    MEDICATIONS  (STANDING):  chlorhexidine 2% Cloths 1 Application(s) Topical daily  enoxaparin Injectable 30 milliGRAM(s) SubCutaneous every 24 hours  folic acid 1 milliGRAM(s) Oral daily  glutamine Powder 10 Gram(s) Oral two times a day  ketorolac   Injectable 15 milliGRAM(s) IV Push every 6 hours  morphine ER Tablet 30 milliGRAM(s) Oral two times a day  morphine PCA (5 mG/mL) 30 milliLiter(s) PCA Continuous PCA Continuous  pantoprazole  Injectable 40 milliGRAM(s) IV Push daily  penicillin   milliGRAM(s) Oral two times a day  sodium chloride 0.45%. 1000 milliLiter(s) (75 mL/Hr) IV Continuous <Continuous>  voxelotor 1500 milliGRAM(s) Oral daily    MEDICATIONS  (PRN):  naloxone Injectable 0.1 milliGRAM(s) IV Push every 3 minutes PRN For ANY of the following changes in patient status:  A. RR LESS THAN 10 breaths per minute, B. Oxygen saturation LESS THAN 90%, C. Sedation score of 6  ondansetron Injectable 4 milliGRAM(s) IV Push every 6 hours PRN Nausea  polyethylene glycol 3350 17 Gram(s) Oral two times a day PRN Constipation  senna 2 Tablet(s) Oral at bedtime PRN Constipation      Vital Signs Last 24 Hrs  T(C): 36.5 (25 Sep 2024 05:58), Max: 37.1 (24 Sep 2024 21:29)  T(F): 97.7 (25 Sep 2024 05:58), Max: 98.8 (24 Sep 2024 21:29)  HR: 57 (25 Sep 2024 05:58) (57 - 81)  BP: 105/72 (25 Sep 2024 05:58) (100/69 - 121/75)  BP(mean): --  RR: 18 (25 Sep 2024 05:58) (16 - 18)  SpO2: 100% (25 Sep 2024 05:58) (98% - 100%)    Parameters below as of 25 Sep 2024 05:58  Patient On (Oxygen Delivery Method): room air      CAPILLARY BLOOD GLUCOSE        I&O's Summary    24 Sep 2024 07:01  -  25 Sep 2024 07:00  --------------------------------------------------------  IN: 2150 mL / OUT: 3000 mL / NET: -850 mL    PHYSICAL EXAM:  GENERAL: NAD, cachetic  HEAD:  Atraumatic, Normocephalic  EYES: EOMI, conjunctiva and sclera clear  NECK: Supple, No JVD  CHEST/LUNG: Clear to auscultation bilaterally; No wheeze  HEART: Regular rate and rhythm; No murmurs, rubs, or gallops  ABDOMEN: Soft, Nontender, Nondistended; Bowel sounds present  EXTREMITIES:  2+ Peripheral Pulses, No clubbing, cyanosis, or edema; left hip pain w/ palpation   NEUROLOGY: AOx3, non-focal  SKIN: No rashes or lesions      LABS:                  MICRO:      RADIOLOGY & ADDITIONAL TESTS:           *******************************  Joelle Skelton MD (PGY-1)  Internal Medicine  Contact via [employer] TEAMS  *******************************    DATE OF SERVICE: 09-25-24 @ 07:27    Patient is a 29y old  Male who presents with a chief complaint of sickle cell crisis (25 Sep 2024 07:16)      SUBJECTIVE / OVERNIGHT EVENTS:  Overnight, no acute events.  Pt seen and examined at bedside. States that he still has left hip and knee pain which is worse when waking up. Denies chest pain, SOB, cough, muscle weakness/numbness. Last BM was 2 days ago.     ROS negative except as above.    MEDICATIONS  (STANDING):  chlorhexidine 2% Cloths 1 Application(s) Topical daily  enoxaparin Injectable 30 milliGRAM(s) SubCutaneous every 24 hours  folic acid 1 milliGRAM(s) Oral daily  glutamine Powder 10 Gram(s) Oral two times a day  ketorolac   Injectable 15 milliGRAM(s) IV Push every 6 hours  morphine ER Tablet 30 milliGRAM(s) Oral two times a day  morphine PCA (5 mG/mL) 30 milliLiter(s) PCA Continuous PCA Continuous  pantoprazole  Injectable 40 milliGRAM(s) IV Push daily  penicillin   milliGRAM(s) Oral two times a day  sodium chloride 0.45%. 1000 milliLiter(s) (75 mL/Hr) IV Continuous <Continuous>  voxelotor 1500 milliGRAM(s) Oral daily    MEDICATIONS  (PRN):  naloxone Injectable 0.1 milliGRAM(s) IV Push every 3 minutes PRN For ANY of the following changes in patient status:  A. RR LESS THAN 10 breaths per minute, B. Oxygen saturation LESS THAN 90%, C. Sedation score of 6  ondansetron Injectable 4 milliGRAM(s) IV Push every 6 hours PRN Nausea  polyethylene glycol 3350 17 Gram(s) Oral two times a day PRN Constipation  senna 2 Tablet(s) Oral at bedtime PRN Constipation      Vital Signs Last 24 Hrs  T(C): 36.5 (25 Sep 2024 05:58), Max: 37.1 (24 Sep 2024 21:29)  T(F): 97.7 (25 Sep 2024 05:58), Max: 98.8 (24 Sep 2024 21:29)  HR: 57 (25 Sep 2024 05:58) (57 - 81)  BP: 105/72 (25 Sep 2024 05:58) (100/69 - 121/75)  BP(mean): --  RR: 18 (25 Sep 2024 05:58) (16 - 18)  SpO2: 100% (25 Sep 2024 05:58) (98% - 100%)    Parameters below as of 25 Sep 2024 05:58  Patient On (Oxygen Delivery Method): room air      CAPILLARY BLOOD GLUCOSE        I&O's Summary    24 Sep 2024 07:01  -  25 Sep 2024 07:00  --------------------------------------------------------  IN: 2150 mL / OUT: 3000 mL / NET: -850 mL    PHYSICAL EXAM:  GENERAL: NAD, cachetic  HEAD:  Atraumatic, Normocephalic  EYES: EOMI, conjunctiva and sclera clear  NECK: Supple, No JVD  CHEST/LUNG: Clear to auscultation bilaterally; No wheeze  HEART: Regular rate and rhythm; No murmurs, rubs, or gallops  ABDOMEN: Soft, Nontender, Nondistended; Bowel sounds present  EXTREMITIES:  2+ Peripheral Pulses, No clubbing, cyanosis, or edema;   NEUROLOGY: AOx3, non-focal  SKIN: No rashes or lesions      LABS:                  MICRO:      RADIOLOGY & ADDITIONAL TESTS:           *******************************  Joelle Skelton MD (PGY-1)  Internal Medicine  Contact via [employer] TEAMS  *******************************    DATE OF SERVICE: 09-25-24 @ 07:27    Patient is a 29y old  Male who presents with a chief complaint of sickle cell crisis (25 Sep 2024 07:16)      SUBJECTIVE / OVERNIGHT EVENTS:  Overnight, no acute events. PCA metrics show that patient received medication ~60 times and pressed ~155 times.   Pt seen and examined at bedside. States that he still has left hip and knee pain which is worse when waking up. Is amenable to increasing PCA pump to 2mg doses. Denies chest pain, SOB, cough, muscle weakness/numbness. Last BM was 2 days ago.     ROS negative except as above.    MEDICATIONS  (STANDING):  chlorhexidine 2% Cloths 1 Application(s) Topical daily  enoxaparin Injectable 30 milliGRAM(s) SubCutaneous every 24 hours  folic acid 1 milliGRAM(s) Oral daily  glutamine Powder 10 Gram(s) Oral two times a day  ketorolac   Injectable 15 milliGRAM(s) IV Push every 6 hours  morphine ER Tablet 30 milliGRAM(s) Oral two times a day  morphine PCA (5 mG/mL) 30 milliLiter(s) PCA Continuous PCA Continuous  pantoprazole  Injectable 40 milliGRAM(s) IV Push daily  penicillin   milliGRAM(s) Oral two times a day  sodium chloride 0.45%. 1000 milliLiter(s) (75 mL/Hr) IV Continuous <Continuous>  voxelotor 1500 milliGRAM(s) Oral daily    MEDICATIONS  (PRN):  naloxone Injectable 0.1 milliGRAM(s) IV Push every 3 minutes PRN For ANY of the following changes in patient status:  A. RR LESS THAN 10 breaths per minute, B. Oxygen saturation LESS THAN 90%, C. Sedation score of 6  ondansetron Injectable 4 milliGRAM(s) IV Push every 6 hours PRN Nausea  polyethylene glycol 3350 17 Gram(s) Oral two times a day PRN Constipation  senna 2 Tablet(s) Oral at bedtime PRN Constipation      Vital Signs Last 24 Hrs  T(C): 36.5 (25 Sep 2024 05:58), Max: 37.1 (24 Sep 2024 21:29)  T(F): 97.7 (25 Sep 2024 05:58), Max: 98.8 (24 Sep 2024 21:29)  HR: 57 (25 Sep 2024 05:58) (57 - 81)  BP: 105/72 (25 Sep 2024 05:58) (100/69 - 121/75)  BP(mean): --  RR: 18 (25 Sep 2024 05:58) (16 - 18)  SpO2: 100% (25 Sep 2024 05:58) (98% - 100%)    Parameters below as of 25 Sep 2024 05:58  Patient On (Oxygen Delivery Method): room air      CAPILLARY BLOOD GLUCOSE        I&O's Summary    24 Sep 2024 07:01  -  25 Sep 2024 07:00  --------------------------------------------------------  IN: 2150 mL / OUT: 3000 mL / NET: -850 mL    PHYSICAL EXAM:  GENERAL: NAD, cachetic  HEAD:  Atraumatic, Normocephalic  EYES: EOMI, conjunctiva and sclera clear  NECK: Supple, No JVD  CHEST/LUNG: Clear to auscultation bilaterally; No wheeze  HEART: Regular rate and rhythm; No murmurs, rubs, or gallops  ABDOMEN: Soft, Nontender, Nondistended; Bowel sounds present  EXTREMITIES:  2+ Peripheral Pulses, No clubbing, cyanosis, or edema; TTP left hip  NEUROLOGY: AOx3, non-focal  SKIN: No rashes or lesions      LABS:                  MICRO:      RADIOLOGY & ADDITIONAL TESTS:

## 2024-09-25 NOTE — PROGRESS NOTE ADULT - ATTENDING COMMENTS
Pt reports that he has improved substantially over the last week, but still has fairly reliable pain after falling asleep (and not being able to use the PCA). However, he finds that now he is able to "catch up" after waking fasting that earlier in this admission. He continues to decline any increase in long-acting medications. However, today he is amenable to increasing PCA demand to 2mg. Will monitor usage and response to this change.

## 2024-09-26 LAB
ANION GAP SERPL CALC-SCNC: 14 MMOL/L — SIGNIFICANT CHANGE UP (ref 7–14)
ANISOCYTOSIS BLD QL: SIGNIFICANT CHANGE UP
BASOPHILS # BLD AUTO: 0.11 K/UL — SIGNIFICANT CHANGE UP (ref 0–0.2)
BASOPHILS NFR BLD AUTO: 1.5 % — SIGNIFICANT CHANGE UP (ref 0–2)
BILIRUB DIRECT SERPL-MCNC: 0.8 MG/DL — HIGH (ref 0–0.3)
BILIRUB INDIRECT FLD-MCNC: 1 MG/DL — SIGNIFICANT CHANGE UP (ref 0–1)
BILIRUB SERPL-MCNC: 1.8 MG/DL — HIGH (ref 0.2–1.2)
BUN SERPL-MCNC: 6 MG/DL — LOW (ref 7–23)
CALCIUM SERPL-MCNC: 8.6 MG/DL — SIGNIFICANT CHANGE UP (ref 8.4–10.5)
CHLORIDE SERPL-SCNC: 102 MMOL/L — SIGNIFICANT CHANGE UP (ref 98–107)
CO2 SERPL-SCNC: 22 MMOL/L — SIGNIFICANT CHANGE UP (ref 22–31)
CREAT SERPL-MCNC: 0.31 MG/DL — LOW (ref 0.5–1.3)
EGFR: 165 ML/MIN/1.73M2 — SIGNIFICANT CHANGE UP
EOSINOPHIL # BLD AUTO: 0 K/UL — SIGNIFICANT CHANGE UP (ref 0–0.5)
EOSINOPHIL NFR BLD AUTO: 0 % — SIGNIFICANT CHANGE UP (ref 0–6)
GIANT PLATELETS BLD QL SMEAR: PRESENT — SIGNIFICANT CHANGE UP
GLUCOSE SERPL-MCNC: 56 MG/DL — LOW (ref 70–99)
HCT VFR BLD CALC: 27.8 % — LOW (ref 39–50)
HGB BLD-MCNC: 9 G/DL — LOW (ref 13–17)
HYPOCHROMIA BLD QL: SIGNIFICANT CHANGE UP
IANC: 3.81 K/UL — SIGNIFICANT CHANGE UP (ref 1.8–7.4)
LDH SERPL L TO P-CCNC: 641 U/L — HIGH (ref 135–225)
LYMPHOCYTES # BLD AUTO: 2.32 K/UL — SIGNIFICANT CHANGE UP (ref 1–3.3)
LYMPHOCYTES # BLD AUTO: 30.9 % — SIGNIFICANT CHANGE UP (ref 13–44)
MACROCYTES BLD QL: SIGNIFICANT CHANGE UP
MAGNESIUM SERPL-MCNC: 2 MG/DL — SIGNIFICANT CHANGE UP (ref 1.6–2.6)
MANUAL SMEAR VERIFICATION: SIGNIFICANT CHANGE UP
MCHC RBC-ENTMCNC: 25.3 PG — LOW (ref 27–34)
MCHC RBC-ENTMCNC: 32.4 GM/DL — SIGNIFICANT CHANGE UP (ref 32–36)
MCV RBC AUTO: 78.1 FL — LOW (ref 80–100)
MICROCYTES BLD QL: SLIGHT — SIGNIFICANT CHANGE UP
MONOCYTES # BLD AUTO: 0.22 K/UL — SIGNIFICANT CHANGE UP (ref 0–0.9)
MONOCYTES NFR BLD AUTO: 2.9 % — SIGNIFICANT CHANGE UP (ref 2–14)
NEUTROPHILS # BLD AUTO: 4.75 K/UL — SIGNIFICANT CHANGE UP (ref 1.8–7.4)
NEUTROPHILS NFR BLD AUTO: 61.7 % — SIGNIFICANT CHANGE UP (ref 43–77)
NEUTS BAND # BLD: 1.5 % — SIGNIFICANT CHANGE UP (ref 0–6)
NRBC # BLD: 365 /100 WBCS — HIGH (ref 0–0)
OVALOCYTES BLD QL SMEAR: SLIGHT — SIGNIFICANT CHANGE UP
PHOSPHATE SERPL-MCNC: 4.1 MG/DL — SIGNIFICANT CHANGE UP (ref 2.5–4.5)
PLAT MORPH BLD: NORMAL — SIGNIFICANT CHANGE UP
PLATELET # BLD AUTO: 288 K/UL — SIGNIFICANT CHANGE UP (ref 150–400)
PLATELET COUNT - ESTIMATE: NORMAL — SIGNIFICANT CHANGE UP
POIKILOCYTOSIS BLD QL AUTO: SIGNIFICANT CHANGE UP
POLYCHROMASIA BLD QL SMEAR: SLIGHT — SIGNIFICANT CHANGE UP
POTASSIUM SERPL-MCNC: 4.1 MMOL/L — SIGNIFICANT CHANGE UP (ref 3.5–5.3)
POTASSIUM SERPL-SCNC: 4.1 MMOL/L — SIGNIFICANT CHANGE UP (ref 3.5–5.3)
RBC # BLD: 3.56 M/UL — LOW (ref 4.2–5.8)
RBC # BLD: 3.56 M/UL — LOW (ref 4.2–5.8)
RBC # FLD: 26 % — HIGH (ref 10.3–14.5)
RBC BLD AUTO: ABNORMAL
RETICS #: SIGNIFICANT CHANGE UP (ref 25–125)
RETICS/RBC NFR: >22.2 % — HIGH (ref 0.5–2.5)
SCHISTOCYTES BLD QL AUTO: SLIGHT — SIGNIFICANT CHANGE UP
SICKLE CELLS BLD QL SMEAR: SLIGHT — SIGNIFICANT CHANGE UP
SMUDGE CELLS # BLD: PRESENT — SIGNIFICANT CHANGE UP
SODIUM SERPL-SCNC: 138 MMOL/L — SIGNIFICANT CHANGE UP (ref 135–145)
SPHEROCYTES BLD QL SMEAR: SLIGHT — SIGNIFICANT CHANGE UP
TARGETS BLD QL SMEAR: SIGNIFICANT CHANGE UP
VARIANT LYMPHS # BLD: 1.5 % — SIGNIFICANT CHANGE UP (ref 0–6)
WBC # BLD: 7.51 K/UL — SIGNIFICANT CHANGE UP (ref 3.8–10.5)
WBC # FLD AUTO: 7.51 K/UL — SIGNIFICANT CHANGE UP (ref 3.8–10.5)

## 2024-09-26 PROCEDURE — 99233 SBSQ HOSP IP/OBS HIGH 50: CPT | Mod: GC

## 2024-09-26 RX ORDER — MORPHINE SULFATE 30 MG/1
30 TABLET, FILM COATED, EXTENDED RELEASE ORAL
Refills: 0 | Status: DISCONTINUED | OUTPATIENT
Start: 2024-09-26 | End: 2024-09-30

## 2024-09-26 RX ADMIN — Medication 4 MILLIGRAM(S): at 06:49

## 2024-09-26 RX ADMIN — Medication 4 MILLIGRAM(S): at 00:42

## 2024-09-26 RX ADMIN — MORPHINE SULFATE 30 MILLILITER(S): 30 TABLET, FILM COATED, EXTENDED RELEASE ORAL at 08:23

## 2024-09-26 RX ADMIN — PENICILLIN V POTASSIUM 250 MILLIGRAM(S): 500 TABLET ORAL at 05:14

## 2024-09-26 RX ADMIN — MORPHINE SULFATE 30 MILLIGRAM(S): 30 TABLET, FILM COATED, EXTENDED RELEASE ORAL at 17:29

## 2024-09-26 RX ADMIN — KETOROLAC TROMETHAMINE 15 MILLIGRAM(S): 10 TABLET, FILM COATED ORAL at 11:41

## 2024-09-26 RX ADMIN — Medication 10 GRAM(S): at 18:43

## 2024-09-26 RX ADMIN — PANTOPRAZOLE SODIUM 40 MILLIGRAM(S): 40 TABLET, DELAYED RELEASE ORAL at 11:40

## 2024-09-26 RX ADMIN — Medication 75 MILLILITER(S): at 05:24

## 2024-09-26 RX ADMIN — ENOXAPARIN SODIUM 30 MILLIGRAM(S): 150 INJECTION SUBCUTANEOUS at 11:41

## 2024-09-26 RX ADMIN — Medication 75 MILLILITER(S): at 17:30

## 2024-09-26 RX ADMIN — KETOROLAC TROMETHAMINE 15 MILLIGRAM(S): 10 TABLET, FILM COATED ORAL at 05:14

## 2024-09-26 RX ADMIN — KETOROLAC TROMETHAMINE 15 MILLIGRAM(S): 10 TABLET, FILM COATED ORAL at 17:30

## 2024-09-26 RX ADMIN — VOXELOTOR 1500 MILLIGRAM(S): 300 TABLET, FOR SUSPENSION ORAL at 11:43

## 2024-09-26 RX ADMIN — KETOROLAC TROMETHAMINE 15 MILLIGRAM(S): 10 TABLET, FILM COATED ORAL at 00:42

## 2024-09-26 RX ADMIN — MORPHINE SULFATE 30 MILLIGRAM(S): 30 TABLET, FILM COATED, EXTENDED RELEASE ORAL at 05:14

## 2024-09-26 RX ADMIN — KETOROLAC TROMETHAMINE 15 MILLIGRAM(S): 10 TABLET, FILM COATED ORAL at 00:54

## 2024-09-26 RX ADMIN — MORPHINE SULFATE 30 MILLILITER(S): 30 TABLET, FILM COATED, EXTENDED RELEASE ORAL at 20:10

## 2024-09-26 RX ADMIN — MORPHINE SULFATE 30 MILLILITER(S): 30 TABLET, FILM COATED, EXTENDED RELEASE ORAL at 11:12

## 2024-09-26 RX ADMIN — PENICILLIN V POTASSIUM 250 MILLIGRAM(S): 500 TABLET ORAL at 17:29

## 2024-09-26 RX ADMIN — MORPHINE SULFATE 30 MILLIGRAM(S): 30 TABLET, FILM COATED, EXTENDED RELEASE ORAL at 18:01

## 2024-09-26 RX ADMIN — FOLIC ACID 1 MILLIGRAM(S): 1 TABLET ORAL at 11:40

## 2024-09-26 RX ADMIN — Medication 10 GRAM(S): at 05:24

## 2024-09-26 RX ADMIN — KETOROLAC TROMETHAMINE 15 MILLIGRAM(S): 10 TABLET, FILM COATED ORAL at 18:02

## 2024-09-26 RX ADMIN — KETOROLAC TROMETHAMINE 15 MILLIGRAM(S): 10 TABLET, FILM COATED ORAL at 12:15

## 2024-09-26 RX ADMIN — CHLORHEXIDINE GLUCONATE ORAL RINSE 1 APPLICATION(S): 1.2 SOLUTION DENTAL at 11:40

## 2024-09-26 NOTE — PROGRESS NOTE ADULT - SUBJECTIVE AND OBJECTIVE BOX
*******************************  Joelle Skelton MD (PGY-1)  Internal Medicine  Contact via [employer] TE[employer]  *******************************    DATE OF SERVICE: 09-26-24 @ 07:10    Patient is a 29y old  Male who presents with a chief complaint of sickle cell crisis (25 Sep 2024 07:16)      SUBJECTIVE / OVERNIGHT EVENTS:  Overnight,  Pt seen and examined at bedside.    ROS negative except as above.    MEDICATIONS  (STANDING):  chlorhexidine 2% Cloths 1 Application(s) Topical daily  enoxaparin Injectable 30 milliGRAM(s) SubCutaneous every 24 hours  folic acid 1 milliGRAM(s) Oral daily  glutamine Powder 10 Gram(s) Oral two times a day  ketorolac   Injectable 15 milliGRAM(s) IV Push every 6 hours  morphine ER Tablet 30 milliGRAM(s) Oral two times a day  morphine PCA (5 mG/mL) 30 milliLiter(s) PCA Continuous PCA Continuous  pantoprazole  Injectable 40 milliGRAM(s) IV Push daily  penicillin   milliGRAM(s) Oral two times a day  sodium chloride 0.45%. 1000 milliLiter(s) (75 mL/Hr) IV Continuous <Continuous>  voxelotor 1500 milliGRAM(s) Oral daily    MEDICATIONS  (PRN):  naloxone Injectable 0.1 milliGRAM(s) IV Push every 3 minutes PRN For ANY of the following changes in patient status:  A. RR LESS THAN 10 breaths per minute, B. Oxygen saturation LESS THAN 90%, C. Sedation score of 6  ondansetron Injectable 4 milliGRAM(s) IV Push every 6 hours PRN Nausea  polyethylene glycol 3350 17 Gram(s) Oral two times a day PRN Constipation  senna 2 Tablet(s) Oral at bedtime PRN Constipation      Vital Signs Last 24 Hrs  T(C): 36.4 (26 Sep 2024 05:30), Max: 36.7 (25 Sep 2024 08:00)  T(F): 97.6 (26 Sep 2024 05:30), Max: 98 (25 Sep 2024 08:00)  HR: 63 (26 Sep 2024 05:30) (59 - 75)  BP: 112/75 (26 Sep 2024 05:30) (98/63 - 118/70)  BP(mean): --  RR: 16 (26 Sep 2024 05:30) (16 - 18)  SpO2: 100% (26 Sep 2024 05:30) (97% - 100%)    Parameters below as of 26 Sep 2024 05:30  Patient On (Oxygen Delivery Method): room air      CAPILLARY BLOOD GLUCOSE        I&O's Summary    25 Sep 2024 07:01  -  26 Sep 2024 07:00  --------------------------------------------------------  IN: 600 mL / OUT: 1300 mL / NET: -700 mL        PHYSICAL EXAM:  GENERAL: NAD, well-developed  HEAD:  Atraumatic, Normocephalic  EYES: EOMI, conjunctiva and sclera clear  NECK: Supple, No JVD  CHEST/LUNG: Clear to auscultation bilaterally; No wheeze  HEART: Regular rate and rhythm; No murmurs, rubs, or gallops  ABDOMEN: Soft, Nontender, Nondistended; Bowel sounds present  EXTREMITIES:  2+ Peripheral Pulses, No clubbing, cyanosis, or edema  NEUROLOGY: AOx3, non-focal  SKIN: No rashes or lesions    LABS:                  MICRO:      RADIOLOGY & ADDITIONAL TESTS:           *******************************  Joelle Skelton MD (PGY-1)  Internal Medicine  Contact via [employer] TEAMS  *******************************    DATE OF SERVICE: 09-26-24 @ 07:10    Patient is a 29y old  Male who presents with a chief complaint of sickle cell crisis (25 Sep 2024 07:16)      SUBJECTIVE / OVERNIGHT EVENTS:  Overnight, patient states that he has been feeling nauseous and requests for PCA pump dose be decreased. Otherwise still endorsing pain in joints. Denies SOB, chest pain, cough, abdominal pain.     ROS negative except as above.    MEDICATIONS  (STANDING):  chlorhexidine 2% Cloths 1 Application(s) Topical daily  enoxaparin Injectable 30 milliGRAM(s) SubCutaneous every 24 hours  folic acid 1 milliGRAM(s) Oral daily  glutamine Powder 10 Gram(s) Oral two times a day  ketorolac   Injectable 15 milliGRAM(s) IV Push every 6 hours  morphine ER Tablet 30 milliGRAM(s) Oral two times a day  morphine PCA (5 mG/mL) 30 milliLiter(s) PCA Continuous PCA Continuous  pantoprazole  Injectable 40 milliGRAM(s) IV Push daily  penicillin   milliGRAM(s) Oral two times a day  sodium chloride 0.45%. 1000 milliLiter(s) (75 mL/Hr) IV Continuous <Continuous>  voxelotor 1500 milliGRAM(s) Oral daily    MEDICATIONS  (PRN):  naloxone Injectable 0.1 milliGRAM(s) IV Push every 3 minutes PRN For ANY of the following changes in patient status:  A. RR LESS THAN 10 breaths per minute, B. Oxygen saturation LESS THAN 90%, C. Sedation score of 6  ondansetron Injectable 4 milliGRAM(s) IV Push every 6 hours PRN Nausea  polyethylene glycol 3350 17 Gram(s) Oral two times a day PRN Constipation  senna 2 Tablet(s) Oral at bedtime PRN Constipation      Vital Signs Last 24 Hrs  T(C): 36.4 (26 Sep 2024 05:30), Max: 36.7 (25 Sep 2024 08:00)  T(F): 97.6 (26 Sep 2024 05:30), Max: 98 (25 Sep 2024 08:00)  HR: 63 (26 Sep 2024 05:30) (59 - 75)  BP: 112/75 (26 Sep 2024 05:30) (98/63 - 118/70)  BP(mean): --  RR: 16 (26 Sep 2024 05:30) (16 - 18)  SpO2: 100% (26 Sep 2024 05:30) (97% - 100%)    Parameters below as of 26 Sep 2024 05:30  Patient On (Oxygen Delivery Method): room air      CAPILLARY BLOOD GLUCOSE        I&O's Summary    25 Sep 2024 07:01  -  26 Sep 2024 07:00  --------------------------------------------------------  IN: 600 mL / OUT: 1300 mL / NET: -700 mL        PHYSICAL EXAM:  GENERAL: NAD, cachetic   HEAD:  Atraumatic, Normocephalic  EYES: EOMI, conjunctiva and sclera clear  NECK: Supple, No JVD  CHEST/LUNG: Clear to auscultation bilaterally; No wheeze  HEART: Regular rate and rhythm; No murmurs, rubs, or gallops  ABDOMEN: Soft, Nontender, Nondistended; Bowel sounds present  EXTREMITIES:  2+ Peripheral Pulses, No clubbing, cyanosis, or edema; TTP on knees and hips  NEUROLOGY: AOx3, non-focal  SKIN: No rashes or lesions    LABS:                  MICRO:      RADIOLOGY & ADDITIONAL TESTS:

## 2024-09-26 NOTE — CHART NOTE - NSCHARTNOTEFT_GEN_A_CORE
Pt reports that medication oxbryta was recalled. Verfied and medication held for now. Will consult [facility] tomorrow.

## 2024-09-26 NOTE — PROGRESS NOTE ADULT - PROBLEM SELECTOR PLAN 1
- follows with Dr. Montano, missed monthly dose of Adakveo for 2 months which may explain SCC  - low concern for acute chest given no opacities on CXR, chest pain more consistent with SCC  - transaminitis likely 2/2 to hepatopathy iso vasoocclusive crisis, no abdominal pain and LFTs improving, could otherwise consider RUQ US  - palliative deferred pain management to primary team   - s/p 1 U PRBC 9/23    A/P:  - Heme consults appreciated   - c/w 1/2 NS  - increase pain control w/ pca morphine pump 2.0 mg, toradol 15mg q6h w/ IV protonix 40 x5 days  - c/w 30 mg ERBID Morphine (home medication 15 mg ER BID, would like to go home on home dose when DC)  - C/w humidified O2 for pain  - bowel regimen: miralax BID, senna 2 MG PRN  - Hold home hydroxyurea (need to take home med)  - C/w oxbryta (need to take home med) and endari  - incentive spirometer  - will continue to monitor for signs of acute chest syndrome. If fever, culture and repeat CXR stat - follows with Dr. Montano, missed monthly dose of Adakveo for 2 months which may explain SCC  - low concern for acute chest given no opacities on CXR, chest pain more consistent with SCC  - transaminitis likely 2/2 to hepatopathy iso vasoocclusive crisis, no abdominal pain and LFTs improving, could otherwise consider RUQ US  - palliative deferred pain management to primary team   - s/p 1 U PRBC 9/23    A/P:  - Heme consults appreciated   - c/w 1/2 NS  - decrease PCA pump to 1.75 mg, toradol 15mg q6h w/ IV protonix 40 x5 days  - c/w 30 mg ERBID Morphine (home medication 15 mg ER BID, would like to go home on home dose when DC)  - C/w humidified O2 for pain  - bowel regimen: miralax BID, senna 2 MG PRN  - Hold home hydroxyurea (need to take home med)  - C/w oxbryta (need to take home med) and endari  - incentive spirometer  - will continue to monitor for signs of acute chest syndrome. If fever, culture and repeat CXR stat

## 2024-09-26 NOTE — PROGRESS NOTE ADULT - ATTENDING COMMENTS
Pt had some nausea and headache yesterday. Persists today but less severe, and pt is tolerating diet. Pt agrees this may be related to increased dose of morphine yesterday. Will decrease back to demand dose of 1.75mg q6min as before. Monitor CBC and hemolysis indices.

## 2024-09-26 NOTE — PROGRESS NOTE ADULT - PROBLEM SELECTOR PLAN 2
- platelet count 9/18: 95, unclear baseline as pt admitted w/ thrombocytosis. Likely ~170  - 4 T score: 4 points for intermediate probability of HIT  - 9/19 platelet 76  - Heparin platelets negative, serotonin assay negative, platelets 148 on 9/24, unlikely to be HIT       PLAN  - c/w lovenox

## 2024-09-27 PROCEDURE — 99233 SBSQ HOSP IP/OBS HIGH 50: CPT | Mod: GC

## 2024-09-27 RX ADMIN — KETOROLAC TROMETHAMINE 15 MILLIGRAM(S): 10 TABLET, FILM COATED ORAL at 07:46

## 2024-09-27 RX ADMIN — MORPHINE SULFATE 30 MILLIGRAM(S): 30 TABLET, FILM COATED, EXTENDED RELEASE ORAL at 18:37

## 2024-09-27 RX ADMIN — KETOROLAC TROMETHAMINE 15 MILLIGRAM(S): 10 TABLET, FILM COATED ORAL at 18:05

## 2024-09-27 RX ADMIN — MORPHINE SULFATE 30 MILLILITER(S): 30 TABLET, FILM COATED, EXTENDED RELEASE ORAL at 20:07

## 2024-09-27 RX ADMIN — PENICILLIN V POTASSIUM 250 MILLIGRAM(S): 500 TABLET ORAL at 06:46

## 2024-09-27 RX ADMIN — KETOROLAC TROMETHAMINE 15 MILLIGRAM(S): 10 TABLET, FILM COATED ORAL at 06:46

## 2024-09-27 RX ADMIN — Medication 10 GRAM(S): at 17:05

## 2024-09-27 RX ADMIN — CHLORHEXIDINE GLUCONATE ORAL RINSE 1 APPLICATION(S): 1.2 SOLUTION DENTAL at 11:27

## 2024-09-27 RX ADMIN — Medication 10 GRAM(S): at 08:36

## 2024-09-27 RX ADMIN — KETOROLAC TROMETHAMINE 15 MILLIGRAM(S): 10 TABLET, FILM COATED ORAL at 17:05

## 2024-09-27 RX ADMIN — PENICILLIN V POTASSIUM 250 MILLIGRAM(S): 500 TABLET ORAL at 17:05

## 2024-09-27 RX ADMIN — Medication 75 MILLILITER(S): at 07:15

## 2024-09-27 RX ADMIN — KETOROLAC TROMETHAMINE 15 MILLIGRAM(S): 10 TABLET, FILM COATED ORAL at 12:27

## 2024-09-27 RX ADMIN — KETOROLAC TROMETHAMINE 15 MILLIGRAM(S): 10 TABLET, FILM COATED ORAL at 01:36

## 2024-09-27 RX ADMIN — KETOROLAC TROMETHAMINE 15 MILLIGRAM(S): 10 TABLET, FILM COATED ORAL at 11:27

## 2024-09-27 RX ADMIN — MORPHINE SULFATE 30 MILLIGRAM(S): 30 TABLET, FILM COATED, EXTENDED RELEASE ORAL at 08:37

## 2024-09-27 RX ADMIN — KETOROLAC TROMETHAMINE 15 MILLIGRAM(S): 10 TABLET, FILM COATED ORAL at 02:36

## 2024-09-27 RX ADMIN — MORPHINE SULFATE 30 MILLILITER(S): 30 TABLET, FILM COATED, EXTENDED RELEASE ORAL at 08:07

## 2024-09-27 RX ADMIN — ENOXAPARIN SODIUM 30 MILLIGRAM(S): 150 INJECTION SUBCUTANEOUS at 11:27

## 2024-09-27 RX ADMIN — MORPHINE SULFATE 30 MILLILITER(S): 30 TABLET, FILM COATED, EXTENDED RELEASE ORAL at 00:49

## 2024-09-27 RX ADMIN — FOLIC ACID 1 MILLIGRAM(S): 1 TABLET ORAL at 11:27

## 2024-09-27 RX ADMIN — MORPHINE SULFATE 30 MILLIGRAM(S): 30 TABLET, FILM COATED, EXTENDED RELEASE ORAL at 09:37

## 2024-09-27 RX ADMIN — MORPHINE SULFATE 30 MILLIGRAM(S): 30 TABLET, FILM COATED, EXTENDED RELEASE ORAL at 19:37

## 2024-09-27 NOTE — PROGRESS NOTE ADULT - SUBJECTIVE AND OBJECTIVE BOX
*******************************  Joelle Skelton MD (PGY-1)  Internal Medicine  Contact via [employer] TEAMS  *******************************      ANTONI NUNEZ  29y  MRN: [id number]    Patient is a 29y old  Male who presents with a chief complaint of sickle cell crisis (26 Sep 2024 07:08)      Interval/Overnight Events: Pt reported that medication oxybryta was recalled. Patient medication held, will consult heme today. Otherwise, no acute events overnight.      Subjective: Pt seen and examined at bedside. Denies fever, CP, SOB, abn pain, N/V, dysuria. Tolerating diet.      MEDICATIONS  (STANDING):  chlorhexidine 2% Cloths 1 Application(s) Topical daily  enoxaparin Injectable 30 milliGRAM(s) SubCutaneous every 24 hours  folic acid 1 milliGRAM(s) Oral daily  glutamine Powder 10 Gram(s) Oral two times a day  ketorolac   Injectable 15 milliGRAM(s) IV Push every 6 hours  morphine ER Tablet 30 milliGRAM(s) Oral two times a day  morphine PCA (5 mG/mL) 30 milliLiter(s) PCA Continuous PCA Continuous  penicillin   milliGRAM(s) Oral two times a day  sodium chloride 0.45%. 1000 milliLiter(s) (75 mL/Hr) IV Continuous <Continuous>    MEDICATIONS  (PRN):  naloxone Injectable 0.1 milliGRAM(s) IV Push every 3 minutes PRN For ANY of the following changes in patient status:  A. RR LESS THAN 10 breaths per minute, B. Oxygen saturation LESS THAN 90%, C. Sedation score of 6  ondansetron Injectable 4 milliGRAM(s) IV Push every 6 hours PRN Nausea  polyethylene glycol 3350 17 Gram(s) Oral two times a day PRN Constipation  senna 2 Tablet(s) Oral at bedtime PRN Constipation      Objective:    Vitals: Vital Signs Last 24 Hrs  T(C): 36.9 (09-27-24 @ 02:00), Max: 36.9 (09-27-24 @ 02:00)  T(F): 98.5 (09-27-24 @ 02:00), Max: 98.5 (09-27-24 @ 02:00)  HR: 77 (09-27-24 @ 02:00) (74 - 83)  BP: 118/78 (09-27-24 @ 02:00) (102/68 - 118/78)  BP(mean): --  RR: 18 (09-27-24 @ 02:00) (17 - 18)  SpO2: 96% (09-27-24 @ 02:00) (96% - 100%)                I&O's Summary    26 Sep 2024 07:01  -  27 Sep 2024 07:00  --------------------------------------------------------  IN: 500 mL / OUT: 400 mL / NET: 100 mL        PHYSICAL EXAM:  GENERAL: NAD, cachetic   HEAD:  Atraumatic, Normocephalic  EYES: EOMI, conjunctiva and sclera clear  CHEST/LUNG: Clear to auscultation bilaterally; No rales, rhonchi, wheezing, or rubs  HEART: Regular rate and rhythm; No murmurs, rubs, or gallops  ABDOMEN: Soft, Nontender, Nondistended;   SKIN: No rashes or lesions  NERVOUS SYSTEM:  Alert & Oriented X3, no focal deficits    LABS:                        9.0    7.51  )-----------( 288      ( 26 Sep 2024 05:15 )             27.8     09-26    138  |  102  |  6[L]  ----------------------------<  56[L]  4.1   |  22  |  0.31[L]    Ca    8.6      26 Sep 2024 05:15  Phos  4.1     09-26  Mg     2.00     09-26    TPro  x   /  Alb  x   /  TBili  1.8[H]  /  DBili  0.8[H]  /  AST  x   /  ALT  x   /  AlkPhos  x   09-26    CAPILLARY BLOOD GLUCOSE            Urinalysis Basic - ( 26 Sep 2024 05:15 )    Color: x / Appearance: x / SG: x / pH: x  Gluc: 56 mg/dL / Ketone: x  / Bili: x / Urobili: x   Blood: x / Protein: x / Nitrite: x   Leuk Esterase: x / RBC: x / WBC x   Sq Epi: x / Non Sq Epi: x / Bacteria: x          RADIOLOGY & ADDITIONAL TESTS:         *******************************  Joelle Skelton MD (PGY-1)  Internal Medicine  Contact via [employer] TEAMS  *******************************      ANTONI NUNEZ  29y  MRN: [id number]    Patient is a 29y old  Male who presents with a chief complaint of sickle cell crisis (26 Sep 2024 07:08)      Interval/Overnight Events: Pt reported that medication oxybryta was recalled. Patient medication held, will consult heme today. Otherwise, no acute events overnight.      Subjective: Pt seen and examined at bedside. States that pain is better today, at max it will be a 7/10 rather than a 8/10. Denies new joint pain, muscle weakness, fever, CP, SOB, abn pain, N/V, dysuria. Tolerating diet. States that last BM was this AM.     MEDICATIONS  (STANDING):  chlorhexidine 2% Cloths 1 Application(s) Topical daily  enoxaparin Injectable 30 milliGRAM(s) SubCutaneous every 24 hours  folic acid 1 milliGRAM(s) Oral daily  glutamine Powder 10 Gram(s) Oral two times a day  ketorolac   Injectable 15 milliGRAM(s) IV Push every 6 hours  morphine ER Tablet 30 milliGRAM(s) Oral two times a day  morphine PCA (5 mG/mL) 30 milliLiter(s) PCA Continuous PCA Continuous  penicillin   milliGRAM(s) Oral two times a day  sodium chloride 0.45%. 1000 milliLiter(s) (75 mL/Hr) IV Continuous <Continuous>    MEDICATIONS  (PRN):  naloxone Injectable 0.1 milliGRAM(s) IV Push every 3 minutes PRN For ANY of the following changes in patient status:  A. RR LESS THAN 10 breaths per minute, B. Oxygen saturation LESS THAN 90%, C. Sedation score of 6  ondansetron Injectable 4 milliGRAM(s) IV Push every 6 hours PRN Nausea  polyethylene glycol 3350 17 Gram(s) Oral two times a day PRN Constipation  senna 2 Tablet(s) Oral at bedtime PRN Constipation      Objective:    Vitals: Vital Signs Last 24 Hrs  T(C): 36.9 (09-27-24 @ 02:00), Max: 36.9 (09-27-24 @ 02:00)  T(F): 98.5 (09-27-24 @ 02:00), Max: 98.5 (09-27-24 @ 02:00)  HR: 77 (09-27-24 @ 02:00) (74 - 83)  BP: 118/78 (09-27-24 @ 02:00) (102/68 - 118/78)  BP(mean): --  RR: 18 (09-27-24 @ 02:00) (17 - 18)  SpO2: 96% (09-27-24 @ 02:00) (96% - 100%)                I&O's Summary    26 Sep 2024 07:01  -  27 Sep 2024 07:00  --------------------------------------------------------  IN: 500 mL / OUT: 400 mL / NET: 100 mL        PHYSICAL EXAM:  GENERAL: NAD, cachetic   HEAD:  Atraumatic, Normocephalic  EYES: EOMI, conjunctiva and sclera clear  CHEST/LUNG: Clear to auscultation bilaterally; No rales, rhonchi, wheezing, or rubs  HEART: Regular rate and rhythm; No murmurs, rubs, or gallops  ABDOMEN: Soft, Nontender, Nondistended;   SKIN: No rashes or lesions  NERVOUS SYSTEM:  Alert & Oriented X3, no focal deficits    LABS:                        9.0    7.51  )-----------( 288      ( 26 Sep 2024 05:15 )             27.8     09-26    138  |  102  |  6[L]  ----------------------------<  56[L]  4.1   |  22  |  0.31[L]    Ca    8.6      26 Sep 2024 05:15  Phos  4.1     09-26  Mg     2.00     09-26    TPro  x   /  Alb  x   /  TBili  1.8[H]  /  DBili  0.8[H]  /  AST  x   /  ALT  x   /  AlkPhos  x   09-26    CAPILLARY BLOOD GLUCOSE            Urinalysis Basic - ( 26 Sep 2024 05:15 )    Color: x / Appearance: x / SG: x / pH: x  Gluc: 56 mg/dL / Ketone: x  / Bili: x / Urobili: x   Blood: x / Protein: x / Nitrite: x   Leuk Esterase: x / RBC: x / WBC x   Sq Epi: x / Non Sq Epi: x / Bacteria: x          RADIOLOGY & ADDITIONAL TESTS:

## 2024-09-27 NOTE — PROGRESS NOTE ADULT - PROBLEM SELECTOR PLAN 1
- follows with Dr. Montano, missed monthly dose of Adakveo for 2 months which may explain SCC  - low concern for acute chest given no opacities on CXR, chest pain more consistent with SCC  - transaminitis likely 2/2 to hepatopathy iso vasoocclusive crisis, no abdominal pain and LFTs improving, could otherwise consider RUQ US  - palliative deferred pain management to primary team   - s/p 1 U PRBC 9/23    A/P:  - Heme consults appreciated - will reach out regarding oxbryta recall   - c/w 1/2 NS  - decrease PCA pump to 1.75 mg, toradol 15mg q6h w/ IV protonix 40 x5 days  - c/w 30 mg ERBID Morphine (home medication 15 mg ER BID, would like to go home on home dose when DC)  - C/w humidified O2 for pain  - bowel regimen: miralax BID, senna 2 MG PRN  - Hold home hydroxyurea (need to take home med)  - C/w oxbryta (need to take home med) and endari  - incentive spirometer  - will continue to monitor for signs of acute chest syndrome. If fever, culture and repeat CXR stat - follows with Dr. Montano, missed monthly dose of Adakveo for 2 months which may explain SCC  - low concern for acute chest given no opacities on CXR, chest pain more consistent with SCC  - transaminitis likely 2/2 to hepatopathy iso vasoocclusive crisis, no abdominal pain and LFTs improving, could otherwise consider RUQ US  - palliative deferred pain management to primary team   - s/p 1 U PRBC 9/23  - pain has little improvement and patient consistently reports worse pain when sleeping: will consider switching to long-acting PO medication     A/P:  - Heme consults appreciated  - c/w 1/2 NS  - decrease PCA pump to 1.75 mg, toradol 15mg q6h w/ IV protonix 40 for x2 days   - c/w 30 mg ERBID Morphine (home medication 15 mg ER BID, would like to go home on home dose when DC)  - C/w humidified O2 for pain  - bowel regimen: miralax BID, senna 2 MG PRN  - Hold home hydroxyurea (need to take home med)  - d/c oxbryta iso of recall   - incentive spirometer  - will continue to monitor for signs of acute chest syndrome. If fever, culture and repeat CXR stat

## 2024-09-27 NOTE — PROGRESS NOTE ADULT - ATTENDING COMMENTS
Pt notes slow, steady improvement in pain crisis. PCA use has decreased somewhat over the last several days. Pt's oxbryta has been discontinued after news today that [employer] has recalled the drug for its potential to increase VOC. Hematology contacted and in agreement. Will continue PCA, morphine ER, and toradol (monitor BMP). Repeat CBC tomorrow -- last hgb was > 9 so doubt need for additional PRBCs. Pt in agreement with plan.

## 2024-09-28 LAB
BASOPHILS # BLD AUTO: 0.05 K/UL — SIGNIFICANT CHANGE UP (ref 0–0.2)
BASOPHILS NFR BLD AUTO: 0.6 % — SIGNIFICANT CHANGE UP (ref 0–2)
EOSINOPHIL # BLD AUTO: 0.03 K/UL — SIGNIFICANT CHANGE UP (ref 0–0.5)
EOSINOPHIL NFR BLD AUTO: 0.4 % — SIGNIFICANT CHANGE UP (ref 0–6)
HCT VFR BLD CALC: 26.2 % — LOW (ref 39–50)
HGB BLD-MCNC: 8.6 G/DL — LOW (ref 13–17)
IANC: 2.98 K/UL — SIGNIFICANT CHANGE UP (ref 1.8–7.4)
IMM GRANULOCYTES NFR BLD AUTO: 0.8 % — SIGNIFICANT CHANGE UP (ref 0–0.9)
LDH SERPL L TO P-CCNC: 629 U/L — HIGH (ref 135–225)
LYMPHOCYTES # BLD AUTO: 4.83 K/UL — HIGH (ref 1–3.3)
LYMPHOCYTES # BLD AUTO: 56.4 % — HIGH (ref 13–44)
MCHC RBC-ENTMCNC: 25.4 PG — LOW (ref 27–34)
MCHC RBC-ENTMCNC: 32.8 GM/DL — SIGNIFICANT CHANGE UP (ref 32–36)
MCV RBC AUTO: 77.3 FL — LOW (ref 80–100)
MONOCYTES # BLD AUTO: 0.6 K/UL — SIGNIFICANT CHANGE UP (ref 0–0.9)
MONOCYTES NFR BLD AUTO: 7 % — SIGNIFICANT CHANGE UP (ref 2–14)
NEUTROPHILS # BLD AUTO: 2.98 K/UL — SIGNIFICANT CHANGE UP (ref 1.8–7.4)
NEUTROPHILS NFR BLD AUTO: 34.8 % — LOW (ref 43–77)
NRBC # BLD: 6 /100 WBCS — HIGH (ref 0–0)
NRBC # FLD: 0.55 K/UL — HIGH (ref 0–0)
PLATELET # BLD AUTO: 388 K/UL — SIGNIFICANT CHANGE UP (ref 150–400)
RBC # BLD: 3.39 M/UL — LOW (ref 4.2–5.8)
RBC # BLD: 3.39 M/UL — LOW (ref 4.2–5.8)
RBC # FLD: 24.9 % — HIGH (ref 10.3–14.5)
RETICS #: SIGNIFICANT CHANGE UP (ref 25–125)
RETICS/RBC NFR: >22.2 % — HIGH (ref 0.5–2.5)
WBC # BLD: 8.56 K/UL — SIGNIFICANT CHANGE UP (ref 3.8–10.5)
WBC # FLD AUTO: 8.56 K/UL — SIGNIFICANT CHANGE UP (ref 3.8–10.5)

## 2024-09-28 PROCEDURE — 99232 SBSQ HOSP IP/OBS MODERATE 35: CPT | Mod: GC

## 2024-09-28 RX ORDER — KETOROLAC TROMETHAMINE 10 MG/1
15 TABLET, FILM COATED ORAL EVERY 6 HOURS
Refills: 0 | Status: DISCONTINUED | OUTPATIENT
Start: 2024-09-28 | End: 2024-10-03

## 2024-09-28 RX ADMIN — Medication 10 GRAM(S): at 17:02

## 2024-09-28 RX ADMIN — KETOROLAC TROMETHAMINE 15 MILLIGRAM(S): 10 TABLET, FILM COATED ORAL at 01:00

## 2024-09-28 RX ADMIN — Medication 75 MILLILITER(S): at 23:45

## 2024-09-28 RX ADMIN — KETOROLAC TROMETHAMINE 15 MILLIGRAM(S): 10 TABLET, FILM COATED ORAL at 13:22

## 2024-09-28 RX ADMIN — KETOROLAC TROMETHAMINE 15 MILLIGRAM(S): 10 TABLET, FILM COATED ORAL at 06:33

## 2024-09-28 RX ADMIN — PENICILLIN V POTASSIUM 250 MILLIGRAM(S): 500 TABLET ORAL at 06:34

## 2024-09-28 RX ADMIN — KETOROLAC TROMETHAMINE 15 MILLIGRAM(S): 10 TABLET, FILM COATED ORAL at 00:03

## 2024-09-28 RX ADMIN — KETOROLAC TROMETHAMINE 15 MILLIGRAM(S): 10 TABLET, FILM COATED ORAL at 14:22

## 2024-09-28 RX ADMIN — ENOXAPARIN SODIUM 30 MILLIGRAM(S): 150 INJECTION SUBCUTANEOUS at 11:31

## 2024-09-28 RX ADMIN — PENICILLIN V POTASSIUM 250 MILLIGRAM(S): 500 TABLET ORAL at 17:03

## 2024-09-28 RX ADMIN — FOLIC ACID 1 MILLIGRAM(S): 1 TABLET ORAL at 11:14

## 2024-09-28 RX ADMIN — MORPHINE SULFATE 30 MILLIGRAM(S): 30 TABLET, FILM COATED, EXTENDED RELEASE ORAL at 06:34

## 2024-09-28 RX ADMIN — Medication 75 MILLILITER(S): at 00:02

## 2024-09-28 RX ADMIN — MORPHINE SULFATE 30 MILLILITER(S): 30 TABLET, FILM COATED, EXTENDED RELEASE ORAL at 20:13

## 2024-09-28 RX ADMIN — Medication 10 GRAM(S): at 06:33

## 2024-09-28 RX ADMIN — MORPHINE SULFATE 30 MILLIGRAM(S): 30 TABLET, FILM COATED, EXTENDED RELEASE ORAL at 07:30

## 2024-09-28 RX ADMIN — CHLORHEXIDINE GLUCONATE ORAL RINSE 1 APPLICATION(S): 1.2 SOLUTION DENTAL at 11:15

## 2024-09-28 RX ADMIN — KETOROLAC TROMETHAMINE 15 MILLIGRAM(S): 10 TABLET, FILM COATED ORAL at 07:30

## 2024-09-28 RX ADMIN — KETOROLAC TROMETHAMINE 15 MILLIGRAM(S): 10 TABLET, FILM COATED ORAL at 18:41

## 2024-09-28 RX ADMIN — MORPHINE SULFATE 30 MILLIGRAM(S): 30 TABLET, FILM COATED, EXTENDED RELEASE ORAL at 17:07

## 2024-09-28 RX ADMIN — MORPHINE SULFATE 30 MILLILITER(S): 30 TABLET, FILM COATED, EXTENDED RELEASE ORAL at 04:25

## 2024-09-28 RX ADMIN — MORPHINE SULFATE 30 MILLILITER(S): 30 TABLET, FILM COATED, EXTENDED RELEASE ORAL at 08:33

## 2024-09-28 NOTE — PROGRESS NOTE ADULT - PROBLEM SELECTOR PLAN 1
- follows with Dr. Montano, missed monthly dose of Adakveo for 2 months which may explain SCC  - low concern for acute chest given no opacities on CXR, chest pain more consistent with SCC  - transaminitis likely 2/2 to hepatopathy iso vasoocclusive crisis, no abdominal pain and LFTs improving, could otherwise consider RUQ US  - palliative deferred pain management to primary team   - s/p 1 U PRBC 9/23  - pain has little improvement and patient consistently reports worse pain when sleeping: will consider switching to long-acting PO medication     A/P:  - Heme consults appreciated  - c/w 1/2 NS  - decrease PCA pump to 1.75 mg, toradol 15mg q6h w/ IV protonix 40 for x2 days   - c/w 30 mg ERBID Morphine (home medication 15 mg ER BID, would like to go home on home dose when DC)  - C/w humidified O2 for pain  - bowel regimen: miralax BID, senna 2 MG PRN  - Hold home hydroxyurea (need to take home med)  - d/c oxbryta iso of recall   - incentive spirometer  - will continue to monitor for signs of acute chest syndrome. If fever, culture and repeat CXR stat

## 2024-09-28 NOTE — PROGRESS NOTE ADULT - SUBJECTIVE AND OBJECTIVE BOX
***************************************************************  Adiel Leong, PGY3  Internal Medicine   Available on [employer] TEAMS  ***************************************************************    ANTONI NUNZE  29y  MRN: [id number]    Patient is a 29y old  Male who presents with a chief complaint of sickle cell crisis (27 Sep 2024 07:18)    Interval/Overnight Events: no events ON.   - PCA: 62/197 in 24hr. Pt declined to increase dose.     Subjective: Pt seen and examined at bedside. Denies fever, CP, SOB, abn pain, N/V, dysuria. Tolerating diet.      MEDICATIONS  (STANDING):  chlorhexidine 2% Cloths 1 Application(s) Topical daily  enoxaparin Injectable 30 milliGRAM(s) SubCutaneous every 24 hours  folic acid 1 milliGRAM(s) Oral daily  glutamine Powder 10 Gram(s) Oral two times a day  morphine ER Tablet 30 milliGRAM(s) Oral two times a day  morphine PCA (5 mG/mL) 30 milliLiter(s) PCA Continuous PCA Continuous  penicillin   milliGRAM(s) Oral two times a day  sodium chloride 0.45%. 1000 milliLiter(s) (75 mL/Hr) IV Continuous <Continuous>    MEDICATIONS  (PRN):  naloxone Injectable 0.1 milliGRAM(s) IV Push every 3 minutes PRN For ANY of the following changes in patient status:  A. RR LESS THAN 10 breaths per minute, B. Oxygen saturation LESS THAN 90%, C. Sedation score of 6  ondansetron Injectable 4 milliGRAM(s) IV Push every 6 hours PRN Nausea  polyethylene glycol 3350 17 Gram(s) Oral two times a day PRN Constipation  senna 2 Tablet(s) Oral at bedtime PRN Constipation      Objective:    Vitals: Vital Signs Last 24 Hrs  T(C): 36.9 (09-28-24 @ 08:30), Max: 36.9 (09-27-24 @ 22:00)  T(F): 98.5 (09-28-24 @ 08:30), Max: 98.5 (09-28-24 @ 08:30)  HR: 76 (09-28-24 @ 08:30) (68 - 76)  BP: 115/72 (09-28-24 @ 08:30) (107/63 - 125/84)  BP(mean): --  RR: 16 (09-28-24 @ 08:30) (16 - 18)  SpO2: 99% (09-28-24 @ 08:30) (99% - 100%)                I&O's Summary    27 Sep 2024 07:01  -  28 Sep 2024 07:00  --------------------------------------------------------  IN: 1710 mL / OUT: 900 mL / NET: 810 mL        PHYSICAL EXAM:  GENERAL: NAD  HEAD:  Atraumatic, Normocephalic  EYES: EOMI, conjunctiva and sclera clear  CHEST/LUNG: Clear to auscultation bilaterally; No rales, rhonchi, wheezing, or rubs  HEART: Regular rate and rhythm; No murmurs, rubs, or gallops  ABDOMEN: Soft, Nontender, Nondistended;   SKIN: No rashes or lesions. +TTP b/l knees  NERVOUS SYSTEM:  Alert & Oriented X3, no focal deficits    LABS:                        8.6    8.56  )-----------( 388      ( 28 Sep 2024 06:22 )             26.2                         9.0    7.51  )-----------( 288      ( 26 Sep 2024 05:15 )             27.8     09-26    138  |  102  |  6[L]  ----------------------------<  56[L]  4.1   |  22  |  0.31[L]    Ca    8.6      26 Sep 2024 05:15    TPro  x   /  Alb  x   /  TBili  1.8[H]  /  DBili  0.8[H]  /  AST  x   /  ALT  x   /  AlkPhos  x   09-26    CAPILLARY BLOOD GLUCOSE                  RADIOLOGY & ADDITIONAL TESTS:

## 2024-09-28 NOTE — PROGRESS NOTE ADULT - ATTENDING COMMENTS
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 cont pca pump Morphine at 1.75, and on ms contin bid, Cont IV 1/2 NS, cont to monitor LDH/CBC/BMP. patient doesn't feel comfortable with lowering dose today. having normal bm.

## 2024-09-29 LAB — CYSTIC FIBROSIS EXPANDED PANEL: SIGNIFICANT CHANGE UP

## 2024-09-29 PROCEDURE — 99232 SBSQ HOSP IP/OBS MODERATE 35: CPT | Mod: GC

## 2024-09-29 RX ORDER — VOXELOTOR 300 MG/1
3 TABLET, FOR SUSPENSION ORAL
Refills: 0 | DISCHARGE

## 2024-09-29 RX ADMIN — MORPHINE SULFATE 30 MILLILITER(S): 30 TABLET, FILM COATED, EXTENDED RELEASE ORAL at 08:07

## 2024-09-29 RX ADMIN — MORPHINE SULFATE 30 MILLIGRAM(S): 30 TABLET, FILM COATED, EXTENDED RELEASE ORAL at 06:10

## 2024-09-29 RX ADMIN — PENICILLIN V POTASSIUM 250 MILLIGRAM(S): 500 TABLET ORAL at 17:16

## 2024-09-29 RX ADMIN — KETOROLAC TROMETHAMINE 15 MILLIGRAM(S): 10 TABLET, FILM COATED ORAL at 13:10

## 2024-09-29 RX ADMIN — KETOROLAC TROMETHAMINE 15 MILLIGRAM(S): 10 TABLET, FILM COATED ORAL at 06:10

## 2024-09-29 RX ADMIN — Medication 10 GRAM(S): at 19:18

## 2024-09-29 RX ADMIN — KETOROLAC TROMETHAMINE 15 MILLIGRAM(S): 10 TABLET, FILM COATED ORAL at 12:10

## 2024-09-29 RX ADMIN — ENOXAPARIN SODIUM 30 MILLIGRAM(S): 150 INJECTION SUBCUTANEOUS at 12:11

## 2024-09-29 RX ADMIN — KETOROLAC TROMETHAMINE 15 MILLIGRAM(S): 10 TABLET, FILM COATED ORAL at 05:10

## 2024-09-29 RX ADMIN — MORPHINE SULFATE 30 MILLIGRAM(S): 30 TABLET, FILM COATED, EXTENDED RELEASE ORAL at 05:10

## 2024-09-29 RX ADMIN — KETOROLAC TROMETHAMINE 15 MILLIGRAM(S): 10 TABLET, FILM COATED ORAL at 18:17

## 2024-09-29 RX ADMIN — MORPHINE SULFATE 30 MILLIGRAM(S): 30 TABLET, FILM COATED, EXTENDED RELEASE ORAL at 17:16

## 2024-09-29 RX ADMIN — KETOROLAC TROMETHAMINE 15 MILLIGRAM(S): 10 TABLET, FILM COATED ORAL at 17:17

## 2024-09-29 RX ADMIN — MORPHINE SULFATE 30 MILLIGRAM(S): 30 TABLET, FILM COATED, EXTENDED RELEASE ORAL at 18:17

## 2024-09-29 RX ADMIN — MORPHINE SULFATE 30 MILLILITER(S): 30 TABLET, FILM COATED, EXTENDED RELEASE ORAL at 11:52

## 2024-09-29 RX ADMIN — FOLIC ACID 1 MILLIGRAM(S): 1 TABLET ORAL at 12:08

## 2024-09-29 RX ADMIN — PENICILLIN V POTASSIUM 250 MILLIGRAM(S): 500 TABLET ORAL at 05:10

## 2024-09-29 RX ADMIN — CHLORHEXIDINE GLUCONATE ORAL RINSE 1 APPLICATION(S): 1.2 SOLUTION DENTAL at 12:51

## 2024-09-29 RX ADMIN — KETOROLAC TROMETHAMINE 15 MILLIGRAM(S): 10 TABLET, FILM COATED ORAL at 01:02

## 2024-09-29 RX ADMIN — Medication 10 GRAM(S): at 05:09

## 2024-09-29 RX ADMIN — MORPHINE SULFATE 30 MILLILITER(S): 30 TABLET, FILM COATED, EXTENDED RELEASE ORAL at 20:16

## 2024-09-29 RX ADMIN — KETOROLAC TROMETHAMINE 15 MILLIGRAM(S): 10 TABLET, FILM COATED ORAL at 00:02

## 2024-09-29 NOTE — PROGRESS NOTE ADULT - ATTENDING COMMENTS
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 cont pca pump Morphine at 1.75, and on ms contin bid, Cont IV 1/2 NS, cont to monitor LDH/CBC/BMP. still reports persistent pain, doesn't want to wean down the dose. able to ambulate and having bowel movement. 28 yo male admitted with SCC on IV morphine pca pump. cont pca pump Morphine at 1.75, and on ms contin bid, Cont IV 1/2 NS, cont to monitor LDH/CBC/BMP. still reports persistent pain, doesn't want to wean down the dose. able to ambulate and having bowel movement. HH stable.

## 2024-09-29 NOTE — PROGRESS NOTE ADULT - PROBLEM SELECTOR PLAN 1
- follows with Dr. Montano, missed monthly dose of Adakveo for 2 months which may explain SCC  - low concern for acute chest given no opacities on CXR, chest pain more consistent with SCC  - transaminitis likely 2/2 to hepatopathy iso vasoocclusive crisis, no abdominal pain and LFTs improving, could otherwise consider RUQ US  - palliative deferred pain management to primary team   - s/p 1 U PRBC 9/23  - pain has little improvement and patient consistently reports worse pain when sleeping: will consider switching to long-acting PO medication     A/P:  - Heme consults appreciated  - c/w 1/2 NS  - PCA pump to 1.75 mg, toradol 15mg q6h w/ IV protonix 40 for x2 days   - c/w 30 mg ERBID Morphine (home medication 15 mg ER BID, would like to go home on home dose when DC)  - C/w humidified O2 for pain  - bowel regimen: miralax BID, senna 2 MG PRN  - Hold home hydroxyurea (need to take home med)  - d/c oxbryta iso of recall, will follow up with o/p Dr. Montano  - incentive spirometer  - will continue to monitor for signs of acute chest syndrome. If fever, culture and repeat CXR stat - follows with Dr. Montano, missed monthly dose of Adakveo for 2 months which may explain SCC  - low concern for acute chest given no opacities on CXR, chest pain more consistent with SCC  - transaminitis likely 2/2 to hepatopathy iso vasoocclusive crisis, no abdominal pain and LFTs improving, could otherwise consider RUQ US  - palliative deferred pain management to primary team   - s/p 1 U PRBC 9/23  - pain has little improvement and patient consistently reports worse pain when sleeping: will consider switching to long-acting PO medication     A/P:  - Heme consults appreciated  - c/w 1/2 NS  - PCA pump to 1.75 mg, toradol 15mg q6h w/ IV protonix 40 for x5 days   - c/w 30 mg ERBID Morphine (home medication 15 mg ER BID, would like to go home on home dose when DC)  - C/w humidified O2 for pain  - bowel regimen: miralax BID, senna 2 MG PRN  - Hold home hydroxyurea (need to take home med)  - d/c oxbryta iso of recall, will follow up with o/p Dr. Montano  - incentive spirometer  - will continue to monitor for signs of acute chest syndrome. If fever, culture and repeat CXR stat - follows with Dr. Montano, missed monthly dose of Adakveo for 2 months which may explain SCC  - low concern for acute chest given no opacities on CXR, chest pain more consistent with SCC  - transaminitis likely 2/2 to hepatopathy iso vasoocclusive crisis, no abdominal pain and LFTs improving, could otherwise consider RUQ US  - palliative deferred pain management to primary team   - s/p 1 U PRBC 9/19, 9/22, 9/26  - pain has little improvement and patient consistently reports worse pain when sleeping: will consider switching to long-acting PO medication     A/P:  - Heme consults appreciated  - c/w 1/2 NS  - PCA pump to 1.75 mg, toradol 15mg q6h w/ IV protonix 40 for x5 days   - c/w 30 mg ERBID Morphine (home medication 15 mg ER BID, would like to go home on home dose when DC)  - C/w humidified O2 for pain  - bowel regimen: miralax BID, senna 2 MG PRN  - Hold home hydroxyurea (need to take home med)  - d/c oxbryta iso of recall, will follow up with o/p Dr. Montano  - incentive spirometer  - will continue to monitor for signs of acute chest syndrome. If fever, culture and repeat CXR stat

## 2024-09-29 NOTE — PROGRESS NOTE ADULT - SUBJECTIVE AND OBJECTIVE BOX
*******************************  Joelle Skelton MD (PGY-1)  Internal Medicine  Contact via [employer] TEAMS  *******************************      ANTONI NUNEZ  29y  MRN: [id number]    Patient is a 29y old  Male who presents with a chief complaint of sickle cell crisis (28 Sep 2024 10:15)      Interval/Overnight Events: no events ON.     Subjective: Pt seen and examined at bedside. Denies fever, CP, SOB, abn pain, N/V, dysuria. Tolerating diet.      MEDICATIONS  (STANDING):  chlorhexidine 2% Cloths 1 Application(s) Topical daily  enoxaparin Injectable 30 milliGRAM(s) SubCutaneous every 24 hours  folic acid 1 milliGRAM(s) Oral daily  glutamine Powder 10 Gram(s) Oral two times a day  ketorolac   Injectable 15 milliGRAM(s) IV Push every 6 hours  morphine ER Tablet 30 milliGRAM(s) Oral two times a day  morphine PCA (5 mG/mL) 30 milliLiter(s) PCA Continuous PCA Continuous  penicillin   milliGRAM(s) Oral two times a day  sodium chloride 0.45%. 1000 milliLiter(s) (75 mL/Hr) IV Continuous <Continuous>    MEDICATIONS  (PRN):  naloxone Injectable 0.1 milliGRAM(s) IV Push every 3 minutes PRN For ANY of the following changes in patient status:  A. RR LESS THAN 10 breaths per minute, B. Oxygen saturation LESS THAN 90%, C. Sedation score of 6  ondansetron Injectable 4 milliGRAM(s) IV Push every 6 hours PRN Nausea  polyethylene glycol 3350 17 Gram(s) Oral two times a day PRN Constipation  senna 2 Tablet(s) Oral at bedtime PRN Constipation      Objective:    Vitals: Vital Signs Last 24 Hrs  T(C): 36.5 (09-29-24 @ 04:00), Max: 37.1 (09-28-24 @ 21:49)  T(F): 97.7 (09-29-24 @ 04:00), Max: 98.7 (09-28-24 @ 21:49)  HR: 57 (09-29-24 @ 04:00) (57 - 76)  BP: 123/80 (09-29-24 @ 04:00) (109/70 - 123/80)  BP(mean): --  RR: 16 (09-29-24 @ 04:00) (16 - 17)  SpO2: 96% (09-29-24 @ 04:00) (96% - 100%)                I&O's Summary    28 Sep 2024 07:01  -  29 Sep 2024 07:00  --------------------------------------------------------  IN: 0 mL / OUT: 850 mL / NET: -850 mL        PHYSICAL EXAM:  GENERAL: NAD  HEAD:  Atraumatic, Normocephalic  EYES: EOMI, conjunctiva and sclera clear  CHEST/LUNG: Clear to auscultation bilaterally; No rales, rhonchi, wheezing, or rubs  HEART: Regular rate and rhythm; No murmurs, rubs, or gallops  ABDOMEN: Soft, Nontender, Nondistended;   SKIN: No rashes or lesions  NERVOUS SYSTEM:  Alert & Oriented X3, no focal deficits    LABS:                        8.6    8.56  )-----------( 388      ( 28 Sep 2024 06:22 )             26.2           CAPILLARY BLOOD GLUCOSE                  RADIOLOGY & ADDITIONAL TESTS:         *******************************  Joelle Skelton MD (PGY-1)  Internal Medicine  Contact via [employer] TEAMS  *******************************      ANTONI NUNEZ  29y  MRN: [id number]    Patient is a 29y old  Male who presents with a chief complaint of sickle cell crisis (28 Sep 2024 10:15)      /Interval/Overnight Events: no events ON. Attempts/delivery of PCA: 77/219    Subjective: Pt seen and examined at bedside. Denies fever, CP, SOB, abn pain, N/V, dysuria. Tolerating diet.      MEDICATIONS  (STANDING):  chlorhexidine 2% Cloths 1 Application(s) Topical daily  enoxaparin Injectable 30 milliGRAM(s) SubCutaneous every 24 hours  folic acid 1 milliGRAM(s) Oral daily  glutamine Powder 10 Gram(s) Oral two times a day  ketorolac   Injectable 15 milliGRAM(s) IV Push every 6 hours  morphine ER Tablet 30 milliGRAM(s) Oral two times a day  morphine PCA (5 mG/mL) 30 milliLiter(s) PCA Continuous PCA Continuous  penicillin   milliGRAM(s) Oral two times a day  sodium chloride 0.45%. 1000 milliLiter(s) (75 mL/Hr) IV Continuous <Continuous>    MEDICATIONS  (PRN):  naloxone Injectable 0.1 milliGRAM(s) IV Push every 3 minutes PRN For ANY of the following changes in patient status:  A. RR LESS THAN 10 breaths per minute, B. Oxygen saturation LESS THAN 90%, C. Sedation score of 6  ondansetron Injectable 4 milliGRAM(s) IV Push every 6 hours PRN Nausea  polyethylene glycol 3350 17 Gram(s) Oral two times a day PRN Constipation  senna 2 Tablet(s) Oral at bedtime PRN Constipation      Objective:    Vitals: Vital Signs Last 24 Hrs  T(C): 36.5 (09-29-24 @ 04:00), Max: 37.1 (09-28-24 @ 21:49)  T(F): 97.7 (09-29-24 @ 04:00), Max: 98.7 (09-28-24 @ 21:49)  HR: 57 (09-29-24 @ 04:00) (57 - 76)  BP: 123/80 (09-29-24 @ 04:00) (109/70 - 123/80)  BP(mean): --  RR: 16 (09-29-24 @ 04:00) (16 - 17)  SpO2: 96% (09-29-24 @ 04:00) (96% - 100%)                I&O's Summary    28 Sep 2024 07:01  -  29 Sep 2024 07:00  --------------------------------------------------------  IN: 0 mL / OUT: 850 mL / NET: -850 mL        PHYSICAL EXAM:  GENERAL: NAD, cachetic  HEAD:  Atraumatic, Normocephalic  EYES: EOMI, conjunctiva and sclera clear  CHEST/LUNG: Clear to auscultation bilaterally; No rales, rhonchi, wheezing, or rubs  HEART: Regular rate and rhythm; No murmurs, rubs, or gallops  ABDOMEN: Soft, Nontender, Nondistended;   SKIN: No rashes or lesions  NERVOUS SYSTEM:  Alert & Oriented X3, no focal deficits    LABS:                        8.6    8.56  )-----------( 388      ( 28 Sep 2024 06:22 )             26.2           CAPILLARY BLOOD GLUCOSE                  RADIOLOGY & ADDITIONAL TESTS:         *******************************  Joelle Skelton MD (PGY-1)  Internal Medicine  Contact via [employer] TEAMS  *******************************      ANTONI NUNEZ  29y  MRN: [id number]    Patient is a 29y old  Male who presents with a chief complaint of sickle cell crisis (28 Sep 2024 10:15)      /Interval/Overnight Events: no events ON. Attempts/delivery of PCA: 77/219    Subjective: Pt seen and examined at bedside. States that pain is still in knees, hips, shoulders, and will fluctuate b/w 6-8 / 10 on pain scale. Pt frustrated by lack of progress and asking if there is other pain regimen we can try. Also asked about reducing PCA pump - however agreed to keep dose the same as pt still complaining of pain. Denies fever, CP, SOB, abn pain, N/V, dysuria. Tolerating diet.      MEDICATIONS  (STANDING):  chlorhexidine 2% Cloths 1 Application(s) Topical daily  enoxaparin Injectable 30 milliGRAM(s) SubCutaneous every 24 hours  folic acid 1 milliGRAM(s) Oral daily  glutamine Powder 10 Gram(s) Oral two times a day  ketorolac   Injectable 15 milliGRAM(s) IV Push every 6 hours  morphine ER Tablet 30 milliGRAM(s) Oral two times a day  morphine PCA (5 mG/mL) 30 milliLiter(s) PCA Continuous PCA Continuous  penicillin   milliGRAM(s) Oral two times a day  sodium chloride 0.45%. 1000 milliLiter(s) (75 mL/Hr) IV Continuous <Continuous>    MEDICATIONS  (PRN):  naloxone Injectable 0.1 milliGRAM(s) IV Push every 3 minutes PRN For ANY of the following changes in patient status:  A. RR LESS THAN 10 breaths per minute, B. Oxygen saturation LESS THAN 90%, C. Sedation score of 6  ondansetron Injectable 4 milliGRAM(s) IV Push every 6 hours PRN Nausea  polyethylene glycol 3350 17 Gram(s) Oral two times a day PRN Constipation  senna 2 Tablet(s) Oral at bedtime PRN Constipation      Objective:    Vitals: Vital Signs Last 24 Hrs  T(C): 36.5 (09-29-24 @ 04:00), Max: 37.1 (09-28-24 @ 21:49)  T(F): 97.7 (09-29-24 @ 04:00), Max: 98.7 (09-28-24 @ 21:49)  HR: 57 (09-29-24 @ 04:00) (57 - 76)  BP: 123/80 (09-29-24 @ 04:00) (109/70 - 123/80)  BP(mean): --  RR: 16 (09-29-24 @ 04:00) (16 - 17)  SpO2: 96% (09-29-24 @ 04:00) (96% - 100%)                I&O's Summary    28 Sep 2024 07:01  -  29 Sep 2024 07:00  --------------------------------------------------------  IN: 0 mL / OUT: 850 mL / NET: -850 mL        PHYSICAL EXAM:  GENERAL: NAD, cachetic  HEAD:  Atraumatic, Normocephalic  EYES: EOMI, conjunctiva and sclera clear  CHEST/LUNG: Clear to auscultation bilaterally; No rales, rhonchi, wheezing, or rubs  HEART: Regular rate and rhythm; No murmurs, rubs, or gallops  ABDOMEN: Soft, Nontender, Nondistended;   MSK: Tenderness to palpation on knees, hips  SKIN: No rashes or lesions  NERVOUS SYSTEM:  Alert & Oriented X3, no focal deficits    LABS:                        8.6    8.56  )-----------( 388      ( 28 Sep 2024 06:22 )             26.2           CAPILLARY BLOOD GLUCOSE                  RADIOLOGY & ADDITIONAL TESTS:

## 2024-09-29 NOTE — PROGRESS NOTE ADULT - ASSESSMENT
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 S/p PRBC x1 unit (9/19, 9/22, 9/26). Pain currently being managed with PCA pump.

## 2024-09-30 LAB
ANION GAP SERPL CALC-SCNC: 13 MMOL/L — SIGNIFICANT CHANGE UP (ref 7–14)
BASOPHILS # BLD AUTO: 0.05 K/UL — SIGNIFICANT CHANGE UP (ref 0–0.2)
BASOPHILS NFR BLD AUTO: 0.5 % — SIGNIFICANT CHANGE UP (ref 0–2)
BLD GP AB SCN SERPL QL: NEGATIVE — SIGNIFICANT CHANGE UP
BUN SERPL-MCNC: 4 MG/DL — LOW (ref 7–23)
CALCIUM SERPL-MCNC: 8.5 MG/DL — SIGNIFICANT CHANGE UP (ref 8.4–10.5)
CHLORIDE SERPL-SCNC: 105 MMOL/L — SIGNIFICANT CHANGE UP (ref 98–107)
CO2 SERPL-SCNC: 21 MMOL/L — LOW (ref 22–31)
CREAT SERPL-MCNC: 0.3 MG/DL — LOW (ref 0.5–1.3)
EGFR: 166 ML/MIN/1.73M2 — SIGNIFICANT CHANGE UP
EOSINOPHIL # BLD AUTO: 0.05 K/UL — SIGNIFICANT CHANGE UP (ref 0–0.5)
EOSINOPHIL NFR BLD AUTO: 0.5 % — SIGNIFICANT CHANGE UP (ref 0–6)
GLUCOSE SERPL-MCNC: 81 MG/DL — SIGNIFICANT CHANGE UP (ref 70–99)
HCT VFR BLD CALC: 25.7 % — LOW (ref 39–50)
HGB BLD-MCNC: 8.3 G/DL — LOW (ref 13–17)
IANC: 3.56 K/UL — SIGNIFICANT CHANGE UP (ref 1.8–7.4)
IMM GRANULOCYTES NFR BLD AUTO: 0.5 % — SIGNIFICANT CHANGE UP (ref 0–0.9)
LDH SERPL L TO P-CCNC: 618 U/L — HIGH (ref 135–225)
LYMPHOCYTES # BLD AUTO: 5.51 K/UL — HIGH (ref 1–3.3)
LYMPHOCYTES # BLD AUTO: 54.6 % — HIGH (ref 13–44)
MAGNESIUM SERPL-MCNC: 1.8 MG/DL — SIGNIFICANT CHANGE UP (ref 1.6–2.6)
MCHC RBC-ENTMCNC: 24.9 PG — LOW (ref 27–34)
MCHC RBC-ENTMCNC: 32.3 GM/DL — SIGNIFICANT CHANGE UP (ref 32–36)
MCV RBC AUTO: 77.2 FL — LOW (ref 80–100)
MONOCYTES # BLD AUTO: 0.87 K/UL — SIGNIFICANT CHANGE UP (ref 0–0.9)
MONOCYTES NFR BLD AUTO: 8.6 % — SIGNIFICANT CHANGE UP (ref 2–14)
NEUTROPHILS # BLD AUTO: 3.56 K/UL — SIGNIFICANT CHANGE UP (ref 1.8–7.4)
NEUTROPHILS NFR BLD AUTO: 35.3 % — LOW (ref 43–77)
NRBC # BLD: 85 /100 WBCS — HIGH (ref 0–0)
NRBC # FLD: 8.58 K/UL — HIGH (ref 0–0)
PHOSPHATE SERPL-MCNC: 4.2 MG/DL — SIGNIFICANT CHANGE UP (ref 2.5–4.5)
PLATELET # BLD AUTO: 452 K/UL — HIGH (ref 150–400)
POTASSIUM SERPL-MCNC: 3.2 MMOL/L — LOW (ref 3.5–5.3)
POTASSIUM SERPL-SCNC: 3.2 MMOL/L — LOW (ref 3.5–5.3)
RBC # BLD: 3.33 M/UL — LOW (ref 4.2–5.8)
RBC # FLD: 24.9 % — HIGH (ref 10.3–14.5)
RETICS/RBC NFR: >20.8 % — HIGH (ref 0.5–2.5)
RH IG SCN BLD-IMP: POSITIVE — SIGNIFICANT CHANGE UP
SODIUM SERPL-SCNC: 139 MMOL/L — SIGNIFICANT CHANGE UP (ref 135–145)
WBC # BLD: 10.09 K/UL — SIGNIFICANT CHANGE UP (ref 3.8–10.5)
WBC # FLD AUTO: 10.09 K/UL — SIGNIFICANT CHANGE UP (ref 3.8–10.5)

## 2024-09-30 PROCEDURE — 99233 SBSQ HOSP IP/OBS HIGH 50: CPT | Mod: GC

## 2024-09-30 RX ORDER — MORPHINE SULFATE 30 MG/1
30 TABLET, FILM COATED, EXTENDED RELEASE ORAL
Refills: 0 | Status: DISCONTINUED | OUTPATIENT
Start: 2024-09-30 | End: 2024-10-03

## 2024-09-30 RX ORDER — SODIUM CHLORIDE 0.9 % (FLUSH) 0.9 %
1000 SYRINGE (ML) INJECTION
Refills: 0 | Status: DISCONTINUED | OUTPATIENT
Start: 2024-09-30 | End: 2024-09-30

## 2024-09-30 RX ADMIN — MORPHINE SULFATE 30 MILLILITER(S): 30 TABLET, FILM COATED, EXTENDED RELEASE ORAL at 20:16

## 2024-09-30 RX ADMIN — KETOROLAC TROMETHAMINE 15 MILLIGRAM(S): 10 TABLET, FILM COATED ORAL at 18:40

## 2024-09-30 RX ADMIN — Medication 10 GRAM(S): at 18:39

## 2024-09-30 RX ADMIN — MORPHINE SULFATE 30 MILLIGRAM(S): 30 TABLET, FILM COATED, EXTENDED RELEASE ORAL at 06:11

## 2024-09-30 RX ADMIN — MORPHINE SULFATE 30 MILLIGRAM(S): 30 TABLET, FILM COATED, EXTENDED RELEASE ORAL at 18:38

## 2024-09-30 RX ADMIN — KETOROLAC TROMETHAMINE 15 MILLIGRAM(S): 10 TABLET, FILM COATED ORAL at 05:54

## 2024-09-30 RX ADMIN — PENICILLIN V POTASSIUM 250 MILLIGRAM(S): 500 TABLET ORAL at 18:39

## 2024-09-30 RX ADMIN — ENOXAPARIN SODIUM 30 MILLIGRAM(S): 150 INJECTION SUBCUTANEOUS at 13:20

## 2024-09-30 RX ADMIN — Medication 10 GRAM(S): at 05:59

## 2024-09-30 RX ADMIN — PENICILLIN V POTASSIUM 250 MILLIGRAM(S): 500 TABLET ORAL at 05:58

## 2024-09-30 RX ADMIN — Medication 75 MILLILITER(S): at 05:58

## 2024-09-30 RX ADMIN — KETOROLAC TROMETHAMINE 15 MILLIGRAM(S): 10 TABLET, FILM COATED ORAL at 11:37

## 2024-09-30 RX ADMIN — Medication 40 MILLIEQUIVALENT(S): at 13:19

## 2024-09-30 RX ADMIN — KETOROLAC TROMETHAMINE 15 MILLIGRAM(S): 10 TABLET, FILM COATED ORAL at 00:07

## 2024-09-30 RX ADMIN — FOLIC ACID 1 MILLIGRAM(S): 1 TABLET ORAL at 11:36

## 2024-09-30 RX ADMIN — Medication 75 MILLILITER(S): at 10:42

## 2024-09-30 RX ADMIN — MORPHINE SULFATE 30 MILLILITER(S): 30 TABLET, FILM COATED, EXTENDED RELEASE ORAL at 08:23

## 2024-09-30 RX ADMIN — MORPHINE SULFATE 30 MILLILITER(S): 30 TABLET, FILM COATED, EXTENDED RELEASE ORAL at 16:06

## 2024-09-30 RX ADMIN — KETOROLAC TROMETHAMINE 15 MILLIGRAM(S): 10 TABLET, FILM COATED ORAL at 12:37

## 2024-09-30 RX ADMIN — KETOROLAC TROMETHAMINE 15 MILLIGRAM(S): 10 TABLET, FILM COATED ORAL at 00:13

## 2024-09-30 RX ADMIN — KETOROLAC TROMETHAMINE 15 MILLIGRAM(S): 10 TABLET, FILM COATED ORAL at 06:11

## 2024-09-30 RX ADMIN — MORPHINE SULFATE 30 MILLIGRAM(S): 30 TABLET, FILM COATED, EXTENDED RELEASE ORAL at 19:38

## 2024-09-30 RX ADMIN — CHLORHEXIDINE GLUCONATE ORAL RINSE 1 APPLICATION(S): 1.2 SOLUTION DENTAL at 11:37

## 2024-09-30 RX ADMIN — MORPHINE SULFATE 30 MILLILITER(S): 30 TABLET, FILM COATED, EXTENDED RELEASE ORAL at 10:34

## 2024-09-30 RX ADMIN — MORPHINE SULFATE 30 MILLIGRAM(S): 30 TABLET, FILM COATED, EXTENDED RELEASE ORAL at 05:58

## 2024-09-30 RX ADMIN — KETOROLAC TROMETHAMINE 15 MILLIGRAM(S): 10 TABLET, FILM COATED ORAL at 19:40

## 2024-09-30 RX ADMIN — Medication 40 MILLIEQUIVALENT(S): at 10:41

## 2024-09-30 RX ADMIN — KETOROLAC TROMETHAMINE 15 MILLIGRAM(S): 10 TABLET, FILM COATED ORAL at 05:58

## 2024-09-30 NOTE — PROGRESS NOTE ADULT - SUBJECTIVE AND OBJECTIVE BOX
Progress Note    08-25-24 (36d)    Patient is a 29y old  Male who presents with a chief complaint of sickle cell crisis (29 Sep 2024 07:05)      Subjective / Overnight Events :  - No acute events overnight.  - Pt seen and examined at bedside.     Additional ROS (if any):    MEDICATIONS  (STANDING):  chlorhexidine 2% Cloths 1 Application(s) Topical daily  enoxaparin Injectable 30 milliGRAM(s) SubCutaneous every 24 hours  folic acid 1 milliGRAM(s) Oral daily  glutamine Powder 10 Gram(s) Oral two times a day  ketorolac   Injectable 15 milliGRAM(s) IV Push every 6 hours  morphine ER Tablet 30 milliGRAM(s) Oral two times a day  morphine PCA (5 mG/mL) 30 milliLiter(s) PCA Continuous PCA Continuous  penicillin   milliGRAM(s) Oral two times a day  sodium chloride 0.45%. 1000 milliLiter(s) (75 mL/Hr) IV Continuous <Continuous>    MEDICATIONS  (PRN):  naloxone Injectable 0.1 milliGRAM(s) IV Push every 3 minutes PRN For ANY of the following changes in patient status:  A. RR LESS THAN 10 breaths per minute, B. Oxygen saturation LESS THAN 90%, C. Sedation score of 6  ondansetron Injectable 4 milliGRAM(s) IV Push every 6 hours PRN Nausea  polyethylene glycol 3350 17 Gram(s) Oral two times a day PRN Constipation  senna 2 Tablet(s) Oral at bedtime PRN Constipation          PHYSICAL EXAM:  Vital Signs Last 24 Hrs  T(C): 36.8 (30 Sep 2024 04:00), Max: 36.9 (29 Sep 2024 21:33)  T(F): 98.2 (30 Sep 2024 04:00), Max: 98.4 (29 Sep 2024 21:33)  HR: 80 (30 Sep 2024 04:00) (64 - 84)  BP: 117/72 (30 Sep 2024 04:00) (103/65 - 137/93)  BP(mean): --  RR: 15 (30 Sep 2024 04:00) (15 - 18)  SpO2: 100% (30 Sep 2024 04:00) (99% - 100%)    Parameters below as of 30 Sep 2024 04:00  Patient On (Oxygen Delivery Method): room air        I&O's Summary    29 Sep 2024 07:01  -  30 Sep 2024 07:00  --------------------------------------------------------  IN: 600 mL / OUT: 800 mL / NET: -200 mL        General: NAD  HEENT: PERRLA, EOMi, no scleral icterus  CV: RRR, normal S1 and S2, no m/r/g  Lungs: normal respiratory effort. CTAB, no wheezes, rales, or rhonchi  Abd: soft, nontender, nondistended, BSx4  Ext: no edema, 2+ peripheral pulses   Pysch: AAOx3  Neuro: grossly non-focal, moving all extremities spontaneously   Skin: no rashes or lesions     LABS:  CAPILLARY BLOOD GLUCOSE                WBC Trend: 8.56<--, 7.51<--  Hb Trend: 8.6<--, 9.0<--                        RADIOLOGY & ADDITIONAL TESTS: Reviewed Progress Note    08-25-24 (36d)    Patient is a 29y old  Male who presents with a chief complaint of sickle cell crisis (29 Sep 2024 07:05)      Subjective / Overnight Events :  - No acute events overnight.  - Pt seen and examined at bedside.   - Pt reports that he is feeling like his pain is slowly improving ranging from 6/10 to 8/10, would like to come down on his PCA dose   - Denies any cp, sob, abd pain, dysuria.     Additional ROS (if any):    MEDICATIONS  (STANDING):  chlorhexidine 2% Cloths 1 Application(s) Topical daily  enoxaparin Injectable 30 milliGRAM(s) SubCutaneous every 24 hours  folic acid 1 milliGRAM(s) Oral daily  glutamine Powder 10 Gram(s) Oral two times a day  ketorolac   Injectable 15 milliGRAM(s) IV Push every 6 hours  morphine ER Tablet 30 milliGRAM(s) Oral two times a day  morphine PCA (5 mG/mL) 30 milliLiter(s) PCA Continuous PCA Continuous  penicillin   milliGRAM(s) Oral two times a day  sodium chloride 0.45%. 1000 milliLiter(s) (75 mL/Hr) IV Continuous <Continuous>    MEDICATIONS  (PRN):  naloxone Injectable 0.1 milliGRAM(s) IV Push every 3 minutes PRN For ANY of the following changes in patient status:  A. RR LESS THAN 10 breaths per minute, B. Oxygen saturation LESS THAN 90%, C. Sedation score of 6  ondansetron Injectable 4 milliGRAM(s) IV Push every 6 hours PRN Nausea  polyethylene glycol 3350 17 Gram(s) Oral two times a day PRN Constipation  senna 2 Tablet(s) Oral at bedtime PRN Constipation          PHYSICAL EXAM:  Vital Signs Last 24 Hrs  T(C): 36.8 (30 Sep 2024 04:00), Max: 36.9 (29 Sep 2024 21:33)  T(F): 98.2 (30 Sep 2024 04:00), Max: 98.4 (29 Sep 2024 21:33)  HR: 80 (30 Sep 2024 04:00) (64 - 84)  BP: 117/72 (30 Sep 2024 04:00) (103/65 - 137/93)  BP(mean): --  RR: 15 (30 Sep 2024 04:00) (15 - 18)  SpO2: 100% (30 Sep 2024 04:00) (99% - 100%)    Parameters below as of 30 Sep 2024 04:00  Patient On (Oxygen Delivery Method): room air        I&O's Summary    29 Sep 2024 07:01  -  30 Sep 2024 07:00  --------------------------------------------------------  IN: 600 mL / OUT: 800 mL / NET: -200 mL        General: NAD  CV: RRR, normal S1 and S2, no m/r/g  Lungs: normal respiratory effort. CTAB, no wheezes, rales, or rhonchi  Abd: soft, nontender, nondistended, BSx4  Ext: no edema, 2+ peripheral pulses   Pysch: AAOx3      LABS:  CAPILLARY BLOOD GLUCOSE                WBC Trend: 8.56<--, 7.51<--  Hb Trend: 8.6<--, 9.0<--                        RADIOLOGY & ADDITIONAL TESTS: Reviewed

## 2024-09-30 NOTE — PROGRESS NOTE ADULT - PROBLEM SELECTOR PLAN 1
- follows with Dr. Montano, missed monthly dose of Adakveo for 2 months which may explain SCC  - low concern for acute chest given no opacities on CXR, chest pain more consistent with SCC  - transaminitis likely 2/2 to hepatopathy iso vasoocclusive crisis, no abdominal pain and LFTs improving, could otherwise consider RUQ US  - palliative deferred pain management to primary team   - s/p 1 U PRBC 9/19, 9/22, 9/26  - pain has little improvement and patient consistently reports worse pain when sleeping: will consider switching to long-acting PO medication     A/P:  - Heme consults appreciated  - c/w 1/2 NS  - PCA pump to 1.75 mg, toradol 15mg q6h w/ IV protonix 40 for x5 days   - c/w 30 mg ERBID Morphine (home medication 15 mg ER BID, would like to go home on home dose when DC)  - C/w humidified O2 for pain  - bowel regimen: miralax BID, senna 2 MG PRN  - Hold home hydroxyurea (need to take home med)  - d/c oxbryta iso of recall, will follow up with o/p Dr. Montano  - incentive spirometer  - will continue to monitor for signs of acute chest syndrome. If fever, culture and repeat CXR stat - follows with Dr. Montano, missed monthly dose of Adakveo for 2 months which may explain SCC  - low concern for acute chest given no opacities on CXR, chest pain more consistent with SCC  - transaminitis likely 2/2 to hepatopathy iso vasoocclusive crisis, no abdominal pain and LFTs improving, could otherwise consider RUQ US  - palliative deferred pain management to primary team   - s/p 1 U PRBC 9/19, 9/22, 9/26  - pain has little improvement and patient consistently reports worse pain when sleeping: will consider switching to long-acting PO medication     A/P:  - Heme consults appreciated  - c/w 1/2 NS  - PCA pump downtitrated to 1.5 mg, toradol 15mg q6h w/ IV protonix 40 for x5 days   - c/w 30 mg ERBID Morphine (home medication 15 mg ER BID, would like to go home on home dose when DC)  - C/w humidified O2 for pain  - bowel regimen: miralax BID, senna 2 MG PRN  - Hold home hydroxyurea (need to take home med)  - d/c oxbryta iso of recall, will follow up with o/p Dr. Montano  - incentive spirometer  - will continue to monitor for signs of acute chest syndrome. If fever, culture and repeat CXR stat

## 2024-09-30 NOTE — PROGRESS NOTE ADULT - ATTENDING COMMENTS
28 yo male admitted with SCC on IV morphine pca pump.   prolonged hospital stay , still c/o pains in his hips, across left shoulder and knees  reduce pca pump morphine to 1.5 mg, 4h lockout 30mg   mscontin 30mg bid , c/w IVF 1/2 NS, incentive spirometer  trend LDH/CBC/BMP.   H/H stable , Hb 8.3,  , retic>20.8

## 2024-09-30 NOTE — PROGRESS NOTE ADULT - ASSESSMENT
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 S/p PRBC x1 unit (9/19, 9/22, 9/26). Pain currently being managed with PCA pump.      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 S/p PRBC x1 unit (9/19, 9/22, 9/26). Pain currently being managed with PCA pump.

## 2024-10-01 PROCEDURE — 99232 SBSQ HOSP IP/OBS MODERATE 35: CPT | Mod: GC

## 2024-10-01 RX ORDER — MORPHINE SULFATE 30 MG/1
30 TABLET, FILM COATED, EXTENDED RELEASE ORAL
Refills: 0 | Status: DISCONTINUED | OUTPATIENT
Start: 2024-10-03 | End: 2024-10-07

## 2024-10-01 RX ORDER — PANTOPRAZOLE SODIUM 40 MG/1
40 TABLET, DELAYED RELEASE ORAL
Refills: 0 | Status: DISCONTINUED | OUTPATIENT
Start: 2024-10-01 | End: 2024-10-10

## 2024-10-01 RX ADMIN — Medication 75 MILLILITER(S): at 02:14

## 2024-10-01 RX ADMIN — CHLORHEXIDINE GLUCONATE ORAL RINSE 1 APPLICATION(S): 1.2 SOLUTION DENTAL at 11:13

## 2024-10-01 RX ADMIN — PENICILLIN V POTASSIUM 250 MILLIGRAM(S): 500 TABLET ORAL at 18:39

## 2024-10-01 RX ADMIN — KETOROLAC TROMETHAMINE 15 MILLIGRAM(S): 10 TABLET, FILM COATED ORAL at 00:05

## 2024-10-01 RX ADMIN — PENICILLIN V POTASSIUM 250 MILLIGRAM(S): 500 TABLET ORAL at 05:01

## 2024-10-01 RX ADMIN — MORPHINE SULFATE 30 MILLILITER(S): 30 TABLET, FILM COATED, EXTENDED RELEASE ORAL at 08:10

## 2024-10-01 RX ADMIN — Medication 10 GRAM(S): at 18:37

## 2024-10-01 RX ADMIN — KETOROLAC TROMETHAMINE 15 MILLIGRAM(S): 10 TABLET, FILM COATED ORAL at 18:39

## 2024-10-01 RX ADMIN — MORPHINE SULFATE 30 MILLIGRAM(S): 30 TABLET, FILM COATED, EXTENDED RELEASE ORAL at 05:00

## 2024-10-01 RX ADMIN — KETOROLAC TROMETHAMINE 15 MILLIGRAM(S): 10 TABLET, FILM COATED ORAL at 19:39

## 2024-10-01 RX ADMIN — FOLIC ACID 1 MILLIGRAM(S): 1 TABLET ORAL at 11:12

## 2024-10-01 RX ADMIN — KETOROLAC TROMETHAMINE 15 MILLIGRAM(S): 10 TABLET, FILM COATED ORAL at 05:00

## 2024-10-01 RX ADMIN — MORPHINE SULFATE 30 MILLIGRAM(S): 30 TABLET, FILM COATED, EXTENDED RELEASE ORAL at 06:00

## 2024-10-01 RX ADMIN — MORPHINE SULFATE 30 MILLILITER(S): 30 TABLET, FILM COATED, EXTENDED RELEASE ORAL at 20:14

## 2024-10-01 RX ADMIN — MORPHINE SULFATE 30 MILLIGRAM(S): 30 TABLET, FILM COATED, EXTENDED RELEASE ORAL at 18:38

## 2024-10-01 RX ADMIN — KETOROLAC TROMETHAMINE 15 MILLIGRAM(S): 10 TABLET, FILM COATED ORAL at 12:12

## 2024-10-01 RX ADMIN — KETOROLAC TROMETHAMINE 15 MILLIGRAM(S): 10 TABLET, FILM COATED ORAL at 01:05

## 2024-10-01 RX ADMIN — KETOROLAC TROMETHAMINE 15 MILLIGRAM(S): 10 TABLET, FILM COATED ORAL at 11:12

## 2024-10-01 RX ADMIN — KETOROLAC TROMETHAMINE 15 MILLIGRAM(S): 10 TABLET, FILM COATED ORAL at 06:00

## 2024-10-01 RX ADMIN — MORPHINE SULFATE 30 MILLIGRAM(S): 30 TABLET, FILM COATED, EXTENDED RELEASE ORAL at 19:38

## 2024-10-01 RX ADMIN — Medication 10 GRAM(S): at 05:01

## 2024-10-01 NOTE — PROGRESS NOTE ADULT - ATTENDING COMMENTS
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   prolonged hospital stay , still c/o pains in his hips, across left shoulder and knees  reduced pca pump morphine to 1.5 mg on 9/30, 4h lockout 30mg  cw mscontin 30mg bid , reports home dose mscontin 15mg qd  c/w IVF 1/2 NS, incentive spirometer  trend LDH/CBC/BMP.  doing lab QOD

## 2024-10-01 NOTE — PROGRESS NOTE ADULT - SUBJECTIVE AND OBJECTIVE BOX
Progress Note    08-25-24 (37d)    Patient is a 29y old  Male who presents with a chief complaint of sickle cell crisis (30 Sep 2024 07:15)      Subjective / Overnight Events :  - No acute events overnight.  - Pt seen and examined at bedside.     Additional ROS (if any):    MEDICATIONS  (STANDING):  chlorhexidine 2% Cloths 1 Application(s) Topical daily  enoxaparin Injectable 30 milliGRAM(s) SubCutaneous every 24 hours  folic acid 1 milliGRAM(s) Oral daily  glutamine Powder 10 Gram(s) Oral two times a day  ketorolac   Injectable 15 milliGRAM(s) IV Push every 6 hours  morphine ER Tablet 30 milliGRAM(s) Oral two times a day  morphine PCA (5 mG/mL) 30 milliLiter(s) PCA Continuous PCA Continuous  penicillin   milliGRAM(s) Oral two times a day  sodium chloride 0.45%. 1000 milliLiter(s) (75 mL/Hr) IV Continuous <Continuous>    MEDICATIONS  (PRN):  naloxone Injectable 0.1 milliGRAM(s) IV Push every 3 minutes PRN For ANY of the following changes in patient status:  A. RR LESS THAN 10 breaths per minute, B. Oxygen saturation LESS THAN 90%, C. Sedation score of 6  ondansetron Injectable 4 milliGRAM(s) IV Push every 6 hours PRN Nausea  polyethylene glycol 3350 17 Gram(s) Oral two times a day PRN Constipation  senna 2 Tablet(s) Oral at bedtime PRN Constipation          PHYSICAL EXAM:  Vital Signs Last 24 Hrs  T(C): 36.9 (01 Oct 2024 04:00), Max: 36.9 (30 Sep 2024 20:00)  T(F): 98.4 (01 Oct 2024 04:00), Max: 98.5 (30 Sep 2024 20:00)  HR: 83 (01 Oct 2024 04:00) (70 - 83)  BP: 143/97 (01 Oct 2024 04:00) (111/74 - 143/97)  BP(mean): --  RR: 18 (01 Oct 2024 04:00) (16 - 18)  SpO2: 100% (01 Oct 2024 04:00) (96% - 100%)    Parameters below as of 01 Oct 2024 04:00  Patient On (Oxygen Delivery Method): room air        I&O's Summary    30 Sep 2024 07:01  -  01 Oct 2024 07:00  --------------------------------------------------------  IN: 0 mL / OUT: 800 mL / NET: -800 mL        General: NAD  HEENT: PERRLA, EOMi, no scleral icterus  CV: RRR, normal S1 and S2, no m/r/g  Lungs: normal respiratory effort. CTAB, no wheezes, rales, or rhonchi  Abd: soft, nontender, nondistended, BSx4  Ext: no edema, 2+ peripheral pulses   Pysch: AAOx3  Neuro: grossly non-focal, moving all extremities spontaneously   Skin: no rashes or lesions     LABS:  CAPILLARY BLOOD GLUCOSE                                  8.3    10.09 )-----------( 452      ( 30 Sep 2024 06:58 )             25.7       WBC Trend: 10.09<--, 8.56<--, 7.51<--  Hb Trend: 8.3<--, 8.6<--, 9.0<--    09-30    139  |  105  |  4[L]  ----------------------------<  81  3.2[L]   |  21[L]  |  0.30[L]    Ca    8.5      30 Sep 2024 06:58  Phos  4.2     09-30  Mg     1.80     09-30            Urinalysis Basic - ( 30 Sep 2024 06:58 )    Color: x / Appearance: x / SG: x / pH: x  Gluc: 81 mg/dL / Ketone: x  / Bili: x / Urobili: x   Blood: x / Protein: x / Nitrite: x   Leuk Esterase: x / RBC: x / WBC x   Sq Epi: x / Non Sq Epi: x / Bacteria: x            RADIOLOGY & ADDITIONAL TESTS: Reviewed Progress Note    08-25-24 (37d)    Patient is a 29y old  Male who presents with a chief complaint of sickle cell crisis (30 Sep 2024 07:15)      Subjective / Overnight Events :  - No acute events overnight.  - Pt seen and examined at bedside.   - Pt states he feels that his pain is worse at night. Would like to continue the 1.5mg PCA dose  - Denies cp, sob, abd pain, dysuria  - Reports having BM yesterday    Additional ROS (if any):    MEDICATIONS  (STANDING):  chlorhexidine 2% Cloths 1 Application(s) Topical daily  enoxaparin Injectable 30 milliGRAM(s) SubCutaneous every 24 hours  folic acid 1 milliGRAM(s) Oral daily  glutamine Powder 10 Gram(s) Oral two times a day  ketorolac   Injectable 15 milliGRAM(s) IV Push every 6 hours  morphine ER Tablet 30 milliGRAM(s) Oral two times a day  morphine PCA (5 mG/mL) 30 milliLiter(s) PCA Continuous PCA Continuous  penicillin   milliGRAM(s) Oral two times a day  sodium chloride 0.45%. 1000 milliLiter(s) (75 mL/Hr) IV Continuous <Continuous>    MEDICATIONS  (PRN):  naloxone Injectable 0.1 milliGRAM(s) IV Push every 3 minutes PRN For ANY of the following changes in patient status:  A. RR LESS THAN 10 breaths per minute, B. Oxygen saturation LESS THAN 90%, C. Sedation score of 6  ondansetron Injectable 4 milliGRAM(s) IV Push every 6 hours PRN Nausea  polyethylene glycol 3350 17 Gram(s) Oral two times a day PRN Constipation  senna 2 Tablet(s) Oral at bedtime PRN Constipation          PHYSICAL EXAM:  Vital Signs Last 24 Hrs  T(C): 36.9 (01 Oct 2024 04:00), Max: 36.9 (30 Sep 2024 20:00)  T(F): 98.4 (01 Oct 2024 04:00), Max: 98.5 (30 Sep 2024 20:00)  HR: 83 (01 Oct 2024 04:00) (70 - 83)  BP: 143/97 (01 Oct 2024 04:00) (111/74 - 143/97)  BP(mean): --  RR: 18 (01 Oct 2024 04:00) (16 - 18)  SpO2: 100% (01 Oct 2024 04:00) (96% - 100%)    Parameters below as of 01 Oct 2024 04:00  Patient On (Oxygen Delivery Method): room air        I&O's Summary    30 Sep 2024 07:01  -  01 Oct 2024 07:00  --------------------------------------------------------  IN: 0 mL / OUT: 800 mL / NET: -800 mL        General: NAD  CV: RRR, normal S1 and S2, no m/r/g  Lungs: normal respiratory effort. CTAB, no wheezes, rales, or rhonchi  Abd: soft, nontender, nondistended, BSx4  Ext: no edema, 2+ peripheral pulses   Pysch: AAOx3     LABS:  CAPILLARY BLOOD GLUCOSE                                  8.3    10.09 )-----------( 452      ( 30 Sep 2024 06:58 )             25.7       WBC Trend: 10.09<--, 8.56<--, 7.51<--  Hb Trend: 8.3<--, 8.6<--, 9.0<--    09-30    139  |  105  |  4[L]  ----------------------------<  81  3.2[L]   |  21[L]  |  0.30[L]    Ca    8.5      30 Sep 2024 06:58  Phos  4.2     09-30  Mg     1.80     09-30            Urinalysis Basic - ( 30 Sep 2024 06:58 )    Color: x / Appearance: x / SG: x / pH: x  Gluc: 81 mg/dL / Ketone: x  / Bili: x / Urobili: x   Blood: x / Protein: x / Nitrite: x   Leuk Esterase: x / RBC: x / WBC x   Sq Epi: x / Non Sq Epi: x / Bacteria: x            RADIOLOGY & ADDITIONAL TESTS: Reviewed

## 2024-10-01 NOTE — CHART NOTE - NSCHARTNOTEFT_GEN_A_CORE
Nutrition Follow-Up Note         Source: Patient A&Ox 4    Family [ ]     RN [ ]    Chart [x ]     Pt seen for nutrition follow up due to severe malnutrition.    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    ACTIVE DIET ORDER  Diet, Regular:   Supplement Feeding Modality:  Oral  Ensure Plus High Protein Cans or Servings Per Day:  1       Frequency:  Two Times a day (08-28-24 @ 11:03) [Active]    NUTRITION COURSE  Patient seen in his room at good sleep when RD visited with ear buds, difficult to arouse. Comprehensive chart review completed. Patient has been reported good oral intake in house with >% meal completion per RN flowsheet. Consumed Ensure Plus High Protein 8 oz. 2 x/day (700kcal, 40gm protein). No reported GI issues such as nausea/vomiting/diarrhea/constipation, on bowel regimen (senna, polyethylene glycol). Last BM reported on 9/30 per 10/1 Internal Medicine chart review. Continue on folic acid, s/p KCl (hypokalemia 3.2) for micronutrient repletion. RDN to remain available for further nutrition intervention as indicated.     PERTINENT MEDICATION  MEDICATIONS  (STANDING):  chlorhexidine 2% Cloths 1 Application(s) Topical daily  enoxaparin Injectable 30 milliGRAM(s) SubCutaneous every 24 hours  folic acid 1 milliGRAM(s) Oral daily  glutamine Powder 10 Gram(s) Oral two times a day  ketorolac   Injectable 15 milliGRAM(s) IV Push every 6 hours  morphine ER Tablet 30 milliGRAM(s) Oral two times a day  morphine PCA (5 mG/mL) 30 milliLiter(s) PCA Continuous PCA Continuous  pantoprazole    Tablet 40 milliGRAM(s) Oral before breakfast  penicillin   milliGRAM(s) Oral two times a day  sodium chloride 0.45%. 1000 milliLiter(s) (75 mL/Hr) IV Continuous <Continuous>    MEDICATIONS  (PRN):  naloxone Injectable 0.1 milliGRAM(s) IV Push every 3 minutes PRN For ANY of the following changes in patient status:  A. RR LESS THAN 10 breaths per minute, B. Oxygen saturation LESS THAN 90%, C. Sedation score of 6  ondansetron Injectable 4 milliGRAM(s) IV Push every 6 hours PRN Nausea  polyethylene glycol 3350 17 Gram(s) Oral two times a day PRN Constipation  senna 2 Tablet(s) Oral at bedtime PRN Constipation      PERTINENT LABS  09-30    139  |  105  |  4[L]  ----------------------------<  81  3.2[L]   |  21[L]  |  0.30[L]    Ca    8.5      30 Sep 2024 06:58  Phos  4.2     09-30  Mg     1.80     09-30               8.3    10.09 )-----------( 452      ( 30 Sep 2024 06:58 )             25.7   ANTHROPOMETRICS  Height (cm): 165.1 (08-27-24)  Weight (kg): 41 (09-04-24)  BMI (kg/m2): 15 (09-04-24)  IBW: 136 lbs/61.8kg  +/- 10%    Weight Assessment: per RN flowsheet in KG  41.2kg (9-18)  43 kg (9-25)  % weight change : +2kg/4.8% weight gain x 3 weeks in house.    Edema: No edema per RN flowsheets     Skin: intact    ESTIMATED NEEDS ASSESSMENT  [ x ] recalculated, weight Used: dosing weight 43kg  Estimated Energy: 2231-1282 Kcal/kg/day ( 30-35 kcal/kg)  Estimated Protein: 51.6-64.5 gm/kg/day (1.2-1.5 gm/kg)    PREVIOUS NUTRITION DIAGNOSIS  [ x ] Severe malnutrition  [ x ] Underweight     Nutrition Diagnosis is : [ x ] ongoing    New Nutrition Diagnosis :  [ x ] not applicable     EDUCATION  [ x ] Not warranted at present    RECOMMENDATION  [ x ] Continue present PO diet and supplement order as prescribed  [ x ] Honor food and fluid preferences as able.    MONITORING AND EVALUATION   [ x ] Monitor PO intake, skin integrity, bowel regimen, and nutrition pertinent labs.  [ x ] Tolerance to diet prescription [ x ] weights [ x ] follow up per protocol

## 2024-10-01 NOTE — PROGRESS NOTE ADULT - ASSESSMENT
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 S/p PRBC x1 unit (9/19, 9/22, 9/26). Pain currently being managed with PCA pump.

## 2024-10-01 NOTE — PROGRESS NOTE ADULT - PROBLEM SELECTOR PLAN 1
- follows with Dr. Montano, missed monthly dose of Adakveo for 2 months which may explain SCC  - low concern for acute chest given no opacities on CXR, chest pain more consistent with SCC  - transaminitis likely 2/2 to hepatopathy iso vasoocclusive crisis, no abdominal pain and LFTs improving, could otherwise consider RUQ US  - palliative deferred pain management to primary team   - s/p 1 U PRBC 9/19, 9/22, 9/26  - pain has little improvement and patient consistently reports worse pain when sleeping: will consider switching to long-acting PO medication     A/P:  - Heme consults appreciated  - c/w 1/2 NS  - PCA pump downtitrated to 1.5 mg, toradol 15mg q6h w/ IV protonix 40 for x5 days   - c/w 30 mg ERBID Morphine (home medication 15 mg ER BID, would like to go home on home dose when DC)  - C/w humidified O2 for pain  - bowel regimen: miralax BID, senna 2 MG PRN  - Hold home hydroxyurea (need to take home med)  - d/c oxbryta iso of recall, will follow up with o/p Dr. Montano  - incentive spirometer  - will continue to monitor for signs of acute chest syndrome. If fever, culture and repeat CXR stat

## 2024-10-02 LAB
ADD ON TEST-SPECIMEN IN LAB: SIGNIFICANT CHANGE UP
ALBUMIN SERPL ELPH-MCNC: 3.3 G/DL — SIGNIFICANT CHANGE UP (ref 3.3–5)
ALP SERPL-CCNC: 352 U/L — HIGH (ref 40–120)
ALT FLD-CCNC: 38 U/L — SIGNIFICANT CHANGE UP (ref 4–41)
ANION GAP SERPL CALC-SCNC: 15 MMOL/L — HIGH (ref 7–14)
AST SERPL-CCNC: 68 U/L — HIGH (ref 4–40)
BASOPHILS # BLD AUTO: 0.05 K/UL — SIGNIFICANT CHANGE UP (ref 0–0.2)
BASOPHILS NFR BLD AUTO: 0.5 % — SIGNIFICANT CHANGE UP (ref 0–2)
BILIRUB DIRECT SERPL-MCNC: 0.7 MG/DL — HIGH (ref 0–0.3)
BILIRUB SERPL-MCNC: 1.8 MG/DL — HIGH (ref 0.2–1.2)
BUN SERPL-MCNC: 7 MG/DL — SIGNIFICANT CHANGE UP (ref 7–23)
CALCIUM SERPL-MCNC: 8.4 MG/DL — SIGNIFICANT CHANGE UP (ref 8.4–10.5)
CHLORIDE SERPL-SCNC: 104 MMOL/L — SIGNIFICANT CHANGE UP (ref 98–107)
CO2 SERPL-SCNC: 19 MMOL/L — LOW (ref 22–31)
CREAT SERPL-MCNC: 0.3 MG/DL — LOW (ref 0.5–1.3)
EGFR: 166 ML/MIN/1.73M2 — SIGNIFICANT CHANGE UP
EOSINOPHIL # BLD AUTO: 0.05 K/UL — SIGNIFICANT CHANGE UP (ref 0–0.5)
EOSINOPHIL NFR BLD AUTO: 0.5 % — SIGNIFICANT CHANGE UP (ref 0–6)
GLUCOSE SERPL-MCNC: 86 MG/DL — SIGNIFICANT CHANGE UP (ref 70–99)
HCT VFR BLD CALC: 24.4 % — LOW (ref 39–50)
HEMOGLOBIN INTERPRETATION: SIGNIFICANT CHANGE UP
HGB A MFR BLD: 37 % — LOW (ref 95–97.6)
HGB A2 MFR BLD: 3.1 % — SIGNIFICANT CHANGE UP (ref 2.4–3.5)
HGB BLD-MCNC: 7.8 G/DL — LOW (ref 13–17)
HGB F MFR BLD: 3.9 % — HIGH (ref 0–1.5)
HGB S MFR BLD: 56 % — HIGH
IANC: 3.49 K/UL — SIGNIFICANT CHANGE UP (ref 1.8–7.4)
IMM GRANULOCYTES NFR BLD AUTO: 0.7 % — SIGNIFICANT CHANGE UP (ref 0–0.9)
LDH SERPL L TO P-CCNC: 712 U/L — HIGH (ref 135–225)
LYMPHOCYTES # BLD AUTO: 5.57 K/UL — HIGH (ref 1–3.3)
LYMPHOCYTES # BLD AUTO: 55.4 % — HIGH (ref 13–44)
MAGNESIUM SERPL-MCNC: 1.9 MG/DL — SIGNIFICANT CHANGE UP (ref 1.6–2.6)
MCHC RBC-ENTMCNC: 24.5 PG — LOW (ref 27–34)
MCHC RBC-ENTMCNC: 32 GM/DL — SIGNIFICANT CHANGE UP (ref 32–36)
MCV RBC AUTO: 76.7 FL — LOW (ref 80–100)
MONOCYTES # BLD AUTO: 0.83 K/UL — SIGNIFICANT CHANGE UP (ref 0–0.9)
MONOCYTES NFR BLD AUTO: 8.3 % — SIGNIFICANT CHANGE UP (ref 2–14)
NEUTROPHILS # BLD AUTO: 3.49 K/UL — SIGNIFICANT CHANGE UP (ref 1.8–7.4)
NEUTROPHILS NFR BLD AUTO: 34.6 % — LOW (ref 43–77)
NRBC # BLD: 5 /100 WBCS — HIGH (ref 0–0)
NRBC # FLD: 0.49 K/UL — HIGH (ref 0–0)
PHOSPHATE SERPL-MCNC: 4.5 MG/DL — SIGNIFICANT CHANGE UP (ref 2.5–4.5)
PLATELET # BLD AUTO: 425 K/UL — HIGH (ref 150–400)
POTASSIUM SERPL-MCNC: 3.8 MMOL/L — SIGNIFICANT CHANGE UP (ref 3.5–5.3)
POTASSIUM SERPL-SCNC: 3.8 MMOL/L — SIGNIFICANT CHANGE UP (ref 3.5–5.3)
PROT SERPL-MCNC: 7.7 G/DL — SIGNIFICANT CHANGE UP (ref 6–8.3)
RBC # BLD: 3.18 M/UL — LOW (ref 4.2–5.8)
RBC # BLD: 3.18 M/UL — LOW (ref 4.2–5.8)
RBC # FLD: 24.9 % — HIGH (ref 10.3–14.5)
RETICS #: SIGNIFICANT CHANGE UP (ref 25–125)
RETICS/RBC NFR: >22.2 % — HIGH (ref 0.5–2.5)
SODIUM SERPL-SCNC: 138 MMOL/L — SIGNIFICANT CHANGE UP (ref 135–145)
WBC # BLD: 10.06 K/UL — SIGNIFICANT CHANGE UP (ref 3.8–10.5)
WBC # FLD AUTO: 10.06 K/UL — SIGNIFICANT CHANGE UP (ref 3.8–10.5)

## 2024-10-02 PROCEDURE — 99232 SBSQ HOSP IP/OBS MODERATE 35: CPT | Mod: GC

## 2024-10-02 RX ORDER — HYDROXYUREA 500 MG/1
750 CAPSULE ORAL
Refills: 0 | Status: DISCONTINUED | OUTPATIENT
Start: 2024-10-02 | End: 2024-10-03

## 2024-10-02 RX ORDER — HYDROXYUREA 500 MG/1
1000 CAPSULE ORAL
Refills: 0 | Status: DISCONTINUED | OUTPATIENT
Start: 2024-10-02 | End: 2024-10-10

## 2024-10-02 RX ADMIN — KETOROLAC TROMETHAMINE 15 MILLIGRAM(S): 10 TABLET, FILM COATED ORAL at 18:30

## 2024-10-02 RX ADMIN — KETOROLAC TROMETHAMINE 15 MILLIGRAM(S): 10 TABLET, FILM COATED ORAL at 07:02

## 2024-10-02 RX ADMIN — PENICILLIN V POTASSIUM 250 MILLIGRAM(S): 500 TABLET ORAL at 05:29

## 2024-10-02 RX ADMIN — KETOROLAC TROMETHAMINE 15 MILLIGRAM(S): 10 TABLET, FILM COATED ORAL at 17:34

## 2024-10-02 RX ADMIN — MORPHINE SULFATE 30 MILLIGRAM(S): 30 TABLET, FILM COATED, EXTENDED RELEASE ORAL at 17:34

## 2024-10-02 RX ADMIN — MORPHINE SULFATE 30 MILLIGRAM(S): 30 TABLET, FILM COATED, EXTENDED RELEASE ORAL at 05:29

## 2024-10-02 RX ADMIN — Medication 10 GRAM(S): at 17:35

## 2024-10-02 RX ADMIN — HYDROXYUREA 1000 MILLIGRAM(S): 500 CAPSULE ORAL at 13:00

## 2024-10-02 RX ADMIN — MORPHINE SULFATE 30 MILLILITER(S): 30 TABLET, FILM COATED, EXTENDED RELEASE ORAL at 08:17

## 2024-10-02 RX ADMIN — MORPHINE SULFATE 30 MILLIGRAM(S): 30 TABLET, FILM COATED, EXTENDED RELEASE ORAL at 06:29

## 2024-10-02 RX ADMIN — Medication 4 MILLIGRAM(S): at 20:26

## 2024-10-02 RX ADMIN — KETOROLAC TROMETHAMINE 15 MILLIGRAM(S): 10 TABLET, FILM COATED ORAL at 06:02

## 2024-10-02 RX ADMIN — MORPHINE SULFATE 30 MILLILITER(S): 30 TABLET, FILM COATED, EXTENDED RELEASE ORAL at 00:16

## 2024-10-02 RX ADMIN — Medication 75 MILLILITER(S): at 03:42

## 2024-10-02 RX ADMIN — KETOROLAC TROMETHAMINE 15 MILLIGRAM(S): 10 TABLET, FILM COATED ORAL at 00:00

## 2024-10-02 RX ADMIN — KETOROLAC TROMETHAMINE 15 MILLIGRAM(S): 10 TABLET, FILM COATED ORAL at 12:50

## 2024-10-02 RX ADMIN — Medication 10 GRAM(S): at 05:29

## 2024-10-02 RX ADMIN — KETOROLAC TROMETHAMINE 15 MILLIGRAM(S): 10 TABLET, FILM COATED ORAL at 01:00

## 2024-10-02 RX ADMIN — PENICILLIN V POTASSIUM 250 MILLIGRAM(S): 500 TABLET ORAL at 17:34

## 2024-10-02 RX ADMIN — KETOROLAC TROMETHAMINE 15 MILLIGRAM(S): 10 TABLET, FILM COATED ORAL at 11:53

## 2024-10-02 RX ADMIN — MORPHINE SULFATE 30 MILLIGRAM(S): 30 TABLET, FILM COATED, EXTENDED RELEASE ORAL at 18:30

## 2024-10-02 RX ADMIN — CHLORHEXIDINE GLUCONATE ORAL RINSE 1 APPLICATION(S): 1.2 SOLUTION DENTAL at 11:53

## 2024-10-02 RX ADMIN — FOLIC ACID 1 MILLIGRAM(S): 1 TABLET ORAL at 11:54

## 2024-10-02 RX ADMIN — KETOROLAC TROMETHAMINE 15 MILLIGRAM(S): 10 TABLET, FILM COATED ORAL at 23:17

## 2024-10-02 RX ADMIN — PANTOPRAZOLE SODIUM 40 MILLIGRAM(S): 40 TABLET, DELAYED RELEASE ORAL at 06:54

## 2024-10-02 RX ADMIN — MORPHINE SULFATE 30 MILLILITER(S): 30 TABLET, FILM COATED, EXTENDED RELEASE ORAL at 20:11

## 2024-10-02 NOTE — PROGRESS NOTE ADULT - SUBJECTIVE AND OBJECTIVE BOX
Progress Note    08-25-24 (38d)    Patient is a 29y old  Male who presents with a chief complaint of sickle cell crisis (01 Oct 2024 07:09)      Subjective / Overnight Events :  - No acute events overnight.  - Pt seen and examined at bedside.     Additional ROS (if any):    MEDICATIONS  (STANDING):  chlorhexidine 2% Cloths 1 Application(s) Topical daily  enoxaparin Injectable 30 milliGRAM(s) SubCutaneous every 24 hours  folic acid 1 milliGRAM(s) Oral daily  glutamine Powder 10 Gram(s) Oral two times a day  ketorolac   Injectable 15 milliGRAM(s) IV Push every 6 hours  morphine ER Tablet 30 milliGRAM(s) Oral two times a day  morphine PCA (5 mG/mL) 30 milliLiter(s) PCA Continuous PCA Continuous  pantoprazole    Tablet 40 milliGRAM(s) Oral before breakfast  penicillin   milliGRAM(s) Oral two times a day  sodium chloride 0.45%. 1000 milliLiter(s) (75 mL/Hr) IV Continuous <Continuous>    MEDICATIONS  (PRN):  naloxone Injectable 0.1 milliGRAM(s) IV Push every 3 minutes PRN For ANY of the following changes in patient status:  A. RR LESS THAN 10 breaths per minute, B. Oxygen saturation LESS THAN 90%, C. Sedation score of 6  ondansetron Injectable 4 milliGRAM(s) IV Push every 6 hours PRN Nausea  polyethylene glycol 3350 17 Gram(s) Oral two times a day PRN Constipation  senna 2 Tablet(s) Oral at bedtime PRN Constipation          PHYSICAL EXAM:  Vital Signs Last 24 Hrs  T(C): 36.6 (02 Oct 2024 04:00), Max: 37.1 (01 Oct 2024 12:00)  T(F): 97.9 (02 Oct 2024 04:00), Max: 98.8 (01 Oct 2024 12:00)  HR: 81 (02 Oct 2024 04:00) (69 - 86)  BP: 117/80 (02 Oct 2024 04:00) (106/69 - 137/89)  BP(mean): --  RR: 18 (02 Oct 2024 04:00) (18 - 18)  SpO2: 97% (02 Oct 2024 04:00) (97% - 100%)    Parameters below as of 02 Oct 2024 04:00  Patient On (Oxygen Delivery Method): room air        I&O's Summary    01 Oct 2024 07:01  -  02 Oct 2024 07:00  --------------------------------------------------------  IN: 0 mL / OUT: 600 mL / NET: -600 mL        General: NAD  HEENT: PERRLA, EOMi, no scleral icterus  CV: RRR, normal S1 and S2, no m/r/g  Lungs: normal respiratory effort. CTAB, no wheezes, rales, or rhonchi  Abd: soft, nontender, nondistended, BSx4  Ext: no edema, 2+ peripheral pulses   Pysch: AAOx3  Neuro: grossly non-focal, moving all extremities spontaneously   Skin: no rashes or lesions     LABS:  CAPILLARY BLOOD GLUCOSE                WBC Trend: 10.09<--, 8.56<--, 7.51<--  Hb Trend: 8.3<--, 8.6<--, 9.0<--                        RADIOLOGY & ADDITIONAL TESTS: Reviewed Progress Note    08-25-24 (38d)    Patient is a 29y old  Male who presents with a chief complaint of sickle cell crisis (01 Oct 2024 07:09)      Subjective / Overnight Events :  - No acute events overnight.  - Pt seen and examined at bedside.   - Pt reports that he continues to have pain, and has been using his PCA less in an effort to wean himself quicker  - Is interested in reducing his dose to 1.25  - Denies cp, sob, abd pain, leg pain, dysuria    Additional ROS (if any):    MEDICATIONS  (STANDING):  chlorhexidine 2% Cloths 1 Application(s) Topical daily  enoxaparin Injectable 30 milliGRAM(s) SubCutaneous every 24 hours  folic acid 1 milliGRAM(s) Oral daily  glutamine Powder 10 Gram(s) Oral two times a day  ketorolac   Injectable 15 milliGRAM(s) IV Push every 6 hours  morphine ER Tablet 30 milliGRAM(s) Oral two times a day  morphine PCA (5 mG/mL) 30 milliLiter(s) PCA Continuous PCA Continuous  pantoprazole    Tablet 40 milliGRAM(s) Oral before breakfast  penicillin   milliGRAM(s) Oral two times a day  sodium chloride 0.45%. 1000 milliLiter(s) (75 mL/Hr) IV Continuous <Continuous>    MEDICATIONS  (PRN):  naloxone Injectable 0.1 milliGRAM(s) IV Push every 3 minutes PRN For ANY of the following changes in patient status:  A. RR LESS THAN 10 breaths per minute, B. Oxygen saturation LESS THAN 90%, C. Sedation score of 6  ondansetron Injectable 4 milliGRAM(s) IV Push every 6 hours PRN Nausea  polyethylene glycol 3350 17 Gram(s) Oral two times a day PRN Constipation  senna 2 Tablet(s) Oral at bedtime PRN Constipation          PHYSICAL EXAM:  Vital Signs Last 24 Hrs  T(C): 36.6 (02 Oct 2024 04:00), Max: 37.1 (01 Oct 2024 12:00)  T(F): 97.9 (02 Oct 2024 04:00), Max: 98.8 (01 Oct 2024 12:00)  HR: 81 (02 Oct 2024 04:00) (69 - 86)  BP: 117/80 (02 Oct 2024 04:00) (106/69 - 137/89)  BP(mean): --  RR: 18 (02 Oct 2024 04:00) (18 - 18)  SpO2: 97% (02 Oct 2024 04:00) (97% - 100%)    Parameters below as of 02 Oct 2024 04:00  Patient On (Oxygen Delivery Method): room air        I&O's Summary    01 Oct 2024 07:01  -  02 Oct 2024 07:00  --------------------------------------------------------  IN: 0 mL / OUT: 600 mL / NET: -600 mL        General: NAD  CV: RRR, normal S1 and S2, no m/r/g  Lungs: normal respiratory effort. CTAB, no wheezes, rales, or rhonchi  Abd: soft, nontender, nondistended, BSx4  Ext: no edema, 2+ peripheral pulses   Pysch: AAOx3     LABS:  CAPILLARY BLOOD GLUCOSE                WBC Trend: 10.09<--, 8.56<--, 7.51<--  Hb Trend: 8.3<--, 8.6<--, 9.0<--                        RADIOLOGY & ADDITIONAL TESTS: Reviewed

## 2024-10-02 NOTE — PROGRESS NOTE ADULT - PROBLEM SELECTOR PLAN 1
- follows with Dr. Montano, missed monthly dose of Adakveo for 2 months which may explain SCC  - low concern for acute chest given no opacities on CXR, chest pain more consistent with SCC  - transaminitis likely 2/2 to hepatopathy iso vasoocclusive crisis, no abdominal pain and LFTs improving, could otherwise consider RUQ US  - palliative deferred pain management to primary team   - s/p 1 U PRBC 9/19, 9/22, 9/26  - pain has little improvement and patient consistently reports worse pain when sleeping: will consider switching to long-acting PO medication     A/P:  - Heme consults appreciated  - c/w 1/2 NS  - PCA pump downtitrated to 1.5 mg, toradol 15mg q6h w/ IV protonix 40 for x5 days   - c/w 30 mg ERBID Morphine (home medication 15 mg ER BID, would like to go home on home dose when DC)  - C/w humidified O2 for pain  - bowel regimen: miralax BID, senna 2 MG PRN  - Hold home hydroxyurea (need to take home med)  - d/c oxbryta iso of recall, will follow up with o/p Dr. Montano  - incentive spirometer  - will continue to monitor for signs of acute chest syndrome. If fever, culture and repeat CXR stat - follows with Dr. Montano, missed monthly dose of Adakveo for 2 months which may explain SCC  - low concern for acute chest given no opacities on CXR, chest pain more consistent with SCC  - transaminitis likely 2/2 to hepatopathy iso vasoocclusive crisis, no abdominal pain and LFTs improving, could otherwise consider RUQ US  - palliative deferred pain management to primary team   - s/p 1 U PRBC 9/19, 9/22, 9/26  - pain has little improvement and patient consistently reports worse pain when sleeping: will consider switching to long-acting PO medication     A/P:  - Heme consults appreciated  - c/w 1/2 NS  - PCA pump downtitrated to 1.25 mg, toradol 15mg q6h w/ IV protonix 40 for x5 days   - c/w 30 mg ERBID Morphine (home medication 15 mg ER BID, would like to go home on home dose when DC)  - C/w humidified O2 for pain  - bowel regimen: miralax BID, senna 2 MG PRN  - Hold home hydroxyurea (need to take home med)  - d/c oxbryta iso of recall, will follow up with o/p Dr. Montano  - incentive spirometer  - will continue to monitor for signs of acute chest syndrome. If fever, culture and repeat CXR stat

## 2024-10-03 PROCEDURE — 99232 SBSQ HOSP IP/OBS MODERATE 35: CPT | Mod: GC

## 2024-10-03 RX ORDER — HYDROXYUREA 500 MG/1
1500 CAPSULE ORAL
Refills: 0 | Status: DISCONTINUED | OUTPATIENT
Start: 2024-10-03 | End: 2024-10-10

## 2024-10-03 RX ORDER — MORPHINE SULFATE 30 MG/1
30 TABLET, FILM COATED, EXTENDED RELEASE ORAL
Refills: 0 | Status: DISCONTINUED | OUTPATIENT
Start: 2024-10-03 | End: 2024-10-07

## 2024-10-03 RX ORDER — KETOROLAC TROMETHAMINE 10 MG/1
15 TABLET, FILM COATED ORAL EVERY 6 HOURS
Refills: 0 | Status: DISCONTINUED | OUTPATIENT
Start: 2024-10-03 | End: 2024-10-06

## 2024-10-03 RX ORDER — ACETAMINOPHEN 325 MG
650 TABLET ORAL EVERY 6 HOURS
Refills: 0 | Status: DISCONTINUED | OUTPATIENT
Start: 2024-10-03 | End: 2024-10-10

## 2024-10-03 RX ADMIN — MORPHINE SULFATE 30 MILLIGRAM(S): 30 TABLET, FILM COATED, EXTENDED RELEASE ORAL at 18:34

## 2024-10-03 RX ADMIN — KETOROLAC TROMETHAMINE 15 MILLIGRAM(S): 10 TABLET, FILM COATED ORAL at 13:50

## 2024-10-03 RX ADMIN — KETOROLAC TROMETHAMINE 15 MILLIGRAM(S): 10 TABLET, FILM COATED ORAL at 06:19

## 2024-10-03 RX ADMIN — PANTOPRAZOLE SODIUM 40 MILLIGRAM(S): 40 TABLET, DELAYED RELEASE ORAL at 05:20

## 2024-10-03 RX ADMIN — KETOROLAC TROMETHAMINE 15 MILLIGRAM(S): 10 TABLET, FILM COATED ORAL at 05:19

## 2024-10-03 RX ADMIN — Medication 10 GRAM(S): at 05:20

## 2024-10-03 RX ADMIN — Medication 650 MILLIGRAM(S): at 15:39

## 2024-10-03 RX ADMIN — PENICILLIN V POTASSIUM 250 MILLIGRAM(S): 500 TABLET ORAL at 05:20

## 2024-10-03 RX ADMIN — Medication 650 MILLIGRAM(S): at 22:03

## 2024-10-03 RX ADMIN — MORPHINE SULFATE 30 MILLILITER(S): 30 TABLET, FILM COATED, EXTENDED RELEASE ORAL at 12:47

## 2024-10-03 RX ADMIN — KETOROLAC TROMETHAMINE 15 MILLIGRAM(S): 10 TABLET, FILM COATED ORAL at 12:50

## 2024-10-03 RX ADMIN — MORPHINE SULFATE 30 MILLILITER(S): 30 TABLET, FILM COATED, EXTENDED RELEASE ORAL at 19:59

## 2024-10-03 RX ADMIN — MORPHINE SULFATE 30 MILLILITER(S): 30 TABLET, FILM COATED, EXTENDED RELEASE ORAL at 08:19

## 2024-10-03 RX ADMIN — Medication 650 MILLIGRAM(S): at 23:00

## 2024-10-03 RX ADMIN — PENICILLIN V POTASSIUM 250 MILLIGRAM(S): 500 TABLET ORAL at 17:35

## 2024-10-03 RX ADMIN — HYDROXYUREA 1000 MILLIGRAM(S): 500 CAPSULE ORAL at 08:09

## 2024-10-03 RX ADMIN — KETOROLAC TROMETHAMINE 15 MILLIGRAM(S): 10 TABLET, FILM COATED ORAL at 18:34

## 2024-10-03 RX ADMIN — Medication 75 MILLILITER(S): at 17:34

## 2024-10-03 RX ADMIN — MORPHINE SULFATE 30 MILLIGRAM(S): 30 TABLET, FILM COATED, EXTENDED RELEASE ORAL at 06:19

## 2024-10-03 RX ADMIN — MORPHINE SULFATE 30 MILLIGRAM(S): 30 TABLET, FILM COATED, EXTENDED RELEASE ORAL at 17:34

## 2024-10-03 RX ADMIN — KETOROLAC TROMETHAMINE 15 MILLIGRAM(S): 10 TABLET, FILM COATED ORAL at 17:34

## 2024-10-03 RX ADMIN — Medication 75 MILLILITER(S): at 05:34

## 2024-10-03 RX ADMIN — KETOROLAC TROMETHAMINE 15 MILLIGRAM(S): 10 TABLET, FILM COATED ORAL at 00:17

## 2024-10-03 RX ADMIN — Medication 10 GRAM(S): at 17:35

## 2024-10-03 RX ADMIN — Medication 650 MILLIGRAM(S): at 14:48

## 2024-10-03 RX ADMIN — MORPHINE SULFATE 30 MILLIGRAM(S): 30 TABLET, FILM COATED, EXTENDED RELEASE ORAL at 05:19

## 2024-10-03 NOTE — PROGRESS NOTE ADULT - SUBJECTIVE AND OBJECTIVE BOX
Progress Note    08-25-24 (39d)    Patient is a 29y old  Male who presents with a chief complaint of sickle cell crisis (02 Oct 2024 07:08)      Subjective / Overnight Events :  - No acute events overnight.  - Pt seen and examined at bedside.     Additional ROS (if any):    MEDICATIONS  (STANDING):  chlorhexidine 2% Cloths 1 Application(s) Topical daily  enoxaparin Injectable 30 milliGRAM(s) SubCutaneous every 24 hours  folic acid 1 milliGRAM(s) Oral daily  glutamine Powder 10 Gram(s) Oral two times a day  hydroxyurea 750 milliGRAM(s) Oral <User Schedule>  hydroxyurea 1000 milliGRAM(s) Oral <User Schedule>  morphine ER Tablet 30 milliGRAM(s) Oral two times a day  morphine PCA (5 mG/mL) 30 milliLiter(s) PCA Continuous PCA Continuous  pantoprazole    Tablet 40 milliGRAM(s) Oral before breakfast  penicillin   milliGRAM(s) Oral two times a day  sodium chloride 0.45%. 1000 milliLiter(s) (75 mL/Hr) IV Continuous <Continuous>    MEDICATIONS  (PRN):  naloxone Injectable 0.1 milliGRAM(s) IV Push every 3 minutes PRN For ANY of the following changes in patient status:  A. RR LESS THAN 10 breaths per minute, B. Oxygen saturation LESS THAN 90%, C. Sedation score of 6  ondansetron Injectable 4 milliGRAM(s) IV Push every 6 hours PRN Nausea  polyethylene glycol 3350 17 Gram(s) Oral two times a day PRN Constipation  senna 2 Tablet(s) Oral at bedtime PRN Constipation          PHYSICAL EXAM:  Vital Signs Last 24 Hrs  T(C): 36.6 (03 Oct 2024 04:00), Max: 36.7 (03 Oct 2024 00:00)  T(F): 97.8 (03 Oct 2024 04:00), Max: 98 (03 Oct 2024 00:00)  HR: 79 (03 Oct 2024 04:00) (71 - 80)  BP: 115/74 (03 Oct 2024 04:00) (114/70 - 120/78)  BP(mean): --  RR: 17 (03 Oct 2024 04:00) (17 - 19)  SpO2: 96% (03 Oct 2024 04:00) (96% - 100%)    Parameters below as of 03 Oct 2024 04:00  Patient On (Oxygen Delivery Method): room air        I&O's Summary    02 Oct 2024 07:01  -  03 Oct 2024 07:00  --------------------------------------------------------  IN: 1850 mL / OUT: 2350 mL / NET: -500 mL        General: NAD  HEENT: PERRLA, EOMi, no scleral icterus  CV: RRR, normal S1 and S2, no m/r/g  Lungs: normal respiratory effort. CTAB, no wheezes, rales, or rhonchi  Abd: soft, nontender, nondistended, BSx4  Ext: no edema, 2+ peripheral pulses   Pysch: AAOx3  Neuro: grossly non-focal, moving all extremities spontaneously   Skin: no rashes or lesions     LABS:  CAPILLARY BLOOD GLUCOSE                                  7.8    10.06 )-----------( 425      ( 02 Oct 2024 06:10 )             24.4       WBC Trend: 10.06<--, 10.09<--, 8.56<--  Hb Trend: 7.8<--, 8.3<--, 8.6<--    10-02    138  |  104  |  7   ----------------------------<  86  3.8   |  19[L]  |  0.30[L]    Ca    8.4      02 Oct 2024 06:10  Phos  4.5     10-02  Mg     1.90     10-02    TPro  7.7  /  Alb  3.3  /  TBili  1.8[H]  /  DBili  0.7[H]  /  AST  68[H]  /  ALT  38  /  AlkPhos  352[H]  10-02          Urinalysis Basic - ( 02 Oct 2024 06:10 )    Color: x / Appearance: x / SG: x / pH: x  Gluc: 86 mg/dL / Ketone: x  / Bili: x / Urobili: x   Blood: x / Protein: x / Nitrite: x   Leuk Esterase: x / RBC: x / WBC x   Sq Epi: x / Non Sq Epi: x / Bacteria: x            RADIOLOGY & ADDITIONAL TESTS: Reviewed Progress Note    08-25-24 (39d)    Patient is a 29y old  Male who presents with a chief complaint of sickle cell crisis (02 Oct 2024 07:08)      Subjective / Overnight Events :  - No acute events overnight.  - Pt seen and examined at bedside.   - Continues to report 8/10 pain in his b/l shoulders and b/l knees  - States he has been pushing less in an effort to be discharged  - Reports good BMs  - Denies cp, sob, abd pain, dysuria    Additional ROS (if any):    MEDICATIONS  (STANDING):  chlorhexidine 2% Cloths 1 Application(s) Topical daily  enoxaparin Injectable 30 milliGRAM(s) SubCutaneous every 24 hours  folic acid 1 milliGRAM(s) Oral daily  glutamine Powder 10 Gram(s) Oral two times a day  hydroxyurea 750 milliGRAM(s) Oral <User Schedule>  hydroxyurea 1000 milliGRAM(s) Oral <User Schedule>  morphine ER Tablet 30 milliGRAM(s) Oral two times a day  morphine PCA (5 mG/mL) 30 milliLiter(s) PCA Continuous PCA Continuous  pantoprazole    Tablet 40 milliGRAM(s) Oral before breakfast  penicillin   milliGRAM(s) Oral two times a day  sodium chloride 0.45%. 1000 milliLiter(s) (75 mL/Hr) IV Continuous <Continuous>    MEDICATIONS  (PRN):  naloxone Injectable 0.1 milliGRAM(s) IV Push every 3 minutes PRN For ANY of the following changes in patient status:  A. RR LESS THAN 10 breaths per minute, B. Oxygen saturation LESS THAN 90%, C. Sedation score of 6  ondansetron Injectable 4 milliGRAM(s) IV Push every 6 hours PRN Nausea  polyethylene glycol 3350 17 Gram(s) Oral two times a day PRN Constipation  senna 2 Tablet(s) Oral at bedtime PRN Constipation          PHYSICAL EXAM:  Vital Signs Last 24 Hrs  T(C): 36.6 (03 Oct 2024 04:00), Max: 36.7 (03 Oct 2024 00:00)  T(F): 97.8 (03 Oct 2024 04:00), Max: 98 (03 Oct 2024 00:00)  HR: 79 (03 Oct 2024 04:00) (71 - 80)  BP: 115/74 (03 Oct 2024 04:00) (114/70 - 120/78)  BP(mean): --  RR: 17 (03 Oct 2024 04:00) (17 - 19)  SpO2: 96% (03 Oct 2024 04:00) (96% - 100%)    Parameters below as of 03 Oct 2024 04:00  Patient On (Oxygen Delivery Method): room air        I&O's Summary    02 Oct 2024 07:01  -  03 Oct 2024 07:00  --------------------------------------------------------  IN: 1850 mL / OUT: 2350 mL / NET: -500 mL        General: NAD  CV: RRR, normal S1 and S2, no m/r/g  Lungs: normal respiratory effort. CTAB, no wheezes, rales, or rhonchi  Abd: soft, nontender, nondistended, BSx4  Ext: no edema, 2+ peripheral pulses. b/l shoulders and knees ROM limited by pain. No erythema, swelling, fluctuance   Pysch: AAOx3     LABS:  CAPILLARY BLOOD GLUCOSE                                  7.8    10.06 )-----------( 425      ( 02 Oct 2024 06:10 )             24.4       WBC Trend: 10.06<--, 10.09<--, 8.56<--  Hb Trend: 7.8<--, 8.3<--, 8.6<--    10-02    138  |  104  |  7   ----------------------------<  86  3.8   |  19[L]  |  0.30[L]    Ca    8.4      02 Oct 2024 06:10  Phos  4.5     10-02  Mg     1.90     10-02    TPro  7.7  /  Alb  3.3  /  TBili  1.8[H]  /  DBili  0.7[H]  /  AST  68[H]  /  ALT  38  /  AlkPhos  352[H]  10-02          Urinalysis Basic - ( 02 Oct 2024 06:10 )    Color: x / Appearance: x / SG: x / pH: x  Gluc: 86 mg/dL / Ketone: x  / Bili: x / Urobili: x   Blood: x / Protein: x / Nitrite: x   Leuk Esterase: x / RBC: x / WBC x   Sq Epi: x / Non Sq Epi: x / Bacteria: x            RADIOLOGY & ADDITIONAL TESTS: Reviewed

## 2024-10-03 NOTE — PROGRESS NOTE ADULT - ATTENDING COMMENTS
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   prolonged hospital stay , still c/o pains in his hips, across left shoulder and knees  reduced pca pump morphine to 1.5 mg on 9/30, 4h lockout 30mg    HgbS% downtrending to 56%    A/P  pt willing to decrease pump to 1.25 mg today, add toradol/tylenol q6, c/w ppi for gi ppx  cw mscontin 30mg bid , reports home dose mscontin 15mg qd  c/w IVF 1/2 NS, incentive spirometer  resumed home hydrea, d/w Dr. Montano  cystic fibrosis expanded panel negative   trend LDH/CBC/BMP.  d  lab QOD

## 2024-10-03 NOTE — PROGRESS NOTE ADULT - PROBLEM SELECTOR PLAN 1
- follows with Dr. Montano, missed monthly dose of Adakveo for 2 months which may explain SCC  - low concern for acute chest given no opacities on CXR, chest pain more consistent with SCC  - transaminitis likely 2/2 to hepatopathy iso vasoocclusive crisis, no abdominal pain and LFTs improving, could otherwise consider RUQ US  - palliative deferred pain management to primary team   - s/p 1 U PRBC 9/19, 9/22, 9/26  - pain has little improvement and patient consistently reports worse pain when sleeping: will consider switching to long-acting PO medication     A/P:  - Heme consults appreciated  - c/w 1/2 NS  - PCA pump downtitrated to 1.25 mg, toradol 15mg q6h w/ IV protonix 40 for x5 days   - c/w 30 mg ERBID Morphine (home medication 15 mg ER BID, would like to go home on home dose when DC)  - C/w humidified O2 for pain  - bowel regimen: miralax BID, senna 2 MG PRN  - Hold home hydroxyurea (need to take home med)  - d/c oxbryta iso of recall, will follow up with o/p Dr. Montano  - incentive spirometer  - will continue to monitor for signs of acute chest syndrome. If fever, culture and repeat CXR stat - follows with Dr. Montano, missed monthly dose of Adakveo for 2 months which may explain SCC  - low concern for acute chest given no opacities on CXR, chest pain more consistent with SCC  - transaminitis likely 2/2 to hepatopathy iso vasoocclusive crisis, no abdominal pain and LFTs improving, could otherwise consider RUQ US  - palliative deferred pain management to primary team   - s/p 1 U PRBC 9/19, 9/22, 9/26  - pain has little improvement and patient consistently reports worse pain when sleeping: will consider switching to long-acting PO medication     A/P:  - Heme consults appreciated  - c/w 1/2 NS  - restarted hydrea (10/2) home dose 1500mg qd  - PCA pump at 1.5 mg, toradol 15mg q6h w/ pO protonix  - c/w 30 mg ERBID Morphine (home medication 15 mg ER BID, would like to go home on home dose when DC)  - C/w humidified O2 for pain  - bowel regimen: miralax BID, senna 2 MG PRN  - d/c oxbryta iso of recall, will follow up with o/p Dr. Montano  - incentive spirometer  - will continue to monitor for signs of acute chest syndrome. If fever, culture and repeat CXR stat

## 2024-10-04 LAB
ALBUMIN SERPL ELPH-MCNC: 3.2 G/DL — LOW (ref 3.3–5)
ALP SERPL-CCNC: 351 U/L — HIGH (ref 40–120)
ALT FLD-CCNC: 40 U/L — SIGNIFICANT CHANGE UP (ref 4–41)
ANION GAP SERPL CALC-SCNC: 14 MMOL/L — SIGNIFICANT CHANGE UP (ref 7–14)
AST SERPL-CCNC: 69 U/L — HIGH (ref 4–40)
BASOPHILS # BLD AUTO: 0 K/UL — SIGNIFICANT CHANGE UP (ref 0–0.2)
BASOPHILS NFR BLD AUTO: 0 % — SIGNIFICANT CHANGE UP (ref 0–2)
BILIRUB SERPL-MCNC: 1.8 MG/DL — HIGH (ref 0.2–1.2)
BUN SERPL-MCNC: 9 MG/DL — SIGNIFICANT CHANGE UP (ref 7–23)
CALCIUM SERPL-MCNC: 8.4 MG/DL — SIGNIFICANT CHANGE UP (ref 8.4–10.5)
CHLORIDE SERPL-SCNC: 106 MMOL/L — SIGNIFICANT CHANGE UP (ref 98–107)
CO2 SERPL-SCNC: 20 MMOL/L — LOW (ref 22–31)
CREAT SERPL-MCNC: 0.39 MG/DL — LOW (ref 0.5–1.3)
EGFR: 154 ML/MIN/1.73M2 — SIGNIFICANT CHANGE UP
EOSINOPHIL # BLD AUTO: 0 K/UL — SIGNIFICANT CHANGE UP (ref 0–0.5)
EOSINOPHIL NFR BLD AUTO: 0 % — SIGNIFICANT CHANGE UP (ref 0–6)
GLUCOSE SERPL-MCNC: 103 MG/DL — HIGH (ref 70–99)
HCT VFR BLD CALC: 23 % — LOW (ref 39–50)
HGB BLD-MCNC: 7.3 G/DL — LOW (ref 13–17)
IANC: 3.49 K/UL — SIGNIFICANT CHANGE UP (ref 1.8–7.4)
LDH SERPL L TO P-CCNC: 669 U/L — HIGH (ref 135–225)
LYMPHOCYTES # BLD AUTO: 5.1 K/UL — HIGH (ref 1–3.3)
LYMPHOCYTES # BLD AUTO: 50.5 % — HIGH (ref 13–44)
MAGNESIUM SERPL-MCNC: 1.9 MG/DL — SIGNIFICANT CHANGE UP (ref 1.6–2.6)
MCHC RBC-ENTMCNC: 24.1 PG — LOW (ref 27–34)
MCHC RBC-ENTMCNC: 31.7 GM/DL — LOW (ref 32–36)
MCV RBC AUTO: 75.9 FL — LOW (ref 80–100)
MONOCYTES # BLD AUTO: 1.09 K/UL — HIGH (ref 0–0.9)
MONOCYTES NFR BLD AUTO: 10.8 % — SIGNIFICANT CHANGE UP (ref 2–14)
NEUTROPHILS # BLD AUTO: 3.72 K/UL — SIGNIFICANT CHANGE UP (ref 1.8–7.4)
NEUTROPHILS NFR BLD AUTO: 36.9 % — LOW (ref 43–77)
PHOSPHATE SERPL-MCNC: 3.9 MG/DL — SIGNIFICANT CHANGE UP (ref 2.5–4.5)
PLATELET # BLD AUTO: 345 K/UL — SIGNIFICANT CHANGE UP (ref 150–400)
POTASSIUM SERPL-MCNC: 3.5 MMOL/L — SIGNIFICANT CHANGE UP (ref 3.5–5.3)
POTASSIUM SERPL-SCNC: 3.5 MMOL/L — SIGNIFICANT CHANGE UP (ref 3.5–5.3)
PROT SERPL-MCNC: 7.7 G/DL — SIGNIFICANT CHANGE UP (ref 6–8.3)
RBC # BLD: 3.03 M/UL — LOW (ref 4.2–5.8)
RBC # BLD: 3.03 M/UL — LOW (ref 4.2–5.8)
RBC # FLD: 25.1 % — HIGH (ref 10.3–14.5)
RETICS #: 968.1 K/UL — HIGH (ref 25–125)
RETICS/RBC NFR: >22.2 % — HIGH (ref 0.5–2.5)
SODIUM SERPL-SCNC: 140 MMOL/L — SIGNIFICANT CHANGE UP (ref 135–145)
WBC # BLD: 10.09 K/UL — SIGNIFICANT CHANGE UP (ref 3.8–10.5)
WBC # FLD AUTO: 10.09 K/UL — SIGNIFICANT CHANGE UP (ref 3.8–10.5)

## 2024-10-04 PROCEDURE — 99232 SBSQ HOSP IP/OBS MODERATE 35: CPT | Mod: GC

## 2024-10-04 RX ADMIN — Medication 650 MILLIGRAM(S): at 21:10

## 2024-10-04 RX ADMIN — CHLORHEXIDINE GLUCONATE ORAL RINSE 1 APPLICATION(S): 1.2 SOLUTION DENTAL at 11:49

## 2024-10-04 RX ADMIN — MORPHINE SULFATE 30 MILLIGRAM(S): 30 TABLET, FILM COATED, EXTENDED RELEASE ORAL at 07:00

## 2024-10-04 RX ADMIN — KETOROLAC TROMETHAMINE 15 MILLIGRAM(S): 10 TABLET, FILM COATED ORAL at 11:50

## 2024-10-04 RX ADMIN — Medication 650 MILLIGRAM(S): at 18:08

## 2024-10-04 RX ADMIN — KETOROLAC TROMETHAMINE 15 MILLIGRAM(S): 10 TABLET, FILM COATED ORAL at 05:55

## 2024-10-04 RX ADMIN — Medication 75 MILLILITER(S): at 11:51

## 2024-10-04 RX ADMIN — Medication 650 MILLIGRAM(S): at 19:00

## 2024-10-04 RX ADMIN — KETOROLAC TROMETHAMINE 15 MILLIGRAM(S): 10 TABLET, FILM COATED ORAL at 06:30

## 2024-10-04 RX ADMIN — HYDROXYUREA 1000 MILLIGRAM(S): 500 CAPSULE ORAL at 08:08

## 2024-10-04 RX ADMIN — MORPHINE SULFATE 30 MILLIGRAM(S): 30 TABLET, FILM COATED, EXTENDED RELEASE ORAL at 18:08

## 2024-10-04 RX ADMIN — Medication 10 GRAM(S): at 05:53

## 2024-10-04 RX ADMIN — Medication 650 MILLIGRAM(S): at 11:50

## 2024-10-04 RX ADMIN — KETOROLAC TROMETHAMINE 15 MILLIGRAM(S): 10 TABLET, FILM COATED ORAL at 00:02

## 2024-10-04 RX ADMIN — KETOROLAC TROMETHAMINE 15 MILLIGRAM(S): 10 TABLET, FILM COATED ORAL at 18:09

## 2024-10-04 RX ADMIN — Medication 650 MILLIGRAM(S): at 05:53

## 2024-10-04 RX ADMIN — MORPHINE SULFATE 30 MILLIGRAM(S): 30 TABLET, FILM COATED, EXTENDED RELEASE ORAL at 19:00

## 2024-10-04 RX ADMIN — KETOROLAC TROMETHAMINE 15 MILLIGRAM(S): 10 TABLET, FILM COATED ORAL at 01:00

## 2024-10-04 RX ADMIN — Medication 650 MILLIGRAM(S): at 12:41

## 2024-10-04 RX ADMIN — MORPHINE SULFATE 30 MILLILITER(S): 30 TABLET, FILM COATED, EXTENDED RELEASE ORAL at 20:06

## 2024-10-04 RX ADMIN — Medication 75 MILLILITER(S): at 06:01

## 2024-10-04 RX ADMIN — MORPHINE SULFATE 30 MILLIGRAM(S): 30 TABLET, FILM COATED, EXTENDED RELEASE ORAL at 05:53

## 2024-10-04 RX ADMIN — Medication 650 MILLIGRAM(S): at 22:10

## 2024-10-04 RX ADMIN — PENICILLIN V POTASSIUM 250 MILLIGRAM(S): 500 TABLET ORAL at 05:54

## 2024-10-04 RX ADMIN — PENICILLIN V POTASSIUM 250 MILLIGRAM(S): 500 TABLET ORAL at 18:10

## 2024-10-04 RX ADMIN — Medication 650 MILLIGRAM(S): at 06:30

## 2024-10-04 RX ADMIN — MORPHINE SULFATE 30 MILLILITER(S): 30 TABLET, FILM COATED, EXTENDED RELEASE ORAL at 08:35

## 2024-10-04 RX ADMIN — FOLIC ACID 1 MILLIGRAM(S): 1 TABLET ORAL at 11:50

## 2024-10-04 RX ADMIN — KETOROLAC TROMETHAMINE 15 MILLIGRAM(S): 10 TABLET, FILM COATED ORAL at 12:42

## 2024-10-04 RX ADMIN — KETOROLAC TROMETHAMINE 15 MILLIGRAM(S): 10 TABLET, FILM COATED ORAL at 19:00

## 2024-10-04 RX ADMIN — PANTOPRAZOLE SODIUM 40 MILLIGRAM(S): 40 TABLET, DELAYED RELEASE ORAL at 06:01

## 2024-10-04 RX ADMIN — Medication 10 GRAM(S): at 18:10

## 2024-10-04 NOTE — PROGRESS NOTE ADULT - ATTENDING COMMENTS
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   prolonged hospital stay , still c/o pains in his hips, across left shoulder and knees  reduced pca pump morphine to 1.5 mg on 9/30, 4h lockout 30mg    HgbS% downtrending to 56%    A/P  c/w current pump dose 1.25mg, toradol/tylenol q6, c/w ppi for gi ppx  cw mscontin 30mg bid , reports home dose mscontin 15mg qd  c/w IVF 1/2 NS, incentive spirometer  c/w home hydrea, d/w Dr. Montano  cystic fibrosis expanded panel negative   trend LDH/CBC/BMP/retic count  lab QOD

## 2024-10-04 NOTE — PROGRESS NOTE ADULT - SUBJECTIVE AND OBJECTIVE BOX
Progress Note    08-25-24 (40d)    Patient is a 29y old  Male who presents with a chief complaint of sickle cell crisis (03 Oct 2024 07:01)      Subjective / Overnight Events :  - No acute events overnight.  - Pt seen and examined at bedside.     Additional ROS (if any):    MEDICATIONS  (STANDING):  acetaminophen     Tablet .. 650 milliGRAM(s) Oral every 6 hours  chlorhexidine 2% Cloths 1 Application(s) Topical daily  enoxaparin Injectable 30 milliGRAM(s) SubCutaneous every 24 hours  folic acid 1 milliGRAM(s) Oral daily  glutamine Powder 10 Gram(s) Oral two times a day  hydroxyurea 1000 milliGRAM(s) Oral <User Schedule>  hydroxyurea 1500 milliGRAM(s) Oral <User Schedule>  ketorolac   Injectable 15 milliGRAM(s) IV Push every 6 hours  morphine ER Tablet 30 milliGRAM(s) Oral two times a day  morphine PCA (5 mG/mL) 30 milliLiter(s) PCA Continuous PCA Continuous  pantoprazole    Tablet 40 milliGRAM(s) Oral before breakfast  penicillin   milliGRAM(s) Oral two times a day  sodium chloride 0.45%. 1000 milliLiter(s) (75 mL/Hr) IV Continuous <Continuous>    MEDICATIONS  (PRN):  naloxone Injectable 0.1 milliGRAM(s) IV Push every 3 minutes PRN For ANY of the following changes in patient status:  A. RR LESS THAN 10 breaths per minute, B. Oxygen saturation LESS THAN 90%, C. Sedation score of 6  ondansetron Injectable 4 milliGRAM(s) IV Push every 6 hours PRN Nausea  polyethylene glycol 3350 17 Gram(s) Oral two times a day PRN Constipation  senna 2 Tablet(s) Oral at bedtime PRN Constipation          PHYSICAL EXAM:  Vital Signs Last 24 Hrs  T(C): 36.8 (04 Oct 2024 05:30), Max: 37.1 (03 Oct 2024 20:00)  T(F): 98.2 (04 Oct 2024 05:30), Max: 98.8 (03 Oct 2024 20:00)  HR: 84 (04 Oct 2024 05:30) (70 - 95)  BP: 115/74 (04 Oct 2024 05:30) (114/80 - 150/96)  BP(mean): --  RR: 18 (04 Oct 2024 05:30) (17 - 19)  SpO2: 96% (04 Oct 2024 05:30) (96% - 99%)    Parameters below as of 04 Oct 2024 05:30  Patient On (Oxygen Delivery Method): room air        I&O's Summary    03 Oct 2024 07:01  -  04 Oct 2024 07:00  --------------------------------------------------------  IN: 1650 mL / OUT: 800 mL / NET: 850 mL        General: NAD  HEENT: PERRLA, EOMi, no scleral icterus  CV: RRR, normal S1 and S2, no m/r/g  Lungs: normal respiratory effort. CTAB, no wheezes, rales, or rhonchi  Abd: soft, nontender, nondistended, BSx4  Ext: no edema, 2+ peripheral pulses   Pysch: AAOx3  Neuro: grossly non-focal, moving all extremities spontaneously   Skin: no rashes or lesions     LABS:  CAPILLARY BLOOD GLUCOSE                WBC Trend: 10.06<--, 10.09<--, 8.56<--  Hb Trend: 7.8<--, 8.3<--, 8.6<--                        RADIOLOGY & ADDITIONAL TESTS: Reviewed Progress Note    08-25-24 (40d)    Patient is a 29y old  Male who presents with a chief complaint of sickle cell crisis (03 Oct 2024 07:01)      Subjective / Overnight Events :  - No acute events overnight.  - Pt seen and examined at bedside.     Additional ROS (if any):    MEDICATIONS  (STANDING):  acetaminophen     Tablet .. 650 milliGRAM(s) Oral every 6 hours  chlorhexidine 2% Cloths 1 Application(s) Topical daily  enoxaparin Injectable 30 milliGRAM(s) SubCutaneous every 24 hours  folic acid 1 milliGRAM(s) Oral daily  glutamine Powder 10 Gram(s) Oral two times a day  hydroxyurea 1000 milliGRAM(s) Oral <User Schedule>  hydroxyurea 1500 milliGRAM(s) Oral <User Schedule>  ketorolac   Injectable 15 milliGRAM(s) IV Push every 6 hours  morphine ER Tablet 30 milliGRAM(s) Oral two times a day  morphine PCA (5 mG/mL) 30 milliLiter(s) PCA Continuous PCA Continuous  pantoprazole    Tablet 40 milliGRAM(s) Oral before breakfast  penicillin   milliGRAM(s) Oral two times a day  sodium chloride 0.45%. 1000 milliLiter(s) (75 mL/Hr) IV Continuous <Continuous>    MEDICATIONS  (PRN):  naloxone Injectable 0.1 milliGRAM(s) IV Push every 3 minutes PRN For ANY of the following changes in patient status:  A. RR LESS THAN 10 breaths per minute, B. Oxygen saturation LESS THAN 90%, C. Sedation score of 6  ondansetron Injectable 4 milliGRAM(s) IV Push every 6 hours PRN Nausea  polyethylene glycol 3350 17 Gram(s) Oral two times a day PRN Constipation  senna 2 Tablet(s) Oral at bedtime PRN Constipation          PHYSICAL EXAM:  Vital Signs Last 24 Hrs  T(C): 36.8 (04 Oct 2024 05:30), Max: 37.1 (03 Oct 2024 20:00)  T(F): 98.2 (04 Oct 2024 05:30), Max: 98.8 (03 Oct 2024 20:00)  HR: 84 (04 Oct 2024 05:30) (70 - 95)  BP: 115/74 (04 Oct 2024 05:30) (114/80 - 150/96)  BP(mean): --  RR: 18 (04 Oct 2024 05:30) (17 - 19)  SpO2: 96% (04 Oct 2024 05:30) (96% - 99%)    Parameters below as of 04 Oct 2024 05:30  Patient On (Oxygen Delivery Method): room air        I&O's Summary    03 Oct 2024 07:01  -  04 Oct 2024 07:00  --------------------------------------------------------  IN: 1650 mL / OUT: 800 mL / NET: 850 mL        General: NAD  CV: RRR, normal S1 and S2, no m/r/g  Lungs: normal respiratory effort. CTAB, no wheezes, rales, or rhonchi  Abd: soft, nontender, nondistended, BSx4  Ext: no edema, 2+ peripheral pulses. b/l shoulders and knees ROM limited by pain. No erythema, swelling, fluctuance   Pysch: AAOx3     LABS:  CAPILLARY BLOOD GLUCOSE                WBC Trend: 10.06<--, 10.09<--, 8.56<--  Hb Trend: 7.8<--, 8.3<--, 8.6<--                        RADIOLOGY & ADDITIONAL TESTS: Reviewed Progress Note    08-25-24 (40d)    Patient is a 29y old  Male who presents with a chief complaint of sickle cell crisis (03 Oct 2024 07:01)      Subjective / Overnight Events :  - No acute events overnight.  - Pt seen and examined at bedside.   - Pt states toradol, tylenol multimodal mgmt in addition to PCA helps his pain  - He is still pressing less frequently in an effort to go home  - Continues to have his crises pain in his b/l shoulders and knees  - Denies cp, sob, abd pain, leg pain  - Good appetite, and good BMs    Additional ROS (if any):    MEDICATIONS  (STANDING):  acetaminophen     Tablet .. 650 milliGRAM(s) Oral every 6 hours  chlorhexidine 2% Cloths 1 Application(s) Topical daily  enoxaparin Injectable 30 milliGRAM(s) SubCutaneous every 24 hours  folic acid 1 milliGRAM(s) Oral daily  glutamine Powder 10 Gram(s) Oral two times a day  hydroxyurea 1000 milliGRAM(s) Oral <User Schedule>  hydroxyurea 1500 milliGRAM(s) Oral <User Schedule>  ketorolac   Injectable 15 milliGRAM(s) IV Push every 6 hours  morphine ER Tablet 30 milliGRAM(s) Oral two times a day  morphine PCA (5 mG/mL) 30 milliLiter(s) PCA Continuous PCA Continuous  pantoprazole    Tablet 40 milliGRAM(s) Oral before breakfast  penicillin   milliGRAM(s) Oral two times a day  sodium chloride 0.45%. 1000 milliLiter(s) (75 mL/Hr) IV Continuous <Continuous>    MEDICATIONS  (PRN):  naloxone Injectable 0.1 milliGRAM(s) IV Push every 3 minutes PRN For ANY of the following changes in patient status:  A. RR LESS THAN 10 breaths per minute, B. Oxygen saturation LESS THAN 90%, C. Sedation score of 6  ondansetron Injectable 4 milliGRAM(s) IV Push every 6 hours PRN Nausea  polyethylene glycol 3350 17 Gram(s) Oral two times a day PRN Constipation  senna 2 Tablet(s) Oral at bedtime PRN Constipation          PHYSICAL EXAM:  Vital Signs Last 24 Hrs  T(C): 36.8 (04 Oct 2024 05:30), Max: 37.1 (03 Oct 2024 20:00)  T(F): 98.2 (04 Oct 2024 05:30), Max: 98.8 (03 Oct 2024 20:00)  HR: 84 (04 Oct 2024 05:30) (70 - 95)  BP: 115/74 (04 Oct 2024 05:30) (114/80 - 150/96)  BP(mean): --  RR: 18 (04 Oct 2024 05:30) (17 - 19)  SpO2: 96% (04 Oct 2024 05:30) (96% - 99%)    Parameters below as of 04 Oct 2024 05:30  Patient On (Oxygen Delivery Method): room air        I&O's Summary    03 Oct 2024 07:01  -  04 Oct 2024 07:00  --------------------------------------------------------  IN: 1650 mL / OUT: 800 mL / NET: 850 mL        General: NAD  CV: RRR, normal S1 and S2, no m/r/g  Lungs: normal respiratory effort. CTAB, no wheezes, rales, or rhonchi  Abd: soft, nontender, nondistended, BSx4  Ext: no edema, 2+ peripheral pulses. b/l shoulders and knees ROM limited by pain. No erythema, swelling, fluctuance   Pysch: AAOx3     LABS:  CAPILLARY BLOOD GLUCOSE                WBC Trend: 10.06<--, 10.09<--, 8.56<--  Hb Trend: 7.8<--, 8.3<--, 8.6<--                        RADIOLOGY & ADDITIONAL TESTS: Reviewed

## 2024-10-04 NOTE — PROGRESS NOTE ADULT - PROBLEM SELECTOR PLAN 1
- follows with Dr. Montano, missed monthly dose of Adakveo for 2 months which may explain SCC  - low concern for acute chest given no opacities on CXR, chest pain more consistent with SCC  - transaminitis likely 2/2 to hepatopathy iso vasoocclusive crisis, no abdominal pain and LFTs improving, could otherwise consider RUQ US  - palliative deferred pain management to primary team   - s/p 1 U PRBC 9/19, 9/22, 9/26  - pain has little improvement and patient consistently reports worse pain when sleeping: will consider switching to long-acting PO medication     A/P:  - Heme consults appreciated  - c/w 1/2 NS  - restarted hydrea (10/2) home dose 1500mg qd  - PCA pump at 1.5 mg, toradol 15mg q6h w/ pO protonix  - c/w 30 mg ERBID Morphine (home medication 15 mg ER BID, would like to go home on home dose when DC)  - C/w humidified O2 for pain  - bowel regimen: miralax BID, senna 2 MG PRN  - d/c oxbryta iso of recall, will follow up with o/p Dr. Montano  - incentive spirometer  - will continue to monitor for signs of acute chest syndrome. If fever, culture and repeat CXR stat - follows with Dr. Montano, missed monthly dose of Adakveo for 2 months which may explain SCC  - low concern for acute chest given no opacities on CXR, chest pain more consistent with SCC  - transaminitis likely 2/2 to hepatopathy iso vasoocclusive crisis, no abdominal pain and LFTs improving, could otherwise consider RUQ US  - palliative deferred pain management to primary team   - s/p 1 U PRBC 9/19, 9/22, 9/26  - pain has little improvement and patient consistently reports worse pain when sleeping: will consider switching to long-acting PO medication     A/P:  - Heme consults appreciated  - c/w 1/2 NS  - restarted hydrea (10/2) home dose 1000mg weekdays, 1500mg weekends  Pain regimen  - PCA pump at 1.25 mg, tylenol 650mg q6h, toradol 15mg q6h w/ pO protonix  - c/w 30 mg ERBID Morphine (home medication 15 mg ER BID, would like to go home on home dose when DC)    - C/w humidified O2 for pain  - bowel regimen: miralax BID, senna 2 MG PRN  - d/c oxbryta iso of recall, will follow up with o/p Dr. Montano  - incentive spirometer  - will continue to monitor for signs of acute chest syndrome. If fever, culture and repeat CXR stat

## 2024-10-05 PROCEDURE — 99232 SBSQ HOSP IP/OBS MODERATE 35: CPT | Mod: GC

## 2024-10-05 RX ADMIN — KETOROLAC TROMETHAMINE 15 MILLIGRAM(S): 10 TABLET, FILM COATED ORAL at 05:10

## 2024-10-05 RX ADMIN — Medication 650 MILLIGRAM(S): at 06:10

## 2024-10-05 RX ADMIN — MORPHINE SULFATE 30 MILLIGRAM(S): 30 TABLET, FILM COATED, EXTENDED RELEASE ORAL at 06:10

## 2024-10-05 RX ADMIN — KETOROLAC TROMETHAMINE 15 MILLIGRAM(S): 10 TABLET, FILM COATED ORAL at 12:27

## 2024-10-05 RX ADMIN — MORPHINE SULFATE 30 MILLIGRAM(S): 30 TABLET, FILM COATED, EXTENDED RELEASE ORAL at 17:05

## 2024-10-05 RX ADMIN — Medication 650 MILLIGRAM(S): at 05:10

## 2024-10-05 RX ADMIN — MORPHINE SULFATE 30 MILLILITER(S): 30 TABLET, FILM COATED, EXTENDED RELEASE ORAL at 19:40

## 2024-10-05 RX ADMIN — KETOROLAC TROMETHAMINE 15 MILLIGRAM(S): 10 TABLET, FILM COATED ORAL at 17:06

## 2024-10-05 RX ADMIN — Medication 10 GRAM(S): at 05:09

## 2024-10-05 RX ADMIN — KETOROLAC TROMETHAMINE 15 MILLIGRAM(S): 10 TABLET, FILM COATED ORAL at 06:10

## 2024-10-05 RX ADMIN — MORPHINE SULFATE 30 MILLILITER(S): 30 TABLET, FILM COATED, EXTENDED RELEASE ORAL at 20:20

## 2024-10-05 RX ADMIN — MORPHINE SULFATE 30 MILLILITER(S): 30 TABLET, FILM COATED, EXTENDED RELEASE ORAL at 08:22

## 2024-10-05 RX ADMIN — ENOXAPARIN SODIUM 30 MILLIGRAM(S): 150 INJECTION SUBCUTANEOUS at 12:28

## 2024-10-05 RX ADMIN — Medication 10 GRAM(S): at 17:06

## 2024-10-05 RX ADMIN — Medication 650 MILLIGRAM(S): at 13:26

## 2024-10-05 RX ADMIN — PENICILLIN V POTASSIUM 250 MILLIGRAM(S): 500 TABLET ORAL at 05:09

## 2024-10-05 RX ADMIN — PENICILLIN V POTASSIUM 250 MILLIGRAM(S): 500 TABLET ORAL at 17:07

## 2024-10-05 RX ADMIN — MORPHINE SULFATE 30 MILLIGRAM(S): 30 TABLET, FILM COATED, EXTENDED RELEASE ORAL at 05:10

## 2024-10-05 RX ADMIN — Medication 650 MILLIGRAM(S): at 12:26

## 2024-10-05 RX ADMIN — Medication 650 MILLIGRAM(S): at 17:05

## 2024-10-05 RX ADMIN — KETOROLAC TROMETHAMINE 15 MILLIGRAM(S): 10 TABLET, FILM COATED ORAL at 01:07

## 2024-10-05 RX ADMIN — PANTOPRAZOLE SODIUM 40 MILLIGRAM(S): 40 TABLET, DELAYED RELEASE ORAL at 05:10

## 2024-10-05 RX ADMIN — CHLORHEXIDINE GLUCONATE ORAL RINSE 1 APPLICATION(S): 1.2 SOLUTION DENTAL at 12:29

## 2024-10-05 RX ADMIN — FOLIC ACID 1 MILLIGRAM(S): 1 TABLET ORAL at 12:27

## 2024-10-05 RX ADMIN — HYDROXYUREA 1500 MILLIGRAM(S): 500 CAPSULE ORAL at 08:15

## 2024-10-05 RX ADMIN — KETOROLAC TROMETHAMINE 15 MILLIGRAM(S): 10 TABLET, FILM COATED ORAL at 00:07

## 2024-10-05 RX ADMIN — KETOROLAC TROMETHAMINE 15 MILLIGRAM(S): 10 TABLET, FILM COATED ORAL at 13:27

## 2024-10-05 NOTE — PROGRESS NOTE ADULT - SUBJECTIVE AND OBJECTIVE BOX
Progress Note    08-25-24 (41d)    Patient is a 29y old  Male who presents with a chief complaint of sickle cell crisis (04 Oct 2024 07:36)      Subjective / Overnight Events :  - No acute events overnight.  - Pt seen and examined at bedside.     Additional ROS (if any):    MEDICATIONS  (STANDING):  acetaminophen     Tablet .. 650 milliGRAM(s) Oral every 6 hours  chlorhexidine 2% Cloths 1 Application(s) Topical daily  enoxaparin Injectable 30 milliGRAM(s) SubCutaneous every 24 hours  folic acid 1 milliGRAM(s) Oral daily  glutamine Powder 10 Gram(s) Oral two times a day  hydroxyurea 1500 milliGRAM(s) Oral <User Schedule>  hydroxyurea 1000 milliGRAM(s) Oral <User Schedule>  ketorolac   Injectable 15 milliGRAM(s) IV Push every 6 hours  morphine ER Tablet 30 milliGRAM(s) Oral two times a day  morphine PCA (5 mG/mL) 30 milliLiter(s) PCA Continuous PCA Continuous  pantoprazole    Tablet 40 milliGRAM(s) Oral before breakfast  penicillin   milliGRAM(s) Oral two times a day  sodium chloride 0.45%. 1000 milliLiter(s) (75 mL/Hr) IV Continuous <Continuous>    MEDICATIONS  (PRN):  naloxone Injectable 0.1 milliGRAM(s) IV Push every 3 minutes PRN For ANY of the following changes in patient status:  A. RR LESS THAN 10 breaths per minute, B. Oxygen saturation LESS THAN 90%, C. Sedation score of 6  ondansetron Injectable 4 milliGRAM(s) IV Push every 6 hours PRN Nausea  polyethylene glycol 3350 17 Gram(s) Oral two times a day PRN Constipation  senna 2 Tablet(s) Oral at bedtime PRN Constipation          PHYSICAL EXAM:  Vital Signs Last 24 Hrs  T(C): 36.8 (05 Oct 2024 04:00), Max: 37.2 (04 Oct 2024 12:00)  T(F): 98.2 (05 Oct 2024 04:00), Max: 98.9 (04 Oct 2024 12:00)  HR: 78 (05 Oct 2024 04:00) (74 - 90)  BP: 104/60 (05 Oct 2024 04:00) (104/60 - 143/94)  BP(mean): --  RR: 18 (05 Oct 2024 04:00) (16 - 18)  SpO2: 95% (05 Oct 2024 04:00) (95% - 99%)    Parameters below as of 05 Oct 2024 04:00  Patient On (Oxygen Delivery Method): room air        I&O's Summary    04 Oct 2024 07:01  -  05 Oct 2024 07:00  --------------------------------------------------------  IN: 1650 mL / OUT: 900 mL / NET: 750 mL        General: NAD  HEENT: PERRLA, EOMi, no scleral icterus  CV: RRR, normal S1 and S2, no m/r/g  Lungs: normal respiratory effort. CTAB, no wheezes, rales, or rhonchi  Abd: soft, nontender, nondistended, BSx4  Ext: no edema, 2+ peripheral pulses   Pysch: AAOx3  Neuro: grossly non-focal, moving all extremities spontaneously   Skin: no rashes or lesions     LABS:  CAPILLARY BLOOD GLUCOSE                                  7.3    10.09 )-----------( 345      ( 04 Oct 2024 06:34 )             23.0       WBC Trend: 10.09<--, 10.06<--, 10.09<--  Hb Trend: 7.3<--, 7.8<--, 8.3<--    10-04    140  |  106  |  9   ----------------------------<  103[H]  3.5   |  20[L]  |  0.39[L]    Ca    8.4      04 Oct 2024 06:34  Phos  3.9     10-04  Mg     1.90     10-04    TPro  7.7  /  Alb  3.2[L]  /  TBili  1.8[H]  /  DBili  x   /  AST  69[H]  /  ALT  40  /  AlkPhos  351[H]  10-04          Urinalysis Basic - ( 04 Oct 2024 06:34 )    Color: x / Appearance: x / SG: x / pH: x  Gluc: 103 mg/dL / Ketone: x  / Bili: x / Urobili: x   Blood: x / Protein: x / Nitrite: x   Leuk Esterase: x / RBC: x / WBC x   Sq Epi: x / Non Sq Epi: x / Bacteria: x            RADIOLOGY & ADDITIONAL TESTS: Reviewed Progress Note    08-25-24 (41d)    Patient is a 29y old  Male who presents with a chief complaint of sickle cell crisis (04 Oct 2024 07:36)      Subjective / Overnight Events :  - No acute events overnight.  - Pt seen and examined at bedside.   - Pt states his pain is still the same as day prior  - States he wants to take his lovenox qod and walk for DVT ppx. Counselled on risks  - Reports good BMs and appetite  - Denies cp, sob, abd pain    Additional ROS (if any):    MEDICATIONS  (STANDING):  acetaminophen     Tablet .. 650 milliGRAM(s) Oral every 6 hours  chlorhexidine 2% Cloths 1 Application(s) Topical daily  enoxaparin Injectable 30 milliGRAM(s) SubCutaneous every 24 hours  folic acid 1 milliGRAM(s) Oral daily  glutamine Powder 10 Gram(s) Oral two times a day  hydroxyurea 1500 milliGRAM(s) Oral <User Schedule>  hydroxyurea 1000 milliGRAM(s) Oral <User Schedule>  ketorolac   Injectable 15 milliGRAM(s) IV Push every 6 hours  morphine ER Tablet 30 milliGRAM(s) Oral two times a day  morphine PCA (5 mG/mL) 30 milliLiter(s) PCA Continuous PCA Continuous  pantoprazole    Tablet 40 milliGRAM(s) Oral before breakfast  penicillin   milliGRAM(s) Oral two times a day  sodium chloride 0.45%. 1000 milliLiter(s) (75 mL/Hr) IV Continuous <Continuous>    MEDICATIONS  (PRN):  naloxone Injectable 0.1 milliGRAM(s) IV Push every 3 minutes PRN For ANY of the following changes in patient status:  A. RR LESS THAN 10 breaths per minute, B. Oxygen saturation LESS THAN 90%, C. Sedation score of 6  ondansetron Injectable 4 milliGRAM(s) IV Push every 6 hours PRN Nausea  polyethylene glycol 3350 17 Gram(s) Oral two times a day PRN Constipation  senna 2 Tablet(s) Oral at bedtime PRN Constipation          PHYSICAL EXAM:  Vital Signs Last 24 Hrs  T(C): 36.8 (05 Oct 2024 04:00), Max: 37.2 (04 Oct 2024 12:00)  T(F): 98.2 (05 Oct 2024 04:00), Max: 98.9 (04 Oct 2024 12:00)  HR: 78 (05 Oct 2024 04:00) (74 - 90)  BP: 104/60 (05 Oct 2024 04:00) (104/60 - 143/94)  BP(mean): --  RR: 18 (05 Oct 2024 04:00) (16 - 18)  SpO2: 95% (05 Oct 2024 04:00) (95% - 99%)    Parameters below as of 05 Oct 2024 04:00  Patient On (Oxygen Delivery Method): room air        I&O's Summary    04 Oct 2024 07:01  -  05 Oct 2024 07:00  --------------------------------------------------------  IN: 1650 mL / OUT: 900 mL / NET: 750 mL        General: NAD  CV: RRR, normal S1 and S2, no m/r/g  Lungs: normal respiratory effort. CTAB, no wheezes, rales, or rhonchi  Abd: soft, nontender, nondistended, BSx4  Ext: no edema, 2+ peripheral pulses. b/l shoulders and knees ROM limited by pain. No erythema, swelling, fluctuance   Pysch: AAOx3    LABS:  CAPILLARY BLOOD GLUCOSE                                  7.3    10.09 )-----------( 345      ( 04 Oct 2024 06:34 )             23.0       WBC Trend: 10.09<--, 10.06<--, 10.09<--  Hb Trend: 7.3<--, 7.8<--, 8.3<--    10-04    140  |  106  |  9   ----------------------------<  103[H]  3.5   |  20[L]  |  0.39[L]    Ca    8.4      04 Oct 2024 06:34  Phos  3.9     10-04  Mg     1.90     10-04    TPro  7.7  /  Alb  3.2[L]  /  TBili  1.8[H]  /  DBili  x   /  AST  69[H]  /  ALT  40  /  AlkPhos  351[H]  10-04          Urinalysis Basic - ( 04 Oct 2024 06:34 )    Color: x / Appearance: x / SG: x / pH: x  Gluc: 103 mg/dL / Ketone: x  / Bili: x / Urobili: x   Blood: x / Protein: x / Nitrite: x   Leuk Esterase: x / RBC: x / WBC x   Sq Epi: x / Non Sq Epi: x / Bacteria: x            RADIOLOGY & ADDITIONAL TESTS: Reviewed

## 2024-10-05 NOTE — PROGRESS NOTE ADULT - PROBLEM SELECTOR PLAN 1
- follows with Dr. Montano, missed monthly dose of Adakveo for 2 months which may explain SCC  - low concern for acute chest given no opacities on CXR, chest pain more consistent with SCC  - transaminitis likely 2/2 to hepatopathy iso vasoocclusive crisis, no abdominal pain and LFTs improving, could otherwise consider RUQ US  - palliative deferred pain management to primary team   - s/p 1 U PRBC 9/19, 9/22, 9/26  - pain has little improvement and patient consistently reports worse pain when sleeping: will consider switching to long-acting PO medication     A/P:  - Heme consults appreciated  - c/w 1/2 NS  - restarted hydrea (10/2) home dose 1000mg weekdays, 1500mg weekends  Pain regimen  - PCA pump at 1.25 mg, tylenol 650mg q6h, toradol 15mg q6h w/ pO protonix  - c/w 30 mg ERBID Morphine (home medication 15 mg ER BID, would like to go home on home dose when DC)    - C/w humidified O2 for pain  - bowel regimen: miralax BID, senna 2 MG PRN  - d/c oxbryta iso of recall, will follow up with o/p Dr. Montano  - incentive spirometer  - will continue to monitor for signs of acute chest syndrome. If fever, culture and repeat CXR stat

## 2024-10-05 NOTE — PROGRESS NOTE ADULT - ATTENDING COMMENTS
28 yo male admitted with SCC on IV morphine pca pump.   prolonged hospital stay , still c/o pains in his hips, across left shoulder and knees  reduced pca pump morphine to 1.5 mg on 9/30, 4h lockout 30mg    HgbS% downtrending to 56%    A/P  c/w current pump dose 1.25mg, toradol/tylenol q6, c/w ppi for gi ppx  cw mscontin 30mg bid , home dose mscontin 15mg qd  c/w IVF 1/2 NS, incentive spirometer  c/w home hydrea, d/w Dr. Montano  cystic fibrosis expanded panel negative   trend LDH/CBC/BMP/retic count  lab QOD  likely dc home next week pending clinical improvement

## 2024-10-06 LAB
ALBUMIN SERPL ELPH-MCNC: 3.4 G/DL — SIGNIFICANT CHANGE UP (ref 3.3–5)
ALP SERPL-CCNC: 362 U/L — HIGH (ref 40–120)
ALT FLD-CCNC: 42 U/L — HIGH (ref 4–41)
ANION GAP SERPL CALC-SCNC: 13 MMOL/L — SIGNIFICANT CHANGE UP (ref 7–14)
AST SERPL-CCNC: 72 U/L — HIGH (ref 4–40)
BASOPHILS # BLD AUTO: 0.06 K/UL — SIGNIFICANT CHANGE UP (ref 0–0.2)
BASOPHILS NFR BLD AUTO: 0.7 % — SIGNIFICANT CHANGE UP (ref 0–2)
BILIRUB DIRECT SERPL-MCNC: 0.8 MG/DL — HIGH (ref 0–0.3)
BILIRUB SERPL-MCNC: 1.9 MG/DL — HIGH (ref 0.2–1.2)
BUN SERPL-MCNC: 6 MG/DL — LOW (ref 7–23)
CALCIUM SERPL-MCNC: 8.3 MG/DL — LOW (ref 8.4–10.5)
CHLORIDE SERPL-SCNC: 106 MMOL/L — SIGNIFICANT CHANGE UP (ref 98–107)
CO2 SERPL-SCNC: 23 MMOL/L — SIGNIFICANT CHANGE UP (ref 22–31)
CREAT SERPL-MCNC: 0.36 MG/DL — LOW (ref 0.5–1.3)
EGFR: 157 ML/MIN/1.73M2 — SIGNIFICANT CHANGE UP
EOSINOPHIL # BLD AUTO: 0.04 K/UL — SIGNIFICANT CHANGE UP (ref 0–0.5)
EOSINOPHIL NFR BLD AUTO: 0.5 % — SIGNIFICANT CHANGE UP (ref 0–6)
GLUCOSE SERPL-MCNC: 78 MG/DL — SIGNIFICANT CHANGE UP (ref 70–99)
HCT VFR BLD CALC: 22.9 % — LOW (ref 39–50)
HGB BLD-MCNC: 7.3 G/DL — LOW (ref 13–17)
IANC: 2.84 K/UL — SIGNIFICANT CHANGE UP (ref 1.8–7.4)
IMM GRANULOCYTES NFR BLD AUTO: 0.5 % — SIGNIFICANT CHANGE UP (ref 0–0.9)
LDH SERPL L TO P-CCNC: 726 U/L — HIGH (ref 135–225)
LYMPHOCYTES # BLD AUTO: 5.05 K/UL — HIGH (ref 1–3.3)
LYMPHOCYTES # BLD AUTO: 57.3 % — HIGH (ref 13–44)
MAGNESIUM SERPL-MCNC: 2 MG/DL — SIGNIFICANT CHANGE UP (ref 1.6–2.6)
MCHC RBC-ENTMCNC: 24.3 PG — LOW (ref 27–34)
MCHC RBC-ENTMCNC: 31.9 GM/DL — LOW (ref 32–36)
MCV RBC AUTO: 76.1 FL — LOW (ref 80–100)
MONOCYTES # BLD AUTO: 0.79 K/UL — SIGNIFICANT CHANGE UP (ref 0–0.9)
MONOCYTES NFR BLD AUTO: 9 % — SIGNIFICANT CHANGE UP (ref 2–14)
NEUTROPHILS # BLD AUTO: 2.84 K/UL — SIGNIFICANT CHANGE UP (ref 1.8–7.4)
NEUTROPHILS NFR BLD AUTO: 32 % — LOW (ref 43–77)
NRBC # BLD: 4 /100 WBCS — HIGH (ref 0–0)
NRBC # FLD: 0.32 K/UL — HIGH (ref 0–0)
PHOSPHATE SERPL-MCNC: 4.3 MG/DL — SIGNIFICANT CHANGE UP (ref 2.5–4.5)
PLATELET # BLD AUTO: 263 K/UL — SIGNIFICANT CHANGE UP (ref 150–400)
POTASSIUM SERPL-MCNC: 3.8 MMOL/L — SIGNIFICANT CHANGE UP (ref 3.5–5.3)
POTASSIUM SERPL-SCNC: 3.8 MMOL/L — SIGNIFICANT CHANGE UP (ref 3.5–5.3)
PROT SERPL-MCNC: 7.7 G/DL — SIGNIFICANT CHANGE UP (ref 6–8.3)
RBC # BLD: 3.01 M/UL — LOW (ref 4.2–5.8)
RBC # BLD: 3.01 M/UL — LOW (ref 4.2–5.8)
RBC # FLD: 25.7 % — HIGH (ref 10.3–14.5)
RETICS #: 849.6 K/UL — HIGH (ref 25–125)
RETICS/RBC NFR: >22.2 % — HIGH (ref 0.5–2.5)
SODIUM SERPL-SCNC: 142 MMOL/L — SIGNIFICANT CHANGE UP (ref 135–145)
WBC # BLD: 8.82 K/UL — SIGNIFICANT CHANGE UP (ref 3.8–10.5)
WBC # FLD AUTO: 8.82 K/UL — SIGNIFICANT CHANGE UP (ref 3.8–10.5)

## 2024-10-06 PROCEDURE — 71045 X-RAY EXAM CHEST 1 VIEW: CPT | Mod: 26

## 2024-10-06 PROCEDURE — 99232 SBSQ HOSP IP/OBS MODERATE 35: CPT | Mod: GC

## 2024-10-06 PROCEDURE — 73521 X-RAY EXAM HIPS BI 2 VIEWS: CPT | Mod: 26

## 2024-10-06 PROCEDURE — 73010 X-RAY EXAM OF SHOULDER BLADE: CPT | Mod: 26,50

## 2024-10-06 PROCEDURE — 73030 X-RAY EXAM OF SHOULDER: CPT | Mod: 26,50

## 2024-10-06 RX ORDER — KETOROLAC TROMETHAMINE 10 MG/1
15 TABLET, FILM COATED ORAL EVERY 6 HOURS
Refills: 0 | Status: DISCONTINUED | OUTPATIENT
Start: 2024-10-06 | End: 2024-10-07

## 2024-10-06 RX ORDER — KETOROLAC TROMETHAMINE 10 MG/1
15 TABLET, FILM COATED ORAL ONCE
Refills: 0 | Status: DISCONTINUED | OUTPATIENT
Start: 2024-10-06 | End: 2024-10-06

## 2024-10-06 RX ADMIN — Medication 10 GRAM(S): at 06:23

## 2024-10-06 RX ADMIN — Medication 650 MILLIGRAM(S): at 06:23

## 2024-10-06 RX ADMIN — Medication 650 MILLIGRAM(S): at 23:46

## 2024-10-06 RX ADMIN — KETOROLAC TROMETHAMINE 15 MILLIGRAM(S): 10 TABLET, FILM COATED ORAL at 17:43

## 2024-10-06 RX ADMIN — Medication 75 MILLILITER(S): at 21:06

## 2024-10-06 RX ADMIN — Medication 10 GRAM(S): at 17:42

## 2024-10-06 RX ADMIN — HYDROXYUREA 1500 MILLIGRAM(S): 500 CAPSULE ORAL at 10:05

## 2024-10-06 RX ADMIN — KETOROLAC TROMETHAMINE 15 MILLIGRAM(S): 10 TABLET, FILM COATED ORAL at 00:08

## 2024-10-06 RX ADMIN — Medication 650 MILLIGRAM(S): at 17:42

## 2024-10-06 RX ADMIN — KETOROLAC TROMETHAMINE 15 MILLIGRAM(S): 10 TABLET, FILM COATED ORAL at 07:23

## 2024-10-06 RX ADMIN — KETOROLAC TROMETHAMINE 15 MILLIGRAM(S): 10 TABLET, FILM COATED ORAL at 12:14

## 2024-10-06 RX ADMIN — PANTOPRAZOLE SODIUM 40 MILLIGRAM(S): 40 TABLET, DELAYED RELEASE ORAL at 06:23

## 2024-10-06 RX ADMIN — PENICILLIN V POTASSIUM 250 MILLIGRAM(S): 500 TABLET ORAL at 06:23

## 2024-10-06 RX ADMIN — Medication 75 MILLILITER(S): at 09:14

## 2024-10-06 RX ADMIN — MORPHINE SULFATE 30 MILLIGRAM(S): 30 TABLET, FILM COATED, EXTENDED RELEASE ORAL at 07:23

## 2024-10-06 RX ADMIN — FOLIC ACID 1 MILLIGRAM(S): 1 TABLET ORAL at 12:14

## 2024-10-06 RX ADMIN — CHLORHEXIDINE GLUCONATE ORAL RINSE 1 APPLICATION(S): 1.2 SOLUTION DENTAL at 12:16

## 2024-10-06 RX ADMIN — Medication 650 MILLIGRAM(S): at 13:13

## 2024-10-06 RX ADMIN — Medication 650 MILLIGRAM(S): at 01:08

## 2024-10-06 RX ADMIN — Medication 650 MILLIGRAM(S): at 12:13

## 2024-10-06 RX ADMIN — KETOROLAC TROMETHAMINE 15 MILLIGRAM(S): 10 TABLET, FILM COATED ORAL at 23:07

## 2024-10-06 RX ADMIN — Medication 4 MILLIGRAM(S): at 19:14

## 2024-10-06 RX ADMIN — KETOROLAC TROMETHAMINE 15 MILLIGRAM(S): 10 TABLET, FILM COATED ORAL at 06:23

## 2024-10-06 RX ADMIN — Medication 650 MILLIGRAM(S): at 00:08

## 2024-10-06 RX ADMIN — KETOROLAC TROMETHAMINE 15 MILLIGRAM(S): 10 TABLET, FILM COATED ORAL at 13:14

## 2024-10-06 RX ADMIN — KETOROLAC TROMETHAMINE 15 MILLIGRAM(S): 10 TABLET, FILM COATED ORAL at 22:07

## 2024-10-06 RX ADMIN — Medication 650 MILLIGRAM(S): at 07:23

## 2024-10-06 RX ADMIN — KETOROLAC TROMETHAMINE 15 MILLIGRAM(S): 10 TABLET, FILM COATED ORAL at 01:08

## 2024-10-06 RX ADMIN — MORPHINE SULFATE 30 MILLILITER(S): 30 TABLET, FILM COATED, EXTENDED RELEASE ORAL at 08:23

## 2024-10-06 RX ADMIN — MORPHINE SULFATE 30 MILLIGRAM(S): 30 TABLET, FILM COATED, EXTENDED RELEASE ORAL at 17:42

## 2024-10-06 RX ADMIN — MORPHINE SULFATE 30 MILLIGRAM(S): 30 TABLET, FILM COATED, EXTENDED RELEASE ORAL at 06:23

## 2024-10-06 RX ADMIN — MORPHINE SULFATE 30 MILLILITER(S): 30 TABLET, FILM COATED, EXTENDED RELEASE ORAL at 20:17

## 2024-10-06 RX ADMIN — PENICILLIN V POTASSIUM 250 MILLIGRAM(S): 500 TABLET ORAL at 17:41

## 2024-10-06 NOTE — PROGRESS NOTE ADULT - SUBJECTIVE AND OBJECTIVE BOX
Patient is a 29y old  Male who presents with a chief complaint of sickle cell crisis (05 Oct 2024 07:06)      SUBJECTIVE / OVERNIGHT EVENTS:    MEDICATIONS  (STANDING):  acetaminophen     Tablet .. 650 milliGRAM(s) Oral every 6 hours  chlorhexidine 2% Cloths 1 Application(s) Topical daily  enoxaparin Injectable 30 milliGRAM(s) SubCutaneous every 24 hours  folic acid 1 milliGRAM(s) Oral daily  glutamine Powder 10 Gram(s) Oral two times a day  hydroxyurea 1500 milliGRAM(s) Oral <User Schedule>  hydroxyurea 1000 milliGRAM(s) Oral <User Schedule>  ketorolac   Injectable 15 milliGRAM(s) IV Push every 6 hours  morphine ER Tablet 30 milliGRAM(s) Oral two times a day  morphine PCA (5 mG/mL) 30 milliLiter(s) PCA Continuous PCA Continuous  pantoprazole    Tablet 40 milliGRAM(s) Oral before breakfast  penicillin   milliGRAM(s) Oral two times a day  sodium chloride 0.45%. 1000 milliLiter(s) (75 mL/Hr) IV Continuous <Continuous>    MEDICATIONS  (PRN):  naloxone Injectable 0.1 milliGRAM(s) IV Push every 3 minutes PRN For ANY of the following changes in patient status:  A. RR LESS THAN 10 breaths per minute, B. Oxygen saturation LESS THAN 90%, C. Sedation score of 6  ondansetron Injectable 4 milliGRAM(s) IV Push every 6 hours PRN Nausea  polyethylene glycol 3350 17 Gram(s) Oral two times a day PRN Constipation  senna 2 Tablet(s) Oral at bedtime PRN Constipation      CAPILLARY BLOOD GLUCOSE        I&O's Summary    05 Oct 2024 07:01  -  06 Oct 2024 07:00  --------------------------------------------------------  IN: 0 mL / OUT: 800 mL / NET: -800 mL        Vital Signs Last 24 Hrs  T(C): 36.5 (06 Oct 2024 08:00), Max: 36.8 (06 Oct 2024 00:00)  T(F): 97.7 (06 Oct 2024 08:00), Max: 98.2 (06 Oct 2024 00:00)  HR: 84 (06 Oct 2024 08:00) (68 - 85)  BP: 121/88 (06 Oct 2024 08:00) (104/74 - 138/92)  BP(mean): --  RR: 18 (06 Oct 2024 08:00) (17 - 18)  SpO2: 98% (06 Oct 2024 08:00) (97% - 100%)    Parameters below as of 06 Oct 2024 08:00  Patient On (Oxygen Delivery Method): room air        PHYSICAL EXAM:  GENERAL: NAD, well-developed, well-nourished  HEAD: Atraumatic, Normocephalic  EYES: EOMI, PERRLA, conjunctiva and sclera clear  NECK: Supple, No JVD  CHEST/LUNG: Clear to auscultation bilaterally; No wheezes or crackles  HEART: Normal S1/S2; Regular rate and rhythm; No murmurs, rubs, or gallops  ABDOMEN: Soft, Nontender, Nondistended; Bowel sounds present  EXTREMITIES: 2+ Peripheral Pulses; No clubbing, cyanosis, or edema  PSYCH: A&Ox3  NEUROLOGY: no focal neurologic deficit  SKIN: No rashes or lesions    LABS:                     RADIOLOGY & ADDITIONAL TESTS:    Imaging Personally Reviewed:    Consultant(s) Notes Reviewed:      Care Discussed with Consultants/Other Providers:   Patient is a 29y old  Male who presents with a chief complaint of sickle cell crisis (05 Oct 2024 07:06)      SUBJECTIVE / OVERNIGHT EVENTS: No acute events overnight. Patient seen and examined at bedside, continues to have "spikes" of pain. Last BM yesterday. No other complaints.     MEDICATIONS  (STANDING):  acetaminophen     Tablet .. 650 milliGRAM(s) Oral every 6 hours  chlorhexidine 2% Cloths 1 Application(s) Topical daily  enoxaparin Injectable 30 milliGRAM(s) SubCutaneous every 24 hours  folic acid 1 milliGRAM(s) Oral daily  glutamine Powder 10 Gram(s) Oral two times a day  hydroxyurea 1500 milliGRAM(s) Oral <User Schedule>  hydroxyurea 1000 milliGRAM(s) Oral <User Schedule>  ketorolac   Injectable 15 milliGRAM(s) IV Push every 6 hours  morphine ER Tablet 30 milliGRAM(s) Oral two times a day  morphine PCA (5 mG/mL) 30 milliLiter(s) PCA Continuous PCA Continuous  pantoprazole    Tablet 40 milliGRAM(s) Oral before breakfast  penicillin   milliGRAM(s) Oral two times a day  sodium chloride 0.45%. 1000 milliLiter(s) (75 mL/Hr) IV Continuous <Continuous>    MEDICATIONS  (PRN):  naloxone Injectable 0.1 milliGRAM(s) IV Push every 3 minutes PRN For ANY of the following changes in patient status:  A. RR LESS THAN 10 breaths per minute, B. Oxygen saturation LESS THAN 90%, C. Sedation score of 6  ondansetron Injectable 4 milliGRAM(s) IV Push every 6 hours PRN Nausea  polyethylene glycol 3350 17 Gram(s) Oral two times a day PRN Constipation  senna 2 Tablet(s) Oral at bedtime PRN Constipation      CAPILLARY BLOOD GLUCOSE        I&O's Summary    05 Oct 2024 07:01  -  06 Oct 2024 07:00  --------------------------------------------------------  IN: 0 mL / OUT: 800 mL / NET: -800 mL        Vital Signs Last 24 Hrs  T(C): 36.5 (06 Oct 2024 08:00), Max: 36.8 (06 Oct 2024 00:00)  T(F): 97.7 (06 Oct 2024 08:00), Max: 98.2 (06 Oct 2024 00:00)  HR: 84 (06 Oct 2024 08:00) (68 - 85)  BP: 121/88 (06 Oct 2024 08:00) (104/74 - 138/92)  BP(mean): --  RR: 18 (06 Oct 2024 08:00) (17 - 18)  SpO2: 98% (06 Oct 2024 08:00) (97% - 100%)    Parameters below as of 06 Oct 2024 08:00  Patient On (Oxygen Delivery Method): room air        PHYSICAL EXAM:  GENERAL: NAD, well-developed, well-nourished  HEAD: Atraumatic, Normocephalic  EYES: EOMI, PERRLA, conjunctiva and sclera clear  NECK: Supple, No JVD  CHEST/LUNG: Clear to auscultation bilaterally; No wheezes or crackles  HEART: Normal S1/S2; Regular rate and rhythm; No murmurs, rubs, or gallops  ABDOMEN: Soft, Nontender, Nondistended; Bowel sounds present  EXTREMITIES: 2+ Peripheral Pulses; No clubbing, cyanosis, or edema  PSYCH: A&Ox3  NEUROLOGY: no focal neurologic deficit  SKIN: No rashes or lesions    LABS:                     RADIOLOGY & ADDITIONAL TESTS:    Imaging Personally Reviewed:    Consultant(s) Notes Reviewed:      Care Discussed with Consultants/Other Providers:

## 2024-10-06 NOTE — PROGRESS NOTE ADULT - ATTENDING COMMENTS
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   prolonged hospital stay , still c/o pains in his hips, across left shoulder and knees  reduced pca pump morphine to 1.5 mg on 9/30, 4h lockout 30mg  HgbS% downtrending to 56%  still with pain lowest 6/10    A/P  c/w current pump dose 1.25mg, toradol x4 more doses  tylenol q6 atc, c/w ppi for gi ppx  cw mscontin 30mg bid , home dose mscontin 15mg qd  c/w IVF 1/2 NS, incentive spirometer  check xrays of hips, shoulders/scapula to r/o AVN  c/w home hydrea, d/w Dr. Montano  cystic fibrosis expanded panel negative   trend LDH/CBC/BMP/retic count  lab QOD  likely dc home next week pending clinical improvement

## 2024-10-06 NOTE — PROGRESS NOTE ADULT - PROBLEM SELECTOR PLAN 1
- follows with Dr. Montano, missed monthly dose of Adakveo for 2 months which may explain SCC  - low concern for acute chest given no opacities on CXR, chest pain more consistent with SCC  - transaminitis likely 2/2 to hepatopathy iso vasoocclusive crisis, no abdominal pain and LFTs improving, could otherwise consider RUQ US  - palliative deferred pain management to primary team   - s/p 1 U PRBC 9/19, 9/22, 9/26  - pain has little improvement and patient consistently reports worse pain when sleeping: will consider switching to long-acting PO medication     A/P:  - Heme consults appreciated  - c/w 1/2 NS  - restarted hydrea (10/2) home dose 1000mg weekdays, 1500mg weekends  Pain regimen  - PCA pump at 1.25 mg, tylenol 650mg q6h, toradol 15mg q6h w/ pO protonix  - c/w 30 mg ERBID Morphine (home medication 15 mg ER BID, would like to go home on home dose when DC)    - C/w humidified O2 for pain  - bowel regimen: miralax BID, senna 2 MG PRN  - d/c oxbryta iso of recall, will follow up with o/p Dr. Montnao  - incentive spirometer  - will continue to monitor for signs of acute chest syndrome. If fever, culture and repeat CXR stat - follows with Dr. Montano, missed monthly dose of Adakveo for 2 months which may explain SCC  - low concern for acute chest given no opacities on CXR, chest pain more consistent with SCC  - transaminitis likely 2/2 to hepatopathy iso vasoocclusive crisis, no abdominal pain and LFTs improving, could otherwise consider RUQ US  - palliative deferred pain management to primary team   - s/p 1 U PRBC 9/19, 9/22, 9/26  - pain has little improvement and patient consistently reports worse pain when sleeping: will consider switching to long-acting PO medication     A/P:  - Heme consults appreciated  - restarted hydrea (10/2) home dose 1000mg weekdays, 1500mg weekends  Pain regimen  - PCA pump at 1.25 mg, tylenol 650mg q6h, toradol 15mg q6h w/ pO protonix  - c/w 30 mg ERBID Morphine (home medication 15 mg ER BID, would like to go home on home dose when DC)    - C/w humidified O2 for pain  - bowel regimen: miralax BID, senna 2 MG PRN  - d/c oxbryta iso of recall, will follow up with o/p Dr. Montano  - incentive spirometer  - will continue to monitor for signs of acute chest syndrome. If fever, culture and repeat CXR stat  - XRs ordered to r/o AVN

## 2024-10-07 PROCEDURE — 99233 SBSQ HOSP IP/OBS HIGH 50: CPT | Mod: GC

## 2024-10-07 RX ORDER — MORPHINE SULFATE 30 MG/1
30 TABLET, FILM COATED, EXTENDED RELEASE ORAL
Refills: 0 | Status: DISCONTINUED | OUTPATIENT
Start: 2024-10-07 | End: 2024-10-10

## 2024-10-07 RX ORDER — MORPHINE SULFATE 30 MG/1
30 TABLET, FILM COATED, EXTENDED RELEASE ORAL
Refills: 0 | Status: DISCONTINUED | OUTPATIENT
Start: 2024-10-07 | End: 2024-10-08

## 2024-10-07 RX ORDER — ONDANSETRON HCL/PF 4 MG/2 ML
4 VIAL (ML) INJECTION ONCE
Refills: 0 | Status: COMPLETED | OUTPATIENT
Start: 2024-10-07 | End: 2024-10-07

## 2024-10-07 RX ORDER — KETOROLAC TROMETHAMINE 10 MG/1
15 TABLET, FILM COATED ORAL ONCE
Refills: 0 | Status: DISCONTINUED | OUTPATIENT
Start: 2024-10-07 | End: 2024-10-07

## 2024-10-07 RX ADMIN — Medication 10 GRAM(S): at 17:11

## 2024-10-07 RX ADMIN — KETOROLAC TROMETHAMINE 15 MILLIGRAM(S): 10 TABLET, FILM COATED ORAL at 06:37

## 2024-10-07 RX ADMIN — Medication 650 MILLIGRAM(S): at 06:30

## 2024-10-07 RX ADMIN — KETOROLAC TROMETHAMINE 15 MILLIGRAM(S): 10 TABLET, FILM COATED ORAL at 07:37

## 2024-10-07 RX ADMIN — Medication 650 MILLIGRAM(S): at 11:45

## 2024-10-07 RX ADMIN — Medication 650 MILLIGRAM(S): at 00:46

## 2024-10-07 RX ADMIN — Medication 4 MILLIGRAM(S): at 07:36

## 2024-10-07 RX ADMIN — Medication 75 MILLILITER(S): at 10:22

## 2024-10-07 RX ADMIN — Medication 650 MILLIGRAM(S): at 17:08

## 2024-10-07 RX ADMIN — MORPHINE SULFATE 30 MILLIGRAM(S): 30 TABLET, FILM COATED, EXTENDED RELEASE ORAL at 17:09

## 2024-10-07 RX ADMIN — MORPHINE SULFATE 30 MILLILITER(S): 30 TABLET, FILM COATED, EXTENDED RELEASE ORAL at 13:00

## 2024-10-07 RX ADMIN — MORPHINE SULFATE 30 MILLIGRAM(S): 30 TABLET, FILM COATED, EXTENDED RELEASE ORAL at 06:30

## 2024-10-07 RX ADMIN — Medication 150 MILLILITER(S): at 17:08

## 2024-10-07 RX ADMIN — HYDROXYUREA 1000 MILLIGRAM(S): 500 CAPSULE ORAL at 06:32

## 2024-10-07 RX ADMIN — Medication 150 MILLILITER(S): at 11:54

## 2024-10-07 RX ADMIN — ENOXAPARIN SODIUM 30 MILLIGRAM(S): 150 INJECTION SUBCUTANEOUS at 11:44

## 2024-10-07 RX ADMIN — FOLIC ACID 1 MILLIGRAM(S): 1 TABLET ORAL at 11:46

## 2024-10-07 RX ADMIN — MORPHINE SULFATE 30 MILLILITER(S): 30 TABLET, FILM COATED, EXTENDED RELEASE ORAL at 20:48

## 2024-10-07 RX ADMIN — Medication 10 GRAM(S): at 06:32

## 2024-10-07 RX ADMIN — MORPHINE SULFATE 30 MILLILITER(S): 30 TABLET, FILM COATED, EXTENDED RELEASE ORAL at 19:20

## 2024-10-07 RX ADMIN — Medication 4 MILLIGRAM(S): at 18:14

## 2024-10-07 RX ADMIN — CHLORHEXIDINE GLUCONATE ORAL RINSE 1 APPLICATION(S): 1.2 SOLUTION DENTAL at 11:46

## 2024-10-07 RX ADMIN — Medication 150 MILLILITER(S): at 23:41

## 2024-10-07 RX ADMIN — MORPHINE SULFATE 30 MILLIGRAM(S): 30 TABLET, FILM COATED, EXTENDED RELEASE ORAL at 07:30

## 2024-10-07 RX ADMIN — PENICILLIN V POTASSIUM 250 MILLIGRAM(S): 500 TABLET ORAL at 06:31

## 2024-10-07 RX ADMIN — Medication 4 MILLIGRAM(S): at 00:21

## 2024-10-07 RX ADMIN — Medication 650 MILLIGRAM(S): at 07:30

## 2024-10-07 RX ADMIN — MORPHINE SULFATE 30 MILLILITER(S): 30 TABLET, FILM COATED, EXTENDED RELEASE ORAL at 08:22

## 2024-10-07 RX ADMIN — PANTOPRAZOLE SODIUM 40 MILLIGRAM(S): 40 TABLET, DELAYED RELEASE ORAL at 06:31

## 2024-10-07 RX ADMIN — PENICILLIN V POTASSIUM 250 MILLIGRAM(S): 500 TABLET ORAL at 17:09

## 2024-10-07 RX ADMIN — MORPHINE SULFATE 30 MILLIGRAM(S): 30 TABLET, FILM COATED, EXTENDED RELEASE ORAL at 18:00

## 2024-10-07 NOTE — PROGRESS NOTE ADULT - ATTENDING COMMENTS
28 yo male admitted with SCC on IV morphine pca pump.   prolonged hospital stay , still c/o pains in his hips, across left shoulder and knees, although appears better overall.    Reduced pca pump morphine to 1.25 mg on 10/3, 4h lockout 30mg, will reduce further today and increase IVF  C/w tylenol q6 atc, c/w ppi for gi ppx  cw mscontin 30mg bid , home dose mscontin 15mg qd  Encouraged incentive spirometer    concern for AVN:  Xrays of hips, shoulders/scapula negative  c/w home hydrea

## 2024-10-07 NOTE — PROGRESS NOTE ADULT - SUBJECTIVE AND OBJECTIVE BOX
Progress Note    08-25-24 (43d)    Patient is a 29y old  Male who presents with a chief complaint of sickle cell crisis (06 Oct 2024 08:30)      Subjective / Overnight Events :  - Overnight: was off PCA pump during XR. Had some pain + nausea. Received toradol + zofran  - Pt seen and examined at bedside.     Additional ROS (if any):    MEDICATIONS  (STANDING):  acetaminophen     Tablet .. 650 milliGRAM(s) Oral every 6 hours  chlorhexidine 2% Cloths 1 Application(s) Topical daily  enoxaparin Injectable 30 milliGRAM(s) SubCutaneous every 24 hours  folic acid 1 milliGRAM(s) Oral daily  glutamine Powder 10 Gram(s) Oral two times a day  hydroxyurea 1000 milliGRAM(s) Oral <User Schedule>  hydroxyurea 1500 milliGRAM(s) Oral <User Schedule>  morphine ER Tablet 30 milliGRAM(s) Oral two times a day  morphine PCA (5 mG/mL) 30 milliLiter(s) PCA Continuous PCA Continuous  pantoprazole    Tablet 40 milliGRAM(s) Oral before breakfast  penicillin   milliGRAM(s) Oral two times a day  sodium chloride 0.45%. 1000 milliLiter(s) (75 mL/Hr) IV Continuous <Continuous>    MEDICATIONS  (PRN):  naloxone Injectable 0.1 milliGRAM(s) IV Push every 3 minutes PRN For ANY of the following changes in patient status:  A. RR LESS THAN 10 breaths per minute, B. Oxygen saturation LESS THAN 90%, C. Sedation score of 6  ondansetron Injectable 4 milliGRAM(s) IV Push every 6 hours PRN Nausea  polyethylene glycol 3350 17 Gram(s) Oral two times a day PRN Constipation  senna 2 Tablet(s) Oral at bedtime PRN Constipation          PHYSICAL EXAM:  Vital Signs Last 24 Hrs  T(C): 36.4 (07 Oct 2024 06:25), Max: 36.6 (06 Oct 2024 14:07)  T(F): 97.5 (07 Oct 2024 06:25), Max: 97.9 (06 Oct 2024 14:07)  HR: 71 (07 Oct 2024 06:25) (56 - 87)  BP: 120/78 (07 Oct 2024 06:25) (114/74 - 154/87)  BP(mean): --  RR: 18 (07 Oct 2024 06:25) (18 - 18)  SpO2: 98% (07 Oct 2024 06:25) (97% - 100%)    Parameters below as of 07 Oct 2024 04:00  Patient On (Oxygen Delivery Method): room air        I&O's Summary      General: NAD  HEENT: PERRLA, EOMi, no scleral icterus  CV: RRR, normal S1 and S2, no m/r/g  Lungs: normal respiratory effort. CTAB, no wheezes, rales, or rhonchi  Abd: soft, nontender, nondistended, BSx4  Ext: no edema, 2+ peripheral pulses   Pysch: AAOx3  Neuro: grossly non-focal, moving all extremities spontaneously   Skin: no rashes or lesions     LABS:  CAPILLARY BLOOD GLUCOSE                                  7.3    8.82  )-----------( 263      ( 06 Oct 2024 08:17 )             22.9       WBC Trend: 8.82<--, 10.09<--, 10.06<--  Hb Trend: 7.3<--, 7.3<--, 7.8<--    10-06    142  |  106  |  6[L]  ----------------------------<  78  3.8   |  23  |  0.36[L]    Ca    8.3[L]      06 Oct 2024 08:17  Phos  4.3     10-06  Mg     2.00     10-06    TPro  7.7  /  Alb  3.4  /  TBili  1.9[H]  /  DBili  0.8[H]  /  AST  72[H]  /  ALT  42[H]  /  AlkPhos  362[H]  10-06          Urinalysis Basic - ( 06 Oct 2024 08:17 )    Color: x / Appearance: x / SG: x / pH: x  Gluc: 78 mg/dL / Ketone: x  / Bili: x / Urobili: x   Blood: x / Protein: x / Nitrite: x   Leuk Esterase: x / RBC: x / WBC x   Sq Epi: x / Non Sq Epi: x / Bacteria: x            RADIOLOGY & ADDITIONAL TESTS: Reviewed Progress Note    08-25-24 (43d)    Patient is a 29y old  Male who presents with a chief complaint of sickle cell crisis (06 Oct 2024 08:30)      Subjective / Overnight Events :  - Overnight: was off PCA pump during XR. Had some pain + nausea. Received toradol + zofran  - Pt seen and examined at bedside.   - Pt states that his pain is persistent, mild improvement while back on PCA pump  - Pain in b/l shoulders and knees  - Good pO intake and BMs  - Denies cp, sob, cough, abd pain, dysuria    Additional ROS (if any):    MEDICATIONS  (STANDING):  acetaminophen     Tablet .. 650 milliGRAM(s) Oral every 6 hours  chlorhexidine 2% Cloths 1 Application(s) Topical daily  enoxaparin Injectable 30 milliGRAM(s) SubCutaneous every 24 hours  folic acid 1 milliGRAM(s) Oral daily  glutamine Powder 10 Gram(s) Oral two times a day  hydroxyurea 1000 milliGRAM(s) Oral <User Schedule>  hydroxyurea 1500 milliGRAM(s) Oral <User Schedule>  morphine ER Tablet 30 milliGRAM(s) Oral two times a day  morphine PCA (5 mG/mL) 30 milliLiter(s) PCA Continuous PCA Continuous  pantoprazole    Tablet 40 milliGRAM(s) Oral before breakfast  penicillin   milliGRAM(s) Oral two times a day  sodium chloride 0.45%. 1000 milliLiter(s) (75 mL/Hr) IV Continuous <Continuous>    MEDICATIONS  (PRN):  naloxone Injectable 0.1 milliGRAM(s) IV Push every 3 minutes PRN For ANY of the following changes in patient status:  A. RR LESS THAN 10 breaths per minute, B. Oxygen saturation LESS THAN 90%, C. Sedation score of 6  ondansetron Injectable 4 milliGRAM(s) IV Push every 6 hours PRN Nausea  polyethylene glycol 3350 17 Gram(s) Oral two times a day PRN Constipation  senna 2 Tablet(s) Oral at bedtime PRN Constipation          PHYSICAL EXAM:  Vital Signs Last 24 Hrs  T(C): 36.4 (07 Oct 2024 06:25), Max: 36.6 (06 Oct 2024 14:07)  T(F): 97.5 (07 Oct 2024 06:25), Max: 97.9 (06 Oct 2024 14:07)  HR: 71 (07 Oct 2024 06:25) (56 - 87)  BP: 120/78 (07 Oct 2024 06:25) (114/74 - 154/87)  BP(mean): --  RR: 18 (07 Oct 2024 06:25) (18 - 18)  SpO2: 98% (07 Oct 2024 06:25) (97% - 100%)    Parameters below as of 07 Oct 2024 04:00  Patient On (Oxygen Delivery Method): room air        I&O's Summary      General: NAD  CV: RRR, normal S1 and S2, no m/r/g  Lungs: normal respiratory effort. CTAB, no wheezes, rales, or rhonchi  Abd: soft, nontender, nondistended, BSx4  Ext: no edema, 2+ peripheral pulses. b/l shoulders and knees ROM limited by pain. No erythema, swelling, fluctuance   Pysch: AAOx3    LABS:  CAPILLARY BLOOD GLUCOSE                                  7.3    8.82  )-----------( 263      ( 06 Oct 2024 08:17 )             22.9       WBC Trend: 8.82<--, 10.09<--, 10.06<--  Hb Trend: 7.3<--, 7.3<--, 7.8<--    10-06    142  |  106  |  6[L]  ----------------------------<  78  3.8   |  23  |  0.36[L]    Ca    8.3[L]      06 Oct 2024 08:17  Phos  4.3     10-06  Mg     2.00     10-06    TPro  7.7  /  Alb  3.4  /  TBili  1.9[H]  /  DBili  0.8[H]  /  AST  72[H]  /  ALT  42[H]  /  AlkPhos  362[H]  10-06          Urinalysis Basic - ( 06 Oct 2024 08:17 )    Color: x / Appearance: x / SG: x / pH: x  Gluc: 78 mg/dL / Ketone: x  / Bili: x / Urobili: x   Blood: x / Protein: x / Nitrite: x   Leuk Esterase: x / RBC: x / WBC x   Sq Epi: x / Non Sq Epi: x / Bacteria: x            RADIOLOGY & ADDITIONAL TESTS: Reviewed

## 2024-10-07 NOTE — PROGRESS NOTE ADULT - PROBLEM SELECTOR PLAN 1
- follows with Dr. Montano, missed monthly dose of Adakveo for 2 months which may explain SCC  - low concern for acute chest given no opacities on CXR, chest pain more consistent with SCC  - transaminitis likely 2/2 to hepatopathy iso vasoocclusive crisis, no abdominal pain and LFTs improving, could otherwise consider RUQ US  - palliative deferred pain management to primary team   - s/p 1 U PRBC 9/19, 9/22, 9/26  - pain has little improvement and patient consistently reports worse pain when sleeping: will consider switching to long-acting PO medication     A/P:  - Heme consults appreciated  - restarted hydrea (10/2) home dose 1000mg weekdays, 1500mg weekends  Pain regimen  - PCA pump at 1.25 mg, tylenol 650mg q6h, toradol 15mg q6h w/ pO protonix  - c/w 30 mg ERBID Morphine (home medication 15 mg ER BID, would like to go home on home dose when DC)    - C/w humidified O2 for pain  - bowel regimen: miralax BID, senna 2 MG PRN  - d/c oxbryta iso of recall, will follow up with o/p Dr. Montano  - incentive spirometer  - will continue to monitor for signs of acute chest syndrome. If fever, culture and repeat CXR stat  - XRs ordered to r/o AVN - follows with Dr. Montano, missed monthly dose of Adakveo for 2 months which may explain SCC  - low concern for acute chest given no opacities on CXR, chest pain more consistent with SCC  - transaminitis likely 2/2 to hepatopathy iso vasoocclusive crisis, no abdominal pain and LFTs improving, could otherwise consider RUQ US  - palliative deferred pain management to primary team   - s/p 1 U PRBC 9/19, 9/22, 9/26  - pain has little improvement and patient consistently reports worse pain when sleeping: will consider switching to long-acting PO medication     A/P:  - Heme consults appreciated  - restarted hydrea (10/2) home dose 1000mg weekdays, 1500mg weekends  Pain regimen  - PCA pump at 1.25 mg, tylenol 650mg q6h (s/p toradol 15mg q6h w/ pO protonix)  - c/w 30 mg ERBID Morphine (home medication 15 mg ER BID, would like to go home on home dose when DC)    - C/w humidified O2 for pain  - bowel regimen: miralax BID, senna 2 MG PRN  - d/c oxbryta iso of recall, will follow up with o/p Dr. Montano  - incentive spirometer  - will continue to monitor for signs of acute chest syndrome. If fever, culture and repeat CXR stat  - XRs ordered to r/o AVN

## 2024-10-08 LAB
ALBUMIN SERPL ELPH-MCNC: 3.3 G/DL — SIGNIFICANT CHANGE UP (ref 3.3–5)
ALP SERPL-CCNC: 365 U/L — HIGH (ref 40–120)
ALT FLD-CCNC: 35 U/L — SIGNIFICANT CHANGE UP (ref 4–41)
ANION GAP SERPL CALC-SCNC: 10 MMOL/L — SIGNIFICANT CHANGE UP (ref 7–14)
AST SERPL-CCNC: 53 U/L — HIGH (ref 4–40)
BASOPHILS # BLD AUTO: 0 K/UL — SIGNIFICANT CHANGE UP (ref 0–0.2)
BASOPHILS NFR BLD AUTO: 0 % — SIGNIFICANT CHANGE UP (ref 0–2)
BILIRUB DIRECT SERPL-MCNC: 0.8 MG/DL — HIGH (ref 0–0.3)
BILIRUB SERPL-MCNC: 1.9 MG/DL — HIGH (ref 0.2–1.2)
BLD GP AB SCN SERPL QL: NEGATIVE — SIGNIFICANT CHANGE UP
BUN SERPL-MCNC: 6 MG/DL — LOW (ref 7–23)
CALCIUM SERPL-MCNC: 8.2 MG/DL — LOW (ref 8.4–10.5)
CHLORIDE SERPL-SCNC: 104 MMOL/L — SIGNIFICANT CHANGE UP (ref 98–107)
CO2 SERPL-SCNC: 24 MMOL/L — SIGNIFICANT CHANGE UP (ref 22–31)
CREAT SERPL-MCNC: 0.38 MG/DL — LOW (ref 0.5–1.3)
EGFR: 155 ML/MIN/1.73M2 — SIGNIFICANT CHANGE UP
EOSINOPHIL # BLD AUTO: 0 K/UL — SIGNIFICANT CHANGE UP (ref 0–0.5)
EOSINOPHIL NFR BLD AUTO: 0 % — SIGNIFICANT CHANGE UP (ref 0–6)
GLUCOSE SERPL-MCNC: 86 MG/DL — SIGNIFICANT CHANGE UP (ref 70–99)
HCT VFR BLD CALC: 22.3 % — LOW (ref 39–50)
HEMOGLOBIN INTERPRETATION: SIGNIFICANT CHANGE UP
HGB A MFR BLD: 38.3 % — LOW (ref 95–97.6)
HGB A2 MFR BLD: 3.5 % — SIGNIFICANT CHANGE UP (ref 2.4–3.5)
HGB BLD-MCNC: 7.1 G/DL — LOW (ref 13–17)
HGB F MFR BLD: 4.2 % — HIGH (ref 0–1.5)
HGB S MFR BLD: 54 % — HIGH
IANC: 4 K/UL — SIGNIFICANT CHANGE UP (ref 1.8–7.4)
LDH SERPL L TO P-CCNC: 681 U/L — HIGH (ref 135–225)
LYMPHOCYTES # BLD AUTO: 52.3 % — HIGH (ref 13–44)
LYMPHOCYTES # BLD AUTO: 6.33 K/UL — HIGH (ref 1–3.3)
MAGNESIUM SERPL-MCNC: 1.7 MG/DL — SIGNIFICANT CHANGE UP (ref 1.6–2.6)
MCHC RBC-ENTMCNC: 24.5 PG — LOW (ref 27–34)
MCHC RBC-ENTMCNC: 31.8 GM/DL — LOW (ref 32–36)
MCV RBC AUTO: 76.9 FL — LOW (ref 80–100)
MONOCYTES # BLD AUTO: 0.98 K/UL — HIGH (ref 0–0.9)
MONOCYTES NFR BLD AUTO: 8.1 % — SIGNIFICANT CHANGE UP (ref 2–14)
NEUTROPHILS # BLD AUTO: 4.8 K/UL — SIGNIFICANT CHANGE UP (ref 1.8–7.4)
NEUTROPHILS NFR BLD AUTO: 39.6 % — LOW (ref 43–77)
PHOSPHATE SERPL-MCNC: 4.1 MG/DL — SIGNIFICANT CHANGE UP (ref 2.5–4.5)
PLATELET # BLD AUTO: 272 K/UL — SIGNIFICANT CHANGE UP (ref 150–400)
POTASSIUM SERPL-MCNC: 3.6 MMOL/L — SIGNIFICANT CHANGE UP (ref 3.5–5.3)
POTASSIUM SERPL-SCNC: 3.6 MMOL/L — SIGNIFICANT CHANGE UP (ref 3.5–5.3)
PROT SERPL-MCNC: 7.3 G/DL — SIGNIFICANT CHANGE UP (ref 6–8.3)
RBC # BLD: 2.9 M/UL — LOW (ref 4.2–5.8)
RBC # BLD: 2.9 M/UL — LOW (ref 4.2–5.8)
RBC # FLD: 26.5 % — HIGH (ref 10.3–14.5)
RETICS #: 948 K/UL — HIGH (ref 25–125)
RETICS/RBC NFR: >22.2 % — HIGH (ref 0.5–2.5)
RH IG SCN BLD-IMP: POSITIVE — SIGNIFICANT CHANGE UP
SODIUM SERPL-SCNC: 138 MMOL/L — SIGNIFICANT CHANGE UP (ref 135–145)
WBC # BLD: 12.11 K/UL — HIGH (ref 3.8–10.5)
WBC # FLD AUTO: 12.11 K/UL — HIGH (ref 3.8–10.5)

## 2024-10-08 PROCEDURE — 99232 SBSQ HOSP IP/OBS MODERATE 35: CPT | Mod: GC

## 2024-10-08 RX ORDER — KETOROLAC TROMETHAMINE 10 MG/1
15 TABLET, FILM COATED ORAL ONCE
Refills: 0 | Status: DISCONTINUED | OUTPATIENT
Start: 2024-10-08 | End: 2024-10-08

## 2024-10-08 RX ORDER — MORPHINE SULFATE 30 MG/1
30 TABLET, FILM COATED, EXTENDED RELEASE ORAL
Refills: 0 | Status: ACTIVE | OUTPATIENT
Start: 2024-10-08 | End: 2024-10-15

## 2024-10-08 RX ADMIN — KETOROLAC TROMETHAMINE 15 MILLIGRAM(S): 10 TABLET, FILM COATED ORAL at 01:03

## 2024-10-08 RX ADMIN — Medication 650 MILLIGRAM(S): at 01:39

## 2024-10-08 RX ADMIN — HYDROXYUREA 1000 MILLIGRAM(S): 500 CAPSULE ORAL at 06:25

## 2024-10-08 RX ADMIN — MORPHINE SULFATE 30 MILLIGRAM(S): 30 TABLET, FILM COATED, EXTENDED RELEASE ORAL at 06:26

## 2024-10-08 RX ADMIN — CHLORHEXIDINE GLUCONATE ORAL RINSE 1 APPLICATION(S): 1.2 SOLUTION DENTAL at 12:09

## 2024-10-08 RX ADMIN — Medication 650 MILLIGRAM(S): at 17:27

## 2024-10-08 RX ADMIN — MORPHINE SULFATE 30 MILLILITER(S): 30 TABLET, FILM COATED, EXTENDED RELEASE ORAL at 08:45

## 2024-10-08 RX ADMIN — ENOXAPARIN SODIUM 30 MILLIGRAM(S): 150 INJECTION SUBCUTANEOUS at 12:09

## 2024-10-08 RX ADMIN — MORPHINE SULFATE 30 MILLILITER(S): 30 TABLET, FILM COATED, EXTENDED RELEASE ORAL at 10:29

## 2024-10-08 RX ADMIN — Medication 10 GRAM(S): at 17:30

## 2024-10-08 RX ADMIN — KETOROLAC TROMETHAMINE 15 MILLIGRAM(S): 10 TABLET, FILM COATED ORAL at 14:30

## 2024-10-08 RX ADMIN — FOLIC ACID 1 MILLIGRAM(S): 1 TABLET ORAL at 12:09

## 2024-10-08 RX ADMIN — MORPHINE SULFATE 30 MILLIGRAM(S): 30 TABLET, FILM COATED, EXTENDED RELEASE ORAL at 17:27

## 2024-10-08 RX ADMIN — Medication 150 MILLILITER(S): at 19:30

## 2024-10-08 RX ADMIN — Medication 650 MILLIGRAM(S): at 12:09

## 2024-10-08 RX ADMIN — MORPHINE SULFATE 30 MILLILITER(S): 30 TABLET, FILM COATED, EXTENDED RELEASE ORAL at 20:17

## 2024-10-08 RX ADMIN — PENICILLIN V POTASSIUM 250 MILLIGRAM(S): 500 TABLET ORAL at 06:24

## 2024-10-08 RX ADMIN — MORPHINE SULFATE 30 MILLIGRAM(S): 30 TABLET, FILM COATED, EXTENDED RELEASE ORAL at 07:24

## 2024-10-08 RX ADMIN — Medication 650 MILLIGRAM(S): at 23:58

## 2024-10-08 RX ADMIN — Medication 650 MILLIGRAM(S): at 00:39

## 2024-10-08 RX ADMIN — Medication 150 MILLILITER(S): at 05:57

## 2024-10-08 RX ADMIN — Medication 650 MILLIGRAM(S): at 06:24

## 2024-10-08 RX ADMIN — KETOROLAC TROMETHAMINE 15 MILLIGRAM(S): 10 TABLET, FILM COATED ORAL at 00:03

## 2024-10-08 RX ADMIN — PENICILLIN V POTASSIUM 250 MILLIGRAM(S): 500 TABLET ORAL at 17:27

## 2024-10-08 RX ADMIN — Medication 650 MILLIGRAM(S): at 07:24

## 2024-10-08 RX ADMIN — PANTOPRAZOLE SODIUM 40 MILLIGRAM(S): 40 TABLET, DELAYED RELEASE ORAL at 06:26

## 2024-10-08 RX ADMIN — Medication 10 GRAM(S): at 06:26

## 2024-10-08 NOTE — PROVIDER CONTACT NOTE (OTHER) - NAME OF MD/NP/PA/DO NOTIFIED:
MD Salmon, Mariluz
Mariluz Salmon MD
MD Mesha Briseno
MD Salmon, Mariluz
Joelle Skelton MD
MD Komal Hand
magdaleno blanca

## 2024-10-08 NOTE — PROVIDER CONTACT NOTE (OTHER) - BACKGROUND
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 Pt has a hx of acute chest syndrome, sickle cell disease, and Lumbago
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 Pt has a hx of acute chest syndrome, sickle cell disease, and lumbago
Pt admitted for sickle cell crisis
patient admitted for sickle cell crisis
sickle cell crisis
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
patient admitted for sickle cell crisis
220.4

## 2024-10-08 NOTE — PROVIDER CONTACT NOTE (OTHER) - RECOMMENDATIONS
notify MD. no changes made to pump settings and med bag has not been scanned on emar to reflect new order since patient does not want that high a dose
Notify MD.
Notified MD
make md aware of prn useage.
Notified MD
confirm with MD it is ok to give all the pain meds
Notified MD

## 2024-10-08 NOTE — PROVIDER CONTACT NOTE (OTHER) - SITUATION
Pt hr was 56, pt was in 9/10 pain. MD ordered toradol. MD aware of hr and recommended to still give toradol.
pt on pca pump received breakthrough prn at 23:21. pt is currently complaining of severe pain, requesting another prn (4am). pt is vitally stable.
PCA pump dose was changed from 2.5mg to 2mg. Patient requested the dose stays at 2mg but the lockput/4 hour limit be increased. Patient stated himself that 2.5mg might be too high a dose for him
Pt received toradol early d/t having pain after returning from xray. Rating pain 9 out of 10.
Pt refusing bed alarm
standing PO oxycodone IR added to patient pain regimen. Patient is on IV PCA pump, standing IV morphine q3, standing IV toradol q6, standing morphine ER q12, want to make sure it is ok to give all med
Pt scheduled for radiology, x-ray. OK for pt to go without PCA pump. PCA reservoir at 11:30- 2.4mL. Medication verified with RN Vita Espitia.

## 2024-10-08 NOTE — PROVIDER CONTACT NOTE (OTHER) - DATE AND TIME:
07-Oct-2024 20:44
06-Oct-2024 22:01
12-Sep-2024 11:25
30-Aug-2024 12:27
15-Sep-2024 04:00
06-Oct-2024 21:48
19-Sep-2024 11:39

## 2024-10-08 NOTE — PROVIDER CONTACT NOTE (OTHER) - REASON
pt using prn for breakthrough pain
standing PO oxycodone IR added to patient pain regimen
MD approved of pt leaving unit to go to radiology without PCA pump
PCA pump dosing was changed and patient requested a different change
pt hr was 56
holding toradol at 00:00
Pt refusing bed alarm

## 2024-10-08 NOTE — PROGRESS NOTE ADULT - PROBLEM SELECTOR PLAN 2
- platelet count 9/18: 95, unclear baseline as pt admitted w/ thrombocytosis. Likely ~170  - 4 T score: 4 points for intermediate probability of HIT  - 9/19 platelet 76  - Heparin platelets negative, serotonin assay negative, platelets 148 on 9/24, unlikely to be HIT       PLAN  - c/w lovenox RESOLVED  - platelet count 9/18: 95, unclear baseline as pt admitted w/ thrombocytosis. Likely ~170  - 4 T score: 4 points for intermediate probability of HIT  - 9/19 platelet 76  - Heparin platelets negative, serotonin assay negative, platelets 148 on 9/24, unlikely to be HIT       PLAN  - c/w lovenox

## 2024-10-08 NOTE — PROVIDER CONTACT NOTE (OTHER) - ACTION/TREATMENT ORDERED:
MD banegas.
MD Briseno made aware
As per MD, is it ok with the attending.
MD ordered to hold off on 00:00 toradol since we gave the toradol at 22:07
MD recommended to still give toradol even though hr was 56, will continue to monitor pt
give the prn.
MD notified and aware. As per MD, she will tell the attending since she is the one who adjusts the pump. No changes to the pump settings have been made on RN end, awaiting new orders.

## 2024-10-08 NOTE — PROGRESS NOTE ADULT - ATTENDING COMMENTS
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   prolonged hospital stay , still c/o pain, although better.  Asking to decrease PCA pump.  Hgb S% decreasing    Reduced pca pump further today and maintain IVF  C/w tylenol q6 atc, c/w ppi for gi ppx  cw mscontin 30mg bid , home dose mscontin 15mg qd  Encouraged incentive spirometer    concern for AVN:  Xrays of hips, shoulders/scapula negative  c/w home hydrea    DC planning.

## 2024-10-08 NOTE — PROGRESS NOTE ADULT - PROBLEM SELECTOR PLAN 1
- follows with Dr. Montano, missed monthly dose of Adakveo for 2 months which may explain SCC  - low concern for acute chest given no opacities on CXR, chest pain more consistent with SCC  - transaminitis likely 2/2 to hepatopathy iso vasoocclusive crisis, no abdominal pain and LFTs improving, could otherwise consider RUQ US  - palliative deferred pain management to primary team   - s/p 1 U PRBC 9/19, 9/22, 9/26  - pain has little improvement and patient consistently reports worse pain when sleeping: will consider switching to long-acting PO medication     A/P:  - Heme consults appreciated  - restarted hydrea (10/2) home dose 1000mg weekdays, 1500mg weekends  Pain regimen  - PCA pump at 1.25 mg, tylenol 650mg q6h (s/p toradol 15mg q6h w/ pO protonix)  - c/w 30 mg ERBID Morphine (home medication 15 mg ER BID, would like to go home on home dose when DC)    - C/w humidified O2 for pain  - bowel regimen: miralax BID, senna 2 MG PRN  - d/c oxbryta iso of recall, will follow up with o/p Dr. Montano  - incentive spirometer  - will continue to monitor for signs of acute chest syndrome. If fever, culture and repeat CXR stat  - XRs ordered to r/o AVN - follows with Dr. Montano, missed monthly dose of Adakveo for 2 months which may explain SCC  - low concern for acute chest given no opacities on CXR, chest pain more consistent with SCC  - transaminitis likely 2/2 to hepatopathy iso vasoocclusive crisis, no abdominal pain and LFTs improving, could otherwise consider RUQ US  - palliative deferred pain management to primary team   - s/p 1 U PRBC 9/19, 9/22, 9/26  - pain has little improvement and patient consistently reports worse pain when sleeping: will consider switching to long-acting PO medication   - XRs show no sign of AVNs    A/P:  - Heme consults appreciated  - restarted hydrea (10/2) home dose 1000mg weekdays, 1500mg weekends  - IVF 1/2 NS @150cc/hr  - PCA pump downtitrated to 0.75mg, tylenol 650mg q6h (s/p toradol 15mg q6h w/ pO protonix)  - c/w 30 mg ERBID Morphine (home medication 15 mg ER BID, would like to go home on home dose when DC)    - C/w humidified O2 for pain  - bowel regimen: miralax BID, senna 2 MG PRN  - d/c oxbryta iso of recall, will follow up with o/p Dr. Montano  - incentive spirometer  - will continue to monitor for signs of acute chest syndrome. If fever, culture and repeat CXR stat

## 2024-10-08 NOTE — PROVIDER CONTACT NOTE (OTHER) - ASSESSMENT
Pt is alert and orientedx4. Pt has pain rating 9 out of 10.
patient verbalizing pain, but as per MD, patient is resistant to IV PCA pump dose increase
Pt is alert and orientedx4. Pt refuses bed alarm. Pt educated on fall and injury risks. Pt understood education using teach back method.
Pt scheduled for radiology, x-ray. OK for pt to go without PCA pump. Pt requested not to go with PCA pump. 12pm vital signs rescheduled until pt comes back.
Pt is alert and oriented x4. Pt has pain rating 9 out of 10. Pt is asymptomatic besides the pain.
patient stable
pt visibly uncomfortable and in severe pain.

## 2024-10-08 NOTE — PROGRESS NOTE ADULT - SUBJECTIVE AND OBJECTIVE BOX
Progress Note    08-25-24 (44d)    Patient is a 29y old  Male who presents with a chief complaint of sickle cell crisis (07 Oct 2024 07:10)      Subjective / Overnight Events :  - No acute events overnight.  - Pt seen and examined at bedside.     Additional ROS (if any):    MEDICATIONS  (STANDING):  acetaminophen     Tablet .. 650 milliGRAM(s) Oral every 6 hours  chlorhexidine 2% Cloths 1 Application(s) Topical daily  enoxaparin Injectable 30 milliGRAM(s) SubCutaneous every 24 hours  folic acid 1 milliGRAM(s) Oral daily  glutamine Powder 10 Gram(s) Oral two times a day  hydroxyurea 1000 milliGRAM(s) Oral <User Schedule>  hydroxyurea 1500 milliGRAM(s) Oral <User Schedule>  morphine ER Tablet 30 milliGRAM(s) Oral two times a day  morphine PCA (5 mG/mL) 30 milliLiter(s) PCA Continuous PCA Continuous  pantoprazole    Tablet 40 milliGRAM(s) Oral before breakfast  penicillin   milliGRAM(s) Oral two times a day  sodium chloride 0.45%. 1000 milliLiter(s) (150 mL/Hr) IV Continuous <Continuous>    MEDICATIONS  (PRN):  naloxone Injectable 0.1 milliGRAM(s) IV Push every 3 minutes PRN For ANY of the following changes in patient status:  A. RR LESS THAN 10 breaths per minute, B. Oxygen saturation LESS THAN 90%, C. Sedation score of 6  ondansetron Injectable 4 milliGRAM(s) IV Push every 6 hours PRN Nausea  polyethylene glycol 3350 17 Gram(s) Oral two times a day PRN Constipation  senna 2 Tablet(s) Oral at bedtime PRN Constipation          PHYSICAL EXAM:  Vital Signs Last 24 Hrs  T(C): 37 (08 Oct 2024 08:00), Max: 37 (08 Oct 2024 08:00)  T(F): 98.6 (08 Oct 2024 08:00), Max: 98.6 (08 Oct 2024 08:00)  HR: 73 (08 Oct 2024 08:00) (64 - 80)  BP: 111/69 (08 Oct 2024 08:00) (107/72 - 123/81)  BP(mean): --  RR: 17 (08 Oct 2024 08:00) (17 - 18)  SpO2: 97% (08 Oct 2024 08:00) (97% - 99%)    Parameters below as of 08 Oct 2024 08:00  Patient On (Oxygen Delivery Method): room air        I&O's Summary    07 Oct 2024 07:01  -  08 Oct 2024 07:00  --------------------------------------------------------  IN: 0 mL / OUT: 850 mL / NET: -850 mL        General: NAD  HEENT: PERRLA, EOMi, no scleral icterus  CV: RRR, normal S1 and S2, no m/r/g  Lungs: normal respiratory effort. CTAB, no wheezes, rales, or rhonchi  Abd: soft, nontender, nondistended, BSx4  Ext: no edema, 2+ peripheral pulses   Pysch: AAOx3  Neuro: grossly non-focal, moving all extremities spontaneously   Skin: no rashes or lesions     LABS:  CAPILLARY BLOOD GLUCOSE                                  7.1    12.11 )-----------( 272      ( 08 Oct 2024 07:24 )             22.3       WBC Trend: 12.11<--, 8.82<--, 10.09<--  Hb Trend: 7.1<--, 7.3<--, 7.3<--, 7.8<--    10-08    138  |  104  |  6[L]  ----------------------------<  86  3.6   |  24  |  0.38[L]    Ca    8.2[L]      08 Oct 2024 07:24  Phos  4.1     10-08  Mg     1.70     10-08    TPro  7.3  /  Alb  3.3  /  TBili  1.9[H]  /  DBili  0.8[H]  /  AST  53[H]  /  ALT  35  /  AlkPhos  365[H]  10-08          Urinalysis Basic - ( 08 Oct 2024 07:24 )    Color: x / Appearance: x / SG: x / pH: x  Gluc: 86 mg/dL / Ketone: x  / Bili: x / Urobili: x   Blood: x / Protein: x / Nitrite: x   Leuk Esterase: x / RBC: x / WBC x   Sq Epi: x / Non Sq Epi: x / Bacteria: x            RADIOLOGY & ADDITIONAL TESTS: Reviewed Progress Note    08-25-24 (44d)    Patient is a 29y old  Male who presents with a chief complaint of sickle cell crisis (07 Oct 2024 07:10)      Subjective / Overnight Events :  - Overnight: Patient required 1x toradol for crisis pain  - Pt seen and examined at bedside.   - Pt states he feels similar to yesterday. Pressing PCA less frequently  - Continues to have b/l shoulder, hip, and knee pain  - Reports similar urine output to y'day after increasing fluid rate  - Using incentive spirometer  - good pO intake and good BMs  - Denies cp, sob, abd pain, dysuria    Additional ROS (if any):    MEDICATIONS  (STANDING):  acetaminophen     Tablet .. 650 milliGRAM(s) Oral every 6 hours  chlorhexidine 2% Cloths 1 Application(s) Topical daily  enoxaparin Injectable 30 milliGRAM(s) SubCutaneous every 24 hours  folic acid 1 milliGRAM(s) Oral daily  glutamine Powder 10 Gram(s) Oral two times a day  hydroxyurea 1000 milliGRAM(s) Oral <User Schedule>  hydroxyurea 1500 milliGRAM(s) Oral <User Schedule>  morphine ER Tablet 30 milliGRAM(s) Oral two times a day  morphine PCA (5 mG/mL) 30 milliLiter(s) PCA Continuous PCA Continuous  pantoprazole    Tablet 40 milliGRAM(s) Oral before breakfast  penicillin   milliGRAM(s) Oral two times a day  sodium chloride 0.45%. 1000 milliLiter(s) (150 mL/Hr) IV Continuous <Continuous>    MEDICATIONS  (PRN):  naloxone Injectable 0.1 milliGRAM(s) IV Push every 3 minutes PRN For ANY of the following changes in patient status:  A. RR LESS THAN 10 breaths per minute, B. Oxygen saturation LESS THAN 90%, C. Sedation score of 6  ondansetron Injectable 4 milliGRAM(s) IV Push every 6 hours PRN Nausea  polyethylene glycol 3350 17 Gram(s) Oral two times a day PRN Constipation  senna 2 Tablet(s) Oral at bedtime PRN Constipation          PHYSICAL EXAM:  Vital Signs Last 24 Hrs  T(C): 37 (08 Oct 2024 08:00), Max: 37 (08 Oct 2024 08:00)  T(F): 98.6 (08 Oct 2024 08:00), Max: 98.6 (08 Oct 2024 08:00)  HR: 73 (08 Oct 2024 08:00) (64 - 80)  BP: 111/69 (08 Oct 2024 08:00) (107/72 - 123/81)  BP(mean): --  RR: 17 (08 Oct 2024 08:00) (17 - 18)  SpO2: 97% (08 Oct 2024 08:00) (97% - 99%)    Parameters below as of 08 Oct 2024 08:00  Patient On (Oxygen Delivery Method): room air        I&O's Summary    07 Oct 2024 07:01  -  08 Oct 2024 07:00  --------------------------------------------------------  IN: 0 mL / OUT: 850 mL / NET: -850 mL        General: NAD  CV: RRR, normal S1 and S2, no m/r/g  Lungs: normal respiratory effort. CTAB, no wheezes, rales, or rhonchi  Abd: soft, nontender, nondistended, BSx4  Ext: no edema, 2+ peripheral pulses. b/l shoulders and knees ROM limited by pain. No erythema, swelling, fluctuance   Pysch: AAOx3    LABS:  CAPILLARY BLOOD GLUCOSE                                  7.1    12.11 )-----------( 272      ( 08 Oct 2024 07:24 )             22.3       WBC Trend: 12.11<--, 8.82<--, 10.09<--  Hb Trend: 7.1<--, 7.3<--, 7.3<--, 7.8<--    10-08    138  |  104  |  6[L]  ----------------------------<  86  3.6   |  24  |  0.38[L]    Ca    8.2[L]      08 Oct 2024 07:24  Phos  4.1     10-08  Mg     1.70     10-08    TPro  7.3  /  Alb  3.3  /  TBili  1.9[H]  /  DBili  0.8[H]  /  AST  53[H]  /  ALT  35  /  AlkPhos  365[H]  10-08          Urinalysis Basic - ( 08 Oct 2024 07:24 )    Color: x / Appearance: x / SG: x / pH: x  Gluc: 86 mg/dL / Ketone: x  / Bili: x / Urobili: x   Blood: x / Protein: x / Nitrite: x   Leuk Esterase: x / RBC: x / WBC x   Sq Epi: x / Non Sq Epi: x / Bacteria: x            RADIOLOGY & ADDITIONAL TESTS: Reviewed

## 2024-10-09 LAB
ALBUMIN SERPL ELPH-MCNC: 3.4 G/DL — SIGNIFICANT CHANGE UP (ref 3.3–5)
ALP SERPL-CCNC: 361 U/L — HIGH (ref 40–120)
ALT FLD-CCNC: 33 U/L — SIGNIFICANT CHANGE UP (ref 4–41)
ANION GAP SERPL CALC-SCNC: 14 MMOL/L — SIGNIFICANT CHANGE UP (ref 7–14)
AST SERPL-CCNC: 48 U/L — HIGH (ref 4–40)
BASOPHILS # BLD AUTO: 0.07 K/UL — SIGNIFICANT CHANGE UP (ref 0–0.2)
BASOPHILS NFR BLD AUTO: 0.6 % — SIGNIFICANT CHANGE UP (ref 0–2)
BILIRUB DIRECT SERPL-MCNC: 0.8 MG/DL — HIGH (ref 0–0.3)
BILIRUB SERPL-MCNC: 1.9 MG/DL — HIGH (ref 0.2–1.2)
BUN SERPL-MCNC: 9 MG/DL — SIGNIFICANT CHANGE UP (ref 7–23)
CALCIUM SERPL-MCNC: 8.7 MG/DL — SIGNIFICANT CHANGE UP (ref 8.4–10.5)
CHLORIDE SERPL-SCNC: 102 MMOL/L — SIGNIFICANT CHANGE UP (ref 98–107)
CO2 SERPL-SCNC: 22 MMOL/L — SIGNIFICANT CHANGE UP (ref 22–31)
CREAT SERPL-MCNC: 0.38 MG/DL — LOW (ref 0.5–1.3)
EGFR: 155 ML/MIN/1.73M2 — SIGNIFICANT CHANGE UP
EOSINOPHIL # BLD AUTO: 0.02 K/UL — SIGNIFICANT CHANGE UP (ref 0–0.5)
EOSINOPHIL NFR BLD AUTO: 0.2 % — SIGNIFICANT CHANGE UP (ref 0–6)
GLUCOSE SERPL-MCNC: 95 MG/DL — SIGNIFICANT CHANGE UP (ref 70–99)
HCT VFR BLD CALC: 22.3 % — LOW (ref 39–50)
HGB BLD-MCNC: 7.1 G/DL — LOW (ref 13–17)
IANC: 4.71 K/UL — SIGNIFICANT CHANGE UP (ref 1.8–7.4)
IMM GRANULOCYTES NFR BLD AUTO: 2.9 % — HIGH (ref 0–0.9)
LDH SERPL L TO P-CCNC: 690 U/L — HIGH (ref 135–225)
LYMPHOCYTES # BLD AUTO: 50.1 % — HIGH (ref 13–44)
LYMPHOCYTES # BLD AUTO: 6.2 K/UL — HIGH (ref 1–3.3)
MAGNESIUM SERPL-MCNC: 1.6 MG/DL — SIGNIFICANT CHANGE UP (ref 1.6–2.6)
MCHC RBC-ENTMCNC: 24.9 PG — LOW (ref 27–34)
MCHC RBC-ENTMCNC: 31.8 GM/DL — LOW (ref 32–36)
MCV RBC AUTO: 78.2 FL — LOW (ref 80–100)
MONOCYTES # BLD AUTO: 1.02 K/UL — HIGH (ref 0–0.9)
MONOCYTES NFR BLD AUTO: 8.2 % — SIGNIFICANT CHANGE UP (ref 2–14)
NEUTROPHILS # BLD AUTO: 4.71 K/UL — SIGNIFICANT CHANGE UP (ref 1.8–7.4)
NEUTROPHILS NFR BLD AUTO: 38 % — LOW (ref 43–77)
NRBC # BLD: 67 /100 WBCS — HIGH (ref 0–0)
NRBC # FLD: 8.34 K/UL — HIGH (ref 0–0)
PHOSPHATE SERPL-MCNC: 4 MG/DL — SIGNIFICANT CHANGE UP (ref 2.5–4.5)
PLATELET # BLD AUTO: 240 K/UL — SIGNIFICANT CHANGE UP (ref 150–400)
POTASSIUM SERPL-MCNC: 3.4 MMOL/L — LOW (ref 3.5–5.3)
POTASSIUM SERPL-SCNC: 3.4 MMOL/L — LOW (ref 3.5–5.3)
PROT SERPL-MCNC: 7.7 G/DL — SIGNIFICANT CHANGE UP (ref 6–8.3)
RBC # BLD: 2.85 M/UL — LOW (ref 4.2–5.8)
RBC # BLD: 2.85 M/UL — LOW (ref 4.2–5.8)
RBC # FLD: 26.8 % — HIGH (ref 10.3–14.5)
RETICS #: 870.1 K/UL — HIGH (ref 25–125)
RETICS/RBC NFR: >22.2 % — HIGH (ref 0.5–2.5)
SODIUM SERPL-SCNC: 138 MMOL/L — SIGNIFICANT CHANGE UP (ref 135–145)
WBC # BLD: 12.38 K/UL — HIGH (ref 3.8–10.5)
WBC # FLD AUTO: 12.38 K/UL — HIGH (ref 3.8–10.5)

## 2024-10-09 PROCEDURE — 99232 SBSQ HOSP IP/OBS MODERATE 35: CPT | Mod: GC

## 2024-10-09 RX ORDER — MAGNESIUM SULFATE 500 MG/ML
1 VIAL (ML) INJECTION ONCE
Refills: 0 | Status: DISCONTINUED | OUTPATIENT
Start: 2024-10-09 | End: 2024-10-09

## 2024-10-09 RX ORDER — NALOXONE HYDROCHLORIDE 0.4 MG/ML
1 INJECTION, SOLUTION INTRAMUSCULAR; INTRAVENOUS; SUBCUTANEOUS
Qty: 1 | Refills: 0
Start: 2024-10-09

## 2024-10-09 RX ORDER — KETOROLAC TROMETHAMINE 10 MG/1
15 TABLET, FILM COATED ORAL ONCE
Refills: 0 | Status: DISCONTINUED | OUTPATIENT
Start: 2024-10-09 | End: 2024-10-09

## 2024-10-09 RX ORDER — MORPHINE SULFATE 30 MG/1
1 TABLET, FILM COATED, EXTENDED RELEASE ORAL
Qty: 14 | Refills: 0
Start: 2024-10-09 | End: 2024-10-15

## 2024-10-09 RX ORDER — MORPHINE SULFATE 30 MG/1
1 TABLET, FILM COATED, EXTENDED RELEASE ORAL
Qty: 21 | Refills: 0
Start: 2024-10-09 | End: 2024-10-15

## 2024-10-09 RX ADMIN — Medication 650 MILLIGRAM(S): at 18:14

## 2024-10-09 RX ADMIN — Medication 20 MILLIEQUIVALENT(S): at 13:42

## 2024-10-09 RX ADMIN — Medication 150 MILLILITER(S): at 17:17

## 2024-10-09 RX ADMIN — Medication 20 MILLIEQUIVALENT(S): at 11:16

## 2024-10-09 RX ADMIN — MORPHINE SULFATE 30 MILLIGRAM(S): 30 TABLET, FILM COATED, EXTENDED RELEASE ORAL at 05:28

## 2024-10-09 RX ADMIN — KETOROLAC TROMETHAMINE 15 MILLIGRAM(S): 10 TABLET, FILM COATED ORAL at 17:01

## 2024-10-09 RX ADMIN — Medication 10 GRAM(S): at 17:14

## 2024-10-09 RX ADMIN — PANTOPRAZOLE SODIUM 40 MILLIGRAM(S): 40 TABLET, DELAYED RELEASE ORAL at 05:32

## 2024-10-09 RX ADMIN — Medication 20 MILLIEQUIVALENT(S): at 16:01

## 2024-10-09 RX ADMIN — CHLORHEXIDINE GLUCONATE ORAL RINSE 1 APPLICATION(S): 1.2 SOLUTION DENTAL at 11:20

## 2024-10-09 RX ADMIN — KETOROLAC TROMETHAMINE 15 MILLIGRAM(S): 10 TABLET, FILM COATED ORAL at 03:18

## 2024-10-09 RX ADMIN — FOLIC ACID 1 MILLIGRAM(S): 1 TABLET ORAL at 11:19

## 2024-10-09 RX ADMIN — Medication 650 MILLIGRAM(S): at 00:58

## 2024-10-09 RX ADMIN — PENICILLIN V POTASSIUM 250 MILLIGRAM(S): 500 TABLET ORAL at 17:14

## 2024-10-09 RX ADMIN — Medication 10 GRAM(S): at 05:33

## 2024-10-09 RX ADMIN — Medication 650 MILLIGRAM(S): at 06:28

## 2024-10-09 RX ADMIN — MORPHINE SULFATE 30 MILLILITER(S): 30 TABLET, FILM COATED, EXTENDED RELEASE ORAL at 20:08

## 2024-10-09 RX ADMIN — PENICILLIN V POTASSIUM 250 MILLIGRAM(S): 500 TABLET ORAL at 05:33

## 2024-10-09 RX ADMIN — Medication 150 MILLILITER(S): at 10:17

## 2024-10-09 RX ADMIN — MORPHINE SULFATE 30 MILLILITER(S): 30 TABLET, FILM COATED, EXTENDED RELEASE ORAL at 08:31

## 2024-10-09 RX ADMIN — KETOROLAC TROMETHAMINE 15 MILLIGRAM(S): 10 TABLET, FILM COATED ORAL at 04:18

## 2024-10-09 RX ADMIN — MORPHINE SULFATE 30 MILLIGRAM(S): 30 TABLET, FILM COATED, EXTENDED RELEASE ORAL at 18:14

## 2024-10-09 RX ADMIN — Medication 650 MILLIGRAM(S): at 17:14

## 2024-10-09 RX ADMIN — MORPHINE SULFATE 30 MILLIGRAM(S): 30 TABLET, FILM COATED, EXTENDED RELEASE ORAL at 17:16

## 2024-10-09 RX ADMIN — Medication 650 MILLIGRAM(S): at 12:19

## 2024-10-09 RX ADMIN — Medication 650 MILLIGRAM(S): at 05:28

## 2024-10-09 RX ADMIN — Medication 650 MILLIGRAM(S): at 11:19

## 2024-10-09 RX ADMIN — MORPHINE SULFATE 30 MILLIGRAM(S): 30 TABLET, FILM COATED, EXTENDED RELEASE ORAL at 06:28

## 2024-10-09 RX ADMIN — Medication 150 MILLILITER(S): at 05:27

## 2024-10-09 RX ADMIN — KETOROLAC TROMETHAMINE 15 MILLIGRAM(S): 10 TABLET, FILM COATED ORAL at 16:01

## 2024-10-09 RX ADMIN — HYDROXYUREA 1000 MILLIGRAM(S): 500 CAPSULE ORAL at 05:32

## 2024-10-09 NOTE — PROGRESS NOTE ADULT - TIME BILLING
Review of labs, vitals, imaging.  Discussion with patient, examination of patient, discussion with care team.
Review of laboratory data, radiology results, consultants' recommendations, documentation in [place], discussion with patient/house staff/ACP and interdisciplinary staff (such as , social workers, etc). Interventions were performed as documented above.

## 2024-10-09 NOTE — CHART NOTE - NSCHARTNOTEFT_GEN_A_CORE
Search Terms: orestes lemus, 1995Search Date: 10/09/2024 10:48:37 AM  Searching on behalf of: [address] [place] [address]  The Drug Utilization Report below displays all of the controlled substance prescriptions, if any, that your patient has filled in the last twelve months. The information displayed on this report is compiled from pharmacy submissions to the Department, and accurately reflects the information as submitted by the pharmacies.    This report was requested by: Bianca Harmon | Reference #: [id number]    Practitioner Count: 1  Pharmacy Count: 1  Current Opioid Prescriptions: 0  Current Benzodiazepine Prescriptions: 0  Current Stimulant Prescriptions: 0      Patient Demographic Information (PDI)       PDI	First Name	Last Name	Birth Date	Gender	Street Address	[facility]	Zip Code  A	Orestes Lemus	1995	Male	[address]	[facility]	[address]  B	Orestes Lemus	1995	Male	[address]	[facility]	[address]    Prescription Information      PDI Filter:    PDI	Current Rx	Drug Type	Rx Written	Rx Dispensed	Drug	Quantity	Days Supply	Prescriber Name	Prescriber MIKE #	Payment Method	Dispenser  A	N	O	10/12/2023	10/13/2023	morphine sulfate ir 15 mg tab	60	20	Jesica Montano	[id number]	Medicaid	[facility]  A	N	O	10/12/2023	10/13/2023	morphine sulf er 15 mg tablet	60	30	Jesica Montano	[id number]	Medicaid	[facility]  B	N	O	08/02/2024	08/05/2024	morphine sulf er 15 mg tablet	60	30	Jesica Montano	[id number]	Medicaid	[facility]	N	O	08/02/2024	08/05/2024	morphine sulfate ir 15 mg tab	60	20	Jesica Montano	[id number]	Medicaid	[facility]	N	O	06/21/2024	06/21/2024	morphine sulfate ir 15 mg tab	60	20	Jesica Montano	[id number]	Medicaid	[facility]	N	O	05/21/2024	05/23/2024	morphine sulf er 15 mg tablet	60	30	Jesica [id number]	Medicaid	[facility]	N	O	05/21/2024	05/23/2024	morphine sulfate ir 15 mg tab	60	20	Jesica [id number]	Medicaid	[facility]	N	O	05/02/2024	05/03/2024	morphine sulf er 15 mg tablet	44	22	Jesica Montano	[id number]	Medicaid	[facility]	N	O	04/19/2024	04/26/2024	morphine sulfate ir 15 mg tab	60	20	Jesica Montano	[id number]	Medicaid	[facility]	N	O	03/07/2024	03/08/2024	morphine sulf er 15 mg tablet	60	30	Jesica Montano	[id number]	Medicaid	[facility]	N	O	03/07/2024	03/08/2024	morphine sulfate ir 15 mg tab	60	20	Jesica Montano	[id number]	Medicaid	[facility]	N	O	02/08/2024	02/09/2024	morphine sulf er 15 mg tablet	60	30	Jesica Montano	[id number]	Medicaid	[facility]	N	O	02/08/2024	02/09/2024	morphine sulfate ir 15 mg tab	60	20	Jesica Motnano	[id number]	Medicaid	[facility]	N	O	01/10/2024	01/10/2024	morphine sulfate ir 15 mg tab	60	20	Jesica Montano	[id number]	Medicaid	[facility]	N	O	01/10/2024	01/10/2024	morphine sulf er 15 mg tablet	60	30	Jesica Montano	[id number]	Medicaid	[facility]	N	O	11/20/2023	11/20/2023	morphine sulf er 15 mg tablet	60	30	Jesica Montano	[id number]	Medicaid	[facility]	N	O	11/20/2023	11/20/2023	morphine sulfate ir 15 mg tab	60	20	Jesica Montano	[id number]	Medicaid	[facility]

## 2024-10-09 NOTE — CHART NOTE - NSCHARTNOTESELECT_GEN_ALL_CORE
Event Note
Follow Up/Nutrition Services
Follow Up/Nutrition Services
Hematology/Event Note
I-STOP/Event Note
Medication recall/Event Note
Nutrition Services
Severe protein calorie malnutrition/Nutrition Services

## 2024-10-09 NOTE — PROGRESS NOTE ADULT - PROBLEM SELECTOR PLAN 2
RESOLVED  - platelet count 9/18: 95, unclear baseline as pt admitted w/ thrombocytosis. Likely ~170  - 4 T score: 4 points for intermediate probability of HIT  - 9/19 platelet 76  - Heparin platelets negative, serotonin assay negative, platelets 148 on 9/24, unlikely to be HIT       PLAN  - c/w lovenox

## 2024-10-09 NOTE — PROGRESS NOTE ADULT - SUBJECTIVE AND OBJECTIVE BOX
Progress Note    08-25-24 (45d)    Patient is a 29y old  Male who presents with a chief complaint of sickle cell crisis (08 Oct 2024 08:26)      Subjective / Overnight Events :  - Overnight: 1x toradol PRN  - Pt seen and examined at bedside.     Additional ROS (if any):    MEDICATIONS  (STANDING):  acetaminophen     Tablet .. 650 milliGRAM(s) Oral every 6 hours  chlorhexidine 2% Cloths 1 Application(s) Topical daily  enoxaparin Injectable 30 milliGRAM(s) SubCutaneous every 24 hours  folic acid 1 milliGRAM(s) Oral daily  glutamine Powder 10 Gram(s) Oral two times a day  hydroxyurea 1000 milliGRAM(s) Oral <User Schedule>  hydroxyurea 1500 milliGRAM(s) Oral <User Schedule>  morphine ER Tablet 30 milliGRAM(s) Oral two times a day  morphine PCA (5 mG/mL) 30 milliLiter(s) PCA Continuous PCA Continuous  pantoprazole    Tablet 40 milliGRAM(s) Oral before breakfast  penicillin   milliGRAM(s) Oral two times a day  sodium chloride 0.45%. 1000 milliLiter(s) (150 mL/Hr) IV Continuous <Continuous>    MEDICATIONS  (PRN):  naloxone Injectable 0.1 milliGRAM(s) IV Push every 3 minutes PRN For ANY of the following changes in patient status:  A. RR LESS THAN 10 breaths per minute, B. Oxygen saturation LESS THAN 90%, C. Sedation score of 6  ondansetron Injectable 4 milliGRAM(s) IV Push every 6 hours PRN Nausea  polyethylene glycol 3350 17 Gram(s) Oral two times a day PRN Constipation  senna 2 Tablet(s) Oral at bedtime PRN Constipation          PHYSICAL EXAM:  Vital Signs Last 24 Hrs  T(C): 36.6 (09 Oct 2024 04:00), Max: 37 (08 Oct 2024 08:00)  T(F): 97.9 (09 Oct 2024 04:00), Max: 98.6 (08 Oct 2024 08:00)  HR: 86 (09 Oct 2024 04:00) (73 - 95)  BP: 126/79 (09 Oct 2024 04:00) (111/69 - 150/80)  BP(mean): --  RR: 17 (09 Oct 2024 04:00) (17 - 18)  SpO2: 97% (09 Oct 2024 04:00) (97% - 98%)    Parameters below as of 09 Oct 2024 04:00  Patient On (Oxygen Delivery Method): room air        I&O's Summary    08 Oct 2024 07:01  -  09 Oct 2024 07:00  --------------------------------------------------------  IN: 2450 mL / OUT: 1750 mL / NET: 700 mL        General: NAD  HEENT: PERRLA, EOMi, no scleral icterus  CV: RRR, normal S1 and S2, no m/r/g  Lungs: normal respiratory effort. CTAB, no wheezes, rales, or rhonchi  Abd: soft, nontender, nondistended, BSx4  Ext: no edema, 2+ peripheral pulses   Pysch: AAOx3  Neuro: grossly non-focal, moving all extremities spontaneously   Skin: no rashes or lesions     LABS:  CAPILLARY BLOOD GLUCOSE                                  7.1    12.11 )-----------( 272      ( 08 Oct 2024 07:24 )             22.3       WBC Trend: 12.11<--, 8.82<--, 10.09<--  Hb Trend: 7.1<--, 7.3<--, 7.3<--    10-08    138  |  104  |  6[L]  ----------------------------<  86  3.6   |  24  |  0.38[L]    Ca    8.2[L]      08 Oct 2024 07:24  Phos  4.1     10-08  Mg     1.70     10-08    TPro  7.3  /  Alb  3.3  /  TBili  1.9[H]  /  DBili  0.8[H]  /  AST  53[H]  /  ALT  35  /  AlkPhos  365[H]  10-08          Urinalysis Basic - ( 08 Oct 2024 07:24 )    Color: x / Appearance: x / SG: x / pH: x  Gluc: 86 mg/dL / Ketone: x  / Bili: x / Urobili: x   Blood: x / Protein: x / Nitrite: x   Leuk Esterase: x / RBC: x / WBC x   Sq Epi: x / Non Sq Epi: x / Bacteria: x            RADIOLOGY & ADDITIONAL TESTS: Reviewed Progress Note    08-25-24 (45d)    Patient is a 29y old  Male who presents with a chief complaint of sickle cell crisis (08 Oct 2024 08:26)      Subjective / Overnight Events :  - Overnight: 1x toradol PRN  - Pt seen and examined at bedside.   - Pt reports minimal change in his pain, but feel close to ready to going home  - Reports good urine output, and good BMs  - Denies any new cp, sob, abd pain    Additional ROS (if any):    MEDICATIONS  (STANDING):  acetaminophen     Tablet .. 650 milliGRAM(s) Oral every 6 hours  chlorhexidine 2% Cloths 1 Application(s) Topical daily  enoxaparin Injectable 30 milliGRAM(s) SubCutaneous every 24 hours  folic acid 1 milliGRAM(s) Oral daily  glutamine Powder 10 Gram(s) Oral two times a day  hydroxyurea 1000 milliGRAM(s) Oral <User Schedule>  hydroxyurea 1500 milliGRAM(s) Oral <User Schedule>  morphine ER Tablet 30 milliGRAM(s) Oral two times a day  morphine PCA (5 mG/mL) 30 milliLiter(s) PCA Continuous PCA Continuous  pantoprazole    Tablet 40 milliGRAM(s) Oral before breakfast  penicillin   milliGRAM(s) Oral two times a day  sodium chloride 0.45%. 1000 milliLiter(s) (150 mL/Hr) IV Continuous <Continuous>    MEDICATIONS  (PRN):  naloxone Injectable 0.1 milliGRAM(s) IV Push every 3 minutes PRN For ANY of the following changes in patient status:  A. RR LESS THAN 10 breaths per minute, B. Oxygen saturation LESS THAN 90%, C. Sedation score of 6  ondansetron Injectable 4 milliGRAM(s) IV Push every 6 hours PRN Nausea  polyethylene glycol 3350 17 Gram(s) Oral two times a day PRN Constipation  senna 2 Tablet(s) Oral at bedtime PRN Constipation          PHYSICAL EXAM:  Vital Signs Last 24 Hrs  T(C): 36.6 (09 Oct 2024 04:00), Max: 37 (08 Oct 2024 08:00)  T(F): 97.9 (09 Oct 2024 04:00), Max: 98.6 (08 Oct 2024 08:00)  HR: 86 (09 Oct 2024 04:00) (73 - 95)  BP: 126/79 (09 Oct 2024 04:00) (111/69 - 150/80)  BP(mean): --  RR: 17 (09 Oct 2024 04:00) (17 - 18)  SpO2: 97% (09 Oct 2024 04:00) (97% - 98%)    Parameters below as of 09 Oct 2024 04:00  Patient On (Oxygen Delivery Method): room air        I&O's Summary    08 Oct 2024 07:01  -  09 Oct 2024 07:00  --------------------------------------------------------  IN: 2450 mL / OUT: 1750 mL / NET: 700 mL        General: NAD  CV: RRR, normal S1 and S2, no m/r/g  Lungs: normal respiratory effort. CTAB, no wheezes, rales, or rhonchi  Abd: soft, nontender, nondistended, BSx4  Ext: no edema, 2+ peripheral pulses. b/l shoulders and knees ROM limited by pain. No erythema, swelling, fluctuance   Pysch: AAOx3    LABS:  CAPILLARY BLOOD GLUCOSE                                  7.1    12.11 )-----------( 272      ( 08 Oct 2024 07:24 )             22.3       WBC Trend: 12.11<--, 8.82<--, 10.09<--  Hb Trend: 7.1<--, 7.3<--, 7.3<--    10-08    138  |  104  |  6[L]  ----------------------------<  86  3.6   |  24  |  0.38[L]    Ca    8.2[L]      08 Oct 2024 07:24  Phos  4.1     10-08  Mg     1.70     10-08    TPro  7.3  /  Alb  3.3  /  TBili  1.9[H]  /  DBili  0.8[H]  /  AST  53[H]  /  ALT  35  /  AlkPhos  365[H]  10-08          Urinalysis Basic - ( 08 Oct 2024 07:24 )    Color: x / Appearance: x / SG: x / pH: x  Gluc: 86 mg/dL / Ketone: x  / Bili: x / Urobili: x   Blood: x / Protein: x / Nitrite: x   Leuk Esterase: x / RBC: x / WBC x   Sq Epi: x / Non Sq Epi: x / Bacteria: x            RADIOLOGY & ADDITIONAL TESTS: Reviewed

## 2024-10-09 NOTE — PROGRESS NOTE ADULT - ATTENDING COMMENTS
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   prolonged hospital stay , still c/o pain, although better.  Hgb S% decreasing    Reduced pca pump 10/8. Maintain IVF at current dose  C/w tylenol q6 atc, c/w ppi for gi ppx  cw mscontin 30mg bid , home dose mscontin 15mg qd  Encouraged incentive spirometer    concern for AVN:  Xrays of hips, shoulders/scapula negative  c/w home hydrea    DC planning, likely DC 10/10.  Discussed with PCP Jesica Montano.

## 2024-10-10 ENCOUNTER — TRANSCRIPTION ENCOUNTER (OUTPATIENT)
Age: 29
End: 2024-10-10

## 2024-10-10 VITALS
TEMPERATURE: 98 F | RESPIRATION RATE: 18 BRPM | DIASTOLIC BLOOD PRESSURE: 86 MMHG | OXYGEN SATURATION: 98 % | HEART RATE: 81 BPM | SYSTOLIC BLOOD PRESSURE: 127 MMHG

## 2024-10-10 LAB
ALBUMIN SERPL ELPH-MCNC: 3.5 G/DL — SIGNIFICANT CHANGE UP (ref 3.3–5)
ALP SERPL-CCNC: 383 U/L — HIGH (ref 40–120)
ALT FLD-CCNC: 31 U/L — SIGNIFICANT CHANGE UP (ref 4–41)
ANION GAP SERPL CALC-SCNC: 13 MMOL/L — SIGNIFICANT CHANGE UP (ref 7–14)
AST SERPL-CCNC: 51 U/L — HIGH (ref 4–40)
BASOPHILS # BLD AUTO: 0.08 K/UL — SIGNIFICANT CHANGE UP (ref 0–0.2)
BASOPHILS NFR BLD AUTO: 0.7 % — SIGNIFICANT CHANGE UP (ref 0–2)
BILIRUB DIRECT SERPL-MCNC: 0.9 MG/DL — HIGH (ref 0–0.3)
BILIRUB SERPL-MCNC: 2.1 MG/DL — HIGH (ref 0.2–1.2)
BUN SERPL-MCNC: 6 MG/DL — LOW (ref 7–23)
CALCIUM SERPL-MCNC: 8.8 MG/DL — SIGNIFICANT CHANGE UP (ref 8.4–10.5)
CHLORIDE SERPL-SCNC: 101 MMOL/L — SIGNIFICANT CHANGE UP (ref 98–107)
CO2 SERPL-SCNC: 23 MMOL/L — SIGNIFICANT CHANGE UP (ref 22–31)
CREAT SERPL-MCNC: 0.37 MG/DL — LOW (ref 0.5–1.3)
EGFR: 156 ML/MIN/1.73M2 — SIGNIFICANT CHANGE UP
EOSINOPHIL # BLD AUTO: 0.01 K/UL — SIGNIFICANT CHANGE UP (ref 0–0.5)
EOSINOPHIL NFR BLD AUTO: 0.1 % — SIGNIFICANT CHANGE UP (ref 0–6)
GLUCOSE SERPL-MCNC: 69 MG/DL — LOW (ref 70–99)
HCT VFR BLD CALC: 23.1 % — LOW (ref 39–50)
HGB BLD-MCNC: 7.3 G/DL — LOW (ref 13–17)
IANC: 3.75 K/UL — SIGNIFICANT CHANGE UP (ref 1.8–7.4)
IMM GRANULOCYTES NFR BLD AUTO: 2.3 % — HIGH (ref 0–0.9)
LDH SERPL L TO P-CCNC: 715 U/L — HIGH (ref 135–225)
LYMPHOCYTES # BLD AUTO: 58.2 % — HIGH (ref 13–44)
LYMPHOCYTES # BLD AUTO: 6.52 K/UL — HIGH (ref 1–3.3)
MAGNESIUM SERPL-MCNC: 1.8 MG/DL — SIGNIFICANT CHANGE UP (ref 1.6–2.6)
MCHC RBC-ENTMCNC: 24.4 PG — LOW (ref 27–34)
MCHC RBC-ENTMCNC: 31.6 GM/DL — LOW (ref 32–36)
MCV RBC AUTO: 77.3 FL — LOW (ref 80–100)
MONOCYTES # BLD AUTO: 0.59 K/UL — SIGNIFICANT CHANGE UP (ref 0–0.9)
MONOCYTES NFR BLD AUTO: 5.3 % — SIGNIFICANT CHANGE UP (ref 2–14)
NEUTROPHILS # BLD AUTO: 3.75 K/UL — SIGNIFICANT CHANGE UP (ref 1.8–7.4)
NEUTROPHILS NFR BLD AUTO: 33.4 % — LOW (ref 43–77)
NRBC # BLD: 14 /100 WBCS — HIGH (ref 0–0)
NRBC # FLD: 1.52 K/UL — HIGH (ref 0–0)
PHOSPHATE SERPL-MCNC: 3.8 MG/DL — SIGNIFICANT CHANGE UP (ref 2.5–4.5)
PLATELET # BLD AUTO: 256 K/UL — SIGNIFICANT CHANGE UP (ref 150–400)
POTASSIUM SERPL-MCNC: 4 MMOL/L — SIGNIFICANT CHANGE UP (ref 3.5–5.3)
POTASSIUM SERPL-SCNC: 4 MMOL/L — SIGNIFICANT CHANGE UP (ref 3.5–5.3)
PROT SERPL-MCNC: 8.2 G/DL — SIGNIFICANT CHANGE UP (ref 6–8.3)
RBC # BLD: 2.99 M/UL — LOW (ref 4.2–5.8)
RBC # BLD: 2.99 M/UL — LOW (ref 4.2–5.8)
RBC # FLD: 27.5 % — HIGH (ref 10.3–14.5)
RETICS #: 996.9 K/UL — HIGH (ref 25–125)
RETICS/RBC NFR: >22.2 % — HIGH (ref 0.5–2.5)
SODIUM SERPL-SCNC: 137 MMOL/L — SIGNIFICANT CHANGE UP (ref 135–145)
WBC # BLD: 11.21 K/UL — HIGH (ref 3.8–10.5)
WBC # FLD AUTO: 11.21 K/UL — HIGH (ref 3.8–10.5)

## 2024-10-10 PROCEDURE — 99239 HOSP IP/OBS DSCHRG MGMT >30: CPT | Mod: GC

## 2024-10-10 RX ORDER — MORPHINE SULFATE 30 MG/1
15 TABLET, FILM COATED, EXTENDED RELEASE ORAL ONCE
Refills: 0 | Status: DISCONTINUED | OUTPATIENT
Start: 2024-10-10 | End: 2024-10-10

## 2024-10-10 RX ADMIN — Medication 150 MILLILITER(S): at 11:20

## 2024-10-10 RX ADMIN — CHLORHEXIDINE GLUCONATE ORAL RINSE 1 APPLICATION(S): 1.2 SOLUTION DENTAL at 11:12

## 2024-10-10 RX ADMIN — PANTOPRAZOLE SODIUM 40 MILLIGRAM(S): 40 TABLET, DELAYED RELEASE ORAL at 06:27

## 2024-10-10 RX ADMIN — HYDROXYUREA 1000 MILLIGRAM(S): 500 CAPSULE ORAL at 06:27

## 2024-10-10 RX ADMIN — Medication 650 MILLIGRAM(S): at 00:01

## 2024-10-10 RX ADMIN — Medication 150 MILLILITER(S): at 06:52

## 2024-10-10 RX ADMIN — Medication 10 GRAM(S): at 06:26

## 2024-10-10 RX ADMIN — Medication 650 MILLIGRAM(S): at 06:27

## 2024-10-10 RX ADMIN — Medication 650 MILLIGRAM(S): at 11:12

## 2024-10-10 RX ADMIN — MORPHINE SULFATE 30 MILLIGRAM(S): 30 TABLET, FILM COATED, EXTENDED RELEASE ORAL at 07:27

## 2024-10-10 RX ADMIN — MORPHINE SULFATE 30 MILLILITER(S): 30 TABLET, FILM COATED, EXTENDED RELEASE ORAL at 08:19

## 2024-10-10 RX ADMIN — PENICILLIN V POTASSIUM 250 MILLIGRAM(S): 500 TABLET ORAL at 06:27

## 2024-10-10 RX ADMIN — FOLIC ACID 1 MILLIGRAM(S): 1 TABLET ORAL at 11:12

## 2024-10-10 RX ADMIN — MORPHINE SULFATE 15 MILLIGRAM(S): 30 TABLET, FILM COATED, EXTENDED RELEASE ORAL at 12:42

## 2024-10-10 RX ADMIN — Medication 650 MILLIGRAM(S): at 07:27

## 2024-10-10 RX ADMIN — MORPHINE SULFATE 30 MILLIGRAM(S): 30 TABLET, FILM COATED, EXTENDED RELEASE ORAL at 06:27

## 2024-10-10 RX ADMIN — MORPHINE SULFATE 15 MILLIGRAM(S): 30 TABLET, FILM COATED, EXTENDED RELEASE ORAL at 11:42

## 2024-10-10 RX ADMIN — Medication 150 MILLILITER(S): at 00:04

## 2024-10-10 RX ADMIN — Medication 650 MILLIGRAM(S): at 12:12

## 2024-10-10 RX ADMIN — Medication 650 MILLIGRAM(S): at 01:01

## 2024-10-10 NOTE — PROGRESS NOTE ADULT - ATTENDING COMMENTS
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   prolonged hospital stay , still c/o pain, although better.  Hgb S% decreasing    Reduced pca pump 10/8, pt states ready for DC.    concern for AVN:  Xrays of hips, shoulders/scapula negative  c/w home hydrea    DC planning today, discussed with PCP Jesica Montano, medications sent to pharmacy.    DC time 43 minutes

## 2024-10-10 NOTE — PROGRESS NOTE ADULT - PROVIDER SPECIALTY LIST ADULT
Heme/Onc
Internal Medicine

## 2024-10-10 NOTE — PROGRESS NOTE ADULT - REASON FOR ADMISSION
sickle cell crisis

## 2024-10-10 NOTE — DISCHARGE NOTE NURSING/CASE MANAGEMENT/SOCIAL WORK - NSDCPEFALRISK_GEN_ALL_CORE
For information on Fall & Injury Prevention, visit: [email or web address] OR  [email or web address] OR  https://www.cdc.gov/steadi/patient.html

## 2024-10-10 NOTE — PROGRESS NOTE ADULT - NUTRITIONAL ASSESSMENT
This patient has been assessed with a concern for Malnutrition and has been determined to have a diagnosis/diagnoses of Severe protein-calorie malnutrition and Underweight (BMI < 19).    This patient is being managed with:   Diet Regular-  Supplement Feeding Modality:  Oral  Ensure Plus High Protein Cans or Servings Per Day:  1       Frequency:  Two Times a day  Entered: Aug 28 2024 11:04AM  
This patient has been assessed with a concern for Malnutrition and has been determined to have a diagnosis/diagnoses of Underweight (BMI < 19) and Severe protein-calorie malnutrition.    This patient is being managed with:   Diet Regular-  Supplement Feeding Modality:  Oral  Ensure Plus High Protein Cans or Servings Per Day:  1       Frequency:  Two Times a day  Entered: Aug 28 2024 11:04AM  
This patient has been assessed with a concern for Malnutrition and has been determined to have a diagnosis/diagnoses of Severe protein-calorie malnutrition and Underweight (BMI < 19).    This patient is being managed with:   Diet Regular-  Supplement Feeding Modality:  Oral  Ensure Plus High Protein Cans or Servings Per Day:  1       Frequency:  Two Times a day  Entered: Aug 28 2024 11:04AM  
This patient has been assessed with a concern for Malnutrition and has been determined to have a diagnosis/diagnoses of Underweight (BMI < 19) and Severe protein-calorie malnutrition.    This patient is being managed with:   Diet Regular-  Supplement Feeding Modality:  Oral  Ensure Plus High Protein Cans or Servings Per Day:  1       Frequency:  Two Times a day  Entered: Aug 28 2024 11:04AM  
This patient has been assessed with a concern for Malnutrition and has been determined to have a diagnosis/diagnoses of Severe protein-calorie malnutrition and Underweight (BMI < 19).    This patient is being managed with:   Diet Regular-  Supplement Feeding Modality:  Oral  Ensure Plus High Protein Cans or Servings Per Day:  1       Frequency:  Two Times a day  Entered: Aug 28 2024 11:04AM  

## 2024-10-10 NOTE — DISCHARGE NOTE NURSING/CASE MANAGEMENT/SOCIAL WORK - PATIENT PORTAL LINK FT
You can access the FollowMyHealth Patient Portal offered by [facility] by registering at the following website: [email or web address]. By joining [employer]’s FollowMyHealth portal, you will also be able to view your health information using other applications (apps) compatible with our system. never true

## 2024-10-10 NOTE — PROGRESS NOTE ADULT - PROBLEM SELECTOR PLAN 1
Immediate Brief Procedure Note    Patient: Jose Cohen    Pre-op Dx: dysuria, bladder stone    Post-op Dx: same     Procedure: cystolithalopaxy    Surgeon:  Juan Antonio Brasher MD    Assistants: none    Anesthesia Staff: Anesthesiologist: Lukas Rico DO  Anesthesia Assistant: Lukas Howard  Anesthesia Technician: Rocky Jacob    Anesthesia Type: general     Findings: prostate PVP defect open. 2 small bladder calculi noted. Bladder cytology taken, stones removed with alligator. Trigone fulgurated     Estimated Blood Loss: none    Complications: none    Specimens Removed: bladder cytology. Bladder stones for analysis    - follows with Dr. Montano, missed monthly dose of Adakveo for 2 months which may explain SCC  - low concern for acute chest given no opacities on CXR, chest pain more consistent with SCC  - transaminitis likely 2/2 to hepatopathy iso vasoocclusive crisis, no abdominal pain and LFTs improving, could otherwise consider RUQ US  - palliative deferred pain management to primary team   - s/p 1 U PRBC 9/19, 9/22, 9/26  - pain has little improvement and patient consistently reports worse pain when sleeping: will consider switching to long-acting PO medication   - XRs show no sign of AVNs    A/P:  - Heme consults appreciated  - restarted hydrea (10/2) home dose 1000mg weekdays, 1500mg weekends  - IVF 1/2 NS @150cc/hr  - PCA pump downtitrated to 0.75mg, tylenol 650mg q6h (s/p toradol 15mg q6h w/ pO protonix)  - c/w 30 mg ERBID Morphine (home medication 15 mg ER BID, 15mg IR q8h would like to go home on home dose when DC)    - C/w humidified O2 for pain  - bowel regimen: miralax BID, senna 2 MG PRN  - d/c oxbryta iso of recall, will follow up with o/p Dr. Montano  - incentive spirometer  - will continue to monitor for signs of acute chest syndrome. If fever, culture and repeat CXR stat

## 2024-10-10 NOTE — DISCHARGE NOTE NURSING/CASE MANAGEMENT/SOCIAL WORK - NSDCVIVACCINE_GEN_ALL_CORE_FT
influenza, injectable, quadrivalent, preservative free; 20-Dec-2017 16:33; Kera Anton (RN); [employer]; [id number]; IntraMuscular; Deltoid Right.; 0.5 milliLiter(s); VIS (VIS Published: 07-Aug-2015, VIS Presented: 20-Dec-2017);   influenza, injectable, quadrivalent, preservative free; 17-Oct-2018 13:40; Orestes Nieves (EDGAR); [employer]; [id number] (Exp. Date: 30-Jun-2019); IntraMuscular; Deltoid Right.; 0.5 milliLiter(s); VIS (VIS Published: 07-Aug-2015, VIS Presented: 17-Oct-2018);

## 2024-10-10 NOTE — PROGRESS NOTE ADULT - ATTENDING SUPERVISION STATEMENT
Fellow
Resident

## 2024-10-10 NOTE — PROGRESS NOTE ADULT - SUBJECTIVE AND OBJECTIVE BOX
*******************************  Bianca Harmon MD (PGY-2)  Internal Medicine  Contact via [employer] TEAMS  *******************************    ANTONI NUNEZ  29y  Male    Patient is a 29y old  Male who presents with a chief complaint of sickle cell crisis (09 Oct 2024 07:10)      Subjective:    Objective:  T(C): 36.8 (10-10-24 @ 04:00), Max: 37.1 (10-09-24 @ 20:00)  HR: 72 (10-10-24 @ 04:00) (72 - 93)  BP: 116/83 (10-10-24 @ 04:00) (116/83 - 147/92)  RR: 18 (10-10-24 @ 04:00) (17 - 18)  SpO2: 100% (10-10-24 @ 04:00) (97% - 100%)  I&O's Summary    09 Oct 2024 07:01  -  10 Oct 2024 07:00  --------------------------------------------------------  IN: 0 mL / OUT: 2250 mL / NET: -2250 mL        PHYSICAL EXAM:  GENERAL: NAD  HEAD:  Atraumatic, Normocephalic  EYES: EOMI, PERRLA, conjunctiva and sclera clear  ENMT: Moist mucous membranes  NECK: Supple, No JVD, trachea midline   NERVOUS SYSTEM:  Alert & Oriented X3, Good concentration; Motor Strength 5/5 B/L upper and lower extremities; DTRs 2+ intact and symmetric  CHEST/LUNG: Clear to auscultation bilaterally; No rales, rhonchi, wheezing, or rubs  HEART: Regular rate and rhythm; No murmurs, rubs, or gallops  ABDOMEN: Soft, Nontender, Nondistended; Bowel sounds present  EXTREMITIES:  2+ Peripheral Pulses, No clubbing, cyanosis, or edema  LYMPH: No lymphadenopathy noted  SKIN: No rashes or lesions    MEDICATIONS  (STANDING):  acetaminophen     Tablet .. 650 milliGRAM(s) Oral every 6 hours  chlorhexidine 2% Cloths 1 Application(s) Topical daily  enoxaparin Injectable 30 milliGRAM(s) SubCutaneous every 24 hours  folic acid 1 milliGRAM(s) Oral daily  glutamine Powder 10 Gram(s) Oral two times a day  hydroxyurea 1500 milliGRAM(s) Oral <User Schedule>  hydroxyurea 1000 milliGRAM(s) Oral <User Schedule>  morphine ER Tablet 30 milliGRAM(s) Oral two times a day  morphine PCA (5 mG/mL) 30 milliLiter(s) PCA Continuous PCA Continuous  pantoprazole    Tablet 40 milliGRAM(s) Oral before breakfast  penicillin   milliGRAM(s) Oral two times a day  sodium chloride 0.45%. 1000 milliLiter(s) (150 mL/Hr) IV Continuous <Continuous>    MEDICATIONS  (PRN):  naloxone Injectable 0.1 milliGRAM(s) IV Push every 3 minutes PRN For ANY of the following changes in patient status:  A. RR LESS THAN 10 breaths per minute, B. Oxygen saturation LESS THAN 90%, C. Sedation score of 6  ondansetron Injectable 4 milliGRAM(s) IV Push every 6 hours PRN Nausea  polyethylene glycol 3350 17 Gram(s) Oral two times a day PRN Constipation  senna 2 Tablet(s) Oral at bedtime PRN Constipation      LABS:        CAPILLARY BLOOD GLUCOSE          RADIOLOGY & ADDITIONAL TESTS:               *******************************  Bianca Harmon MD (PGY-2)  Internal Medicine  Contact via [employer] TEAMS  *******************************    ANTONI NUNEZ  29y  Male    Patient is a 29y old  Male who presents with a chief complaint of sickle cell crisis (09 Oct 2024 07:10)    Subjective: No acute events overnight. Seen at bedside this morning. Noted pain still present but would like to go home.     Objective:  T(C): 36.8 (10-10-24 @ 04:00), Max: 37.1 (10-09-24 @ 20:00)  HR: 72 (10-10-24 @ 04:00) (72 - 93)  BP: 116/83 (10-10-24 @ 04:00) (116/83 - 147/92)  RR: 18 (10-10-24 @ 04:00) (17 - 18)  SpO2: 100% (10-10-24 @ 04:00) (97% - 100%)  I&O's Summary    09 Oct 2024 07:01  -  10 Oct 2024 07:00  --------------------------------------------------------  IN: 0 mL / OUT: 2250 mL / NET: -2250 mL    PHYSICAL EXAM:  GENERAL: NAD  HEAD:  Atraumatic, Normocephalic  EYES: EOMI, PERRLA, conjunctiva and sclera clear  ENMT: Moist mucous membranes  NECK: Supple, No JVD  NERVOUS SYSTEM:  Alert & Oriented X3, Good concentration  CHEST/LUNG: Clear to auscultation bilaterally; No rales, rhonchi, wheezing, or rubs  HEART: Regular rate and rhythm; No murmurs, rubs, or gallops  ABDOMEN: Soft, Nontender, Nondistended; Bowel sounds present  EXTREMITIES:  2+ Peripheral Pulses, No clubbing, cyanosis, or edema  SKIN: No rashes or lesions    MEDICATIONS  (STANDING):  acetaminophen     Tablet .. 650 milliGRAM(s) Oral every 6 hours  chlorhexidine 2% Cloths 1 Application(s) Topical daily  enoxaparin Injectable 30 milliGRAM(s) SubCutaneous every 24 hours  folic acid 1 milliGRAM(s) Oral daily  glutamine Powder 10 Gram(s) Oral two times a day  hydroxyurea 1500 milliGRAM(s) Oral <User Schedule>  hydroxyurea 1000 milliGRAM(s) Oral <User Schedule>  morphine ER Tablet 30 milliGRAM(s) Oral two times a day  morphine PCA (5 mG/mL) 30 milliLiter(s) PCA Continuous PCA Continuous  pantoprazole    Tablet 40 milliGRAM(s) Oral before breakfast  penicillin   milliGRAM(s) Oral two times a day  sodium chloride 0.45%. 1000 milliLiter(s) (150 mL/Hr) IV Continuous <Continuous>    MEDICATIONS  (PRN):  naloxone Injectable 0.1 milliGRAM(s) IV Push every 3 minutes PRN For ANY of the following changes in patient status:  A. RR LESS THAN 10 breaths per minute, B. Oxygen saturation LESS THAN 90%, C. Sedation score of 6  ondansetron Injectable 4 milliGRAM(s) IV Push every 6 hours PRN Nausea  polyethylene glycol 3350 17 Gram(s) Oral two times a day PRN Constipation  senna 2 Tablet(s) Oral at bedtime PRN Constipation    LABS:                    7.3    11.21 )-----------( 256      ( 10 Oct 2024 05:20 )             23.1     10-10    137  |  101  |  6[L]  ----------------------------<  69[L]  4.0   |  23  |  0.37[L]    Ca    8.8      10 Oct 2024 05:20  Phos  3.8     10-10  Mg     1.80     10-10    TPro  8.2  /  Alb  3.5  /  TBili  2.1[H]  /  DBili  0.9[H]  /  AST  51[H]  /  ALT  31  /  AlkPhos  383[H]  10-10    RADIOLOGY & ADDITIONAL TESTS:

## 2024-10-10 NOTE — DISCHARGE NOTE NURSING/CASE MANAGEMENT/SOCIAL WORK - NSDCFUADDAPPT_GEN_ALL_CORE_FT
APPTS READY TO BE MADE [X]    Best Family or Patient Contact (if needed):    Additional Information about above appointments (if needed):    1: PCP. Call [phone] to see if your next appointment can be moved up  2:   3:     Other comments or requests:

## 2024-10-10 NOTE — PROGRESS NOTE ADULT - PROBLEM SELECTOR PROBLEM 1
Sickle cell crisis

## 2024-10-10 NOTE — PROGRESS NOTE ADULT - PROBLEM SELECTOR PLAN 6
- Fluids: 1/2 NS  - Electrolytes: Will replete to maintain K>4, Phos>3, and Mag>2  - Diet: regular  - Activity: as tolerated  - DVT Prophylaxis: lovenox  - PT: outpatient PT  - Disposition: Home, nearing end of clinical course - Fluids: 1/2 NS  - Electrolytes: Will replete to maintain K>4, Phos>3, and Mag>2  - Diet: regular  - Activity: as tolerated  - DVT Prophylaxis: lovenox  - PT: outpatient PT  - Disposition: Home 10/10

## 2024-10-17 ENCOUNTER — NON-APPOINTMENT (OUTPATIENT)
Age: 29
End: 2024-10-17

## 2024-10-18 ENCOUNTER — APPOINTMENT (OUTPATIENT)
Dept: INTERNAL MEDICINE | Facility: CLINIC | Age: 29
End: 2024-10-18

## 2024-10-18 ENCOUNTER — NON-APPOINTMENT (OUTPATIENT)
Age: 29
End: 2024-10-18

## 2024-10-28 ENCOUNTER — OUTPATIENT (OUTPATIENT)
Dept: OUTPATIENT SERVICES | Facility: HOSPITAL | Age: 29
LOS: 1 days | End: 2024-10-28

## 2024-10-28 ENCOUNTER — LABORATORY RESULT (OUTPATIENT)
Age: 29
End: 2024-10-28

## 2024-10-28 ENCOUNTER — APPOINTMENT (OUTPATIENT)
Dept: INTERNAL MEDICINE | Facility: CLINIC | Age: 29
End: 2024-10-28
Payer: MEDICAID

## 2024-10-28 ENCOUNTER — NON-APPOINTMENT (OUTPATIENT)
Age: 29
End: 2024-10-28

## 2024-10-28 VITALS
HEIGHT: 65 IN | RESPIRATION RATE: 16 BRPM | OXYGEN SATURATION: 98 % | WEIGHT: 98 LBS | DIASTOLIC BLOOD PRESSURE: 90 MMHG | HEART RATE: 119 BPM | SYSTOLIC BLOOD PRESSURE: 132 MMHG | BODY MASS INDEX: 16.33 KG/M2

## 2024-10-28 DIAGNOSIS — G89.4 CHRONIC PAIN SYNDROME: ICD-10-CM

## 2024-10-28 DIAGNOSIS — R79.89 OTHER SPECIFIED ABNORMAL FINDINGS OF BLOOD CHEMISTRY: ICD-10-CM

## 2024-10-28 DIAGNOSIS — D57.42 SICKLE-CELL THALASSEMIA BETA ZERO WITHOUT CRISIS: ICD-10-CM

## 2024-10-28 PROCEDURE — G2211 COMPLEX E/M VISIT ADD ON: CPT | Mod: NC

## 2024-10-28 PROCEDURE — 99214 OFFICE O/P EST MOD 30 MIN: CPT

## 2024-10-28 RX ORDER — ERGOCALCIFEROL 1.25 MG/1
1.25 MG CAPSULE, LIQUID FILLED ORAL
Qty: 4 | Refills: 6 | Status: ACTIVE | COMMUNITY
Start: 2024-10-28 | End: 1900-01-01

## 2024-10-30 LAB
25(OH)D3 SERPL-MCNC: 16.7 NG/ML
ALBUMIN SERPL ELPH-MCNC: 3.9 G/DL
ALP BLD-CCNC: 536 U/L
ALT SERPL-CCNC: 58 U/L
ANION GAP SERPL CALC-SCNC: 14 MMOL/L
AST SERPL-CCNC: 108 U/L
BASOPHILS # BLD AUTO: 0.1 K/UL
BASOPHILS NFR BLD AUTO: 0.7 %
BILIRUB SERPL-MCNC: 3 MG/DL
BUN SERPL-MCNC: 4 MG/DL
CALCIUM SERPL-MCNC: 8.7 MG/DL
CHLORIDE SERPL-SCNC: 102 MMOL/L
CO2 SERPL-SCNC: 20 MMOL/L
CREAT SERPL-MCNC: 0.39 MG/DL
EGFR: 154 ML/MIN/1.73M2
EOSINOPHIL # BLD AUTO: 0.01 K/UL
EOSINOPHIL NFR BLD AUTO: 0.1 %
FERRITIN SERPL-MCNC: 3623 NG/ML
GLUCOSE SERPL-MCNC: 99 MG/DL
HBV SURFACE AB SER QL: REACTIVE
HBV SURFACE AG SER QL: NONREACTIVE
HCT VFR BLD CALC: 28 %
HCV AB SER QL: NONREACTIVE
HCV S/CO RATIO: 0.36 S/CO
HGB BLD-MCNC: 8.7 G/DL
IMM GRANULOCYTES NFR BLD AUTO: 1.6 %
LYMPHOCYTES # BLD AUTO: 4.24 K/UL
LYMPHOCYTES NFR BLD AUTO: 27.9 %
MAN DIFF?: NORMAL
MCHC RBC-ENTMCNC: 23.3 PG
MCHC RBC-ENTMCNC: 31.1 GM/DL
MCV RBC AUTO: 75.1 FL
MONOCYTES # BLD AUTO: 1.7 K/UL
MONOCYTES NFR BLD AUTO: 11.2 %
NEUTROPHILS # BLD AUTO: 8.89 K/UL
NEUTROPHILS NFR BLD AUTO: 58.5 %
PLATELET # BLD AUTO: 313 K/UL
PMV BLD AUTO: 64 /100 WBCS
POTASSIUM SERPL-SCNC: 4.1 MMOL/L
PROT SERPL-MCNC: 8.4 G/DL
RBC # BLD: 3.73 M/UL
RBC # BLD: 3.73 M/UL
RBC # FLD: 24.2 %
RETICS # AUTO: 6.6 %
RETICS AGGREG/RBC NFR: 248.1 K/UL
SODIUM SERPL-SCNC: 137 MMOL/L
WBC # FLD AUTO: 15.18 K/UL

## 2024-11-01 DIAGNOSIS — R79.89 OTHER SPECIFIED ABNORMAL FINDINGS OF BLOOD CHEMISTRY: ICD-10-CM

## 2024-11-01 DIAGNOSIS — D57.42 SICKLE-CELL THALASSEMIA BETA ZERO WITHOUT CRISIS: ICD-10-CM

## 2024-11-01 DIAGNOSIS — G89.4 CHRONIC PAIN SYNDROME: ICD-10-CM

## 2024-11-01 DIAGNOSIS — Z23 ENCOUNTER FOR IMMUNIZATION: ICD-10-CM

## 2024-11-22 ENCOUNTER — NON-APPOINTMENT (OUTPATIENT)
Age: 29
End: 2024-11-22

## 2024-11-25 ENCOUNTER — APPOINTMENT (OUTPATIENT)
Dept: INTERNAL MEDICINE | Facility: CLINIC | Age: 29
End: 2024-11-25

## 2024-12-19 ENCOUNTER — NON-APPOINTMENT (OUTPATIENT)
Age: 29
End: 2024-12-19

## 2024-12-20 DIAGNOSIS — H66.90 OTITIS MEDIA, UNSPECIFIED, UNSPECIFIED EAR: ICD-10-CM

## 2024-12-23 ENCOUNTER — NON-APPOINTMENT (OUTPATIENT)
Age: 29
End: 2024-12-23

## 2024-12-23 RX ORDER — AMOXICILLIN AND CLAVULANATE POTASSIUM 500; 125 MG/1; MG/1
500-125 TABLET, FILM COATED ORAL
Qty: 10 | Refills: 0 | Status: ACTIVE | COMMUNITY
Start: 2024-12-20 | End: 1900-01-01

## 2025-01-16 ENCOUNTER — OUTPATIENT (OUTPATIENT)
Dept: OUTPATIENT SERVICES | Facility: HOSPITAL | Age: 30
LOS: 1 days | End: 2025-01-16

## 2025-01-16 ENCOUNTER — APPOINTMENT (OUTPATIENT)
Dept: GASTROENTEROLOGY | Facility: CLINIC | Age: 30
End: 2025-01-16
Payer: MEDICAID

## 2025-01-16 VITALS
WEIGHT: 95 LBS | HEIGHT: 65 IN | OXYGEN SATURATION: 95 % | BODY MASS INDEX: 15.83 KG/M2 | DIASTOLIC BLOOD PRESSURE: 70 MMHG | SYSTOLIC BLOOD PRESSURE: 120 MMHG | RESPIRATION RATE: 17 BRPM | HEART RATE: 103 BPM

## 2025-01-16 DIAGNOSIS — K80.50 CALCULUS OF BILE DUCT W/OUT CHOLANGITIS OR CHOLECYSTITIS W/OUT OBSTRUCTION: ICD-10-CM

## 2025-01-16 DIAGNOSIS — R79.89 OTHER SPECIFIED ABNORMAL FINDINGS OF BLOOD CHEMISTRY: ICD-10-CM

## 2025-01-16 DIAGNOSIS — K80.20 CALCULUS OF GALLBLADDER W/OUT CHOLECYSTITIS W/OUT OBSTRUCTION: ICD-10-CM

## 2025-01-16 DIAGNOSIS — K83.1 OBSTRUCTION OF BILE DUCT: ICD-10-CM

## 2025-01-16 DIAGNOSIS — Z78.9 OTHER SPECIFIED HEALTH STATUS: ICD-10-CM

## 2025-01-16 DIAGNOSIS — D57.42 SICKLE-CELL THALASSEMIA BETA ZERO WITHOUT CRISIS: ICD-10-CM

## 2025-01-16 DIAGNOSIS — Z98.890 OTHER SPECIFIED POSTPROCEDURAL STATES: Chronic | ICD-10-CM

## 2025-01-16 DIAGNOSIS — K83.8 OTHER SPECIFIED DISEASES OF BILIARY TRACT: ICD-10-CM

## 2025-01-16 DIAGNOSIS — Z90.81 ACQUIRED ABSENCE OF SPLEEN: Chronic | ICD-10-CM

## 2025-01-16 PROCEDURE — 99204 OFFICE O/P NEW MOD 45 MIN: CPT

## 2025-01-16 PROCEDURE — G2211 COMPLEX E/M VISIT ADD ON: CPT | Mod: NC

## 2025-01-17 DIAGNOSIS — K83.8 OTHER SPECIFIED DISEASES OF BILIARY TRACT: ICD-10-CM

## 2025-01-17 DIAGNOSIS — R79.89 OTHER SPECIFIED ABNORMAL FINDINGS OF BLOOD CHEMISTRY: ICD-10-CM

## 2025-01-17 DIAGNOSIS — K80.50 CALCULUS OF BILE DUCT WITHOUT CHOLANGITIS OR CHOLECYSTITIS WITHOUT OBSTRUCTION: ICD-10-CM

## 2025-01-17 DIAGNOSIS — K80.20 CALCULUS OF GALLBLADDER WITHOUT CHOLECYSTITIS WITHOUT OBSTRUCTION: ICD-10-CM

## 2025-01-21 ENCOUNTER — NON-APPOINTMENT (OUTPATIENT)
Age: 30
End: 2025-01-21

## 2025-01-21 LAB
ALBUMIN SERPL ELPH-MCNC: 3.7 G/DL
ALP BLD-CCNC: 368 U/L
ALT SERPL-CCNC: 38 U/L
ANION GAP SERPL CALC-SCNC: 13 MMOL/L
AST SERPL-CCNC: 102 U/L
BASOPHILS # BLD AUTO: 0.12 K/UL
BASOPHILS NFR BLD AUTO: 0.7 %
BILIRUB DIRECT SERPL-MCNC: 1.6 MG/DL
BILIRUB SERPL-MCNC: 3.7 MG/DL
BUN SERPL-MCNC: 7 MG/DL
CALCIUM SERPL-MCNC: 8.9 MG/DL
CHLORIDE SERPL-SCNC: 99 MMOL/L
CO2 SERPL-SCNC: 23 MMOL/L
CREAT SERPL-MCNC: 0.41 MG/DL
EGFR: 151 ML/MIN/1.73M2
EOSINOPHIL # BLD AUTO: 0.03 K/UL
EOSINOPHIL NFR BLD AUTO: 0.2 %
FERRITIN SERPL-MCNC: 1616 NG/ML
GGT SERPL-CCNC: 117 U/L
GLUCOSE SERPL-MCNC: 85 MG/DL
HCT VFR BLD CALC: 23.6 %
HEPATITIS A IGG ANTIBODY: REACTIVE
HEV AB SER QL: NEGATIVE
HGB BLD-MCNC: 7.2 G/DL
IMM GRANULOCYTES NFR BLD AUTO: 1.7 %
LYMPHOCYTES # BLD AUTO: 8.04 K/UL
LYMPHOCYTES NFR BLD AUTO: 45.1 %
MAN DIFF?: NORMAL
MCHC RBC-ENTMCNC: 21.2 PG
MCHC RBC-ENTMCNC: 30.5 G/DL
MCV RBC AUTO: 69.6 FL
MONOCYTES # BLD AUTO: 1.52 K/UL
MONOCYTES NFR BLD AUTO: 8.5 %
NEUTROPHILS # BLD AUTO: 7.8 K/UL
NEUTROPHILS NFR BLD AUTO: 43.8 %
PLATELET # BLD AUTO: 248 K/UL
PMV BLD AUTO: 117 /100 WBCS
POTASSIUM SERPL-SCNC: 4.3 MMOL/L
PROT SERPL-MCNC: 8.4 G/DL
RBC # BLD: 3.39 M/UL
RBC # FLD: 26.3 %
SODIUM SERPL-SCNC: 135 MMOL/L
WBC # FLD AUTO: 17.82 K/UL

## 2025-02-06 ENCOUNTER — OUTPATIENT (OUTPATIENT)
Dept: OUTPATIENT SERVICES | Facility: HOSPITAL | Age: 30
LOS: 1 days | End: 2025-02-06

## 2025-02-06 ENCOUNTER — NON-APPOINTMENT (OUTPATIENT)
Age: 30
End: 2025-02-06

## 2025-02-06 ENCOUNTER — LABORATORY RESULT (OUTPATIENT)
Age: 30
End: 2025-02-06

## 2025-02-06 ENCOUNTER — APPOINTMENT (OUTPATIENT)
Dept: INTERNAL MEDICINE | Facility: CLINIC | Age: 30
End: 2025-02-06
Payer: MEDICAID

## 2025-02-06 ENCOUNTER — APPOINTMENT (OUTPATIENT)
Dept: GASTROENTEROLOGY | Facility: CLINIC | Age: 30
End: 2025-02-06
Payer: MEDICAID

## 2025-02-06 VITALS
SYSTOLIC BLOOD PRESSURE: 102 MMHG | OXYGEN SATURATION: 95 % | BODY MASS INDEX: 16.33 KG/M2 | HEIGHT: 65 IN | WEIGHT: 98 LBS | DIASTOLIC BLOOD PRESSURE: 72 MMHG | HEART RATE: 100 BPM

## 2025-02-06 DIAGNOSIS — R79.89 OTHER SPECIFIED ABNORMAL FINDINGS OF BLOOD CHEMISTRY: ICD-10-CM

## 2025-02-06 DIAGNOSIS — Z98.890 OTHER SPECIFIED POSTPROCEDURAL STATES: Chronic | ICD-10-CM

## 2025-02-06 DIAGNOSIS — K83.8 OTHER SPECIFIED DISEASES OF BILIARY TRACT: ICD-10-CM

## 2025-02-06 DIAGNOSIS — H92.10 OTORRHEA, UNSPECIFIED EAR: ICD-10-CM

## 2025-02-06 DIAGNOSIS — K80.20 CALCULUS OF GALLBLADDER W/OUT CHOLECYSTITIS W/OUT OBSTRUCTION: ICD-10-CM

## 2025-02-06 DIAGNOSIS — G89.4 CHRONIC PAIN SYNDROME: ICD-10-CM

## 2025-02-06 DIAGNOSIS — E83.19 OTHER DISORDERS OF IRON METABOLISM: ICD-10-CM

## 2025-02-06 DIAGNOSIS — D57.42 SICKLE-CELL THALASSEMIA BETA ZERO WITHOUT CRISIS: ICD-10-CM

## 2025-02-06 DIAGNOSIS — Z90.81 ACQUIRED ABSENCE OF SPLEEN: Chronic | ICD-10-CM

## 2025-02-06 PROCEDURE — 99214 OFFICE O/P EST MOD 30 MIN: CPT | Mod: GC

## 2025-02-06 PROCEDURE — G2211 COMPLEX E/M VISIT ADD ON: CPT | Mod: NC

## 2025-02-06 PROCEDURE — 99214 OFFICE O/P EST MOD 30 MIN: CPT

## 2025-02-07 ENCOUNTER — NON-APPOINTMENT (OUTPATIENT)
Age: 30
End: 2025-02-07

## 2025-02-08 DIAGNOSIS — E83.19 OTHER DISORDERS OF IRON METABOLISM: ICD-10-CM

## 2025-02-08 DIAGNOSIS — H92.10 OTORRHEA, UNSPECIFIED EAR: ICD-10-CM

## 2025-02-08 DIAGNOSIS — R79.89 OTHER SPECIFIED ABNORMAL FINDINGS OF BLOOD CHEMISTRY: ICD-10-CM

## 2025-02-08 DIAGNOSIS — G89.4 CHRONIC PAIN SYNDROME: ICD-10-CM

## 2025-02-08 DIAGNOSIS — D57.42 SICKLE-CELL THALASSEMIA BETA ZERO WITHOUT CRISIS: ICD-10-CM

## 2025-02-10 LAB
25(OH)D3 SERPL-MCNC: 49.4 NG/ML
ALBUMIN SERPL ELPH-MCNC: 3.4 G/DL
ALP BLD-CCNC: 436 U/L
ALT SERPL-CCNC: 73 U/L
ANION GAP SERPL CALC-SCNC: 14 MMOL/L
AST SERPL-CCNC: 190 U/L
BASOPHILS # BLD AUTO: 0 K/UL
BASOPHILS NFR BLD AUTO: 0 %
BILIRUB SERPL-MCNC: 5.1 MG/DL
BUN SERPL-MCNC: 4 MG/DL
CALCIUM SERPL-MCNC: 8.7 MG/DL
CHLORIDE SERPL-SCNC: 100 MMOL/L
CO2 SERPL-SCNC: 20 MMOL/L
CREAT SERPL-MCNC: 0.39 MG/DL
EGFR: 154 ML/MIN/1.73M2
EOSINOPHIL # BLD AUTO: 0 K/UL
EOSINOPHIL NFR BLD AUTO: 0 %
FERRITIN SERPL-MCNC: 4534 NG/ML
GLUCOSE SERPL-MCNC: 70 MG/DL
HCT VFR BLD CALC: 24.9 %
HGB A MFR BLD: 0 %
HGB A2 MFR BLD: 3.2 %
HGB BLD-MCNC: 7.8 G/DL
HGB F MFR BLD: 3 %
HGB FRACT BLD-IMP: NORMAL
HGB OTHER MFR BLD ELPH: 2.6 %
HGB S BLD QL SOLY: POSITIVE
HGB S MFR BLD: 91.2 %
LYMPHOCYTES # BLD AUTO: 6.27 K/UL
LYMPHOCYTES NFR BLD AUTO: 31 %
MAN DIFF?: NORMAL
MCHC RBC-ENTMCNC: 22 PG
MCHC RBC-ENTMCNC: 31.3 G/DL
MCV RBC AUTO: 70.3 FL
MONOCYTES # BLD AUTO: 1.21 K/UL
MONOCYTES NFR BLD AUTO: 6 %
NEUTROPHILS # BLD AUTO: 12.55 K/UL
NEUTROPHILS NFR BLD AUTO: 62 %
PLATELET # BLD AUTO: 229 K/UL
POTASSIUM SERPL-SCNC: 4.6 MMOL/L
PROT SERPL-MCNC: 8.5 G/DL
RBC # BLD: 3.54 M/UL
RBC # BLD: 3.54 M/UL
RBC # FLD: 25.9 %
RETICS # AUTO: 21.9 %
RETICS AGGREG/RBC NFR: 776 K/UL
SODIUM SERPL-SCNC: 134 MMOL/L
WBC # FLD AUTO: 20.24 K/UL

## 2025-02-13 DIAGNOSIS — K80.20 CALCULUS OF GALLBLADDER WITHOUT CHOLECYSTITIS WITHOUT OBSTRUCTION: ICD-10-CM

## 2025-02-13 DIAGNOSIS — R79.89 OTHER SPECIFIED ABNORMAL FINDINGS OF BLOOD CHEMISTRY: ICD-10-CM

## 2025-02-13 DIAGNOSIS — E83.19 OTHER DISORDERS OF IRON METABOLISM: ICD-10-CM

## 2025-02-13 DIAGNOSIS — K83.8 OTHER SPECIFIED DISEASES OF BILIARY TRACT: ICD-10-CM

## 2025-02-28 ENCOUNTER — INPATIENT (INPATIENT)
Facility: HOSPITAL | Age: 30
LOS: 7 days | Discharge: HOME | End: 2025-03-08
Attending: HOSPITALIST | Admitting: HOSPITALIST
Payer: MEDICAID

## 2025-02-28 VITALS
OXYGEN SATURATION: 97 % | SYSTOLIC BLOOD PRESSURE: 120 MMHG | RESPIRATION RATE: 18 BRPM | HEART RATE: 93 BPM | WEIGHT: 95.02 LBS | DIASTOLIC BLOOD PRESSURE: 83 MMHG

## 2025-02-28 DIAGNOSIS — Z98.890 OTHER SPECIFIED POSTPROCEDURAL STATES: Chronic | ICD-10-CM

## 2025-02-28 DIAGNOSIS — R74.01 ELEVATION OF LEVELS OF LIVER TRANSAMINASE LEVELS: ICD-10-CM

## 2025-02-28 DIAGNOSIS — Z90.81 ACQUIRED ABSENCE OF SPLEEN: Chronic | ICD-10-CM

## 2025-02-28 DIAGNOSIS — D57.00 HB-SS DISEASE WITH CRISIS, UNSPECIFIED: ICD-10-CM

## 2025-02-28 DIAGNOSIS — Z79.899 OTHER LONG TERM (CURRENT) DRUG THERAPY: ICD-10-CM

## 2025-02-28 LAB
ALBUMIN SERPL ELPH-MCNC: 3.3 G/DL — SIGNIFICANT CHANGE UP (ref 3.3–5)
ALP SERPL-CCNC: 329 U/L — HIGH (ref 40–120)
ALT FLD-CCNC: 37 U/L — SIGNIFICANT CHANGE UP (ref 4–41)
ANION GAP SERPL CALC-SCNC: 14 MMOL/L — SIGNIFICANT CHANGE UP (ref 7–14)
ANISOCYTOSIS BLD QL: SIGNIFICANT CHANGE UP
AST SERPL-CCNC: 84 U/L — HIGH (ref 4–40)
BASOPHILS # BLD AUTO: 0 K/UL — SIGNIFICANT CHANGE UP (ref 0–0.2)
BASOPHILS NFR BLD AUTO: 0 % — SIGNIFICANT CHANGE UP (ref 0–2)
BILIRUB SERPL-MCNC: 4.2 MG/DL — HIGH (ref 0.2–1.2)
BUN SERPL-MCNC: 9 MG/DL — SIGNIFICANT CHANGE UP (ref 7–23)
CALCIUM SERPL-MCNC: 8.6 MG/DL — SIGNIFICANT CHANGE UP (ref 8.4–10.5)
CHLORIDE SERPL-SCNC: 102 MMOL/L — SIGNIFICANT CHANGE UP (ref 98–107)
CO2 SERPL-SCNC: 19 MMOL/L — LOW (ref 22–31)
CREAT SERPL-MCNC: 0.37 MG/DL — LOW (ref 0.5–1.3)
EGFR: 156 ML/MIN/1.73M2 — SIGNIFICANT CHANGE UP
EGFR: 156 ML/MIN/1.73M2 — SIGNIFICANT CHANGE UP
EOSINOPHIL # BLD AUTO: 0 K/UL — SIGNIFICANT CHANGE UP (ref 0–0.5)
EOSINOPHIL NFR BLD AUTO: 0 % — SIGNIFICANT CHANGE UP (ref 0–6)
GIANT PLATELETS BLD QL SMEAR: PRESENT — SIGNIFICANT CHANGE UP
GLUCOSE SERPL-MCNC: 90 MG/DL — SIGNIFICANT CHANGE UP (ref 70–99)
HCT VFR BLD CALC: 23.3 % — LOW (ref 39–50)
HGB BLD-MCNC: 7.1 G/DL — LOW (ref 13–17)
HYPOCHROMIA BLD QL: SIGNIFICANT CHANGE UP
IANC: 8.05 K/UL — HIGH (ref 1.8–7.4)
LYMPHOCYTES # BLD AUTO: 28.8 % — SIGNIFICANT CHANGE UP (ref 13–44)
LYMPHOCYTES # BLD AUTO: 4.73 K/UL — HIGH (ref 1–3.3)
MACROCYTES BLD QL: SLIGHT — SIGNIFICANT CHANGE UP
MANUAL SMEAR VERIFICATION: SIGNIFICANT CHANGE UP
MCHC RBC-ENTMCNC: 20.2 PG — LOW (ref 27–34)
MCHC RBC-ENTMCNC: 30.5 G/DL — LOW (ref 32–36)
MCV RBC AUTO: 66.4 FL — LOW (ref 80–100)
MICROCYTES BLD QL: SLIGHT — SIGNIFICANT CHANGE UP
MONOCYTES # BLD AUTO: 1.99 K/UL — HIGH (ref 0–0.9)
MONOCYTES NFR BLD AUTO: 12.1 % — SIGNIFICANT CHANGE UP (ref 2–14)
MYELOCYTES NFR BLD: 1.5 % — HIGH (ref 0–0)
NEUTROPHILS # BLD AUTO: 8.7 K/UL — HIGH (ref 1.8–7.4)
NEUTROPHILS NFR BLD AUTO: 53 % — SIGNIFICANT CHANGE UP (ref 43–77)
NRBC # BLD: 458 /100 WBCS — HIGH (ref 0–0)
NRBC BLD-RTO: 458 /100 WBCS — HIGH (ref 0–0)
PLAT MORPH BLD: ABNORMAL
PLATELET # BLD AUTO: 253 K/UL — SIGNIFICANT CHANGE UP (ref 150–400)
PLATELET COUNT - ESTIMATE: NORMAL — SIGNIFICANT CHANGE UP
POIKILOCYTOSIS BLD QL AUTO: SIGNIFICANT CHANGE UP
POLYCHROMASIA BLD QL SMEAR: SLIGHT — SIGNIFICANT CHANGE UP
POTASSIUM SERPL-MCNC: 3.8 MMOL/L — SIGNIFICANT CHANGE UP (ref 3.5–5.3)
POTASSIUM SERPL-SCNC: 3.8 MMOL/L — SIGNIFICANT CHANGE UP (ref 3.5–5.3)
PROT SERPL-MCNC: 8.6 G/DL — HIGH (ref 6–8.3)
RBC # BLD: 3.51 M/UL — LOW (ref 4.2–5.8)
RBC # BLD: 3.51 M/UL — LOW (ref 4.2–5.8)
RBC # FLD: 25.7 % — HIGH (ref 10.3–14.5)
RBC BLD AUTO: ABNORMAL
RETICS #: 618.1 K/UL — HIGH (ref 25–125)
RETICS/RBC NFR: 17.5 % — HIGH (ref 0.5–2.5)
SICKLE CELLS BLD QL SMEAR: SLIGHT — SIGNIFICANT CHANGE UP
SMUDGE CELLS # BLD: PRESENT — SIGNIFICANT CHANGE UP
SODIUM SERPL-SCNC: 135 MMOL/L — SIGNIFICANT CHANGE UP (ref 135–145)
TARGETS BLD QL SMEAR: SIGNIFICANT CHANGE UP
VARIANT LYMPHS # BLD: 4.6 % — SIGNIFICANT CHANGE UP (ref 0–6)
VARIANT LYMPHS NFR BLD MANUAL: 4.6 % — SIGNIFICANT CHANGE UP (ref 0–6)
WBC # BLD: 16.41 K/UL — HIGH (ref 3.8–10.5)
WBC # FLD AUTO: 16.41 K/UL — HIGH (ref 3.8–10.5)

## 2025-02-28 PROCEDURE — 71046 X-RAY EXAM CHEST 2 VIEWS: CPT | Mod: 26

## 2025-02-28 PROCEDURE — 99223 1ST HOSP IP/OBS HIGH 75: CPT

## 2025-02-28 PROCEDURE — 99285 EMERGENCY DEPT VISIT HI MDM: CPT

## 2025-02-28 RX ORDER — FOLIC ACID 1 MG/1
1 TABLET ORAL DAILY
Refills: 0 | Status: DISCONTINUED | OUTPATIENT
Start: 2025-02-28 | End: 2025-03-08

## 2025-02-28 RX ORDER — SODIUM CHLORIDE 9 G/1000ML
1000 INJECTION, SOLUTION INTRAVENOUS
Refills: 0 | Status: DISCONTINUED | OUTPATIENT
Start: 2025-02-28 | End: 2025-02-28

## 2025-02-28 RX ORDER — NALOXONE HYDROCHLORIDE 0.4 MG/ML
0.4 INJECTION, SOLUTION INTRAMUSCULAR; INTRAVENOUS; SUBCUTANEOUS ONCE
Refills: 0 | Status: DISCONTINUED | OUTPATIENT
Start: 2025-02-28 | End: 2025-03-08

## 2025-02-28 RX ORDER — SODIUM CHLORIDE 9 G/1000ML
1000 INJECTION, SOLUTION INTRAVENOUS
Refills: 0 | Status: DISCONTINUED | OUTPATIENT
Start: 2025-02-28 | End: 2025-03-08

## 2025-02-28 RX ORDER — HYDROXYUREA 500 MG/1
1500 CAPSULE ORAL
Refills: 0 | Status: DISCONTINUED | OUTPATIENT
Start: 2025-02-28 | End: 2025-03-08

## 2025-02-28 RX ORDER — SENNA 187 MG
2 TABLET ORAL AT BEDTIME
Refills: 0 | Status: DISCONTINUED | OUTPATIENT
Start: 2025-02-28 | End: 2025-03-08

## 2025-02-28 RX ORDER — ACETAMINOPHEN 500 MG/5ML
650 LIQUID (ML) ORAL EVERY 6 HOURS
Refills: 0 | Status: DISCONTINUED | OUTPATIENT
Start: 2025-02-28 | End: 2025-03-08

## 2025-02-28 RX ORDER — ONDANSETRON HCL/PF 4 MG/2 ML
4 VIAL (ML) INJECTION EVERY 8 HOURS
Refills: 0 | Status: DISCONTINUED | OUTPATIENT
Start: 2025-02-28 | End: 2025-03-08

## 2025-02-28 RX ORDER — KETOROLAC TROMETHAMINE 30 MG/ML
15 INJECTION, SOLUTION INTRAMUSCULAR; INTRAVENOUS ONCE
Refills: 0 | Status: DISCONTINUED | OUTPATIENT
Start: 2025-02-28 | End: 2025-02-28

## 2025-02-28 RX ORDER — HYDROXYUREA 500 MG/1
1000 CAPSULE ORAL
Refills: 0 | Status: DISCONTINUED | OUTPATIENT
Start: 2025-02-28 | End: 2025-03-08

## 2025-02-28 RX ORDER — MELATONIN 5 MG
3 TABLET ORAL AT BEDTIME
Refills: 0 | Status: DISCONTINUED | OUTPATIENT
Start: 2025-02-28 | End: 2025-03-08

## 2025-02-28 RX ORDER — ENOXAPARIN SODIUM 100 MG/ML
30 INJECTION SUBCUTANEOUS EVERY 24 HOURS
Refills: 0 | Status: DISCONTINUED | OUTPATIENT
Start: 2025-03-01 | End: 2025-03-08

## 2025-02-28 RX ORDER — MAGNESIUM, ALUMINUM HYDROXIDE 200-200 MG
30 TABLET,CHEWABLE ORAL EVERY 4 HOURS
Refills: 0 | Status: DISCONTINUED | OUTPATIENT
Start: 2025-02-28 | End: 2025-03-08

## 2025-02-28 RX ORDER — KETOROLAC TROMETHAMINE 30 MG/ML
15 INJECTION, SOLUTION INTRAMUSCULAR; INTRAVENOUS EVERY 8 HOURS
Refills: 0 | Status: DISCONTINUED | OUTPATIENT
Start: 2025-02-28 | End: 2025-03-03

## 2025-02-28 RX ADMIN — Medication 4 MILLIGRAM(S): at 10:55

## 2025-02-28 RX ADMIN — Medication 4 MILLIGRAM(S): at 12:50

## 2025-02-28 RX ADMIN — Medication 4 MILLIGRAM(S): at 17:32

## 2025-02-28 RX ADMIN — Medication 15 MILLIGRAM(S): at 21:52

## 2025-02-28 RX ADMIN — KETOROLAC TROMETHAMINE 15 MILLIGRAM(S): 30 INJECTION, SOLUTION INTRAMUSCULAR; INTRAVENOUS at 07:54

## 2025-02-28 RX ADMIN — Medication 30 MILLILITER(S): at 20:40

## 2025-02-28 RX ADMIN — Medication 15 MILLIGRAM(S): at 22:22

## 2025-02-28 RX ADMIN — SODIUM CHLORIDE 100 MILLILITER(S): 9 INJECTION, SOLUTION INTRAVENOUS at 10:54

## 2025-02-28 RX ADMIN — Medication 4 MILLIGRAM(S): at 17:45

## 2025-02-28 RX ADMIN — SODIUM CHLORIDE 75 MILLILITER(S): 9 INJECTION, SOLUTION INTRAVENOUS at 20:43

## 2025-02-28 RX ADMIN — Medication 4 MILLIGRAM(S): at 07:54

## 2025-03-01 PROBLEM — D57.01 HB-SS DISEASE WITH ACUTE CHEST SYNDROME: Chronic | Status: INACTIVE | Noted: 2018-01-28 | Resolved: 2025-02-28

## 2025-03-01 LAB
ALBUMIN SERPL ELPH-MCNC: 3.2 G/DL — LOW (ref 3.3–5)
ALP SERPL-CCNC: 311 U/L — HIGH (ref 40–120)
ALT FLD-CCNC: 55 U/L — HIGH (ref 4–41)
ANION GAP SERPL CALC-SCNC: 16 MMOL/L — HIGH (ref 7–14)
AST SERPL-CCNC: 177 U/L — HIGH (ref 4–40)
BASOPHILS # BLD AUTO: 0.1 K/UL — SIGNIFICANT CHANGE UP (ref 0–0.2)
BASOPHILS NFR BLD AUTO: 0.7 % — SIGNIFICANT CHANGE UP (ref 0–2)
BILIRUB DIRECT SERPL-MCNC: 2.1 MG/DL — HIGH (ref 0–0.3)
BILIRUB INDIRECT FLD-MCNC: 1.6 MG/DL — HIGH (ref 0–1)
BILIRUB SERPL-MCNC: 3.7 MG/DL — HIGH (ref 0.2–1.2)
BILIRUB SERPL-MCNC: 3.7 MG/DL — HIGH (ref 0.2–1.2)
BUN SERPL-MCNC: 4 MG/DL — LOW (ref 7–23)
CALCIUM SERPL-MCNC: 8.3 MG/DL — LOW (ref 8.4–10.5)
CHLORIDE SERPL-SCNC: 102 MMOL/L — SIGNIFICANT CHANGE UP (ref 98–107)
CO2 SERPL-SCNC: 19 MMOL/L — LOW (ref 22–31)
CREAT SERPL-MCNC: 0.3 MG/DL — LOW (ref 0.5–1.3)
EGFR: 166 ML/MIN/1.73M2 — SIGNIFICANT CHANGE UP
EGFR: 166 ML/MIN/1.73M2 — SIGNIFICANT CHANGE UP
EOSINOPHIL # BLD AUTO: 0.07 K/UL — SIGNIFICANT CHANGE UP (ref 0–0.5)
EOSINOPHIL NFR BLD AUTO: 0.5 % — SIGNIFICANT CHANGE UP (ref 0–6)
GLUCOSE SERPL-MCNC: 65 MG/DL — LOW (ref 70–99)
HAPTOGLOB SERPL-MCNC: <20 MG/DL — LOW (ref 34–200)
HCT VFR BLD CALC: 22.4 % — LOW (ref 39–50)
HGB BLD-MCNC: 6.8 G/DL — CRITICAL LOW (ref 13–17)
IANC: 7.12 K/UL — SIGNIFICANT CHANGE UP (ref 1.8–7.4)
IMM GRANULOCYTES NFR BLD AUTO: 1.3 % — HIGH (ref 0–0.9)
LDH SERPL L TO P-CCNC: 811 U/L — HIGH (ref 135–225)
LYMPHOCYTES # BLD AUTO: 41.3 % — SIGNIFICANT CHANGE UP (ref 13–44)
LYMPHOCYTES # BLD AUTO: 6.09 K/UL — HIGH (ref 1–3.3)
MCHC RBC-ENTMCNC: 20.2 PG — LOW (ref 27–34)
MCHC RBC-ENTMCNC: 30.4 G/DL — LOW (ref 32–36)
MCV RBC AUTO: 66.5 FL — LOW (ref 80–100)
MONOCYTES # BLD AUTO: 1.18 K/UL — HIGH (ref 0–0.9)
MONOCYTES NFR BLD AUTO: 8 % — SIGNIFICANT CHANGE UP (ref 2–14)
NEUTROPHILS # BLD AUTO: 7.12 K/UL — SIGNIFICANT CHANGE UP (ref 1.8–7.4)
NEUTROPHILS NFR BLD AUTO: 48.2 % — SIGNIFICANT CHANGE UP (ref 43–77)
NRBC # BLD AUTO: 37.24 K/UL — HIGH (ref 0–0)
NRBC # FLD: 37.24 K/UL — HIGH (ref 0–0)
NRBC BLD AUTO-RTO: 252 /100 WBCS — HIGH (ref 0–0)
PLATELET # BLD AUTO: 276 K/UL — SIGNIFICANT CHANGE UP (ref 150–400)
POTASSIUM SERPL-MCNC: 3.7 MMOL/L — SIGNIFICANT CHANGE UP (ref 3.5–5.3)
POTASSIUM SERPL-SCNC: 3.7 MMOL/L — SIGNIFICANT CHANGE UP (ref 3.5–5.3)
PROT SERPL-MCNC: 8.6 G/DL — HIGH (ref 6–8.3)
RBC # BLD: 3.37 M/UL — LOW (ref 4.2–5.8)
RBC # FLD: 25.5 % — HIGH (ref 10.3–14.5)
SODIUM SERPL-SCNC: 137 MMOL/L — SIGNIFICANT CHANGE UP (ref 135–145)
WBC # BLD: 14.75 K/UL — HIGH (ref 3.8–10.5)
WBC # FLD AUTO: 14.75 K/UL — HIGH (ref 3.8–10.5)

## 2025-03-01 PROCEDURE — 99233 SBSQ HOSP IP/OBS HIGH 50: CPT

## 2025-03-01 RX ORDER — ALBUTEROL SULFATE 2.5 MG/3ML
2 VIAL, NEBULIZER (ML) INHALATION
Refills: 0 | DISCHARGE

## 2025-03-01 RX ORDER — ERGOCALCIFEROL 1.25 MG/1
1 CAPSULE ORAL
Refills: 0 | DISCHARGE

## 2025-03-01 RX ORDER — ONDANSETRON HCL/PF 4 MG/2 ML
1 VIAL (ML) INJECTION
Refills: 0 | DISCHARGE

## 2025-03-01 RX ADMIN — HYDROXYUREA 1000 MILLIGRAM(S): 500 CAPSULE ORAL at 10:29

## 2025-03-01 RX ADMIN — KETOROLAC TROMETHAMINE 15 MILLIGRAM(S): 30 INJECTION, SOLUTION INTRAMUSCULAR; INTRAVENOUS at 11:28

## 2025-03-01 RX ADMIN — Medication 15 MILLIGRAM(S): at 10:29

## 2025-03-01 RX ADMIN — Medication 15 MILLIGRAM(S): at 17:06

## 2025-03-01 RX ADMIN — FOLIC ACID 1 MILLIGRAM(S): 1 TABLET ORAL at 11:28

## 2025-03-01 RX ADMIN — Medication 30 MILLILITER(S): at 08:22

## 2025-03-01 RX ADMIN — Medication 30 MILLILITER(S): at 20:19

## 2025-03-02 LAB
ALBUMIN SERPL ELPH-MCNC: 3 G/DL — LOW (ref 3.3–5)
ALP SERPL-CCNC: 303 U/L — HIGH (ref 40–120)
ALT FLD-CCNC: 60 U/L — HIGH (ref 4–41)
ANION GAP SERPL CALC-SCNC: 11 MMOL/L — SIGNIFICANT CHANGE UP (ref 7–14)
AST SERPL-CCNC: 148 U/L — HIGH (ref 4–40)
BILIRUB DIRECT SERPL-MCNC: 1.8 MG/DL — HIGH (ref 0–0.3)
BILIRUB INDIRECT FLD-MCNC: 1.8 MG/DL — HIGH (ref 0–1)
BILIRUB SERPL-MCNC: 3.6 MG/DL — HIGH (ref 0.2–1.2)
BUN SERPL-MCNC: 4 MG/DL — LOW (ref 7–23)
CALCIUM SERPL-MCNC: 8.3 MG/DL — LOW (ref 8.4–10.5)
CHLORIDE SERPL-SCNC: 103 MMOL/L — SIGNIFICANT CHANGE UP (ref 98–107)
CO2 SERPL-SCNC: 20 MMOL/L — LOW (ref 22–31)
CREAT SERPL-MCNC: 0.29 MG/DL — LOW (ref 0.5–1.3)
EGFR: 168 ML/MIN/1.73M2 — SIGNIFICANT CHANGE UP
EGFR: 168 ML/MIN/1.73M2 — SIGNIFICANT CHANGE UP
GLUCOSE SERPL-MCNC: 92 MG/DL — SIGNIFICANT CHANGE UP (ref 70–99)
HCT VFR BLD CALC: 22.3 % — LOW (ref 39–50)
HGB BLD-MCNC: 6.8 G/DL — CRITICAL LOW (ref 13–17)
MCHC RBC-ENTMCNC: 20.1 PG — LOW (ref 27–34)
MCHC RBC-ENTMCNC: 30.5 G/DL — LOW (ref 32–36)
MCV RBC AUTO: 66 FL — LOW (ref 80–100)
NRBC # BLD AUTO: 29.32 K/UL — HIGH (ref 0–0)
NRBC # FLD: 29.32 K/UL — HIGH (ref 0–0)
NRBC BLD AUTO-RTO: 210 /100 WBCS — HIGH (ref 0–0)
PLATELET # BLD AUTO: 293 K/UL — SIGNIFICANT CHANGE UP (ref 150–400)
POTASSIUM SERPL-MCNC: 4 MMOL/L — SIGNIFICANT CHANGE UP (ref 3.5–5.3)
POTASSIUM SERPL-SCNC: 4 MMOL/L — SIGNIFICANT CHANGE UP (ref 3.5–5.3)
PROT SERPL-MCNC: 7.7 G/DL — SIGNIFICANT CHANGE UP (ref 6–8.3)
RBC # BLD: 3.38 M/UL — LOW (ref 4.2–5.8)
RBC # FLD: 25.9 % — HIGH (ref 10.3–14.5)
SODIUM SERPL-SCNC: 134 MMOL/L — LOW (ref 135–145)
WBC # BLD: 13.94 K/UL — HIGH (ref 3.8–10.5)
WBC # FLD AUTO: 13.94 K/UL — HIGH (ref 3.8–10.5)

## 2025-03-02 PROCEDURE — 99233 SBSQ HOSP IP/OBS HIGH 50: CPT

## 2025-03-02 RX ADMIN — Medication 30 MILLILITER(S): at 17:41

## 2025-03-02 RX ADMIN — KETOROLAC TROMETHAMINE 15 MILLIGRAM(S): 30 INJECTION, SOLUTION INTRAMUSCULAR; INTRAVENOUS at 10:45

## 2025-03-02 RX ADMIN — HYDROXYUREA 1000 MILLIGRAM(S): 500 CAPSULE ORAL at 10:13

## 2025-03-02 RX ADMIN — Medication 15 MILLIGRAM(S): at 05:34

## 2025-03-02 RX ADMIN — FOLIC ACID 1 MILLIGRAM(S): 1 TABLET ORAL at 12:52

## 2025-03-02 RX ADMIN — Medication 15 MILLIGRAM(S): at 17:41

## 2025-03-02 RX ADMIN — SODIUM CHLORIDE 75 MILLILITER(S): 9 INJECTION, SOLUTION INTRAVENOUS at 02:04

## 2025-03-02 RX ADMIN — KETOROLAC TROMETHAMINE 15 MILLIGRAM(S): 30 INJECTION, SOLUTION INTRAMUSCULAR; INTRAVENOUS at 10:13

## 2025-03-02 RX ADMIN — Medication 4 MILLIGRAM(S): at 02:04

## 2025-03-02 RX ADMIN — Medication 30 MILLILITER(S): at 13:45

## 2025-03-02 RX ADMIN — Medication 30 MILLILITER(S): at 20:23

## 2025-03-02 RX ADMIN — Medication 30 MILLILITER(S): at 08:31

## 2025-03-03 PROCEDURE — 99232 SBSQ HOSP IP/OBS MODERATE 35: CPT

## 2025-03-03 RX ORDER — GABAPENTIN 400 MG/1
300 CAPSULE ORAL EVERY 12 HOURS
Refills: 0 | Status: DISCONTINUED | OUTPATIENT
Start: 2025-03-03 | End: 2025-03-08

## 2025-03-03 RX ORDER — KETOROLAC TROMETHAMINE 30 MG/ML
15 INJECTION, SOLUTION INTRAMUSCULAR; INTRAVENOUS EVERY 8 HOURS
Refills: 0 | Status: DISCONTINUED | OUTPATIENT
Start: 2025-03-03 | End: 2025-03-06

## 2025-03-03 RX ADMIN — KETOROLAC TROMETHAMINE 15 MILLIGRAM(S): 30 INJECTION, SOLUTION INTRAMUSCULAR; INTRAVENOUS at 14:13

## 2025-03-03 RX ADMIN — Medication 30 MILLILITER(S): at 20:11

## 2025-03-03 RX ADMIN — KETOROLAC TROMETHAMINE 15 MILLIGRAM(S): 30 INJECTION, SOLUTION INTRAMUSCULAR; INTRAVENOUS at 21:06

## 2025-03-03 RX ADMIN — Medication 15 MILLIGRAM(S): at 05:16

## 2025-03-03 RX ADMIN — Medication 15 MILLIGRAM(S): at 17:04

## 2025-03-03 RX ADMIN — HYDROXYUREA 1500 MILLIGRAM(S): 500 CAPSULE ORAL at 07:20

## 2025-03-03 RX ADMIN — KETOROLAC TROMETHAMINE 15 MILLIGRAM(S): 30 INJECTION, SOLUTION INTRAMUSCULAR; INTRAVENOUS at 14:43

## 2025-03-03 RX ADMIN — Medication 30 MILLILITER(S): at 08:34

## 2025-03-03 RX ADMIN — GABAPENTIN 300 MILLIGRAM(S): 400 CAPSULE ORAL at 14:13

## 2025-03-03 RX ADMIN — Medication 650 MILLIGRAM(S): at 20:08

## 2025-03-03 RX ADMIN — FOLIC ACID 1 MILLIGRAM(S): 1 TABLET ORAL at 11:13

## 2025-03-03 RX ADMIN — Medication 15 MILLIGRAM(S): at 17:34

## 2025-03-03 RX ADMIN — ENOXAPARIN SODIUM 30 MILLIGRAM(S): 100 INJECTION SUBCUTANEOUS at 17:04

## 2025-03-04 LAB
ALBUMIN SERPL ELPH-MCNC: 2.9 G/DL — LOW (ref 3.3–5)
ALP SERPL-CCNC: 295 U/L — HIGH (ref 40–120)
ALT FLD-CCNC: 39 U/L — SIGNIFICANT CHANGE UP (ref 4–41)
ANION GAP SERPL CALC-SCNC: 11 MMOL/L — SIGNIFICANT CHANGE UP (ref 7–14)
AST SERPL-CCNC: 95 U/L — HIGH (ref 4–40)
BILIRUB SERPL-MCNC: 3 MG/DL — HIGH (ref 0.2–1.2)
BUN SERPL-MCNC: 5 MG/DL — LOW (ref 7–23)
CALCIUM SERPL-MCNC: 8.3 MG/DL — LOW (ref 8.4–10.5)
CHLORIDE SERPL-SCNC: 104 MMOL/L — SIGNIFICANT CHANGE UP (ref 98–107)
CO2 SERPL-SCNC: 22 MMOL/L — SIGNIFICANT CHANGE UP (ref 22–31)
CREAT SERPL-MCNC: 0.31 MG/DL — LOW (ref 0.5–1.3)
EGFR: 165 ML/MIN/1.73M2 — SIGNIFICANT CHANGE UP
EGFR: 165 ML/MIN/1.73M2 — SIGNIFICANT CHANGE UP
GLUCOSE SERPL-MCNC: 78 MG/DL — SIGNIFICANT CHANGE UP (ref 70–99)
HCT VFR BLD CALC: 20.5 % — CRITICAL LOW (ref 39–50)
HGB BLD-MCNC: 6.3 G/DL — CRITICAL LOW (ref 13–17)
MAGNESIUM SERPL-MCNC: 1.7 MG/DL — SIGNIFICANT CHANGE UP (ref 1.6–2.6)
MCHC RBC-ENTMCNC: 20.5 PG — LOW (ref 27–34)
MCHC RBC-ENTMCNC: 30.7 G/DL — LOW (ref 32–36)
MCV RBC AUTO: 66.8 FL — LOW (ref 80–100)
MRSA PCR RESULT.: SIGNIFICANT CHANGE UP
NRBC # BLD AUTO: 19.38 K/UL — HIGH (ref 0–0)
NRBC # FLD: 19.38 K/UL — HIGH (ref 0–0)
NRBC BLD AUTO-RTO: 101 /100 WBCS — HIGH (ref 0–0)
PHOSPHATE SERPL-MCNC: 2.7 MG/DL — SIGNIFICANT CHANGE UP (ref 2.5–4.5)
PLATELET # BLD AUTO: 259 K/UL — SIGNIFICANT CHANGE UP (ref 150–400)
POTASSIUM SERPL-MCNC: 3.6 MMOL/L — SIGNIFICANT CHANGE UP (ref 3.5–5.3)
POTASSIUM SERPL-SCNC: 3.6 MMOL/L — SIGNIFICANT CHANGE UP (ref 3.5–5.3)
PROT SERPL-MCNC: 7.6 G/DL — SIGNIFICANT CHANGE UP (ref 6–8.3)
RBC # BLD: 3.07 M/UL — LOW (ref 4.2–5.8)
RBC # FLD: 25.7 % — HIGH (ref 10.3–14.5)
S AUREUS DNA NOSE QL NAA+PROBE: DETECTED
SODIUM SERPL-SCNC: 137 MMOL/L — SIGNIFICANT CHANGE UP (ref 135–145)
WBC # BLD: 19.18 K/UL — HIGH (ref 3.8–10.5)
WBC # FLD AUTO: 19.18 K/UL — HIGH (ref 3.8–10.5)

## 2025-03-04 PROCEDURE — 99232 SBSQ HOSP IP/OBS MODERATE 35: CPT

## 2025-03-04 RX ORDER — CYCLOBENZAPRINE HYDROCHLORIDE 15 MG/1
5 CAPSULE, EXTENDED RELEASE ORAL THREE TIMES A DAY
Refills: 0 | Status: DISCONTINUED | OUTPATIENT
Start: 2025-03-04 | End: 2025-03-08

## 2025-03-04 RX ADMIN — Medication 15 MILLIGRAM(S): at 05:13

## 2025-03-04 RX ADMIN — HYDROXYUREA 1500 MILLIGRAM(S): 500 CAPSULE ORAL at 07:25

## 2025-03-04 RX ADMIN — KETOROLAC TROMETHAMINE 15 MILLIGRAM(S): 30 INJECTION, SOLUTION INTRAMUSCULAR; INTRAVENOUS at 13:00

## 2025-03-04 RX ADMIN — Medication 15 MILLIGRAM(S): at 17:46

## 2025-03-04 RX ADMIN — CYCLOBENZAPRINE HYDROCHLORIDE 5 MILLIGRAM(S): 15 CAPSULE, EXTENDED RELEASE ORAL at 11:30

## 2025-03-04 RX ADMIN — KETOROLAC TROMETHAMINE 15 MILLIGRAM(S): 30 INJECTION, SOLUTION INTRAMUSCULAR; INTRAVENOUS at 05:12

## 2025-03-04 RX ADMIN — Medication 30 MILLILITER(S): at 08:40

## 2025-03-04 RX ADMIN — ENOXAPARIN SODIUM 30 MILLIGRAM(S): 100 INJECTION SUBCUTANEOUS at 17:16

## 2025-03-04 RX ADMIN — GABAPENTIN 300 MILLIGRAM(S): 400 CAPSULE ORAL at 01:08

## 2025-03-04 RX ADMIN — GABAPENTIN 300 MILLIGRAM(S): 400 CAPSULE ORAL at 13:00

## 2025-03-04 RX ADMIN — KETOROLAC TROMETHAMINE 15 MILLIGRAM(S): 30 INJECTION, SOLUTION INTRAMUSCULAR; INTRAVENOUS at 13:30

## 2025-03-04 RX ADMIN — KETOROLAC TROMETHAMINE 15 MILLIGRAM(S): 30 INJECTION, SOLUTION INTRAMUSCULAR; INTRAVENOUS at 21:24

## 2025-03-04 RX ADMIN — FOLIC ACID 1 MILLIGRAM(S): 1 TABLET ORAL at 11:30

## 2025-03-04 RX ADMIN — Medication 15 MILLIGRAM(S): at 17:16

## 2025-03-04 RX ADMIN — KETOROLAC TROMETHAMINE 15 MILLIGRAM(S): 30 INJECTION, SOLUTION INTRAMUSCULAR; INTRAVENOUS at 22:00

## 2025-03-04 RX ADMIN — Medication 1 APPLICATION(S): at 17:21

## 2025-03-04 RX ADMIN — Medication 30 MILLILITER(S): at 20:05

## 2025-03-05 LAB
ALBUMIN SERPL ELPH-MCNC: 2.8 G/DL — LOW (ref 3.3–5)
ALP SERPL-CCNC: 294 U/L — HIGH (ref 40–120)
ALT FLD-CCNC: 37 U/L — SIGNIFICANT CHANGE UP (ref 4–41)
ANION GAP SERPL CALC-SCNC: 13 MMOL/L — SIGNIFICANT CHANGE UP (ref 7–14)
AST SERPL-CCNC: 92 U/L — HIGH (ref 4–40)
BILIRUB SERPL-MCNC: 3.4 MG/DL — HIGH (ref 0.2–1.2)
BLD GP AB SCN SERPL QL: NEGATIVE — SIGNIFICANT CHANGE UP
BUN SERPL-MCNC: 4 MG/DL — LOW (ref 7–23)
CALCIUM SERPL-MCNC: 8.3 MG/DL — LOW (ref 8.4–10.5)
CHLORIDE SERPL-SCNC: 104 MMOL/L — SIGNIFICANT CHANGE UP (ref 98–107)
CO2 SERPL-SCNC: 22 MMOL/L — SIGNIFICANT CHANGE UP (ref 22–31)
CREAT SERPL-MCNC: 0.28 MG/DL — LOW (ref 0.5–1.3)
EGFR: 170 ML/MIN/1.73M2 — SIGNIFICANT CHANGE UP
EGFR: 170 ML/MIN/1.73M2 — SIGNIFICANT CHANGE UP
GLUCOSE SERPL-MCNC: 77 MG/DL — SIGNIFICANT CHANGE UP (ref 70–99)
HCT VFR BLD CALC: 19.3 % — CRITICAL LOW (ref 39–50)
HGB BLD-MCNC: 6 G/DL — CRITICAL LOW (ref 13–17)
LDH SERPL L TO P-CCNC: 723 U/L — HIGH (ref 135–225)
MAGNESIUM SERPL-MCNC: 1.6 MG/DL — SIGNIFICANT CHANGE UP (ref 1.6–2.6)
MCHC RBC-ENTMCNC: 20.4 PG — LOW (ref 27–34)
MCHC RBC-ENTMCNC: 31.1 G/DL — LOW (ref 32–36)
MCV RBC AUTO: 65.6 FL — LOW (ref 80–100)
NRBC # BLD AUTO: 18.42 K/UL — HIGH (ref 0–0)
NRBC # FLD: 18.42 K/UL — HIGH (ref 0–0)
NRBC BLD AUTO-RTO: 99 /100 WBCS — HIGH (ref 0–0)
PHOSPHATE SERPL-MCNC: 2.8 MG/DL — SIGNIFICANT CHANGE UP (ref 2.5–4.5)
PLATELET # BLD AUTO: 285 K/UL — SIGNIFICANT CHANGE UP (ref 150–400)
POTASSIUM SERPL-MCNC: 3.8 MMOL/L — SIGNIFICANT CHANGE UP (ref 3.5–5.3)
POTASSIUM SERPL-SCNC: 3.8 MMOL/L — SIGNIFICANT CHANGE UP (ref 3.5–5.3)
PROT SERPL-MCNC: 7.4 G/DL — SIGNIFICANT CHANGE UP (ref 6–8.3)
RBC # BLD: 2.94 M/UL — LOW (ref 4.2–5.8)
RBC # BLD: 2.94 M/UL — LOW (ref 4.2–5.8)
RBC # FLD: 26.5 % — HIGH (ref 10.3–14.5)
RETICS #: 392.9 K/UL — HIGH (ref 25–125)
RETICS/RBC NFR: 13.3 % — HIGH (ref 0.5–2.5)
RH IG SCN BLD-IMP: POSITIVE — SIGNIFICANT CHANGE UP
SODIUM SERPL-SCNC: 139 MMOL/L — SIGNIFICANT CHANGE UP (ref 135–145)
WBC # BLD: 18.61 K/UL — HIGH (ref 3.8–10.5)
WBC # FLD AUTO: 18.61 K/UL — HIGH (ref 3.8–10.5)

## 2025-03-05 PROCEDURE — 99233 SBSQ HOSP IP/OBS HIGH 50: CPT

## 2025-03-05 RX ORDER — DIPHENHYDRAMINE HCL 12.5MG/5ML
25 ELIXIR ORAL ONCE
Refills: 0 | Status: COMPLETED | OUTPATIENT
Start: 2025-03-05 | End: 2025-03-05

## 2025-03-05 RX ADMIN — Medication 15 MILLIGRAM(S): at 05:28

## 2025-03-05 RX ADMIN — KETOROLAC TROMETHAMINE 15 MILLIGRAM(S): 30 INJECTION, SOLUTION INTRAMUSCULAR; INTRAVENOUS at 15:56

## 2025-03-05 RX ADMIN — Medication 650 MILLIGRAM(S): at 01:19

## 2025-03-05 RX ADMIN — KETOROLAC TROMETHAMINE 15 MILLIGRAM(S): 30 INJECTION, SOLUTION INTRAMUSCULAR; INTRAVENOUS at 14:56

## 2025-03-05 RX ADMIN — GABAPENTIN 300 MILLIGRAM(S): 400 CAPSULE ORAL at 15:01

## 2025-03-05 RX ADMIN — Medication 650 MILLIGRAM(S): at 18:26

## 2025-03-05 RX ADMIN — Medication 30 MILLILITER(S): at 20:16

## 2025-03-05 RX ADMIN — Medication 15 MILLIGRAM(S): at 18:51

## 2025-03-05 RX ADMIN — KETOROLAC TROMETHAMINE 15 MILLIGRAM(S): 30 INJECTION, SOLUTION INTRAMUSCULAR; INTRAVENOUS at 05:28

## 2025-03-05 RX ADMIN — Medication 30 MILLILITER(S): at 08:14

## 2025-03-05 RX ADMIN — HYDROXYUREA 1500 MILLIGRAM(S): 500 CAPSULE ORAL at 07:24

## 2025-03-05 RX ADMIN — Medication 1 APPLICATION(S): at 05:32

## 2025-03-05 RX ADMIN — GABAPENTIN 300 MILLIGRAM(S): 400 CAPSULE ORAL at 02:29

## 2025-03-05 RX ADMIN — FOLIC ACID 1 MILLIGRAM(S): 1 TABLET ORAL at 11:57

## 2025-03-05 RX ADMIN — KETOROLAC TROMETHAMINE 15 MILLIGRAM(S): 30 INJECTION, SOLUTION INTRAMUSCULAR; INTRAVENOUS at 22:25

## 2025-03-05 RX ADMIN — Medication 15 MILLIGRAM(S): at 17:51

## 2025-03-05 RX ADMIN — Medication 15 MILLIGRAM(S): at 06:05

## 2025-03-05 RX ADMIN — Medication 30 MILLILITER(S): at 19:46

## 2025-03-05 RX ADMIN — CYCLOBENZAPRINE HYDROCHLORIDE 5 MILLIGRAM(S): 15 CAPSULE, EXTENDED RELEASE ORAL at 21:46

## 2025-03-05 RX ADMIN — SODIUM CHLORIDE 75 MILLILITER(S): 9 INJECTION, SOLUTION INTRAVENOUS at 19:47

## 2025-03-05 RX ADMIN — KETOROLAC TROMETHAMINE 15 MILLIGRAM(S): 30 INJECTION, SOLUTION INTRAMUSCULAR; INTRAVENOUS at 06:05

## 2025-03-05 RX ADMIN — Medication 25 MILLIGRAM(S): at 18:26

## 2025-03-05 RX ADMIN — KETOROLAC TROMETHAMINE 15 MILLIGRAM(S): 30 INJECTION, SOLUTION INTRAMUSCULAR; INTRAVENOUS at 21:47

## 2025-03-06 LAB
ALBUMIN SERPL ELPH-MCNC: 2.8 G/DL — LOW (ref 3.3–5)
ALP SERPL-CCNC: 298 U/L — HIGH (ref 40–120)
ALT FLD-CCNC: 38 U/L — SIGNIFICANT CHANGE UP (ref 4–41)
ANION GAP SERPL CALC-SCNC: 12 MMOL/L — SIGNIFICANT CHANGE UP (ref 7–14)
AST SERPL-CCNC: 87 U/L — HIGH (ref 4–40)
BILIRUB SERPL-MCNC: 3.2 MG/DL — HIGH (ref 0.2–1.2)
BUN SERPL-MCNC: 8 MG/DL — SIGNIFICANT CHANGE UP (ref 7–23)
CALCIUM SERPL-MCNC: 8.2 MG/DL — LOW (ref 8.4–10.5)
CHLORIDE SERPL-SCNC: 105 MMOL/L — SIGNIFICANT CHANGE UP (ref 98–107)
CO2 SERPL-SCNC: 22 MMOL/L — SIGNIFICANT CHANGE UP (ref 22–31)
CREAT SERPL-MCNC: 0.38 MG/DL — LOW (ref 0.5–1.3)
EGFR: 155 ML/MIN/1.73M2 — SIGNIFICANT CHANGE UP
EGFR: 155 ML/MIN/1.73M2 — SIGNIFICANT CHANGE UP
GLUCOSE SERPL-MCNC: 88 MG/DL — SIGNIFICANT CHANGE UP (ref 70–99)
HAPTOGLOB SERPL-MCNC: <20 MG/DL — LOW (ref 34–200)
HCT VFR BLD CALC: 26 % — LOW (ref 39–50)
HGB BLD-MCNC: 7.9 G/DL — LOW (ref 13–17)
LDH SERPL L TO P-CCNC: 794 U/L — HIGH (ref 135–225)
MAGNESIUM SERPL-MCNC: 1.8 MG/DL — SIGNIFICANT CHANGE UP (ref 1.6–2.6)
MCHC RBC-ENTMCNC: 22.1 PG — LOW (ref 27–34)
MCHC RBC-ENTMCNC: 30.4 G/DL — LOW (ref 32–36)
MCV RBC AUTO: 72.6 FL — LOW (ref 80–100)
NRBC # BLD AUTO: 15.39 K/UL — HIGH (ref 0–0)
NRBC # FLD: 15.39 K/UL — HIGH (ref 0–0)
NRBC BLD AUTO-RTO: 103 /100 WBCS — HIGH (ref 0–0)
PHOSPHATE SERPL-MCNC: 2.9 MG/DL — SIGNIFICANT CHANGE UP (ref 2.5–4.5)
PLATELET # BLD AUTO: 297 K/UL — SIGNIFICANT CHANGE UP (ref 150–400)
POTASSIUM SERPL-MCNC: 3.8 MMOL/L — SIGNIFICANT CHANGE UP (ref 3.5–5.3)
POTASSIUM SERPL-SCNC: 3.8 MMOL/L — SIGNIFICANT CHANGE UP (ref 3.5–5.3)
PROT SERPL-MCNC: 7.5 G/DL — SIGNIFICANT CHANGE UP (ref 6–8.3)
RBC # BLD: 3.58 M/UL — LOW (ref 4.2–5.8)
RBC # BLD: 3.58 M/UL — LOW (ref 4.2–5.8)
RBC # FLD: 30.5 % — HIGH (ref 10.3–14.5)
RETICS #: 59.8 K/UL — SIGNIFICANT CHANGE UP (ref 25–125)
RETICS/RBC NFR: 1.7 % — SIGNIFICANT CHANGE UP (ref 0.5–2.5)
SODIUM SERPL-SCNC: 139 MMOL/L — SIGNIFICANT CHANGE UP (ref 135–145)
WBC # BLD: 14.93 K/UL — HIGH (ref 3.8–10.5)
WBC # FLD AUTO: 14.93 K/UL — HIGH (ref 3.8–10.5)

## 2025-03-06 PROCEDURE — 99232 SBSQ HOSP IP/OBS MODERATE 35: CPT

## 2025-03-06 RX ADMIN — Medication 30 MILLILITER(S): at 08:22

## 2025-03-06 RX ADMIN — Medication 15 MILLIGRAM(S): at 17:25

## 2025-03-06 RX ADMIN — HYDROXYUREA 1500 MILLIGRAM(S): 500 CAPSULE ORAL at 08:22

## 2025-03-06 RX ADMIN — KETOROLAC TROMETHAMINE 15 MILLIGRAM(S): 30 INJECTION, SOLUTION INTRAMUSCULAR; INTRAVENOUS at 05:06

## 2025-03-06 RX ADMIN — GABAPENTIN 300 MILLIGRAM(S): 400 CAPSULE ORAL at 17:25

## 2025-03-06 RX ADMIN — FOLIC ACID 1 MILLIGRAM(S): 1 TABLET ORAL at 11:12

## 2025-03-06 RX ADMIN — GABAPENTIN 300 MILLIGRAM(S): 400 CAPSULE ORAL at 05:06

## 2025-03-06 RX ADMIN — Medication 15 MILLIGRAM(S): at 18:25

## 2025-03-06 RX ADMIN — CYCLOBENZAPRINE HYDROCHLORIDE 5 MILLIGRAM(S): 15 CAPSULE, EXTENDED RELEASE ORAL at 16:18

## 2025-03-06 RX ADMIN — SODIUM CHLORIDE 75 MILLILITER(S): 9 INJECTION, SOLUTION INTRAVENOUS at 09:42

## 2025-03-06 RX ADMIN — Medication 1 APPLICATION(S): at 05:08

## 2025-03-06 RX ADMIN — Medication 15 MILLIGRAM(S): at 05:06

## 2025-03-07 LAB
ALBUMIN SERPL ELPH-MCNC: 2.9 G/DL — LOW (ref 3.3–5)
ALP SERPL-CCNC: 296 U/L — HIGH (ref 40–120)
ALT FLD-CCNC: 35 U/L — SIGNIFICANT CHANGE UP (ref 4–41)
ANION GAP SERPL CALC-SCNC: 11 MMOL/L — SIGNIFICANT CHANGE UP (ref 7–14)
AST SERPL-CCNC: 77 U/L — HIGH (ref 4–40)
BILIRUB SERPL-MCNC: 2.8 MG/DL — HIGH (ref 0.2–1.2)
BUN SERPL-MCNC: 3 MG/DL — LOW (ref 7–23)
CALCIUM SERPL-MCNC: 8.1 MG/DL — LOW (ref 8.4–10.5)
CHLORIDE SERPL-SCNC: 104 MMOL/L — SIGNIFICANT CHANGE UP (ref 98–107)
CO2 SERPL-SCNC: 21 MMOL/L — LOW (ref 22–31)
CREAT SERPL-MCNC: 0.28 MG/DL — LOW (ref 0.5–1.3)
EGFR: 170 ML/MIN/1.73M2 — SIGNIFICANT CHANGE UP
EGFR: 170 ML/MIN/1.73M2 — SIGNIFICANT CHANGE UP
GLUCOSE SERPL-MCNC: 83 MG/DL — SIGNIFICANT CHANGE UP (ref 70–99)
HAPTOGLOB SERPL-MCNC: <20 MG/DL — LOW (ref 34–200)
HCT VFR BLD CALC: 24.7 % — LOW (ref 39–50)
HGB BLD-MCNC: 7.9 G/DL — LOW (ref 13–17)
LDH SERPL L TO P-CCNC: 764 U/L — HIGH (ref 135–225)
MAGNESIUM SERPL-MCNC: 1.7 MG/DL — SIGNIFICANT CHANGE UP (ref 1.6–2.6)
MCHC RBC-ENTMCNC: 22.7 PG — LOW (ref 27–34)
MCHC RBC-ENTMCNC: 32 G/DL — SIGNIFICANT CHANGE UP (ref 32–36)
MCV RBC AUTO: 71 FL — LOW (ref 80–100)
NRBC # BLD AUTO: 12.95 K/UL — HIGH (ref 0–0)
NRBC # FLD: 12.95 K/UL — HIGH (ref 0–0)
NRBC BLD AUTO-RTO: 98 /100 WBCS — HIGH (ref 0–0)
PHOSPHATE SERPL-MCNC: 2.9 MG/DL — SIGNIFICANT CHANGE UP (ref 2.5–4.5)
PLATELET # BLD AUTO: 331 K/UL — SIGNIFICANT CHANGE UP (ref 150–400)
POTASSIUM SERPL-MCNC: 3.7 MMOL/L — SIGNIFICANT CHANGE UP (ref 3.5–5.3)
POTASSIUM SERPL-SCNC: 3.7 MMOL/L — SIGNIFICANT CHANGE UP (ref 3.5–5.3)
PROT SERPL-MCNC: 7.4 G/DL — SIGNIFICANT CHANGE UP (ref 6–8.3)
RBC # BLD: 3.48 M/UL — LOW (ref 4.2–5.8)
RBC # BLD: 3.48 M/UL — LOW (ref 4.2–5.8)
RBC # FLD: 29.6 % — HIGH (ref 10.3–14.5)
RETICS #: 107.2 K/UL — SIGNIFICANT CHANGE UP (ref 25–125)
RETICS/RBC NFR: 3 % — HIGH (ref 0.5–2.5)
SODIUM SERPL-SCNC: 136 MMOL/L — SIGNIFICANT CHANGE UP (ref 135–145)
WBC # BLD: 13.25 K/UL — HIGH (ref 3.8–10.5)
WBC # FLD AUTO: 13.25 K/UL — HIGH (ref 3.8–10.5)

## 2025-03-07 PROCEDURE — 99239 HOSP IP/OBS DSCHRG MGMT >30: CPT

## 2025-03-07 RX ADMIN — Medication 1 APPLICATION(S): at 05:35

## 2025-03-07 RX ADMIN — HYDROXYUREA 1500 MILLIGRAM(S): 500 CAPSULE ORAL at 07:38

## 2025-03-07 RX ADMIN — CYCLOBENZAPRINE HYDROCHLORIDE 5 MILLIGRAM(S): 15 CAPSULE, EXTENDED RELEASE ORAL at 01:00

## 2025-03-07 RX ADMIN — CYCLOBENZAPRINE HYDROCHLORIDE 5 MILLIGRAM(S): 15 CAPSULE, EXTENDED RELEASE ORAL at 23:46

## 2025-03-07 RX ADMIN — SODIUM CHLORIDE 75 MILLILITER(S): 9 INJECTION, SOLUTION INTRAVENOUS at 14:24

## 2025-03-07 RX ADMIN — FOLIC ACID 1 MILLIGRAM(S): 1 TABLET ORAL at 11:33

## 2025-03-07 RX ADMIN — CYCLOBENZAPRINE HYDROCHLORIDE 5 MILLIGRAM(S): 15 CAPSULE, EXTENDED RELEASE ORAL at 11:34

## 2025-03-07 RX ADMIN — Medication 15 MILLIGRAM(S): at 05:31

## 2025-03-07 RX ADMIN — Medication 15 MILLIGRAM(S): at 18:09

## 2025-03-07 RX ADMIN — Medication 30 MILLILITER(S): at 00:47

## 2025-03-07 RX ADMIN — ENOXAPARIN SODIUM 30 MILLIGRAM(S): 100 INJECTION SUBCUTANEOUS at 17:08

## 2025-03-07 RX ADMIN — Medication 15 MILLIGRAM(S): at 17:08

## 2025-03-07 RX ADMIN — Medication 650 MILLIGRAM(S): at 23:46

## 2025-03-07 RX ADMIN — Medication 30 MILLILITER(S): at 20:24

## 2025-03-07 RX ADMIN — GABAPENTIN 300 MILLIGRAM(S): 400 CAPSULE ORAL at 17:08

## 2025-03-07 RX ADMIN — GABAPENTIN 300 MILLIGRAM(S): 400 CAPSULE ORAL at 05:31

## 2025-03-08 VITALS
DIASTOLIC BLOOD PRESSURE: 47 MMHG | HEART RATE: 73 BPM | OXYGEN SATURATION: 97 % | RESPIRATION RATE: 18 BRPM | SYSTOLIC BLOOD PRESSURE: 106 MMHG | TEMPERATURE: 98 F

## 2025-03-08 LAB
ALBUMIN SERPL ELPH-MCNC: 2.9 G/DL — LOW (ref 3.3–5)
ALP SERPL-CCNC: 295 U/L — HIGH (ref 40–120)
ALT FLD-CCNC: 35 U/L — SIGNIFICANT CHANGE UP (ref 4–41)
ANION GAP SERPL CALC-SCNC: 12 MMOL/L — SIGNIFICANT CHANGE UP (ref 7–14)
AST SERPL-CCNC: 85 U/L — HIGH (ref 4–40)
BILIRUB SERPL-MCNC: 2.7 MG/DL — HIGH (ref 0.2–1.2)
BUN SERPL-MCNC: 4 MG/DL — LOW (ref 7–23)
CALCIUM SERPL-MCNC: 8.1 MG/DL — LOW (ref 8.4–10.5)
CHLORIDE SERPL-SCNC: 104 MMOL/L — SIGNIFICANT CHANGE UP (ref 98–107)
CO2 SERPL-SCNC: 21 MMOL/L — LOW (ref 22–31)
CREAT SERPL-MCNC: 0.3 MG/DL — LOW (ref 0.5–1.3)
EGFR: 166 ML/MIN/1.73M2 — SIGNIFICANT CHANGE UP
EGFR: 166 ML/MIN/1.73M2 — SIGNIFICANT CHANGE UP
GLUCOSE SERPL-MCNC: 83 MG/DL — SIGNIFICANT CHANGE UP (ref 70–99)
HAPTOGLOB SERPL-MCNC: <20 MG/DL — LOW (ref 34–200)
HCT VFR BLD CALC: 25.1 % — LOW (ref 39–50)
HGB BLD-MCNC: 7.9 G/DL — LOW (ref 13–17)
LDH SERPL L TO P-CCNC: 805 U/L — HIGH (ref 135–225)
MAGNESIUM SERPL-MCNC: 1.8 MG/DL — SIGNIFICANT CHANGE UP (ref 1.6–2.6)
MCHC RBC-ENTMCNC: 22.6 PG — LOW (ref 27–34)
MCHC RBC-ENTMCNC: 31.5 G/DL — LOW (ref 32–36)
MCV RBC AUTO: 71.7 FL — LOW (ref 80–100)
NRBC # BLD AUTO: 1.24 K/UL — HIGH (ref 0–0)
NRBC # FLD: 1.24 K/UL — HIGH (ref 0–0)
NRBC BLD AUTO-RTO: 12 /100 WBCS — HIGH (ref 0–0)
PHOSPHATE SERPL-MCNC: 3 MG/DL — SIGNIFICANT CHANGE UP (ref 2.5–4.5)
PLATELET # BLD AUTO: 344 K/UL — SIGNIFICANT CHANGE UP (ref 150–400)
POTASSIUM SERPL-MCNC: 3.5 MMOL/L — SIGNIFICANT CHANGE UP (ref 3.5–5.3)
POTASSIUM SERPL-SCNC: 3.5 MMOL/L — SIGNIFICANT CHANGE UP (ref 3.5–5.3)
PROT SERPL-MCNC: 7.7 G/DL — SIGNIFICANT CHANGE UP (ref 6–8.3)
RBC # BLD: 3.5 M/UL — LOW (ref 4.2–5.8)
RBC # BLD: 3.5 M/UL — LOW (ref 4.2–5.8)
RBC # FLD: 30.1 % — HIGH (ref 10.3–14.5)
RETICS #: 103 K/UL — SIGNIFICANT CHANGE UP (ref 25–125)
RETICS/RBC NFR: 3 % — HIGH (ref 0.5–2.5)
SODIUM SERPL-SCNC: 137 MMOL/L — SIGNIFICANT CHANGE UP (ref 135–145)
WBC # BLD: 10.02 K/UL — SIGNIFICANT CHANGE UP (ref 3.8–10.5)
WBC # FLD AUTO: 10.02 K/UL — SIGNIFICANT CHANGE UP (ref 3.8–10.5)

## 2025-03-08 PROCEDURE — 99231 SBSQ HOSP IP/OBS SF/LOW 25: CPT

## 2025-03-08 RX ORDER — KETOROLAC TROMETHAMINE 30 MG/ML
15 INJECTION, SOLUTION INTRAMUSCULAR; INTRAVENOUS EVERY 8 HOURS
Refills: 0 | Status: DISCONTINUED | OUTPATIENT
Start: 2025-03-08 | End: 2025-03-08

## 2025-03-08 RX ORDER — NALOXONE HYDROCHLORIDE 0.4 MG/ML
1 INJECTION, SOLUTION INTRAMUSCULAR; INTRAVENOUS; SUBCUTANEOUS
Qty: 1 | Refills: 0
Start: 2025-03-08 | End: 2025-03-10

## 2025-03-08 RX ORDER — PENICILLIN V POTASSIUM 500 MG
1 TABLET ORAL
Refills: 0 | DISCHARGE

## 2025-03-08 RX ORDER — IBUPROFEN 200 MG
1 TABLET ORAL
Refills: 0 | DISCHARGE

## 2025-03-08 RX ORDER — HYDROXYUREA 500 MG/1
3 CAPSULE ORAL
Qty: 0 | Refills: 0 | DISCHARGE
Start: 2025-03-08

## 2025-03-08 RX ORDER — HYDROXYUREA 500 MG/1
2 CAPSULE ORAL
Qty: 0 | Refills: 0 | DISCHARGE
Start: 2025-03-08

## 2025-03-08 RX ORDER — DEFERIPRONE 1000 MG/1
2 TABLET, COATED ORAL
Refills: 0 | DISCHARGE

## 2025-03-08 RX ORDER — ACETAMINOPHEN 500 MG/5ML
2 LIQUID (ML) ORAL
Qty: 0 | Refills: 0 | DISCHARGE
Start: 2025-03-08

## 2025-03-08 RX ORDER — DEFERASIROX 90 MG/1
2 TABLET, FILM COATED ORAL
Refills: 0 | DISCHARGE

## 2025-03-08 RX ORDER — SENNA 187 MG
2 TABLET ORAL
Qty: 0 | Refills: 0 | DISCHARGE
Start: 2025-03-08

## 2025-03-08 RX ORDER — CYCLOBENZAPRINE HYDROCHLORIDE 15 MG/1
1 CAPSULE, EXTENDED RELEASE ORAL
Qty: 9 | Refills: 0
Start: 2025-03-08 | End: 2025-03-10

## 2025-03-08 RX ADMIN — Medication 30 MILLILITER(S): at 08:17

## 2025-03-08 RX ADMIN — GABAPENTIN 300 MILLIGRAM(S): 400 CAPSULE ORAL at 04:58

## 2025-03-08 RX ADMIN — KETOROLAC TROMETHAMINE 15 MILLIGRAM(S): 30 INJECTION, SOLUTION INTRAMUSCULAR; INTRAVENOUS at 09:39

## 2025-03-08 RX ADMIN — FOLIC ACID 1 MILLIGRAM(S): 1 TABLET ORAL at 12:02

## 2025-03-08 RX ADMIN — HYDROXYUREA 1000 MILLIGRAM(S): 500 CAPSULE ORAL at 07:36

## 2025-03-08 RX ADMIN — Medication 15 MILLIGRAM(S): at 09:39

## 2025-03-08 RX ADMIN — Medication 15 MILLIGRAM(S): at 06:17

## 2025-03-08 RX ADMIN — KETOROLAC TROMETHAMINE 15 MILLIGRAM(S): 30 INJECTION, SOLUTION INTRAMUSCULAR; INTRAVENOUS at 16:16

## 2025-03-08 RX ADMIN — Medication 15 MILLIGRAM(S): at 05:46

## 2025-03-08 RX ADMIN — KETOROLAC TROMETHAMINE 15 MILLIGRAM(S): 30 INJECTION, SOLUTION INTRAMUSCULAR; INTRAVENOUS at 15:45

## 2025-03-08 RX ADMIN — Medication 15 MILLIGRAM(S): at 17:18

## 2025-03-08 RX ADMIN — Medication 15 MILLIGRAM(S): at 18:06

## 2025-03-08 RX ADMIN — Medication 1 APPLICATION(S): at 05:48

## 2025-03-08 RX ADMIN — Medication 650 MILLIGRAM(S): at 00:23

## 2025-03-08 RX ADMIN — Medication 30 MILLILITER(S): at 02:40

## 2025-03-08 RX ADMIN — KETOROLAC TROMETHAMINE 15 MILLIGRAM(S): 30 INJECTION, SOLUTION INTRAMUSCULAR; INTRAVENOUS at 10:39

## 2025-03-08 RX ADMIN — GABAPENTIN 300 MILLIGRAM(S): 400 CAPSULE ORAL at 15:47

## 2025-03-08 RX ADMIN — CYCLOBENZAPRINE HYDROCHLORIDE 5 MILLIGRAM(S): 15 CAPSULE, EXTENDED RELEASE ORAL at 04:57

## 2025-03-10 ENCOUNTER — APPOINTMENT (OUTPATIENT)
Dept: INTERNAL MEDICINE | Facility: CLINIC | Age: 30
End: 2025-03-10
Payer: MEDICAID

## 2025-03-10 ENCOUNTER — NON-APPOINTMENT (OUTPATIENT)
Age: 30
End: 2025-03-10

## 2025-03-10 ENCOUNTER — OUTPATIENT (OUTPATIENT)
Dept: OUTPATIENT SERVICES | Facility: HOSPITAL | Age: 30
LOS: 1 days | End: 2025-03-10

## 2025-03-10 VITALS
OXYGEN SATURATION: 98 % | WEIGHT: 98 LBS | HEART RATE: 101 BPM | SYSTOLIC BLOOD PRESSURE: 102 MMHG | HEIGHT: 65 IN | BODY MASS INDEX: 16.33 KG/M2 | DIASTOLIC BLOOD PRESSURE: 64 MMHG

## 2025-03-10 DIAGNOSIS — Z90.81 ACQUIRED ABSENCE OF SPLEEN: Chronic | ICD-10-CM

## 2025-03-10 DIAGNOSIS — E83.19 OTHER DISORDERS OF IRON METABOLISM: ICD-10-CM

## 2025-03-10 DIAGNOSIS — Z98.890 OTHER SPECIFIED POSTPROCEDURAL STATES: Chronic | ICD-10-CM

## 2025-03-10 DIAGNOSIS — D57.42 SICKLE-CELL THALASSEMIA BETA ZERO WITHOUT CRISIS: ICD-10-CM

## 2025-03-10 DIAGNOSIS — G89.4 CHRONIC PAIN SYNDROME: ICD-10-CM

## 2025-03-10 DIAGNOSIS — R79.89 OTHER SPECIFIED ABNORMAL FINDINGS OF BLOOD CHEMISTRY: ICD-10-CM

## 2025-03-10 PROCEDURE — 99214 OFFICE O/P EST MOD 30 MIN: CPT

## 2025-03-10 PROCEDURE — G2211 COMPLEX E/M VISIT ADD ON: CPT | Mod: NC

## 2025-03-10 RX ORDER — CYCLOBENZAPRINE HYDROCHLORIDE 5 MG/1
5 TABLET, FILM COATED ORAL
Qty: 60 | Refills: 3 | Status: ACTIVE | COMMUNITY
Start: 2025-03-10 | End: 1900-01-01

## 2025-03-11 ENCOUNTER — NON-APPOINTMENT (OUTPATIENT)
Age: 30
End: 2025-03-11

## 2025-03-12 DIAGNOSIS — R79.89 OTHER SPECIFIED ABNORMAL FINDINGS OF BLOOD CHEMISTRY: ICD-10-CM

## 2025-03-12 DIAGNOSIS — G89.4 CHRONIC PAIN SYNDROME: ICD-10-CM

## 2025-03-12 DIAGNOSIS — D57.42 SICKLE-CELL THALASSEMIA BETA ZERO WITHOUT CRISIS: ICD-10-CM

## 2025-03-12 DIAGNOSIS — E83.19 OTHER DISORDERS OF IRON METABOLISM: ICD-10-CM

## 2025-04-01 ENCOUNTER — APPOINTMENT (OUTPATIENT)
Dept: ENDOCRINOLOGY | Facility: CLINIC | Age: 30
End: 2025-04-01

## 2025-04-25 ENCOUNTER — NON-APPOINTMENT (OUTPATIENT)
Age: 30
End: 2025-04-25

## 2025-04-25 ENCOUNTER — APPOINTMENT (OUTPATIENT)
Dept: OPHTHALMOLOGY | Facility: CLINIC | Age: 30
End: 2025-04-25
Payer: MEDICAID

## 2025-04-25 PROCEDURE — 92025 CPTRIZED CORNEAL TOPOGRAPHY: CPT

## 2025-04-25 PROCEDURE — 92014 COMPRE OPH EXAM EST PT 1/>: CPT

## 2025-04-25 PROCEDURE — 92133 CPTRZD OPH DX IMG PST SGM ON: CPT

## 2025-05-12 ENCOUNTER — NON-APPOINTMENT (OUTPATIENT)
Age: 30
End: 2025-05-12

## 2025-05-12 ENCOUNTER — OUTPATIENT (OUTPATIENT)
Dept: OUTPATIENT SERVICES | Facility: HOSPITAL | Age: 30
LOS: 1 days | End: 2025-05-12

## 2025-05-12 ENCOUNTER — LABORATORY RESULT (OUTPATIENT)
Age: 30
End: 2025-05-12

## 2025-05-12 ENCOUNTER — APPOINTMENT (OUTPATIENT)
Dept: INTERNAL MEDICINE | Facility: CLINIC | Age: 30
End: 2025-05-12
Payer: MEDICAID

## 2025-05-12 VITALS
HEIGHT: 65 IN | HEART RATE: 102 BPM | OXYGEN SATURATION: 96 % | BODY MASS INDEX: 16.16 KG/M2 | WEIGHT: 97 LBS | DIASTOLIC BLOOD PRESSURE: 75 MMHG | SYSTOLIC BLOOD PRESSURE: 115 MMHG

## 2025-05-12 DIAGNOSIS — G89.4 CHRONIC PAIN SYNDROME: ICD-10-CM

## 2025-05-12 DIAGNOSIS — R79.89 OTHER SPECIFIED ABNORMAL FINDINGS OF BLOOD CHEMISTRY: ICD-10-CM

## 2025-05-12 DIAGNOSIS — D57.42 SICKLE-CELL THALASSEMIA BETA ZERO WITHOUT CRISIS: ICD-10-CM

## 2025-05-12 DIAGNOSIS — E83.19 OTHER DISORDERS OF IRON METABOLISM: ICD-10-CM

## 2025-05-12 DIAGNOSIS — D57.00 HB-SS DISEASE WITH CRISIS, UNSPECIFIED: ICD-10-CM

## 2025-05-12 DIAGNOSIS — Z90.81 ACQUIRED ABSENCE OF SPLEEN: Chronic | ICD-10-CM

## 2025-05-12 DIAGNOSIS — Z98.890 OTHER SPECIFIED POSTPROCEDURAL STATES: Chronic | ICD-10-CM

## 2025-05-12 PROCEDURE — 99214 OFFICE O/P EST MOD 30 MIN: CPT

## 2025-05-12 PROCEDURE — G2211 COMPLEX E/M VISIT ADD ON: CPT | Mod: NC

## 2025-05-12 RX ORDER — COLD-HOT PACK
125 MCG EACH MISCELLANEOUS DAILY
Qty: 30 | Refills: 6 | Status: ACTIVE | COMMUNITY
Start: 2025-05-12 | End: 1900-01-01

## 2025-05-12 RX ORDER — VITAMIN K2 90 MCG
125 MCG CAPSULE ORAL
Qty: 30 | Refills: 6 | Status: ACTIVE | COMMUNITY
Start: 2025-05-12 | End: 1900-01-01

## 2025-05-18 LAB
25(OH)D3 SERPL-MCNC: 41.4 NG/ML
ALBUMIN SERPL ELPH-MCNC: 3.5 G/DL
ALP BLD-CCNC: 408 U/L
ALT SERPL-CCNC: 71 U/L
ANION GAP SERPL CALC-SCNC: 12 MMOL/L
AST SERPL-CCNC: 200 U/L
BASOPHILS # BLD AUTO: 0.12 K/UL
BASOPHILS NFR BLD AUTO: 0.6 %
BILIRUB SERPL-MCNC: 5 MG/DL
BUN SERPL-MCNC: 8 MG/DL
CALCIUM SERPL-MCNC: 8.5 MG/DL
CHLORIDE SERPL-SCNC: 100 MMOL/L
CO2 SERPL-SCNC: 24 MMOL/L
CREAT SERPL-MCNC: 0.41 MG/DL
EGFRCR SERPLBLD CKD-EPI 2021: 150 ML/MIN/1.73M2
EOSINOPHIL # BLD AUTO: 0.02 K/UL
EOSINOPHIL NFR BLD AUTO: 0.1 %
FERRITIN SERPL-MCNC: 3188 NG/ML
GLUCOSE SERPL-MCNC: 95 MG/DL
HCT VFR BLD CALC: 24.8 %
HGB A MFR BLD: 9.3 %
HGB A2 MFR BLD: 2.9 %
HGB BLD-MCNC: 7.6 G/DL
HGB F MFR BLD: 3 %
HGB FRACT BLD-IMP: NORMAL
HGB OTHER MFR BLD ELPH: 2.3 %
HGB S MFR BLD: 82.5 %
IMM GRANULOCYTES NFR BLD AUTO: 1.6 %
LYMPHOCYTES # BLD AUTO: 6.15 K/UL
LYMPHOCYTES NFR BLD AUTO: 33 %
MAN DIFF?: NORMAL
MCHC RBC-ENTMCNC: 21.6 PG
MCHC RBC-ENTMCNC: 30.6 G/DL
MCV RBC AUTO: 70.5 FL
MONOCYTES # BLD AUTO: 2.24 K/UL
MONOCYTES NFR BLD AUTO: 12 %
NEUTROPHILS # BLD AUTO: 9.79 K/UL
NEUTROPHILS NFR BLD AUTO: 52.7 %
PLATELET # BLD AUTO: 270 K/UL
PMV BLD AUTO: 100 /100 WBCS
POTASSIUM SERPL-SCNC: 4.9 MMOL/L
PROT SERPL-MCNC: 8.9 G/DL
RBC # BLD: 3.52 M/UL
RBC # BLD: 3.52 M/UL
RBC # FLD: 24.5 %
RETICS # AUTO: 8.1 %
RETICS AGGREG/RBC NFR: 280 K/UL
SODIUM SERPL-SCNC: 136 MMOL/L
WBC # FLD AUTO: 18.62 K/UL

## 2025-05-19 DIAGNOSIS — R79.89 OTHER SPECIFIED ABNORMAL FINDINGS OF BLOOD CHEMISTRY: ICD-10-CM

## 2025-05-19 DIAGNOSIS — D57.00 HB-SS DISEASE WITH CRISIS, UNSPECIFIED: ICD-10-CM

## 2025-05-19 DIAGNOSIS — E83.19 OTHER DISORDERS OF IRON METABOLISM: ICD-10-CM

## 2025-05-19 DIAGNOSIS — D57.42 SICKLE-CELL THALASSEMIA BETA ZERO WITHOUT CRISIS: ICD-10-CM

## 2025-05-19 DIAGNOSIS — G89.4 CHRONIC PAIN SYNDROME: ICD-10-CM

## 2025-07-22 ENCOUNTER — NON-APPOINTMENT (OUTPATIENT)
Age: 30
End: 2025-07-22

## 2025-07-22 ENCOUNTER — APPOINTMENT (OUTPATIENT)
Dept: INTERNAL MEDICINE | Facility: CLINIC | Age: 30
End: 2025-07-22
Payer: MEDICAID

## 2025-07-22 ENCOUNTER — OUTPATIENT (OUTPATIENT)
Dept: OUTPATIENT SERVICES | Facility: HOSPITAL | Age: 30
LOS: 1 days | End: 2025-07-22
Payer: MEDICAID

## 2025-07-22 VITALS
HEART RATE: 90 BPM | DIASTOLIC BLOOD PRESSURE: 80 MMHG | SYSTOLIC BLOOD PRESSURE: 114 MMHG | RESPIRATION RATE: 18 BRPM | HEIGHT: 65 IN | OXYGEN SATURATION: 99 % | BODY MASS INDEX: 16.16 KG/M2 | WEIGHT: 97 LBS | TEMPERATURE: 98.4 F

## 2025-07-22 DIAGNOSIS — D57.00 HB-SS DISEASE WITH CRISIS, UNSPECIFIED: ICD-10-CM

## 2025-07-22 DIAGNOSIS — Z98.890 OTHER SPECIFIED POSTPROCEDURAL STATES: Chronic | ICD-10-CM

## 2025-07-22 DIAGNOSIS — F41.9 ANXIETY DISORDER, UNSPECIFIED: ICD-10-CM

## 2025-07-22 DIAGNOSIS — E83.19 OTHER DISORDERS OF IRON METABOLISM: ICD-10-CM

## 2025-07-22 DIAGNOSIS — R52 PAIN, UNSPECIFIED: ICD-10-CM

## 2025-07-22 DIAGNOSIS — R79.89 OTHER SPECIFIED ABNORMAL FINDINGS OF BLOOD CHEMISTRY: ICD-10-CM

## 2025-07-22 DIAGNOSIS — Z00.00 ENCOUNTER FOR GENERAL ADULT MEDICAL EXAMINATION WITHOUT ABNORMAL FINDINGS: ICD-10-CM

## 2025-07-22 DIAGNOSIS — D57.42 SICKLE-CELL THALASSEMIA BETA ZERO WITHOUT CRISIS: ICD-10-CM

## 2025-07-22 DIAGNOSIS — Z90.81 ACQUIRED ABSENCE OF SPLEEN: Chronic | ICD-10-CM

## 2025-07-22 PROCEDURE — 99214 OFFICE O/P EST MOD 30 MIN: CPT

## 2025-07-22 PROCEDURE — G2211 COMPLEX E/M VISIT ADD ON: CPT | Mod: NC

## 2025-07-22 PROCEDURE — G0463: CPT

## 2025-07-23 DIAGNOSIS — R52 PAIN, UNSPECIFIED: ICD-10-CM

## 2025-07-23 DIAGNOSIS — E83.19 OTHER DISORDERS OF IRON METABOLISM: ICD-10-CM

## 2025-07-23 DIAGNOSIS — D57.42 SICKLE-CELL THALASSEMIA BETA ZERO WITHOUT CRISIS: ICD-10-CM

## 2025-07-23 DIAGNOSIS — D57.00 HB-SS DISEASE WITH CRISIS, UNSPECIFIED: ICD-10-CM

## 2025-07-23 DIAGNOSIS — R79.89 OTHER SPECIFIED ABNORMAL FINDINGS OF BLOOD CHEMISTRY: ICD-10-CM

## 2025-08-19 ENCOUNTER — NON-APPOINTMENT (OUTPATIENT)
Age: 30
End: 2025-08-19

## 2025-08-28 ENCOUNTER — APPOINTMENT (OUTPATIENT)
Dept: INTERNAL MEDICINE | Facility: CLINIC | Age: 30
End: 2025-08-28
Payer: MEDICAID

## 2025-08-28 ENCOUNTER — NON-APPOINTMENT (OUTPATIENT)
Age: 30
End: 2025-08-28

## 2025-08-28 ENCOUNTER — APPOINTMENT (OUTPATIENT)
Dept: INTERNAL MEDICINE | Facility: CLINIC | Age: 30
End: 2025-08-28

## 2025-08-28 ENCOUNTER — OUTPATIENT (OUTPATIENT)
Dept: OUTPATIENT SERVICES | Facility: HOSPITAL | Age: 30
LOS: 1 days | End: 2025-08-28
Payer: MEDICAID

## 2025-08-28 VITALS
OXYGEN SATURATION: 98 % | SYSTOLIC BLOOD PRESSURE: 127 MMHG | WEIGHT: 96 LBS | RESPIRATION RATE: 18 BRPM | DIASTOLIC BLOOD PRESSURE: 75 MMHG | HEIGHT: 65 IN | HEART RATE: 98 BPM | BODY MASS INDEX: 15.99 KG/M2 | TEMPERATURE: 98.6 F

## 2025-08-28 DIAGNOSIS — G89.4 CHRONIC PAIN SYNDROME: ICD-10-CM

## 2025-08-28 DIAGNOSIS — Z98.890 OTHER SPECIFIED POSTPROCEDURAL STATES: Chronic | ICD-10-CM

## 2025-08-28 DIAGNOSIS — E55.9 VITAMIN D DEFICIENCY, UNSPECIFIED: ICD-10-CM

## 2025-08-28 DIAGNOSIS — D57.42 SICKLE-CELL THALASSEMIA BETA ZERO WITHOUT CRISIS: ICD-10-CM

## 2025-08-28 DIAGNOSIS — Z90.81 ACQUIRED ABSENCE OF SPLEEN: Chronic | ICD-10-CM

## 2025-08-28 DIAGNOSIS — Z00.00 ENCOUNTER FOR GENERAL ADULT MEDICAL EXAMINATION WITHOUT ABNORMAL FINDINGS: ICD-10-CM

## 2025-08-28 DIAGNOSIS — D57.00 HB-SS DISEASE WITH CRISIS, UNSPECIFIED: ICD-10-CM

## 2025-08-28 DIAGNOSIS — R79.89 OTHER SPECIFIED ABNORMAL FINDINGS OF BLOOD CHEMISTRY: ICD-10-CM

## 2025-08-28 PROCEDURE — 99214 OFFICE O/P EST MOD 30 MIN: CPT | Mod: GC

## 2025-08-28 PROCEDURE — G0463: CPT

## 2025-08-28 RX ORDER — ATOVAQUONE AND PROGUANIL HYDROCHLORIDE 250; 100 MG/1; MG/1
250-100 TABLET, FILM COATED ORAL DAILY
Qty: 23 | Refills: 0 | Status: ACTIVE | COMMUNITY
Start: 2025-08-28 | End: 1900-01-01

## 2025-08-29 PROBLEM — E55.9 VITAMIN D DEFICIENCY: Status: ACTIVE | Noted: 2025-08-29

## 2025-09-02 ENCOUNTER — APPOINTMENT (OUTPATIENT)
Dept: ENDOCRINOLOGY | Facility: CLINIC | Age: 30
End: 2025-09-02

## 2025-09-04 DIAGNOSIS — G89.4 CHRONIC PAIN SYNDROME: ICD-10-CM

## 2025-09-04 DIAGNOSIS — D57.42 SICKLE-CELL THALASSEMIA BETA ZERO WITHOUT CRISIS: ICD-10-CM

## 2025-09-04 DIAGNOSIS — E55.9 VITAMIN D DEFICIENCY, UNSPECIFIED: ICD-10-CM

## 2025-09-04 DIAGNOSIS — D57.00 HB-SS DISEASE WITH CRISIS, UNSPECIFIED: ICD-10-CM

## 2025-09-04 DIAGNOSIS — R79.89 OTHER SPECIFIED ABNORMAL FINDINGS OF BLOOD CHEMISTRY: ICD-10-CM
